# Patient Record
Sex: MALE | Race: WHITE | Employment: OTHER | URBAN - METROPOLITAN AREA
[De-identification: names, ages, dates, MRNs, and addresses within clinical notes are randomized per-mention and may not be internally consistent; named-entity substitution may affect disease eponyms.]

---

## 2018-03-13 PROBLEM — R73.02 GLUCOSE INTOLERANCE (IMPAIRED GLUCOSE TOLERANCE): Chronic | Status: ACTIVE | Noted: 2018-03-13

## 2018-03-13 PROBLEM — M10.9 GOUT: Chronic | Status: ACTIVE | Noted: 2018-03-13

## 2018-03-13 PROBLEM — E78.2 MIXED HYPERLIPIDEMIA: Chronic | Status: ACTIVE | Noted: 2018-03-13

## 2018-03-13 PROBLEM — Z87.442 HISTORY OF RENAL STONE: Chronic | Status: ACTIVE | Noted: 2018-03-13

## 2018-03-13 PROBLEM — Z87.442 HISTORY OF RENAL STONE: Status: ACTIVE | Noted: 2018-03-13

## 2018-03-13 PROBLEM — N18.30 CKD (CHRONIC KIDNEY DISEASE) STAGE 3, GFR 30-59 ML/MIN (HCC): Chronic | Status: ACTIVE | Noted: 2018-03-13

## 2018-03-13 PROBLEM — I12.9 BENIGN HYPERTENSIVE KIDNEY DISEASE WITH CHRONIC KIDNEY DISEASE: Chronic | Status: ACTIVE | Noted: 2018-03-13

## 2018-06-22 PROBLEM — E79.0 HYPERURICEMIA: Chronic | Status: ACTIVE | Noted: 2018-06-22

## 2018-07-21 ENCOUNTER — HOSPITAL ENCOUNTER (EMERGENCY)
Age: 78
Discharge: HOME | End: 2018-07-21
Attending: EMERGENCY MEDICINE
Payer: MEDICARE

## 2018-07-21 VITALS
HEART RATE: 75 BPM | DIASTOLIC BLOOD PRESSURE: 84 MMHG | HEIGHT: 66 IN | BODY MASS INDEX: 29.57 KG/M2 | WEIGHT: 184 LBS | TEMPERATURE: 97.6 F | RESPIRATION RATE: 20 BRPM | OXYGEN SATURATION: 97 % | SYSTOLIC BLOOD PRESSURE: 147 MMHG

## 2018-07-21 DIAGNOSIS — M54.41 CHRONIC RIGHT-SIDED LOW BACK PAIN WITH RIGHT-SIDED SCIATICA: Primary | ICD-10-CM

## 2018-07-21 DIAGNOSIS — G89.29 CHRONIC RIGHT-SIDED LOW BACK PAIN WITH RIGHT-SIDED SCIATICA: Primary | ICD-10-CM

## 2018-07-21 PROCEDURE — 99282 EMERGENCY DEPT VISIT SF MDM: CPT | Performed by: EMERGENCY MEDICINE

## 2018-07-21 PROCEDURE — 74011250636 HC RX REV CODE- 250/636: Performed by: NURSE PRACTITIONER

## 2018-07-21 PROCEDURE — 96372 THER/PROPH/DIAG INJ SC/IM: CPT | Performed by: EMERGENCY MEDICINE

## 2018-07-21 RX ORDER — KETOROLAC TROMETHAMINE 30 MG/ML
30 INJECTION, SOLUTION INTRAMUSCULAR; INTRAVENOUS
Status: COMPLETED | OUTPATIENT
Start: 2018-07-21 | End: 2018-07-21

## 2018-07-21 RX ORDER — MELOXICAM 15 MG/1
15 TABLET ORAL DAILY
Qty: 15 TAB | Refills: 0 | Status: SHIPPED | OUTPATIENT
Start: 2018-07-21 | End: 2018-08-29 | Stop reason: CLARIF

## 2018-07-21 RX ORDER — DEXAMETHASONE SODIUM PHOSPHATE 4 MG/ML
8 INJECTION, SOLUTION INTRA-ARTICULAR; INTRALESIONAL; INTRAMUSCULAR; INTRAVENOUS; SOFT TISSUE
Status: DISCONTINUED | OUTPATIENT
Start: 2018-07-21 | End: 2018-07-21

## 2018-07-21 RX ADMIN — METHYLPREDNISOLONE SODIUM SUCCINATE 125 MG: 125 INJECTION, POWDER, FOR SOLUTION INTRAMUSCULAR; INTRAVENOUS at 11:58

## 2018-07-21 RX ADMIN — KETOROLAC TROMETHAMINE 30 MG: 30 INJECTION, SOLUTION INTRAMUSCULAR at 11:55

## 2018-07-21 NOTE — ED TRIAGE NOTES
Pt states having right leg pain from the lower back all the way to the foot for the past 4 days. Denies any injury. Pt has hx of sciatica.

## 2018-07-21 NOTE — ED PROVIDER NOTES
Patient is a 65 y.o. male presenting with leg pain. The history is provided by the patient. No  was used. Leg Pain    This is a chronic problem. The current episode started more than 1 week ago. The problem occurs constantly. The problem has not changed since onset. Pain location: right buttock radiating down  his entire right thigh. Pertinent negatives include no numbness. This patient presents to the ED with a c/o an acute exacerbation of his chronic back pain, and RLE sciatica onset over the last couple weeks. He states that he sees [facility] for this pain, and states that he has in the past received several steroid injections, and states that this helped him significantly, but that he has not been able to receive a steroid injection in the last several months, and states that his pain has gotten significant worse. He states that he has had discussions regarding surgical intervention with the orthopedist, but states that he does not want to have surgery done. He presented to day to try to obtain  Pain control or relief from his chronic pain.     Past Medical History:   Diagnosis Date    Calculus of kidney 12/23/2013    Chronic kidney disease, stage III (moderate) 12/23/2013    Fracture of coccyx ([facility] [address] 1/14/2016    Gout, unspecified 12/23/2013    History of left shoulder fracture 1/14/2016    Hyperlipidemia 1/14/2016    Microscopic hematuria 12/23/2013    Sciatica of right side 1/14/2016    Unspecified essential hypertension 12/23/2013    Urinary tract infection, site not specified 12/23/2013       Past Surgical History:   Procedure Laterality Date    HX LITHOTRIPSY      HX SHOULDER REPLACEMENT  12/2014    left shoulder    TOTAL HIP ARTHROPLASTY           Family History:   Problem Relation Age of Onset    No Known Problems Mother     No Known Problems Father     No Known Problems Maternal Grandmother     No Known Problems Maternal Grandfather     No Known Problems Paternal Grandmother     No Known Problems Paternal Grandfather        Social History     Social History    Marital status:      Spouse name: N/A    Number of children: N/A    Years of education: N/A     Occupational History    Not on file. Social History Main Topics    Smoking status: Never Smoker    Smokeless tobacco: Never Used    Alcohol use No    Drug use: No    Sexual activity: Not Currently     Other Topics Concern    Not on file     Social History Narrative         ALLERGIES: Clonidine and Simvastatin    Review of Systems   Constitutional: Negative for chills and fever. Gastrointestinal: Negative for nausea and vomiting. Musculoskeletal: Negative for arthralgias and joint swelling. Skin: Negative for rash and wound. Neurological: Negative for dizziness and numbness. Vitals:    07/21/18 1123   BP: 147/84   Pulse: 75   Resp: 20   Temp: 97.6 °F (36.4 °C)   SpO2: 97%   Weight: 83.5 kg (184 lb)   Height: 5' 6\" (1.676 m)            Physical Exam   Constitutional: He is oriented to person, place, and time. He appears well-developed and well-nourished. No distress. HENT:   Head: Normocephalic and atraumatic. Cardiovascular: Intact distal pulses. Pulses:       Dorsalis pedis pulses are 2+ on the right side, and 2+ on the left side. Posterior tibial pulses are 2+ on the right side, and 2+ on the left side. Pulmonary/Chest: Effort normal. No respiratory distress. Musculoskeletal:   Mild, diffuse tenderness to palpation along the right lateral aspect of the back. No midline tenderness, bony crepitus, deformity, or bruising or swelling noted. No tenderness to palpation of the posterior thigh. Neurological: He is alert and oriented to person, place, and time. Skin: Skin is warm and dry. No erythema.         MDM  Number of Diagnoses or Management Options  Chronic right-sided low back pain with right-sided sciatica: established and worsening  Diagnosis management comments: Given the chronicity of the patient's symptoms, and the fact that he has not had steroid injection sometime, we will give him a systemic steroid injection and intramuscularly today, and will provide him some anti-inflammatory relief. I recommended that he try using exercises, which I provided in his paperwork. I do not believe that imaging would provide additional diagnostic benefit at this time. I also recommended that he follow up with [address] [place] [address] again, which his next appointment is scheduled for 731. I did tell him that he could try calling their office, and letting them know that he had to be seen in the ED for this. The patient and the family both expressed full understanding and compliance with this plan.     Risk of Complications, Morbidity, and/or Mortality  Presenting problems: minimal  Diagnostic procedures: minimal  Management options: minimal    Patient Progress  Patient progress: stable        ED Course       Procedures

## 2018-07-21 NOTE — DISCHARGE INSTRUCTIONS
Low Back Pain: Exercises  Your Care Instructions  Here are some examples of typical rehabilitation exercises for your condition. Start each exercise slowly. Ease off the exercise if you start to have pain. Your doctor or physical therapist will tell you when you can start these exercises and which ones will work best for you. How to do the exercises  Press-up    1. Lie on your stomach, supporting your body with your forearms. 2. Press your elbows down into the floor to raise your upper back. As you do this, relax your stomach muscles and allow your back to arch without using your back muscles. As your press up, do not let your hips or pelvis come off the floor. 3. Hold for 15 to 30 seconds, then relax. 4. Repeat 2 to 4 times. Alternate arm and leg (bird dog) exercise    Do this exercise slowly. Try to keep your body straight at all times, and do not let one hip drop lower than the other. 1. Start on the floor, on your hands and knees. 2. Tighten your belly muscles. 3. Raise one leg off the floor, and hold it straight out behind you. Be careful not to let your hip drop down, because that will twist your trunk. 4. Hold for about 6 seconds, then lower your leg and switch to the other leg. 5. Repeat 8 to 12 times on each leg. 6. Over time, work up to holding for 10 to 30 seconds each time. 7. If you feel stable and secure with your leg raised, try raising the opposite arm straight out in front of you at the same time. Knee-to-chest exercise    1. Lie on your back with your knees bent and your feet flat on the floor. 2. Bring one knee to your chest, keeping the other foot flat on the floor (or keeping the other leg straight, whichever feels better on your lower back). 3. Keep your lower back pressed to the floor. Hold for at least 15 to 30 seconds. 4. Relax, and lower the knee to the starting position. 5. Repeat with the other leg. Repeat 2 to 4 times with each leg.   6. To get more stretch, put your other leg flat on the floor while pulling your knee to your chest.  Curl-ups    1. Lie on the floor on your back with your knees bent at a 90-degree angle. Your feet should be flat on the floor, about 12 inches from your buttocks. 2. Cross your arms over your chest. If this bothers your neck, try putting your hands behind your neck (not your head), with your elbows spread apart. 3. Slowly tighten your belly muscles and raise your shoulder blades off the floor. 4. Keep your head in line with your body, and do not press your chin to your chest.  5. Hold this position for 1 or 2 seconds, then slowly lower yourself back down to the floor. 6. Repeat 8 to 12 times. Pelvic tilt exercise    1. Lie on your back with your knees bent. 2. \"Brace\" your stomach. This means to tighten your muscles by pulling in and imagining your belly button moving toward your spine. You should feel like your back is pressing to the floor and your hips and pelvis are rocking back. 3. Hold for about 6 seconds while you breathe smoothly. 4. Repeat 8 to 12 times. Heel dig bridging    1. Lie on your back with both knees bent and your ankles bent so that only your heels are digging into the floor. Your knees should be bent about 90 degrees. 2. Then push your heels into the floor, squeeze your buttocks, and lift your hips off the floor until your shoulders, hips, and knees are all in a straight line. 3. Hold for about 6 seconds as you continue to breathe normally, and then slowly lower your hips back down to the floor and rest for up to 10 seconds. 4. Do 8 to 12 repetitions. Hamstring stretch in doorway    1. Lie on your back in a doorway, with one leg through the open door. 2. Slide your leg up the wall to straighten your knee. You should feel a gentle stretch down the back of your leg. 3. Hold the stretch for at least 15 to 30 seconds. Do not arch your back, point your toes, or bend either knee.  Keep one heel touching the floor and the other heel touching the wall. 4. Repeat with your other leg. 5. Do 2 to 4 times for each leg. Hip flexor stretch    1. Kneel on the floor with one knee bent and one leg behind you. Place your forward knee over your foot. Keep your other knee touching the floor. 2. Slowly push your hips forward until you feel a stretch in the upper thigh of your rear leg. 3. Hold the stretch for at least 15 to 30 seconds. Repeat with your other leg. 4. Do 2 to 4 times on each side. Wall sit    1. Stand with your back 10 to 12 inches away from a wall. 2. Lean into the wall until your back is flat against it. 3. Slowly slide down until your knees are slightly bent, pressing your lower back into the wall. 4. Hold for about 6 seconds, then slide back up the wall. 5. Repeat 8 to 12 times. Follow-up care is a key part of your treatment and safety. Be sure to make and go to all appointments, and call your doctor if you are having problems. It's also a good idea to know your test results and keep a list of the medicines you take. Where can you learn more? Go to http://harlanExaGrid Systemslaure.info/. Enter L250 in the search box to learn more about \"Low Back Pain: Exercises. \"  Current as of: November 29, 2017  Content Version: 11.7  © 6593-1462 [employer]. Care instructions adapted under license by [employer] (which disclaims liability or warranty for this information). If you have questions about a medical condition or this instruction, always ask your healthcare professional. [place] [address] any warranty or liability for your use of this information. Follow-up with the [address] [place] [address] as discussed for further evaluation and treatment. I recommend calling them on Monday to discuss trying to get in sooner. The medicines provided to you in the ER should help alleviate your pain.   You may also take the Mobic as prescribed to help decrease in better control your pain over the next several days, until you are able to follow up. Return to the ED if you have any new or worsened symptoms, including difficulty walking, numbness or tingling, or difficulty urinating. Back Pain: Care Instructions  Your Care Instructions    Back pain has many possible causes. It is often related to problems with muscles and ligaments of the back. It may also be related to problems with the nerves, discs, or bones of the back. Moving, lifting, standing, sitting, or sleeping in an awkward way can strain the back. Sometimes you don't notice the injury until later. Arthritis is another common cause of back pain. Although it may hurt a lot, back pain usually improves on its own within several weeks. Most people recover in 12 weeks or less. Using good home treatment and being careful not to stress your back can help you feel better sooner. Follow-up care is a key part of your treatment and safety. Be sure to make and go to all appointments, and call your doctor if you are having problems. It's also a good idea to know your test results and keep a list of the medicines you take. How can you care for yourself at home? · Sit or lie in positions that are most comfortable and reduce your pain. Try one of these positions when you lie down:  ¨ Lie on your back with your knees bent and supported by large pillows. ¨ Lie on the floor with your legs on the seat of a sofa or chair. Dallie Mati on your side with your knees and hips bent and a pillow between your legs. ¨ Lie on your stomach if it does not make pain worse. · Do not sit up in bed, and avoid soft couches and twisted positions. Bed rest can help relieve pain at first, but it delays healing. Avoid bed rest after the first day of back pain. · Change positions every 30 minutes. If you must sit for long periods of time, take breaks from sitting. Get up and walk around, or lie in a comfortable position.   · Try using a heating pad on a low or medium setting for 15 to 20 minutes every 2 or 3 hours. Try a warm shower in place of one session with the heating pad. · You can also try an ice pack for 10 to 15 minutes every 2 to 3 hours. Put a thin cloth between the ice pack and your skin. · Take pain medicines exactly as directed. ¨ If the doctor gave you a prescription medicine for pain, take it as prescribed. ¨ If you are not taking a prescription pain medicine, ask your doctor if you can take an over-the-counter medicine. · Take short walks several times a day. You can start with 5 to 10 minutes, 3 or 4 times a day, and work up to longer walks. Walk on level surfaces and avoid hills and stairs until your back is better. · Return to work and other activities as soon as you can. Continued rest without activity is usually not good for your back. · To prevent future back pain, do exercises to stretch and strengthen your back and stomach. Learn how to use good posture, safe lifting techniques, and proper body mechanics. When should you call for help? Call your doctor now or seek immediate medical care if:    · You have new or worsening numbness in your legs.     · You have new or worsening weakness in your legs. (This could make it hard to stand up.)     · You lose control of your bladder or bowels.    Watch closely for changes in your health, and be sure to contact your doctor if:    · You have a fever, lose weight, or don't feel well.     · You do not get better as expected. Where can you learn more? Go to http://harlan-laure.info/. Enter G765 in the search box to learn more about \"Back Pain: Care Instructions. \"  Current as of: November 29, 2017  Content Version: 11.7  © 0140-0418 [employer]. Care instructions adapted under license by [employer] (which disclaims liability or warranty for this information).  If you have questions about a medical condition or this instruction, always ask your healthcare professional. [address] any warranty or liability for your use of this information.

## 2018-07-21 NOTE — ED NOTES
I have reviewed discharge instructions with the patient. The patient verbalized understanding. Patient left ED via Discharge Method: wheelchair to Home with wife. Opportunity for questions and clarification provided. Patient given 1 scripts. To continue your aftercare when you leave the hospital, you may receive an automated call from our care team to check in on how you are doing. This is a free service and part of our promise to provide the best care and service to meet your aftercare needs.  If you have questions, or wish to unsubscribe from this service please call [phone]. Thank you for Choosing our [facility] Emergency Department.

## 2018-08-29 ENCOUNTER — HOSPITAL ENCOUNTER (OUTPATIENT)
Dept: SURGERY | Age: 78
Discharge: HOME | End: 2018-08-29
Payer: MEDICARE

## 2018-08-29 VITALS
TEMPERATURE: 97.9 F | DIASTOLIC BLOOD PRESSURE: 78 MMHG | RESPIRATION RATE: 16 BRPM | SYSTOLIC BLOOD PRESSURE: 130 MMHG | BODY MASS INDEX: 28.16 KG/M2 | HEIGHT: 66 IN | WEIGHT: 175.25 LBS | HEART RATE: 70 BPM | OXYGEN SATURATION: 96 %

## 2018-08-29 LAB
ANION GAP SERPL CALC-SCNC: 10 MMOL/L (ref 7–16)
APPEARANCE UR: CLEAR
ATRIAL RATE: 65 BPM
BACTERIA SPEC CULT: NORMAL
BASOPHILS # BLD: 0.1 K/UL (ref 0–0.2)
BASOPHILS NFR BLD: 1 % (ref 0–2)
BILIRUB UR QL: NEGATIVE
BUN SERPL-MCNC: 27 MG/DL (ref 8–23)
CALCIUM SERPL-MCNC: 9.2 MG/DL (ref 8.3–10.4)
CALCULATED P AXIS, ECG09: -13 DEGREES
CALCULATED R AXIS, ECG10: -50 DEGREES
CALCULATED T AXIS, ECG11: 65 DEGREES
CHLORIDE SERPL-SCNC: 103 MMOL/L (ref 98–107)
CO2 SERPL-SCNC: 26 MMOL/L (ref 21–32)
COLOR UR: YELLOW
CREAT SERPL-MCNC: 1.55 MG/DL (ref 0.8–1.5)
DIAGNOSIS, 93000: NORMAL
DIFFERENTIAL METHOD BLD: ABNORMAL
EOSINOPHIL # BLD: 0.1 K/UL (ref 0–0.8)
EOSINOPHIL NFR BLD: 1 % (ref 0.5–7.8)
ERYTHROCYTE [DISTWIDTH] IN BLOOD BY AUTOMATED COUNT: 12.6 %
GLUCOSE SERPL-MCNC: 107 MG/DL (ref 65–100)
GLUCOSE UR STRIP.AUTO-MCNC: NEGATIVE MG/DL
HCT VFR BLD AUTO: 42 % (ref 41.1–50.3)
HGB BLD-MCNC: 14.5 G/DL (ref 13.6–17.2)
HGB UR QL STRIP: NEGATIVE
IMM GRANULOCYTES # BLD: 0 K/UL (ref 0–0.5)
IMM GRANULOCYTES NFR BLD AUTO: 0 % (ref 0–5)
KETONES UR QL STRIP.AUTO: NEGATIVE MG/DL
LEUKOCYTE ESTERASE UR QL STRIP.AUTO: NEGATIVE
LYMPHOCYTES # BLD: 2.2 K/UL (ref 0.5–4.6)
LYMPHOCYTES NFR BLD: 23 % (ref 13–44)
MCH RBC QN AUTO: 34.2 PG (ref 26.1–32.9)
MCHC RBC AUTO-ENTMCNC: 34.5 G/DL (ref 31.4–35)
MCV RBC AUTO: 99.1 FL (ref 79.6–97.8)
MONOCYTES # BLD: 0.7 K/UL (ref 0.1–1.3)
MONOCYTES NFR BLD: 7 % (ref 4–12)
NEUTS SEG # BLD: 6.5 K/UL (ref 1.7–8.2)
NEUTS SEG NFR BLD: 68 % (ref 43–78)
NITRITE UR QL STRIP.AUTO: NEGATIVE
NRBC # BLD: 0 K/UL (ref 0–0.2)
P-R INTERVAL, ECG05: 216 MS
PH UR STRIP: 6 [PH] (ref 5–9)
PLATELET # BLD AUTO: 237 K/UL (ref 150–450)
PMV BLD AUTO: 11.2 FL (ref 9.4–12.3)
POTASSIUM SERPL-SCNC: 4.5 MMOL/L (ref 3.5–5.1)
PROT UR STRIP-MCNC: NEGATIVE MG/DL
Q-T INTERVAL, ECG07: 426 MS
QRS DURATION, ECG06: 96 MS
QTC CALCULATION (BEZET), ECG08: 443 MS
RBC # BLD AUTO: 4.24 M/UL (ref 4.23–5.6)
SERVICE CMNT-IMP: NORMAL
SODIUM SERPL-SCNC: 139 MMOL/L (ref 136–145)
SP GR UR REFRACTOMETRY: 1.01 (ref 1–1.02)
UROBILINOGEN UR QL STRIP.AUTO: 0.2 EU/DL (ref 0.2–1)
VENTRICULAR RATE, ECG03: 65 BPM
WBC # BLD AUTO: 9.6 K/UL (ref 4.3–11.1)

## 2018-08-29 PROCEDURE — 81003 URINALYSIS AUTO W/O SCOPE: CPT

## 2018-08-29 PROCEDURE — 93005 ELECTROCARDIOGRAM TRACING: CPT | Performed by: ORTHOPAEDIC SURGERY

## 2018-08-29 PROCEDURE — 80048 BASIC METABOLIC PNL TOTAL CA: CPT

## 2018-08-29 PROCEDURE — 85025 COMPLETE CBC W/AUTO DIFF WBC: CPT

## 2018-08-29 PROCEDURE — 77030027138 HC INCENT SPIROMETER -A

## 2018-08-29 PROCEDURE — 87641 MR-STAPH DNA AMP PROBE: CPT

## 2018-08-29 NOTE — PERIOP NOTES
Patient verified name, , and surgery as listed in [facility]. TYPE  CASE:lll Orders:  received Labs per Spine protocol:  CBC,BMP,U/A, MRSA swab Labs per anesthesia protocol: EKG, T&S on DOS 
EKG/cardiac records  : Today NSR 1st degree AV Block, patient denies any cardiac hx Glucose:none Medication bottles were visualized today. Requested pt to validate each medication, dose and frequency while here today. Copy of medication reconciliation provided to the patient and instructed patient to compare to medication bottles. If any changes need to be made call this RN at [phone]. Instructed patient to continue previous medications as prescribed prior to surgery and  to take the following medications the day of surgery according to anesthesia guidelines with a small sip of water : Amlodipine Continue all previous medications unless otherwise directed. Instructed patient to hold all vitamins 7 days prior to surgery and the following medications prior to surgery: Vitamins Pt viewed Spine Pre-hab video. All further questions were addressed. Pt was provided with antibacterial soap, Hibiclens, long-handled sponge, Spine Recovery booklet and incentive spirometer. Pt correctly demonstrated use of incentive spirometer and instruction to bring it to the hospital on day of surgery. Handouts and all Surgery instructions provided to pt and pt verbalizes understanding. Patient Guide to Surgery Packet provided to patient. Packet includes Patient Guide to surgery handout, Facts about Pain Management handout, Facts about Urinary Catherization handout, Hand Hygiene handout, Patient Education and Teaching Sheet -Transfusion of Blood and Blood Products handout, and  [facility] frequent question and answer sheet. Guide reviewed with the patient and all questions answered to the satisfaction of the patient. Pt advised to visit [email or web address] for more educational information regarding anesthesia and to record any additional questions that arise so that it can be addressed by the anesthesiologist on the morning of surgery. Patient instructed on the following and verbalized understanding: 
Arrive at main entrance, time of arrival to be called the day before by 1700. Responsible adult must drive patient to and from hospital, stay during surgery and 24 hours postoperatively. Npo after midnight including gum, mints and ice chips. Shower using hibiclens the night before and the morning of surgery. Hibiclens provided to the patient with handout and verbal instructions for use. Leave all valuables at home. Instructed on no jewelry or body piercings on the dos. Bring insurance card and ID. No perfumes, oil, powder, colognes, makeup or  lotions on the skin. Patient verbalized understanding of all instructions and provided all medical/health information to the best of their ability.

## 2018-08-29 NOTE — PERIOP NOTES
Recent Results (from the past 12 hour(s)) CBC WITH AUTOMATED DIFF Collection Time: 08/29/18 10:41 AM  
Result Value Ref Range WBC 9.6 4.3 - 11.1 K/uL  
 RBC 4.24 4.23 - 5.6 M/uL  
 HGB 14.5 13.6 - 17.2 g/dL HCT 42.0 41.1 - 50.3 % MCV 99.1 (H) 79.6 - 97.8 FL  
 MCH 34.2 (H) 26.1 - 32.9 PG  
 MCHC 34.5 31.4 - 35.0 g/dL  
 RDW 12.6 % PLATELET 010 851 - 821 K/uL MPV 11.2 9.4 - 12.3 FL ABSOLUTE NRBC 0.00 0.0 - 0.2 K/uL  
 DF AUTOMATED NEUTROPHILS 68 43 - 78 % LYMPHOCYTES 23 13 - 44 % MONOCYTES 7 4.0 - 12.0 % EOSINOPHILS 1 0.5 - 7.8 % BASOPHILS 1 0.0 - 2.0 % IMMATURE GRANULOCYTES 0 0.0 - 5.0 %  
 ABS. NEUTROPHILS 6.5 1.7 - 8.2 K/UL  
 ABS. LYMPHOCYTES 2.2 0.5 - 4.6 K/UL  
 ABS. MONOCYTES 0.7 0.1 - 1.3 K/UL  
 ABS. EOSINOPHILS 0.1 0.0 - 0.8 K/UL  
 ABS. BASOPHILS 0.1 0.0 - 0.2 K/UL  
 ABS. IMM. GRANS. 0.0 0.0 - 0.5 K/UL METABOLIC PANEL, BASIC Collection Time: 08/29/18 10:41 AM  
Result Value Ref Range Sodium 139 136 - 145 mmol/L Potassium 4.5 3.5 - 5.1 mmol/L Chloride 103 98 - 107 mmol/L  
 CO2 26 21 - 32 mmol/L Anion gap 10 7 - 16 mmol/L Glucose 107 (H) 65 - 100 mg/dL BUN 27 (H) 8 - 23 MG/DL Creatinine 1.55 (H) 0.8 - 1.5 MG/DL  
 GFR est AA 56 (L) >60 ml/min/1.73m2 GFR est non-AA 46 (L) >60 ml/min/1.73m2 Calcium 9.2 8.3 - 10.4 MG/DL  
MSSA/MRSA SC BY PCR, NASAL SWAB Collection Time: 08/29/18 11:00 AM  
Result Value Ref Range Special Requests: NO SPECIAL REQUESTS Culture result:     
  SA target not detected. A MRSA NEGATIVE, SA NEGATIVE test result does not preclude MRSA or SA nasal colonization. URINALYSIS W/ RFLX MICROSCOPIC Collection Time: 08/29/18 11:00 AM  
Result Value Ref Range Color YELLOW Appearance CLEAR Specific gravity 1.008 1.001 - 1.023    
 pH (UA) 6.0 5.0 - 9.0 Protein NEGATIVE  NEG mg/dL Glucose NEGATIVE  mg/dL Ketone NEGATIVE  NEG mg/dL Bilirubin NEGATIVE  NEG Blood NEGATIVE  NEG Urobilinogen 0.2 0.2 - 1.0 EU/dL Nitrites NEGATIVE  NEG  Leukocyte Esterase NEGATIVE  NEG

## 2018-08-30 NOTE — H&P
Allergies:NKDA Medications: AmLODIPine Besylate (5 MG); Benazepril-Hydrochlorothiazide (20-12.5 MG); Norco (7.5-325 MG, Take 1-2 tablet(s) by mouth every 6 hours as needed [PRN]) CC: LOW BACK AND LEG PAIN 
 
HPI:  Recheck. He has been following with our PA. He is 65 and he has a grade 2 L5-S1 spondylolisthesis with severe foraminal stenosis bilaterally, but he really only has right leg pain, and it is a classic L5 distribution. Recent MRI scan of 08/03/2018 shows severe foraminal stenosis. No central stenosis. He still has a reasonable disc space, but it is a significant spondylolisthesis. He has been receiving L5 nerve blocks and they help temporarily. The last one was about 2 weeks ago and it is wearing off and he is having severe pain. Apparently, he was screaming initially at the visit today at the  until they could get his pain under better control. He says he always hurts at night when he tries to sleep. It hurts to stand and walk. He has improvement sitting and flexing. He also says if he is sitting and he elevates his right foot up on a step or something, his pain goes away. I printed out x-rays and the MRI scan and showed he and his wife the problem, and I told him with this degree of listhesis, I think the best way to treat him would actually be an anteroposterior fusion. I went through his medical history and he has some high blood pressure, but no other significant medical problems, although, he has been told he has some kidney disease, but they are not doing any treatment or anything special for it. SH:  He does not smoke. He is not a diabetic. He does not drink. PE:  He looks younger than his stated age. He looks healthy. Normal body habitus. He is not obese. He looks in pretty good shape. Alert and oriented x3. His pupils are equal, round and reactive to light. Oropharynx is clear. Neck is without adenopathy or bruit.   Sclerae are nonicteric. Mucous membranes are moist.  Lungs are clear to auscultation bilaterally. Heart has regular rate and rhythm without murmur. Abdomen is soft and nontender. No hepatosplenomegaly. He has not had any abdominal surgery. ASSESSMENT/PLAN:  The surgery I would suggest would be an ALIF on a Friday, have him stay in bed over the weekend, and then do a posterior fusion the following Monday. This way, he would not be asleep for too long and it would limit blood loss, and I think it would be better tolerated than a prolonged AP fusion in 1 day or a difficult posterior decompression and fusion with the large spondylolisthesis that he has. We talked about the risks of surgery and I answered his questions. He has decided that he does want to go through with it, but he would also like to go ahead and have another right L5 selective nerve root block in the interim, and it is probably going to be 3 weeks, so I think that is a reasonable idea. He takes Advil and it sounds like he only takes it once a day and it really helps. He had been given some Norco 7.5 mg, but he has not taken it because he thought it might hurt his kidneys, and I told him it would not. So, when he has pain during the day after he has already taken his Advil, I would recommend that he take the Norco.  We will schedule this and see him at surgery, an L5-S1 AP fusion.  
 
 
 
Electronically Signed By Carlie Rosado MD

## 2018-09-06 ENCOUNTER — ANESTHESIA EVENT (OUTPATIENT)
Dept: SURGERY | Age: 78
DRG: 455 | End: 2018-09-06
Payer: MEDICARE

## 2018-09-07 ENCOUNTER — HOSPITAL ENCOUNTER (INPATIENT)
Age: 78
LOS: 6 days | Discharge: HOME HEALTH | DRG: 455 | End: 2018-09-13
Attending: ORTHOPAEDIC SURGERY | Admitting: ORTHOPAEDIC SURGERY
Payer: MEDICARE

## 2018-09-07 ENCOUNTER — ANESTHESIA (OUTPATIENT)
Dept: SURGERY | Age: 78
DRG: 455 | End: 2018-09-07
Payer: MEDICARE

## 2018-09-07 ENCOUNTER — APPOINTMENT (OUTPATIENT)
Dept: GENERAL RADIOLOGY | Age: 78
DRG: 455 | End: 2018-09-07
Attending: ORTHOPAEDIC SURGERY
Payer: MEDICARE

## 2018-09-07 DIAGNOSIS — M48.062 SPINAL STENOSIS OF LUMBAR REGION WITH NEUROGENIC CLAUDICATION: Primary | ICD-10-CM

## 2018-09-07 PROBLEM — M43.17 SPONDYLOLISTHESIS AT L5-S1 LEVEL: Status: ACTIVE | Noted: 2018-09-07

## 2018-09-07 PROBLEM — M48.07 SPINAL STENOSIS OF LUMBOSACRAL REGION: Status: ACTIVE | Noted: 2018-09-07

## 2018-09-07 PROBLEM — M48.00 SPINAL STENOSIS: Status: ACTIVE | Noted: 2018-09-07

## 2018-09-07 LAB
ABO + RH BLD: NORMAL
BLOOD GROUP ANTIBODIES SERPL: NORMAL
SPECIMEN EXP DATE BLD: NORMAL

## 2018-09-07 PROCEDURE — 86901 BLOOD TYPING SEROLOGIC RH(D): CPT

## 2018-09-07 PROCEDURE — 77030003666 HC NDL SPINAL BD -A: Performed by: ORTHOPAEDIC SURGERY

## 2018-09-07 PROCEDURE — 77030039425 HC BLD LARYNG TRULITE DISP TELE -A: Performed by: ANESTHESIOLOGY

## 2018-09-07 PROCEDURE — 77030013292 HC BOWL MX PRSM J&J -A: Performed by: ANESTHESIOLOGY

## 2018-09-07 PROCEDURE — 72100 X-RAY EXAM L-S SPINE 2/3 VWS: CPT

## 2018-09-07 PROCEDURE — 74011250637 HC RX REV CODE- 250/637: Performed by: ORTHOPAEDIC SURGERY

## 2018-09-07 PROCEDURE — 76060000036 HC ANESTHESIA 2.5 TO 3 HR: Performed by: ORTHOPAEDIC SURGERY

## 2018-09-07 PROCEDURE — 77030008703 HC TU ET UNCUF COVD -A: Performed by: ANESTHESIOLOGY

## 2018-09-07 PROCEDURE — 77030032490 HC SLV COMPR SCD KNE COVD -B: Performed by: ORTHOPAEDIC SURGERY

## 2018-09-07 PROCEDURE — 77030008467 HC STPLR SKN COVD -B: Performed by: ORTHOPAEDIC SURGERY

## 2018-09-07 PROCEDURE — 74011250636 HC RX REV CODE- 250/636: Performed by: ORTHOPAEDIC SURGERY

## 2018-09-07 PROCEDURE — 74011250636 HC RX REV CODE- 250/636: Performed by: ANESTHESIOLOGY

## 2018-09-07 PROCEDURE — 77030030163 HC BN WAX J&J -A: Performed by: ORTHOPAEDIC SURGERY

## 2018-09-07 PROCEDURE — 65270000029 HC RM PRIVATE

## 2018-09-07 PROCEDURE — 77030025646 HC AUTOTRNSFUS KT TERU -C: Performed by: ORTHOPAEDIC SURGERY

## 2018-09-07 PROCEDURE — 77030011264 HC ELECTRD BLD EXT COVD -A: Performed by: ORTHOPAEDIC SURGERY

## 2018-09-07 PROCEDURE — 77030020407 HC IV BLD WRMR ST 3M -A: Performed by: ANESTHESIOLOGY

## 2018-09-07 PROCEDURE — 77030018836 HC SOL IRR NACL ICUM -A: Performed by: ORTHOPAEDIC SURGERY

## 2018-09-07 PROCEDURE — 77030019908 HC STETH ESOPH SIMS -A: Performed by: ANESTHESIOLOGY

## 2018-09-07 PROCEDURE — 74011000272 HC RX REV CODE- 272: Performed by: ORTHOPAEDIC SURGERY

## 2018-09-07 PROCEDURE — 77030002966 HC SUT PDS J&J -A: Performed by: ORTHOPAEDIC SURGERY

## 2018-09-07 PROCEDURE — 76210000016 HC OR PH I REC 1 TO 1.5 HR: Performed by: ORTHOPAEDIC SURGERY

## 2018-09-07 PROCEDURE — 77030034850: Performed by: ORTHOPAEDIC SURGERY

## 2018-09-07 PROCEDURE — 77030016570 HC BLNKT BAIR HGGR 3M -B: Performed by: ANESTHESIOLOGY

## 2018-09-07 PROCEDURE — 74011250636 HC RX REV CODE- 250/636

## 2018-09-07 PROCEDURE — 77030022198 HC TU ASPIR ASSMB TERU -A: Performed by: ORTHOPAEDIC SURGERY

## 2018-09-07 PROCEDURE — 77030013794 HC KT TRNSDUC BLD EDWD -B: Performed by: ANESTHESIOLOGY

## 2018-09-07 PROCEDURE — 77030008477 HC STYL SATN SLP COVD -A: Performed by: ANESTHESIOLOGY

## 2018-09-07 PROCEDURE — 74011000250 HC RX REV CODE- 250

## 2018-09-07 PROCEDURE — 77030022197 HC RESVR ATRNSFUS TERU -B: Performed by: ORTHOPAEDIC SURGERY

## 2018-09-07 PROCEDURE — 77030005401 HC CATH RAD ARRO -A: Performed by: ANESTHESIOLOGY

## 2018-09-07 PROCEDURE — 77030002996 HC SUT SLK J&J -A: Performed by: ORTHOPAEDIC SURGERY

## 2018-09-07 PROCEDURE — 76010000172 HC OR TIME 2.5 TO 3 HR INTENSV-TIER 1: Performed by: ORTHOPAEDIC SURGERY

## 2018-09-07 PROCEDURE — 77030014007 HC SPNG HEMSTAT J&J -B: Performed by: ORTHOPAEDIC SURGERY

## 2018-09-07 PROCEDURE — 74011250637 HC RX REV CODE- 250/637: Performed by: ANESTHESIOLOGY

## 2018-09-07 RX ORDER — HYDROMORPHONE HYDROCHLORIDE 2 MG/ML
1 INJECTION, SOLUTION INTRAMUSCULAR; INTRAVENOUS; SUBCUTANEOUS ONCE
Status: COMPLETED | OUTPATIENT
Start: 2018-09-07 | End: 2018-09-07

## 2018-09-07 RX ORDER — SODIUM CHLORIDE 9 MG/ML
25 INJECTION, SOLUTION INTRAVENOUS CONTINUOUS
Status: DISCONTINUED | OUTPATIENT
Start: 2018-09-07 | End: 2018-09-07 | Stop reason: HOSPADM

## 2018-09-07 RX ORDER — OXYCODONE HYDROCHLORIDE 5 MG/1
5 TABLET ORAL
Status: DISCONTINUED | OUTPATIENT
Start: 2018-09-07 | End: 2018-09-07 | Stop reason: HOSPADM

## 2018-09-07 RX ORDER — ROCURONIUM BROMIDE 10 MG/ML
INJECTION, SOLUTION INTRAVENOUS AS NEEDED
Status: DISCONTINUED | OUTPATIENT
Start: 2018-09-07 | End: 2018-09-07 | Stop reason: HOSPADM

## 2018-09-07 RX ORDER — TAMSULOSIN HYDROCHLORIDE 0.4 MG/1
0.4 CAPSULE ORAL DAILY
Status: DISCONTINUED | OUTPATIENT
Start: 2018-09-07 | End: 2018-09-13 | Stop reason: HOSPADM

## 2018-09-07 RX ORDER — HYDROMORPHONE HYDROCHLORIDE 2 MG/ML
0.5 INJECTION, SOLUTION INTRAMUSCULAR; INTRAVENOUS; SUBCUTANEOUS
Status: DISCONTINUED | OUTPATIENT
Start: 2018-09-07 | End: 2018-09-07 | Stop reason: HOSPADM

## 2018-09-07 RX ORDER — SODIUM CHLORIDE 0.9 % (FLUSH) 0.9 %
5-10 SYRINGE (ML) INJECTION EVERY 8 HOURS
Status: DISCONTINUED | OUTPATIENT
Start: 2018-09-07 | End: 2018-09-07 | Stop reason: HOSPADM

## 2018-09-07 RX ORDER — SODIUM CHLORIDE, SODIUM LACTATE, POTASSIUM CHLORIDE, CALCIUM CHLORIDE 600; 310; 30; 20 MG/100ML; MG/100ML; MG/100ML; MG/100ML
100 INJECTION, SOLUTION INTRAVENOUS CONTINUOUS
Status: DISCONTINUED | OUTPATIENT
Start: 2018-09-07 | End: 2018-09-07 | Stop reason: HOSPADM

## 2018-09-07 RX ORDER — SODIUM CHLORIDE 0.9 % (FLUSH) 0.9 %
5-10 SYRINGE (ML) INJECTION AS NEEDED
Status: DISCONTINUED | OUTPATIENT
Start: 2018-09-07 | End: 2018-09-07 | Stop reason: HOSPADM

## 2018-09-07 RX ORDER — PROPOFOL 10 MG/ML
INJECTION, EMULSION INTRAVENOUS AS NEEDED
Status: DISCONTINUED | OUTPATIENT
Start: 2018-09-07 | End: 2018-09-07 | Stop reason: HOSPADM

## 2018-09-07 RX ORDER — HYDROCODONE BITARTRATE AND ACETAMINOPHEN 7.5; 325 MG/1; MG/1
1 TABLET ORAL AS NEEDED
Status: DISCONTINUED | OUTPATIENT
Start: 2018-09-07 | End: 2018-09-07 | Stop reason: HOSPADM

## 2018-09-07 RX ORDER — FENTANYL CITRATE 50 UG/ML
100 INJECTION, SOLUTION INTRAMUSCULAR; INTRAVENOUS ONCE
Status: DISCONTINUED | OUTPATIENT
Start: 2018-09-07 | End: 2018-09-07 | Stop reason: HOSPADM

## 2018-09-07 RX ORDER — FENTANYL CITRATE 50 UG/ML
INJECTION, SOLUTION INTRAMUSCULAR; INTRAVENOUS AS NEEDED
Status: DISCONTINUED | OUTPATIENT
Start: 2018-09-07 | End: 2018-09-07 | Stop reason: HOSPADM

## 2018-09-07 RX ORDER — FAMOTIDINE 20 MG/1
20 TABLET, FILM COATED ORAL ONCE
Status: COMPLETED | OUTPATIENT
Start: 2018-09-07 | End: 2018-09-07

## 2018-09-07 RX ORDER — NALOXONE HYDROCHLORIDE 0.4 MG/ML
0.1 INJECTION, SOLUTION INTRAMUSCULAR; INTRAVENOUS; SUBCUTANEOUS AS NEEDED
Status: DISCONTINUED | OUTPATIENT
Start: 2018-09-07 | End: 2018-09-07 | Stop reason: HOSPADM

## 2018-09-07 RX ORDER — NEOSTIGMINE METHYLSULFATE 1 MG/ML
INJECTION INTRAVENOUS AS NEEDED
Status: DISCONTINUED | OUTPATIENT
Start: 2018-09-07 | End: 2018-09-07 | Stop reason: HOSPADM

## 2018-09-07 RX ORDER — MIDAZOLAM HYDROCHLORIDE 1 MG/ML
2 INJECTION, SOLUTION INTRAMUSCULAR; INTRAVENOUS
Status: DISCONTINUED | OUTPATIENT
Start: 2018-09-07 | End: 2018-09-07 | Stop reason: HOSPADM

## 2018-09-07 RX ORDER — SODIUM CHLORIDE, SODIUM LACTATE, POTASSIUM CHLORIDE, CALCIUM CHLORIDE 600; 310; 30; 20 MG/100ML; MG/100ML; MG/100ML; MG/100ML
INJECTION, SOLUTION INTRAVENOUS
Status: DISCONTINUED | OUTPATIENT
Start: 2018-09-07 | End: 2018-09-07 | Stop reason: HOSPADM

## 2018-09-07 RX ORDER — EPHEDRINE SULFATE 50 MG/ML
INJECTION, SOLUTION INTRAVENOUS AS NEEDED
Status: DISCONTINUED | OUTPATIENT
Start: 2018-09-07 | End: 2018-09-07 | Stop reason: HOSPADM

## 2018-09-07 RX ORDER — GLYCOPYRROLATE 0.2 MG/ML
INJECTION INTRAMUSCULAR; INTRAVENOUS AS NEEDED
Status: DISCONTINUED | OUTPATIENT
Start: 2018-09-07 | End: 2018-09-07 | Stop reason: HOSPADM

## 2018-09-07 RX ORDER — ONDANSETRON 2 MG/ML
INJECTION INTRAMUSCULAR; INTRAVENOUS AS NEEDED
Status: DISCONTINUED | OUTPATIENT
Start: 2018-09-07 | End: 2018-09-07 | Stop reason: HOSPADM

## 2018-09-07 RX ORDER — LIDOCAINE HYDROCHLORIDE 10 MG/ML
0.1 INJECTION INFILTRATION; PERINEURAL AS NEEDED
Status: DISCONTINUED | OUTPATIENT
Start: 2018-09-07 | End: 2018-09-07 | Stop reason: HOSPADM

## 2018-09-07 RX ORDER — LIDOCAINE HYDROCHLORIDE 20 MG/ML
INJECTION, SOLUTION EPIDURAL; INFILTRATION; INTRACAUDAL; PERINEURAL AS NEEDED
Status: DISCONTINUED | OUTPATIENT
Start: 2018-09-07 | End: 2018-09-07 | Stop reason: HOSPADM

## 2018-09-07 RX ORDER — CEFAZOLIN SODIUM/WATER 2 G/20 ML
2 SYRINGE (ML) INTRAVENOUS ONCE
Status: COMPLETED | OUTPATIENT
Start: 2018-09-07 | End: 2018-09-07

## 2018-09-07 RX ORDER — ESMOLOL HYDROCHLORIDE 10 MG/ML
INJECTION INTRAVENOUS AS NEEDED
Status: DISCONTINUED | OUTPATIENT
Start: 2018-09-07 | End: 2018-09-07 | Stop reason: HOSPADM

## 2018-09-07 RX ADMIN — SODIUM CHLORIDE, SODIUM LACTATE, POTASSIUM CHLORIDE, CALCIUM CHLORIDE: 600; 310; 30; 20 INJECTION, SOLUTION INTRAVENOUS at 16:44

## 2018-09-07 RX ADMIN — NEOSTIGMINE METHYLSULFATE 3 MG: 1 INJECTION INTRAVENOUS at 17:13

## 2018-09-07 RX ADMIN — HYDROMORPHONE HYDROCHLORIDE 0.5 MG: 2 INJECTION, SOLUTION INTRAMUSCULAR; INTRAVENOUS; SUBCUTANEOUS at 17:52

## 2018-09-07 RX ADMIN — ONDANSETRON 4 MG: 2 INJECTION INTRAMUSCULAR; INTRAVENOUS at 17:06

## 2018-09-07 RX ADMIN — FAMOTIDINE 20 MG: 20 TABLET ORAL at 12:16

## 2018-09-07 RX ADMIN — FENTANYL CITRATE 25 MCG: 50 INJECTION, SOLUTION INTRAMUSCULAR; INTRAVENOUS at 15:59

## 2018-09-07 RX ADMIN — EPHEDRINE SULFATE 5 MG: 50 INJECTION, SOLUTION INTRAVENOUS at 15:27

## 2018-09-07 RX ADMIN — PROPOFOL 160 MG: 10 INJECTION, EMULSION INTRAVENOUS at 15:03

## 2018-09-07 RX ADMIN — FENTANYL CITRATE 50 MCG: 50 INJECTION, SOLUTION INTRAMUSCULAR; INTRAVENOUS at 15:39

## 2018-09-07 RX ADMIN — EPHEDRINE SULFATE 10 MG: 50 INJECTION, SOLUTION INTRAVENOUS at 15:16

## 2018-09-07 RX ADMIN — LIDOCAINE HYDROCHLORIDE 60 MG: 20 INJECTION, SOLUTION EPIDURAL; INFILTRATION; INTRACAUDAL; PERINEURAL at 15:03

## 2018-09-07 RX ADMIN — ROCURONIUM BROMIDE 40 MG: 10 INJECTION, SOLUTION INTRAVENOUS at 15:03

## 2018-09-07 RX ADMIN — Medication 2 G: at 15:18

## 2018-09-07 RX ADMIN — EPHEDRINE SULFATE 5 MG: 50 INJECTION, SOLUTION INTRAVENOUS at 15:33

## 2018-09-07 RX ADMIN — SODIUM CHLORIDE, SODIUM LACTATE, POTASSIUM CHLORIDE, CALCIUM CHLORIDE: 600; 310; 30; 20 INJECTION, SOLUTION INTRAVENOUS at 15:00

## 2018-09-07 RX ADMIN — HYDROMORPHONE HYDROCHLORIDE 1 MG: 2 INJECTION, SOLUTION INTRAMUSCULAR; INTRAVENOUS; SUBCUTANEOUS at 13:39

## 2018-09-07 RX ADMIN — ROCURONIUM BROMIDE 10 MG: 10 INJECTION, SOLUTION INTRAVENOUS at 15:45

## 2018-09-07 RX ADMIN — FENTANYL CITRATE 25 MCG: 50 INJECTION, SOLUTION INTRAMUSCULAR; INTRAVENOUS at 16:05

## 2018-09-07 RX ADMIN — FENTANYL CITRATE 100 MCG: 50 INJECTION, SOLUTION INTRAMUSCULAR; INTRAVENOUS at 15:03

## 2018-09-07 RX ADMIN — FENTANYL CITRATE 50 MCG: 50 INJECTION, SOLUTION INTRAMUSCULAR; INTRAVENOUS at 16:28

## 2018-09-07 RX ADMIN — TAMSULOSIN HYDROCHLORIDE 0.4 MG: 0.4 CAPSULE ORAL at 20:47

## 2018-09-07 RX ADMIN — ESMOLOL HYDROCHLORIDE 20 MG: 10 INJECTION INTRAVENOUS at 17:14

## 2018-09-07 RX ADMIN — ROCURONIUM BROMIDE 10 MG: 10 INJECTION, SOLUTION INTRAVENOUS at 16:16

## 2018-09-07 RX ADMIN — HYDROMORPHONE HYDROCHLORIDE 0.5 MG: 2 INJECTION, SOLUTION INTRAMUSCULAR; INTRAVENOUS; SUBCUTANEOUS at 17:55

## 2018-09-07 RX ADMIN — SODIUM CHLORIDE, SODIUM LACTATE, POTASSIUM CHLORIDE, AND CALCIUM CHLORIDE 100 ML/HR: 600; 310; 30; 20 INJECTION, SOLUTION INTRAVENOUS at 12:16

## 2018-09-07 RX ADMIN — GLYCOPYRROLATE 0.4 MG: 0.2 INJECTION INTRAMUSCULAR; INTRAVENOUS at 17:13

## 2018-09-07 NOTE — BRIEF OP NOTE
BRIEF OPERATIVE NOTE Date of Procedure: 9/7/2018 Preoperative Diagnosis: Spondylolisthesis, unspecified spinal region [M43.10] Lumbar stenosis with neurogenic claudication [M48.062] Postoperative Diagnosis: same with enlarged, nonmobile scared down iliac veins Procedure(s): 
 attempted L5-S1 ALIF Surgeon(s) and Role: * Adis Burt MD - Primary Nader Arreola MD - Assisting Surgical Assistant: none Surgical Staff: 
Circ-1: Sunshine Burroughs RN 
Circ-Relief: Noemy Millan RN Perfusionist: Hollie Raymond Scrub Tech-1: Tiffany Toscano Scrub Tech-2: Marissa Banda Event Time In Incision Start [id number] Incision Close Anesthesia: General  
Estimated Blood Loss: minimal 
Specimens: * No specimens in log * [address] scared down nonmobile iliac veins Complications: none Implants: * No implants in log *

## 2018-09-07 NOTE — ANESTHESIA POSTPROCEDURE EVALUATION
Post-Anesthesia Evaluation and Assessment Patient: Cornelius Sanders MRN: [id number]  SSN: [id number] YOB: 1940  Age: 65 y.o. Sex: male Cardiovascular Function/Vital Signs Visit Vitals  /88  Pulse 80  Temp 36.1 °C (97 °F)  Resp 15  Ht 5' 6\" (1.676 m)  Wt 78.5 kg (173 lb 2 oz)  SpO2 99%  BMI 27.94 kg/m2 Patient is status post MAC anesthesia for Procedure(s): 
L5-S1 ALIF-ATTEMPTED. Nausea/Vomiting: None Postoperative hydration reviewed and adequate. Pain: 
Pain Scale 1: Numeric (0 - 10) (09/07/18 1754) Pain Intensity 1: 4 (09/07/18 1754) Managed Neurological Status:  
Neuro (WDL): Exceptions to WDL (09/07/18 1739) Neuro Neurologic State: Sleeping (09/07/18 1739) At baseline Mental Status and Level of Consciousness: Arousable Pulmonary Status:  
O2 Device: Nasal cannula (09/07/18 1739) Adequate oxygenation and airway patent Complications related to anesthesia: None Post-anesthesia assessment completed. No concerns Signed By: Bob Coon MD   
 September 7, 2018

## 2018-09-07 NOTE — PERIOP NOTES
Pt received from OR. Report received from [facility] and CRNA. Care of pt assumed. Pt placed on monitors and oxygen. See doc flowsheet for complete assessment. abd incision dry and intact.  Lt radial art line intact positional.

## 2018-09-07 NOTE — IP AVS SNAPSHOT
[address] 
[phone] Patient: Shanon Garcia MRN: [id number] :1940 A check rivera indicates which time of day the medication should be taken. My Medications START taking these medications Instructions Each Dose to Equal  
 Morning Noon Evening Bedtime HYDROcodone-acetaminophen 7.5-325 mg per tablet Commonly known as:  Marcell Callaway Your last dose was: This afternoon at 3 PM  
Your next dose is: Take today if needed after 9 PM  
   
 Take 1 Tab by mouth every six (6) hours as needed for Pain. Max Daily Amount: 4 Tabs. 1 Tab CONTINUE taking these medications Instructions Each Dose to Equal  
 Morning Noon Evening Bedtime  
 amLODIPine 5 mg tablet Commonly known as:  John Lux Your last dose was: This morning  Your next dose is:  Tomorrow morning  TAKE 1 TABLET EVERY DAY  
     
  
   
   
   
  
 benazepril-hydroCHLOROthiazide 20-12.5 mg per tablet Commonly known as:  LOTENSIN HCT Your last dose was: This morning  Your next dose is:  Tomorrow morning  TAKE 1 TABLET EVERY DAY  
     
  
   
   
   
  
 multivitamin tablet Commonly known as:  ONE A DAY Your next dose is:  Tomorrow morning  Take 1 Tab by mouth daily. 1 Tab OTHER Indications: many vitamins and herbal supplements Where to Get Your Medications Information on where to get these meds will be given to you by the nurse or doctor. ! Ask your nurse or doctor about these medications HYDROcodone-acetaminophen 7.5-325 mg per tablet

## 2018-09-07 NOTE — ANESTHESIA PREPROCEDURE EVALUATION
Anesthetic History Review of Systems / Medical History Patient summary reviewed Pulmonary Neuro/Psych Cardiovascular Hypertension Exercise tolerance: >4 METS 
  
GI/Hepatic/Renal 
  
 
 
Renal disease: CRI Endo/Other Cancer (Prostate) Other Findings Physical Exam 
 
Airway Mallampati: II 
TM Distance: > 6 cm Cardiovascular Regular rate and rhythm,  S1 and S2 normal,  no murmur, click, rub, or gallop Dental 
No notable dental hx Pulmonary Breath sounds clear to auscultation Abdominal 
 
 
 
 Other Findings Anesthetic Plan ASA: 3 Anesthesia type: general 
 
Monitoring Plan: Arterial line Anesthetic plan and risks discussed with: Patient

## 2018-09-07 NOTE — ANESTHESIA PROCEDURE NOTES
Arterial Line Placement Start time: 9/7/2018 3:11 PM 
End time: 9/7/2018 3:16 PM 
Performed by: Rose Mensah Authorized by: Rose Mensah Pre-Procedure Indications:  Arterial pressure monitoring and blood sampling Preanesthetic Checklist: patient identified, risks and benefits discussed, anesthesia consent, site marked, patient being monitored, timeout performed and patient being monitored Procedure:  
Prep:  Chlorhexidine Seldinger Technique?: No   
Orientation:  Left Location:  Radial artery Catheter size:  20 G Number of attempts:  1 Assessment:  
Post-procedure:  Sterile dressing applied Patient Tolerance:  Patient tolerated the procedure well with no immediate complications

## 2018-09-07 NOTE — PERIOP NOTES
TRANSFER - OUT REPORT: 
 
Verbal report given to Vivien Oscar RN on Natalia Martin  being transferred to 714(unit) for routine post - op Report consisted of patients Situation, Background, Assessment and  
Recommendations(SBAR). Information from the following report(s) SBAR, OR Summary, Intake/Output, MAR and Recent Results was reviewed with the receiving nurse. Lines:  
Peripheral IV 09/07/18 Right Hand (Active) Site Assessment Clean, dry, & intact 9/7/2018  6:20 PM  
Phlebitis Assessment 0 9/7/2018  6:20 PM  
Infiltration Assessment 0 9/7/2018  6:20 PM  
Dressing Status Clean, dry, & intact 9/7/2018  6:20 PM  
Dressing Type Transparent;Tape 9/7/2018  6:20 PM  
Hub Color/Line Status Green 9/7/2018  6:20 PM  
  
 
Opportunity for questions and clarification was provided. Patient transported with: 
 O2 @ 2 liters VTE prophylaxis orders have been written for Natalia Martin. Patient and family given floor number and nurses name. Family updated re: pt status after security code verified.

## 2018-09-07 NOTE — IP AVS SNAPSHOT
[address] [facility] 
[phone] Patient: Cornelius Sanders MRN: [id number] :1940 About your hospitalization You were admitted on:  2018 You last received care in the:  [facility] 7 MED SURG You were discharged on:  2018 Why you were hospitalized Your primary diagnosis was:  Spinal Stenosis Of Lumbosacral Region Your diagnoses also included:  Spondylolisthesis At L5-S1 Level, Spinal Stenosis Follow-up Information Follow up With Details Comments Contact Info Axel Giles MD Call As needed [address] [place] [address] [facility] 
[phone] Tr Melton MD On 2018 10:35 AM at [address] [address] 
[phone] [facility]   [address] [place] [address] [phone] Discharge Orders None A check rivera indicates which time of day the medication should be taken. My Medications START taking these medications Instructions Each Dose to Equal  
 Morning Noon Evening Bedtime HYDROcodone-acetaminophen 7.5-325 mg per tablet Commonly known as:  Lenon Gauze Your last dose was: This afternoon at 3 PM  
Your next dose is: Take today if needed after 9 PM  
   
 Take 1 Tab by mouth every six (6) hours as needed for Pain. Max Daily Amount: 4 Tabs. 1 Tab CONTINUE taking these medications Instructions Each Dose to Equal  
 Morning Noon Evening Bedtime  
 amLODIPine 5 mg tablet Commonly known as:  Sarah Adame Your last dose was: This morning  Your next dose is:  Tomorrow morning  TAKE 1 TABLET EVERY DAY  
     
  
   
   
   
  
 benazepril-hydroCHLOROthiazide 20-12.5 mg per tablet Commonly known as:  LOTENSIN HCT Your last dose was: This morning  Your next dose is:  Tomorrow morning 9/14 TAKE 1 TABLET EVERY DAY  
     
  
   
   
   
  
 multivitamin tablet Commonly known as:  ONE A DAY Your next dose is:  Tomorrow morning 9/14 Take 1 Tab by mouth daily. 1 Tab OTHER Indications: many vitamins and herbal supplements Where to Get Your Medications Information on where to get these meds will be given to you by the nurse or doctor. ! Ask your nurse or doctor about these medications HYDROcodone-acetaminophen 7.5-325 mg per tablet Opioid Education Prescription Opioids: What You Need to Know: 
 
 
NO Bending, Lifting or Twisting WEAR your brace if prescribed at all times EXCEPT when in bed, just going to the 
 bathroom, or showering Avoid sitting more than 20 - 30 minutes at at time NO driving until directed by your doctor DO NOT take any NSAIDS (either prescribed or over the counter until directed (Aleve, Ibuprofen, Mobic, etc) as this will interfere with bone healing CALL the doctor if:  Fever >100.5 
([phone])                 Incision becomes red, swollen or opens up Incision has yellow, thick drainage or an odor Pain is not managed with prescribed medications Excessive nausea and/or vomiting Avoid having pets sleep in bed with you until incision is completely healed DISCHARGE SUMMARY from Nurse PATIENT INSTRUCTIONS: 
 
 
F-face looks uneven A-arms unable to move or move unevenly S-speech slurred or non-existent T-time-call 911 as soon as signs and symptoms begin-DO NOT go Back to bed or wait to see if you get better-TIME IS BRAIN. Warning Signs of HEART ATTACK Call 911 if you have these symptoms: 
? Chest discomfort. Most heart attacks involve discomfort in the center of the chest that lasts more than a few minutes, or that goes away and comes back. It can feel like uncomfortable pressure, squeezing, fullness, or pain. ? Discomfort in other areas of the upper body. Symptoms can include pain or discomfort in one or both arms, the back, neck, jaw, or stomach. ? Shortness of breath with or without chest discomfort. ? Other signs may include breaking out in a cold sweat, nausea, or lightheadedness. Don't wait more than five minutes to call [address]! Fast action can save your life. Calling 911 is almost always the fastest way to get lifesaving treatment. Emergency Medical Services staff can begin treatment when they arrive  up to an hour sooner than if someone gets to the hospital by car. The discharge information has been reviewed with the patient. The patient verbalized understanding. Discharge medications reviewed with the patient and appropriate educational materials and side effects teaching were provided. ___________________________________________________________________________________________________________________________________ ACO Transitions of Care Introducing [employer] [address] offers a voluntary care coordination program to provide high quality service and care to [facility] fee-for-service beneficiaries. Ginny Michelle was designed to help you enhance your health and well-being through the following services: ? Transitions of Care  support for individuals who are transitioning from one care setting to another (example: Hospital to home). ? Chronic and Complex Care Coordination  support for individuals and caregivers of those with serious or chronic illnesses or with more than one chronic (ongoing) condition and those who take a number of different medications. If you meet specific medical criteria, a [address] [place] [address] Rd may call you directly to coordinate your care with your primary care physician and your other care providers. For questions about the [facility] programs, please, contact your physicians office. For general questions or additional information about Accountable Care Organizations: 
Please visit www.medicare.gov/acos. html or call 1-800-MEDICARE (6-548.417.4468) TTY users should call 8-511.279.3743. Introducing [facility]! Lalito Garrison introduces [employer] patient portal. Now you can access parts of your medical record, email your doctor's office, and request medication refills online. 1. In your internet browser, go to [email or web address] 2. Click on the First Time User? Click Here link in the Sign In box. You will see the New Member Sign Up page. 3. Enter your [employer] Access Code exactly as it appears below. You will not need to use this code after youve completed the sign-up process. If you do not sign up before the expiration date, you must request a new code. · [employer] Access Code: [email or web address] Expires: 9/20/2018  9:46 AM 
 
4. Enter the last four digits of your Social Security Number (xxxx) and Date of Birth (mm/dd/yyyy) as indicated and click Submit. You will be taken to the next sign-up page. 5. Create a [employer] ID.  This will be your [employer] login ID and cannot be changed, so think of one that is secure and easy to remember. 6. Create a [employer] password. You can change your password at any time. 7. Enter your Password Reset Question and Answer. This can be used at a later time if you forget your password. 8. Enter your e-mail address. You will receive e-mail notification when new information is available in [address] 9. Click Sign Up. You can now view and download portions of your medical record. 10. Click the Download Summary menu link to download a portable copy of your medical information. If you have questions, please visit the Frequently Asked Questions section of the [employer] website. Remember, [employer] is NOT to be used for urgent needs. For medical emergencies, dial 911. Now available from your iPhone and Android! Introducing Jovan Fonseca As a [employer] patient, I wanted to make you aware of our electronic visit tool called Jovan Fonseca. [employer] 24/7 allows you to connect within minutes with a medical provider 24 hours a day, seven days a week via a mobile device or tablet or logging into a secure website from your computer. You can access Jovan Fonseca from anywhere in the [place]. A virtual visit might be right for you when you have a simple condition and feel like you just dont want to get out of bed, or cant get away from work for an appointment, when your regular [employer] provider is not available (evenings, weekends or holidays), or when youre out of town and need minor care. Electronic visits cost only $49 and if the [employer] 24/7 provider determines a prescription is needed to treat your condition, one can be electronically transmitted to a nearby pharmacy*. Please take a moment to enroll today if you have not already done so. The enrollment process is free and takes just a few minutes.   To enroll, please download the [employer] 24/7 kimberly to your tablet or phone, or visit [email or web address] to enroll on your computer. And, as an [address] [place] [address] patient with a [employer] account, the results of your visits will be scanned into your electronic medical record and your primary care provider will be able to view the scanned results. We urge you to continue to see your regular [employer] provider for your ongoing medical care. And while your primary care provider may not be the one available when you seek a Jovan [employer] virtual visit, the peace of mind you get from getting a real diagnosis real time can be priceless. For more information on [email or web address], view our Frequently Asked Questions (FAQs) at [email or web address]. Sincerely, 
 
Yanelis Guerrero MD 
Chief Medical Officer [employer] *:  certain medications cannot be prescribed via [email or web address] Unresulted Labs-Please follow up with your PCP about these lab tests Order Current Status NC XR TECHNOLOGIST SERVICE In process NC XR TECHNOLOGIST SERVICE In process Providers Seen During Your Hospitalization Provider Specialty Primary office phone Crystal Chawla MD Orthopedic Surgery [phone] Immunizations Administered for This Admission Name Date Influenza Vaccine (Quad) PF  Deferred () Your Primary Care Physician (PCP) Primary Care Physician Office Phone Office Fax [address], [address] [place] [address] [phone] You are allergic to the following Allergen Reactions Clonidine Other (comments) Lethargy, Patient denies allergy Simvastatin Unknown (comments) Noted in office visit note, 5/26/2015,  Patient denies allergy Recent Documentation Height Weight BMI Smoking Status 1.676 m 78.5 kg 27.94 kg/m2 Never Smoker Emergency Contacts Name Discharge Info Relation Home Work Mobile Cheryl Escoto DISCHARGE CAREGIVER [3] Spouse [3] [phone] Patient Belongings The following personal items are in your possession at time of discharge: 
  Dental Appliances: Uppers, At bedside  Visual Aid: Glasses   Hearing Aids/Status: Does not own  Home Medications: None   Jewelry: None  Clothing: Socks, Undergarments, Footwear, Pants, Shirt    Other Valuables: None Please provide this summary of care documentation to your next provider. Signatures-by signing, you are acknowledging that this After Visit Summary has been reviewed with you and you have received a copy. Patient Signature:  ____________________________________________________________ Date:  ____________________________________________________________  
  
Yariel Police Provider Signature:  ____________________________________________________________ Date:  ____________________________________________________________

## 2018-09-08 PROCEDURE — 97530 THERAPEUTIC ACTIVITIES: CPT

## 2018-09-08 PROCEDURE — 74011250636 HC RX REV CODE- 250/636: Performed by: ORTHOPAEDIC SURGERY

## 2018-09-08 PROCEDURE — 74011250637 HC RX REV CODE- 250/637: Performed by: ORTHOPAEDIC SURGERY

## 2018-09-08 PROCEDURE — 00JU0ZZ INSPECTION OF SPINAL CANAL, OPEN APPROACH: ICD-10-PCS | Performed by: SURGERY

## 2018-09-08 PROCEDURE — 97161 PT EVAL LOW COMPLEX 20 MIN: CPT

## 2018-09-08 PROCEDURE — 65270000029 HC RM PRIVATE

## 2018-09-08 PROCEDURE — 77030020253 HC SOL INJ D545NS .05 DEX .45 SAL

## 2018-09-08 RX ORDER — SODIUM CHLORIDE 0.9 % (FLUSH) 0.9 %
5-10 SYRINGE (ML) INJECTION EVERY 8 HOURS
Status: DISCONTINUED | OUTPATIENT
Start: 2018-09-08 | End: 2018-09-13 | Stop reason: HOSPADM

## 2018-09-08 RX ORDER — DIPHENHYDRAMINE HCL 25 MG
25 CAPSULE ORAL
Status: DISCONTINUED | OUTPATIENT
Start: 2018-09-08 | End: 2018-09-13 | Stop reason: HOSPADM

## 2018-09-08 RX ORDER — FAMOTIDINE 20 MG/1
20 TABLET, FILM COATED ORAL EVERY 12 HOURS
Status: DISCONTINUED | OUTPATIENT
Start: 2018-09-08 | End: 2018-09-08

## 2018-09-08 RX ORDER — AMLODIPINE BESYLATE 5 MG/1
5 TABLET ORAL DAILY
Status: DISCONTINUED | OUTPATIENT
Start: 2018-09-08 | End: 2018-09-13 | Stop reason: HOSPADM

## 2018-09-08 RX ORDER — HYDROCODONE BITARTRATE AND ACETAMINOPHEN 7.5; 325 MG/1; MG/1
1 TABLET ORAL
Status: DISCONTINUED | OUTPATIENT
Start: 2018-09-08 | End: 2018-09-13 | Stop reason: HOSPADM

## 2018-09-08 RX ORDER — IBUPROFEN 400 MG/1
400 TABLET ORAL
Status: DISCONTINUED | OUTPATIENT
Start: 2018-09-08 | End: 2018-09-13 | Stop reason: HOSPADM

## 2018-09-08 RX ORDER — ONDANSETRON 2 MG/ML
4 INJECTION INTRAMUSCULAR; INTRAVENOUS
Status: DISCONTINUED | OUTPATIENT
Start: 2018-09-08 | End: 2018-09-13 | Stop reason: HOSPADM

## 2018-09-08 RX ORDER — FAMOTIDINE 20 MG/1
20 TABLET, FILM COATED ORAL DAILY
Status: DISCONTINUED | OUTPATIENT
Start: 2018-09-09 | End: 2018-09-13 | Stop reason: HOSPADM

## 2018-09-08 RX ORDER — HYDROMORPHONE HYDROCHLORIDE 1 MG/ML
1 INJECTION, SOLUTION INTRAMUSCULAR; INTRAVENOUS; SUBCUTANEOUS
Status: DISCONTINUED | OUTPATIENT
Start: 2018-09-08 | End: 2018-09-13 | Stop reason: HOSPADM

## 2018-09-08 RX ORDER — DEXTROSE, SODIUM CHLORIDE, AND POTASSIUM CHLORIDE 5; .45; .15 G/100ML; G/100ML; G/100ML
100 INJECTION INTRAVENOUS CONTINUOUS
Status: DISCONTINUED | OUTPATIENT
Start: 2018-09-08 | End: 2018-09-13 | Stop reason: HOSPADM

## 2018-09-08 RX ORDER — CYCLOBENZAPRINE HCL 10 MG
5 TABLET ORAL
Status: DISCONTINUED | OUTPATIENT
Start: 2018-09-08 | End: 2018-09-11

## 2018-09-08 RX ORDER — CEFAZOLIN SODIUM/WATER 2 G/20 ML
2 SYRINGE (ML) INTRAVENOUS EVERY 8 HOURS
Status: COMPLETED | OUTPATIENT
Start: 2018-09-08 | End: 2018-09-08

## 2018-09-08 RX ORDER — SODIUM CHLORIDE 0.9 % (FLUSH) 0.9 %
5-10 SYRINGE (ML) INJECTION AS NEEDED
Status: DISCONTINUED | OUTPATIENT
Start: 2018-09-08 | End: 2018-09-13 | Stop reason: HOSPADM

## 2018-09-08 RX ADMIN — TAMSULOSIN HYDROCHLORIDE 0.4 MG: 0.4 CAPSULE ORAL at 09:26

## 2018-09-08 RX ADMIN — Medication 1 AMPULE: at 20:35

## 2018-09-08 RX ADMIN — DEXTROSE MONOHYDRATE, SODIUM CHLORIDE, AND POTASSIUM CHLORIDE 100 ML/HR: 50; 4.5; 1.49 INJECTION, SOLUTION INTRAVENOUS at 01:34

## 2018-09-08 RX ADMIN — FAMOTIDINE 20 MG: 20 TABLET ORAL at 09:26

## 2018-09-08 RX ADMIN — Medication 2 G: at 18:00

## 2018-09-08 RX ADMIN — Medication 10 ML: at 14:00

## 2018-09-08 RX ADMIN — HYDROCHLOROTHIAZIDE: 12.5 CAPSULE ORAL at 09:26

## 2018-09-08 RX ADMIN — Medication 1 AMPULE: at 01:02

## 2018-09-08 RX ADMIN — AMLODIPINE BESYLATE 5 MG: 5 TABLET ORAL at 09:26

## 2018-09-08 RX ADMIN — FAMOTIDINE 20 MG: 20 TABLET ORAL at 01:02

## 2018-09-08 RX ADMIN — Medication 1 AMPULE: at 09:26

## 2018-09-08 RX ADMIN — Medication 5 ML: at 20:36

## 2018-09-08 RX ADMIN — HYDROCODONE BITARTRATE AND ACETAMINOPHEN 1 TABLET: 7.5; 325 TABLET ORAL at 13:51

## 2018-09-08 RX ADMIN — Medication 2 G: at 09:26

## 2018-09-08 RX ADMIN — Medication 2 G: at 01:34

## 2018-09-08 NOTE — OP NOTES
[facility] REPORT    Avel Alfonso  MR#: [id number]  : 1940  ACCOUNT #: [de-identified]   DATE OF SERVICE: 2018    SURGEON:  MD Ruben Vásquez MD, General Surgery    ASSISTANT:  none    PREOPERATIVE DIAGNOSIS:  L5-S1 grade II spondylolisthesis with spinal stenosis. POSTOPERATIVE DIAGNOSIS:  L5-S1 grade II spondylolisthesis with spinal stenosis with huge iliac veins that were scarred down and nonmobile. PROCEDURE PERFORMED:  Attempted L5-S1 anterior interbody fusion and placement of interbody device; however the preprocedure had to be aborted because we could not mobilize the iliac veins to gain access to the L5-S1 disk space. ANESTHESIA:  GETA. ESTIMATED BLOOD LOSS:  Minimal.    FLUIDS:  A liter of crystalloid. SPECIMENS REMOVED:  None. IMPLANTS:  None. COMPLICATIONS:  None. INDICATIONS:  The patient is a 74-year-old white male with L5-S1 spondylolisthesis grade II with spinal stenosis. He failed to get long-term improvement with pain medication, activity restrictions and epidural blocks,  he is very limited on his ability to stand and walk and he was brought for the first part of a 2-part procedure, which was going to be an anterior interbody fusion of L5-S1 followed 2 days with the posterior instrumented fusion. The patient was brought to the operating room and placed on the operating table. His abdominal area was prepped with Betadine and sterilely draped. Surgeon called a timeout and confirmed the procedure to be performed, his allergy history and the fact that he had received his preoperative IV antibiotics. An anterior retroperitoneal approach to the lumbosacral spine was carried out by Dr. Hieu Moya and once reached the disk space, placed a needle into the disk space, took C-arm pictures to determine that we were at L5-S1 and would determine the midline.   However, the patient had a huge iliac vein. He also had what appeared to be enlarged iliac arteries. The vein was scarred down to the top of the disk space and could not be mobilized and there was no way to successfully place an interbody device in the disk space and any further attempt was felt to be risky for injuring the iliac veins, so the surgery was aborted and the wound was closed by Dr. Reina Amanda and this will be dictated in a separate note. Dry sterile dressing applied and he was taken to the recovery room. Plan will be to do a posterior interbody fusion, posterolateral fusion and instrumentation in 3 days.       Fletcher Nixon III, MD SEL / LN  D: 09/07/2018 17:31     T: 09/08/2018 01:08  JOB #: [id number]  CC: Quan Valdes MD

## 2018-09-08 NOTE — PROGRESS NOTES
Problem: Mobility Impaired (Adult and Pediatric) Goal: *Acute Goals and Plan of Care (Insert Text) LTG: 
(1.)Mr. Flo Moreira will move from supine to sit and sit to supine , scoot up and down and roll side to side in bed with MODIFIED INDEPENDENCE within 7 treatment day(s). (2.)Mr. Flo Moreira will transfer from bed to chair and chair to bed with SUPERVISION using the least restrictive device within 7 treatment day(s). (3.)Mr. Flo Moreira will ambulate with STAND BY ASSIST for >pi=242 feet with the least restrictive device, appropriate gait pattern and good dynamic standing balance within 7 treatment day(s). (4.))Mr. Flo Moreira will be able to don / doff lumbar corset brace independently within 7 treatment days to prepare for upcoming surgery. ________________________________________________________________________________________________ PHYSICAL THERAPY: Initial Assessment 9/8/2018 INPATIENT: Hospital Day: 2 [place] / Product Type: Medicare /  
  
NAME/AGE/GENDER: Efren Pyle is a 65 y.o. 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 
L5-S1 ALIF-ATTEMPTED (N/A) 1 Day Post-Op ICD-10: Treatment Diagnosis:  
 · Generalized Muscle Weakness (M62.81) · Difficulty in walking, Not elsewhere classified (R26.2) · Low Back Pain (M54.5) Precaution/Allergies: 
Clonidine and Simvastatin ASSESSMENT:  
 
Mr. Flo Moreira presents supine in bed. Called JOHNSON Rutherford) to room to clarify MD orders. Per notes and per family, attempted fusion was not successful yesterday and surgery had to be aborted. Although pt has abdominal incision, no bony work was done secondary to iliac vain blocking passage.  RN clarified that pt was cleared to be up and lumbar brace was not needed until after surgery on Monday. Pt was motivated to get out of bed. Has been limited in mobility x 2 years. Was independent with all functional mobility, now requiring a straight cane for safety. Increased pain and weakness in R LE has been limiting pt and con't to do so. Pt required CGA for bed mobility with instruction in log roll technique. Min assist for transfers and gait 12' with HHA x 1. Pt noted to have increased hip/knee flexion on R LE during step phase to help compensate for what appeared to be weak DF on R. Pt heavily leans to L side which is the side he has been using a straight cane on. Pt positioned for comfort in chair and left with call bell in reach. Skilled PT to con't to follow once per day until next surgery which is planned for Monday 9/10. Will need new orders to resume following surgery and at that point, pt will be BID following the fusion. This section established at most recent assessment PROBLEM LIST (Impairments causing functional limitations): 1. Decreased Strength 2. Decreased ADL/Functional Activities 3. Decreased Transfer Abilities 4. Decreased Ambulation Ability/Technique 5. Decreased Balance 6. Increased Pain 7. Decreased Activity Tolerance 8. Decreased Flexibility/Joint Mobility 9. Decreased Knowledge of Precautions 10. Decreased [place] with Home Exercise Program 
 INTERVENTIONS PLANNED: (Benefits and precautions of physical therapy have been discussed with the patient.) 1. Balance Exercise 2. Bed Mobility 3. Family Education 4. Gait Training 5. Home Exercise Program (HEP) 6. Therapeutic Activites 7. Therapeutic Exercise/Strengthening 8. Transfer Training 9. Group Therapy TREATMENT PLAN: Frequency/Duration: daily for duration of hospital stay and then BID once pt has had fusion Rehabilitation Potential For Stated Goals: Good RECOMMENDED REHABILITATION/EQUIPMENT: (at time of discharge pending progress): Due to the probability of continued deficits (see above) this patient will likely need continued skilled physical therapy after discharge. Equipment:  
? None at this time HISTORY:  
History of Present Injury/Illness (Reason for Referral): 
Per chart: He has been following with our PA. He is 65 and he has a grade 2 L5-S1 spondylolisthesis with severe foraminal stenosis bilaterally, but he really only has right leg pain, and it is a classic L5 distribution. Recent MRI scan of 08/03/2018 shows severe foraminal stenosis. No central stenosis. He still has a reasonable disc space, but it is a significant spondylolisthesis. He has been receiving L5 nerve blocks and they help temporarily. The last one was about 2 weeks ago and it is wearing off and he is having severe pain. Apparently, he was screaming initially at the visit today at the  until they could get his pain under better control. He says he always hurts at night when he tries to sleep. It hurts to stand and walk. He has improvement sitting and flexing. He also says if he is sitting and he elevates his right foot up on a step or something, his pain goes away. I printed out x-rays and the MRI scan and showed he and his wife the problem, and I told him with this degree of listhesis, I think the best way to treat him would actually be an anteroposterior fusion. I went through his medical history and he has some high blood pressure, but no other significant medical problems, although, he has been told he has some kidney disease, but they are not doing any treatment or anything special for it. Past Medical History/Comorbidities:  
Mr. Ivone Bautista  has a past medical history of Arthritis; Calculus of kidney (12/23/2013); Chronic kidney disease; Chronic kidney disease, stage III (moderate) (12/23/2013); Fracture of coccyx ([address] (1/14/2016); Gout, unspecified (12/23/2013);  History of left shoulder fracture (1/14/2016); Hyperlipidemia (1/14/2016); Microscopic hematuria (12/23/2013); Sciatica of right side (1/14/2016); Unspecified essential hypertension (12/23/2013); and Urinary tract infection, site not specified (12/23/2013). Mr. Luis F Taylor  has a past surgical history that includes hx lithotripsy; pr total hip arthroplasty (Left); and hx shoulder replacement (Left, 12/2014). Social History/Living Environment:  
Home Environment: Private residence # Steps to Enter: 1 Rails to Enter: No 
Wheelchair Ramp: Yes One/Two Story Residence: One story Living Alone: No 
Support Systems: Spouse/Significant Other/Partner Patient Expects to be Discharged to[de-identified] Private residence Current DME Used/Available at Home: Raised toilet seat, Cane, straight Tub or Shower Type: Tub/Shower combination Prior Level of Function/Work/Activity: Pt was independent with all functional mobility and gait without assistive device. Has been limited in overall function x 2  Years secondary to back pain. Tries to work as an  some. Drives. Wife works 4 hours/day and therefore pt will be alone during that time. Personal Factors:   
      Sex:  male Age:  65 y.o. Number of Personal Factors/Comorbidities that affect the Plan of Care: 1-2: MODERATE COMPLEXITY EXAMINATION:  
Most Recent Physical Functioning:  
Gross Assessment: 
AROM: Generally decreased, functional (L shoulder flexion/abd to 90) Strength: Generally decreased, functional 
Coordination: Generally decreased, functional 
Sensation: Impaired (tingling noted by pt in R LE - chronic) Posture: 
Posture (WDL): Exceptions to [facility] Posture Assessment: Forward head, Rounded shoulders Balance: 
Sitting: Intact Standing: Impaired Standing - Static: Fair Standing - Dynamic : Fair Bed Mobility: 
Rolling: Contact guard assistance Supine to Sit: Contact guard assistance Interventions: Safety awareness training (educated in log roll technique) Wheelchair Mobility: Transfers: 
Sit to Stand: Minimum assistance Stand to Sit: Contact guard assistance Bed to Chair: Minimum assistance Interventions: Safety awareness training Gait: 
  
Base of Support: Widened Speed/Maria Victoria: Slow Step Length: Right shortened;Left shortened Swing Pattern: Right symmetrical 
Stance: Right decreased;Weight shift Gait Abnormalities: Antalgic;Decreased step clearance;Shuffling gait; Step to gait Distance (ft): 12 Feet (ft) Ambulation - Level of Assistance: Minimal assistance Body Structures Involved: 1. Joints 2. Muscles Body Functions Affected: 1. Neuromusculoskeletal 
2. Movement Related Activities and Participation Affected: 1. General Tasks and Demands 2. Mobility Number of elements that affect the Plan of Care: 4+: HIGH COMPLEXITY CLINICAL PRESENTATION:  
Presentation: Stable and uncomplicated: LOW COMPLEXITY CLINICAL DECISION MAKIN[address] [place] [id number] AM-PAC 6 Clicks Basic Mobility Inpatient Short Form How much difficulty does the patient currently have. .. Unable A Lot A Little None 1. Turning over in bed (including adjusting bedclothes, sheets and blankets)? [] 1   [] 2   [x] 3   [] 4  
2. Sitting down on and standing up from a chair with arms ( e.g., wheelchair, bedside commode, etc.)   [] 1   [] 2   [x] 3   [] 4  
3. Moving from lying on back to sitting on the side of the bed? [] 1   [] 2   [x] 3   [] 4 How much help from another person does the patient currently need. .. Total A Lot A Little None 4. Moving to and from a bed to a chair (including a wheelchair)? [] 1   [] 2   [x] 3   [] 4  
5. Need to walk in hospital room? [] 1   [] 2   [x] 3   [] 4  
6. Climbing 3-5 steps with a railing? [] 1   [] 2   [x] 3   [] 4  
© , Trustees of [address] [place] [id number], under license to [employer]. All rights reserved Score:  Initial: 18 Most Recent: X (Date: -- ) Interpretation of Tool:  Represents activities that are increasingly more difficult (i.e. Bed mobility, Transfers, Gait). Score 24 23 22-20 19-15 14-10 9-7 6 Modifier CH CI CJ CK CL CM CN   
 
? Mobility - Walking and Moving Around:  
  - CURRENT STATUS: CK - 40%-59% impaired, limited or restricted  - GOAL STATUS: CK - 40%-59% impaired, limited or restricted  - D/C STATUS:  ---------------To be determined--------------- [place] / [place] / Product Type: Medicare /   
 
Medical Necessity:    
· Patient demonstrates good rehab potential due to higher previous functional level. Reason for Services/Other Comments: 
· Patient continues to require present interventions due to patient's inability to function at baseline. Use of outcome tool(s) and clinical judgement create a POC that gives a: Questionable prediction of patient's progress: MODERATE COMPLEXITY  
  
 
 
 
TREATMENT:  
(In addition to Assessment/Re-Assessment sessions the following treatments were rendered) Pre-treatment Symptoms/Complaints: \"They have to fix my back before I can do any better. \" 
Pain: Initial:  
Pain Intensity 1: 5 Pain Intervention(s) 1: Ambulation/Increased Activity, Repositioned, Nurse notified  Post Session:  unchanged Therapeutic Activity: (    10 minutes): Therapeutic activities including Bed transfers, Chair transfers, Ambulation on level ground and safety eduction to improve mobility, strength and balance. Required minimal verbal and tactile cues   to safety and independence. Braces/Orthotics/Lines/Etc:  
· colby catheter · O2 Device: Nasal cannula Treatment/Session Assessment:   
· Response to Treatment:  Tolerated well. · Interdisciplinary Collaboration:  
o Physical Therapist 
o Registered Nurse · After treatment position/precautions:  
o Up in chair 
o Supine in bed 
o Bed alarm/tab alert on 
o Bed/Chair-wheels locked 
o Bed in low position 
o Call light within reach 
o RN notified · Compliance with Program/Exercises: Will assess as treatment progresses. · Recommendations/Intent for next treatment session: \"Next visit will focus on advancements to more challenging activities and reduction in assistance provided\". Total Treatment Duration: PT Patient Time In/Time Out Time In: 46 Time Out: 1112 Daniel Wagoner, PT

## 2018-09-08 NOTE — OP NOTES
[facility] REPORT    Genet Tee  MR#: [id number]  : 1940  ACCOUNT #: [de-identified]   DATE OF SERVICE: 2018    SURGEON:  Rinku Gracia MD    CO-SURGEON:  Shanon Carlson MD    PREOPERATIVE DIAGNOSIS:  L5-S1 spondylolisthesis. POSTOPERATIVE DIAGNOSIS:  L5-S1 spondylolisthesis. PROCEDURE PERFORMED:  Anterior retroperitoneal approach to expose L5-S1 disk space, attempted. ESTIMATED BLOOD LOSS:  100 mL. SPECIMENS REMOVED:  none    COMPLICATIONS: none    INDICATIONS:  This is a 67-year-old gentleman who presented with a spine surgery scheduled by Dr. Jeyson Blanchard and we were requested to expose the disk space anteriorly. Risks, benefits were discussed with Dr. Jeyson Blanchard and he understood and agreed to proceed. FINDINGS:  He has a very low bifurcation from the left and right iliac vein. The left iliac vein is large and densely stuck onto the anterior surface of L5-S1 disk space and mobilization was difficult and considered very dangerous,  and this is verified with intraoperative fluoroscopy and Dr. Jeyson Blanchard also verified the position and considered this was unsafe to proceed with further mobilization. PROCEDURE PERFORMED:  After informed consent was obtained, the patient brought into the operating room, laid in supine position. General anesthesia was administered. The patient's abdomen was prepped and draped in a routine fashion. The left lower abdominal paramedian incision was made. Dissection carried through the dermal layer. Anterior rectus sheath was exposed. Then, the rectus sheath was incised and the rectus muscle mobilized laterally and then the preperitoneal space was entered and then dissection along this space laterally and then this carried us into the retroperitoneal space. We were able to move the peritoneal content and retracted towards the right side and a retroperitoneal space was entered.  Benson retractor were placed to facilitate exposures and I was able to feel the promontory of the sacrum and then mobilization was performed in this area. We were able to see a very enlarged left iliac arteries and there is extensive fibrinous adhesions around this area and with careful dissection the left iliac vein was exposed. This is very enlarged and is almost completely covered the presacral space. With further effort, I was able to expose the presacral fascia and then a spinal needle was placed into the disk space and fluoroscopy were performed and the needle was identified right in the disk space but it is in the lower edge of the L5-S1 disk space and the left iliac vein was found to cover this disk space almost 50% and I spent an effort to mobilize the vein and it is densely stuck on the presacral fascia. The mobilization is difficult and is considered very dangerous and Dr. Chavez Poag examined the situation with me and he agreed and the further mobilization would probably harm the patient and the disk space will not be adequately exposed. At this point, we decided to abort the surgery and surgical field was inspected and no evidence of bleeding identified and then the retractor were released and the peritoneal content was resumed to the original placed. The anterior rectal sleeve was closed with #1 looped PDS in a running fashion. Skin closed with staples. Patient stable during the entire procedure and all the instrument count and lap counts were correct. Estimated blood loss was about 100 mL.       MD KEV Frazier / WILLIE  D: 09/07/2018 17:23     T: 09/08/2018 00:42  JOB #: [id number]

## 2018-09-08 NOTE — PROGRESS NOTES
2018 Post Op day: 1 Day Post-Op Procedure(s) (LRB): 
L5-S1 ALIF-ATTEMPTED (N/A) Admit Date: 2018 Admit Diagnosis: Spondylolisthesis, unspecified spinal region [M43.10] Lumbar stenosis with neurogenic claudication [M48.062] Principle Problem: Spinal stenosis of lumbosacral region. Subjective: Doing well, No complaints, No SOB, No Chest Pain, No Nausea or Vomiting Objective:  
Vital Signs are Stable, No Acute Distress, Alert and Oriented,   Neurovascular exam is intact in both legs. Assessment / Plan : 
Patient Active Problem List  
Diagnosis Code  Family history of malignant neoplasm of prostate Z80.42  
 History of left shoulder fracture Z87.81  
 History of renal stone H36.859  Gout M10.9  CKD (chronic kidney disease) stage 3, GFR 30-59 ml/min N18.3  Benign hypertensive kidney disease with chronic kidney disease I12.9  Glucose intolerance (impaired glucose tolerance) R73.02  
 Mixed hyperlipidemia E78.2  Hyperuricemia E79.0  Spinal stenosis of lumbosacral region M48.07  Spondylolisthesis at L5-S1 level M43.17  Spinal stenosis M48.00 Patient Vitals for the past 8 hrs: 
 BP Temp Pulse SpO2  
18 0512 115/66 97.8 °F (36.6 °C) 77 96 % Temp (24hrs), Av °F (36.7 °C), Min:97 °F (36.1 °C), Max:98.9 °F (37.2 °C) Body mass index is 27.94 kg/(m^2). Lab Results Component Value Date/Time HGB 14.5 2018 10:41 AM  
  
 
Continue current treatment Signed By: Genevieve Cogan, MD

## 2018-09-08 NOTE — PROGRESS NOTES
Problem: Falls - Risk of 
Goal: *Absence of Falls Document Joe Thomas Fall Risk and appropriate interventions in the flowsheet. Outcome: Progressing Towards Goal 
Fall Risk Interventions: 
Mobility Interventions: Bed/chair exit alarm, Communicate number of staff needed for ambulation/transfer, Patient to call before getting OOB Mentation Interventions: Door open when patient unattended, Bed/chair exit alarm Medication Interventions: Patient to call before getting OOB Elimination Interventions: Call light in reach, Bed/chair exit alarm, Patient to call for help with toileting needs History of Falls Interventions: Bed/chair exit alarm

## 2018-09-09 ENCOUNTER — ANESTHESIA EVENT (OUTPATIENT)
Dept: SURGERY | Age: 78
DRG: 455 | End: 2018-09-09
Payer: MEDICARE

## 2018-09-09 LAB
ANION GAP SERPL CALC-SCNC: 7 MMOL/L (ref 7–16)
BUN SERPL-MCNC: 15 MG/DL (ref 8–23)
CALCIUM SERPL-MCNC: 8.8 MG/DL (ref 8.3–10.4)
CHLORIDE SERPL-SCNC: 101 MMOL/L (ref 98–107)
CO2 SERPL-SCNC: 31 MMOL/L (ref 21–32)
CREAT SERPL-MCNC: 1.5 MG/DL (ref 0.8–1.5)
ERYTHROCYTE [DISTWIDTH] IN BLOOD BY AUTOMATED COUNT: 12.6 %
GLUCOSE SERPL-MCNC: 110 MG/DL (ref 65–100)
HCT VFR BLD AUTO: 38.5 % (ref 41.1–50.3)
HGB BLD-MCNC: 12.9 G/DL (ref 13.6–17.2)
INR PPP: 1.2
MCH RBC QN AUTO: 34.2 PG (ref 26.1–32.9)
MCHC RBC AUTO-ENTMCNC: 33.5 G/DL (ref 31.4–35)
MCV RBC AUTO: 102.1 FL (ref 79.6–97.8)
NRBC # BLD: 0 K/UL (ref 0–0.2)
PLATELET # BLD AUTO: 212 K/UL (ref 150–450)
PMV BLD AUTO: 10.4 FL (ref 9.4–12.3)
POTASSIUM SERPL-SCNC: 3.6 MMOL/L (ref 3.5–5.1)
PROTHROMBIN TIME: 14.6 SEC (ref 11.5–14.5)
RBC # BLD AUTO: 3.77 M/UL (ref 4.23–5.6)
SODIUM SERPL-SCNC: 139 MMOL/L (ref 136–145)
WBC # BLD AUTO: 11.3 K/UL (ref 4.3–11.1)

## 2018-09-09 PROCEDURE — 85610 PROTHROMBIN TIME: CPT

## 2018-09-09 PROCEDURE — 74011250637 HC RX REV CODE- 250/637: Performed by: ORTHOPAEDIC SURGERY

## 2018-09-09 PROCEDURE — 97530 THERAPEUTIC ACTIVITIES: CPT

## 2018-09-09 PROCEDURE — 80048 BASIC METABOLIC PNL TOTAL CA: CPT

## 2018-09-09 PROCEDURE — 85027 COMPLETE CBC AUTOMATED: CPT

## 2018-09-09 PROCEDURE — 36415 COLL VENOUS BLD VENIPUNCTURE: CPT

## 2018-09-09 PROCEDURE — 94760 N-INVAS EAR/PLS OXIMETRY 1: CPT

## 2018-09-09 PROCEDURE — 65270000029 HC RM PRIVATE

## 2018-09-09 RX ADMIN — AMLODIPINE BESYLATE 5 MG: 5 TABLET ORAL at 09:11

## 2018-09-09 RX ADMIN — FAMOTIDINE 20 MG: 20 TABLET ORAL at 09:11

## 2018-09-09 RX ADMIN — Medication 1 AMPULE: at 21:53

## 2018-09-09 RX ADMIN — HYDROCHLOROTHIAZIDE: 12.5 CAPSULE ORAL at 09:11

## 2018-09-09 RX ADMIN — Medication 5 ML: at 05:46

## 2018-09-09 RX ADMIN — Medication 10 ML: at 14:00

## 2018-09-09 RX ADMIN — Medication 1 AMPULE: at 09:11

## 2018-09-09 RX ADMIN — HYDROCODONE BITARTRATE AND ACETAMINOPHEN 1 TABLET: 7.5; 325 TABLET ORAL at 05:46

## 2018-09-09 RX ADMIN — Medication 5 ML: at 21:53

## 2018-09-09 RX ADMIN — HYDROCODONE BITARTRATE AND ACETAMINOPHEN 1 TABLET: 7.5; 325 TABLET ORAL at 21:52

## 2018-09-09 RX ADMIN — TAMSULOSIN HYDROCHLORIDE 0.4 MG: 0.4 CAPSULE ORAL at 09:11

## 2018-09-09 NOTE — PROGRESS NOTES
POD 2 
 
AF,VSS No complaints, discussed surgery tomorrow which is L5-S! Laminectomy, fusion,TLIF. Answered all questions Will get preop labs today

## 2018-09-09 NOTE — PROGRESS NOTES
Problem: Falls - Risk of 
Goal: *Absence of Falls Document Francisca Garber Fall Risk and appropriate interventions in the flowsheet. Outcome: Progressing Towards Goal 
Fall Risk Interventions: 
Mobility Interventions: Bed/chair exit alarm, Patient to call before getting OOB Mentation Interventions: Bed/chair exit alarm, Adequate sleep, hydration, pain control, More frequent rounding Medication Interventions: Bed/chair exit alarm, Patient to call before getting OOB Elimination Interventions: Call light in reach, Bed/chair exit alarm, Patient to call for help with toileting needs History of Falls Interventions: Bed/chair exit alarm

## 2018-09-09 NOTE — ANESTHESIA PREPROCEDURE EVALUATION
Anesthetic History Review of Systems / Medical History Patient summary reviewed Pulmonary Neuro/Psych Cardiovascular Hypertension Exercise tolerance: >4 METS 
  
GI/Hepatic/Renal 
  
 
 
Renal disease: CRI Endo/Other Cancer (Prostate) Other Findings Physical Exam 
 
Airway Mallampati: II 
TM Distance: > 6 cm Cardiovascular Regular rate and rhythm,  S1 and S2 normal,  no murmur, click, rub, or gallop Dental 
No notable dental hx Pulmonary Breath sounds clear to auscultation Abdominal 
 
 
 
 Other Findings Anesthetic Plan ASA: 3 Anesthesia type: general 
 
Monitoring Plan: Arterial line Anesthetic plan and risks discussed with: Patient ALIF 9/7 abandoned due to unfavorable anatomy

## 2018-09-10 ENCOUNTER — ANESTHESIA (OUTPATIENT)
Dept: SURGERY | Age: 78
DRG: 455 | End: 2018-09-10
Payer: MEDICARE

## 2018-09-10 ENCOUNTER — APPOINTMENT (OUTPATIENT)
Dept: GENERAL RADIOLOGY | Age: 78
DRG: 455 | End: 2018-09-10
Attending: ORTHOPAEDIC SURGERY
Payer: MEDICARE

## 2018-09-10 PROCEDURE — 77030013292 HC BOWL MX PRSM J&J -A: Performed by: ANESTHESIOLOGY

## 2018-09-10 PROCEDURE — C1713 ANCHOR/SCREW BN/BN,TIS/BN: HCPCS | Performed by: ORTHOPAEDIC SURGERY

## 2018-09-10 PROCEDURE — 76060000040 HC ANESTHESIA 4.5 TO 5 HR: Performed by: ORTHOPAEDIC SURGERY

## 2018-09-10 PROCEDURE — 77030008703 HC TU ET UNCUF COVD -A: Performed by: ANESTHESIOLOGY

## 2018-09-10 PROCEDURE — 77030008477 HC STYL SATN SLP COVD -A: Performed by: ANESTHESIOLOGY

## 2018-09-10 PROCEDURE — 74011250636 HC RX REV CODE- 250/636

## 2018-09-10 PROCEDURE — 77030014650 HC SEAL MTRX FLOSEL BAXT -C: Performed by: ORTHOPAEDIC SURGERY

## 2018-09-10 PROCEDURE — 74011250637 HC RX REV CODE- 250/637: Performed by: ORTHOPAEDIC SURGERY

## 2018-09-10 PROCEDURE — 0SG3071 FUSION OF LUMBOSACRAL JOINT WITH AUTOLOGOUS TISSUE SUBSTITUTE, POSTERIOR APPROACH, POSTERIOR COLUMN, OPEN APPROACH: ICD-10-PCS | Performed by: ORTHOPAEDIC SURGERY

## 2018-09-10 PROCEDURE — 77030037710: Performed by: ORTHOPAEDIC SURGERY

## 2018-09-10 PROCEDURE — 77030039425 HC BLD LARYNG TRULITE DISP TELE -A: Performed by: ANESTHESIOLOGY

## 2018-09-10 PROCEDURE — 77030002933 HC SUT MCRYL J&J -A: Performed by: ORTHOPAEDIC SURGERY

## 2018-09-10 PROCEDURE — 74011250636 HC RX REV CODE- 250/636: Performed by: ANESTHESIOLOGY

## 2018-09-10 PROCEDURE — 74011250636 HC RX REV CODE- 250/636: Performed by: ORTHOPAEDIC SURGERY

## 2018-09-10 PROCEDURE — 65270000029 HC RM PRIVATE

## 2018-09-10 PROCEDURE — 77030003666 HC NDL SPINAL BD -A: Performed by: ORTHOPAEDIC SURGERY

## 2018-09-10 PROCEDURE — 77030014368: Performed by: ORTHOPAEDIC SURGERY

## 2018-09-10 PROCEDURE — 77030005401 HC CATH RAD ARRO -A: Performed by: ANESTHESIOLOGY

## 2018-09-10 PROCEDURE — 94760 N-INVAS EAR/PLS OXIMETRY 1: CPT

## 2018-09-10 PROCEDURE — 0SG30AJ FUSION OF LUMBOSACRAL JOINT WITH INTERBODY FUSION DEVICE, POSTERIOR APPROACH, ANTERIOR COLUMN, OPEN APPROACH: ICD-10-PCS | Performed by: ORTHOPAEDIC SURGERY

## 2018-09-10 PROCEDURE — 07DR3ZZ EXTRACTION OF ILIAC BONE MARROW, PERCUTANEOUS APPROACH: ICD-10-PCS | Performed by: ORTHOPAEDIC SURGERY

## 2018-09-10 PROCEDURE — 77030032490 HC SLV COMPR SCD KNE COVD -B: Performed by: ORTHOPAEDIC SURGERY

## 2018-09-10 PROCEDURE — 77030019908 HC STETH ESOPH SIMS -A: Performed by: ANESTHESIOLOGY

## 2018-09-10 PROCEDURE — 77030038601 HC DEV SYS W/CANN LITE BIO STRY -F: Performed by: ORTHOPAEDIC SURGERY

## 2018-09-10 PROCEDURE — 0JB70ZZ EXCISION OF BACK SUBCUTANEOUS TISSUE AND FASCIA, OPEN APPROACH: ICD-10-PCS | Performed by: ORTHOPAEDIC SURGERY

## 2018-09-10 PROCEDURE — 74011000250 HC RX REV CODE- 250: Performed by: ORTHOPAEDIC SURGERY

## 2018-09-10 PROCEDURE — 0SB40ZZ EXCISION OF LUMBOSACRAL DISC, OPEN APPROACH: ICD-10-PCS | Performed by: ORTHOPAEDIC SURGERY

## 2018-09-10 PROCEDURE — 74011000250 HC RX REV CODE- 250

## 2018-09-10 PROCEDURE — 77030020407 HC IV BLD WRMR ST 3M -A: Performed by: ANESTHESIOLOGY

## 2018-09-10 PROCEDURE — 77030013794 HC KT TRNSDUC BLD EDWD -B: Performed by: ANESTHESIOLOGY

## 2018-09-10 PROCEDURE — 77030014007 HC SPNG HEMSTAT J&J -B: Performed by: ORTHOPAEDIC SURGERY

## 2018-09-10 PROCEDURE — 76210000006 HC OR PH I REC 0.5 TO 1 HR: Performed by: ORTHOPAEDIC SURGERY

## 2018-09-10 PROCEDURE — 77030031139 HC SUT VCRL2 J&J -A: Performed by: ORTHOPAEDIC SURGERY

## 2018-09-10 PROCEDURE — 77030018836 HC SOL IRR NACL ICUM -A: Performed by: ORTHOPAEDIC SURGERY

## 2018-09-10 PROCEDURE — 77030019940 HC BLNKT HYPOTHRM STRY -B: Performed by: ANESTHESIOLOGY

## 2018-09-10 PROCEDURE — 77030034850: Performed by: ORTHOPAEDIC SURGERY

## 2018-09-10 PROCEDURE — 76010000176 HC OR TIME 4.5 TO 5 HR INTENSV-TIER 1: Performed by: ORTHOPAEDIC SURGERY

## 2018-09-10 PROCEDURE — 72100 X-RAY EXAM L-S SPINE 2/3 VWS: CPT

## 2018-09-10 PROCEDURE — 77030034475 HC MISC IMPL SPN: Performed by: ORTHOPAEDIC SURGERY

## 2018-09-10 PROCEDURE — 88304 TISSUE EXAM BY PATHOLOGIST: CPT

## 2018-09-10 PROCEDURE — 74011000258 HC RX REV CODE- 258: Performed by: ORTHOPAEDIC SURGERY

## 2018-09-10 PROCEDURE — 77030006788 HC BLD SAW OSC STRY -B: Performed by: ORTHOPAEDIC SURGERY

## 2018-09-10 PROCEDURE — 77030030163 HC BN WAX J&J -A: Performed by: ORTHOPAEDIC SURGERY

## 2018-09-10 PROCEDURE — 77030039194 HC KT NEURO MONITR ASTU -G: Performed by: ORTHOPAEDIC SURGERY

## 2018-09-10 DEVICE — POLYAXIAL SCREW
Type: IMPLANTABLE DEVICE | Site: SPINE LUMBAR | Status: FUNCTIONAL
Brand: XIA 3

## 2018-09-10 DEVICE — BIO DBM PLUS PUTTY (WITH CANCELLOUS)
Type: IMPLANTABLE DEVICE | Site: SPINE LUMBAR | Status: FUNCTIONAL
Brand: BIO DBM

## 2018-09-10 DEVICE — TI ALLOY RAD ROD
Type: IMPLANTABLE DEVICE | Site: SPINE LUMBAR | Status: FUNCTIONAL
Brand: XIA 3

## 2018-09-10 DEVICE — BLOCKER
Type: IMPLANTABLE DEVICE | Site: SPINE LUMBAR | Status: FUNCTIONAL
Brand: XIA 3

## 2018-09-10 RX ORDER — BUPIVACAINE HYDROCHLORIDE AND EPINEPHRINE 5; 5 MG/ML; UG/ML
INJECTION, SOLUTION EPIDURAL; INTRACAUDAL; PERINEURAL AS NEEDED
Status: DISCONTINUED | OUTPATIENT
Start: 2018-09-10 | End: 2018-09-10 | Stop reason: HOSPADM

## 2018-09-10 RX ORDER — DEXAMETHASONE SODIUM PHOSPHATE 4 MG/ML
INJECTION, SOLUTION INTRA-ARTICULAR; INTRALESIONAL; INTRAMUSCULAR; INTRAVENOUS; SOFT TISSUE AS NEEDED
Status: DISCONTINUED | OUTPATIENT
Start: 2018-09-10 | End: 2018-09-10 | Stop reason: HOSPADM

## 2018-09-10 RX ORDER — ACETAMINOPHEN 10 MG/ML
INJECTION, SOLUTION INTRAVENOUS AS NEEDED
Status: DISCONTINUED | OUTPATIENT
Start: 2018-09-10 | End: 2018-09-10 | Stop reason: HOSPADM

## 2018-09-10 RX ORDER — PROPOFOL 10 MG/ML
INJECTION, EMULSION INTRAVENOUS AS NEEDED
Status: DISCONTINUED | OUTPATIENT
Start: 2018-09-10 | End: 2018-09-10 | Stop reason: HOSPADM

## 2018-09-10 RX ORDER — HYDROMORPHONE HYDROCHLORIDE 2 MG/ML
0.5 INJECTION, SOLUTION INTRAMUSCULAR; INTRAVENOUS; SUBCUTANEOUS
Status: DISCONTINUED | OUTPATIENT
Start: 2018-09-10 | End: 2018-09-10 | Stop reason: HOSPADM

## 2018-09-10 RX ORDER — OXYCODONE HYDROCHLORIDE 5 MG/1
5 TABLET ORAL
Status: DISCONTINUED | OUTPATIENT
Start: 2018-09-10 | End: 2018-09-10 | Stop reason: HOSPADM

## 2018-09-10 RX ORDER — KETAMINE HYDROCHLORIDE 100 MG/ML
INJECTION, SOLUTION INTRAMUSCULAR; INTRAVENOUS AS NEEDED
Status: DISCONTINUED | OUTPATIENT
Start: 2018-09-10 | End: 2018-09-10 | Stop reason: HOSPADM

## 2018-09-10 RX ORDER — SODIUM CHLORIDE 0.9 % (FLUSH) 0.9 %
5-10 SYRINGE (ML) INJECTION AS NEEDED
Status: DISCONTINUED | OUTPATIENT
Start: 2018-09-10 | End: 2018-09-10 | Stop reason: HOSPADM

## 2018-09-10 RX ORDER — FENTANYL CITRATE 50 UG/ML
INJECTION, SOLUTION INTRAMUSCULAR; INTRAVENOUS AS NEEDED
Status: DISCONTINUED | OUTPATIENT
Start: 2018-09-10 | End: 2018-09-10 | Stop reason: HOSPADM

## 2018-09-10 RX ORDER — BACITRACIN 50000 [IU]/1
INJECTION, POWDER, FOR SOLUTION INTRAMUSCULAR AS NEEDED
Status: DISCONTINUED | OUTPATIENT
Start: 2018-09-10 | End: 2018-09-10 | Stop reason: HOSPADM

## 2018-09-10 RX ORDER — LIDOCAINE HYDROCHLORIDE 20 MG/ML
INJECTION, SOLUTION EPIDURAL; INFILTRATION; INTRACAUDAL; PERINEURAL AS NEEDED
Status: DISCONTINUED | OUTPATIENT
Start: 2018-09-10 | End: 2018-09-10 | Stop reason: HOSPADM

## 2018-09-10 RX ORDER — ROCURONIUM BROMIDE 10 MG/ML
INJECTION, SOLUTION INTRAVENOUS AS NEEDED
Status: DISCONTINUED | OUTPATIENT
Start: 2018-09-10 | End: 2018-09-10 | Stop reason: HOSPADM

## 2018-09-10 RX ORDER — SODIUM CHLORIDE, SODIUM LACTATE, POTASSIUM CHLORIDE, CALCIUM CHLORIDE 600; 310; 30; 20 MG/100ML; MG/100ML; MG/100ML; MG/100ML
75 INJECTION, SOLUTION INTRAVENOUS CONTINUOUS
Status: DISCONTINUED | OUTPATIENT
Start: 2018-09-10 | End: 2018-09-10 | Stop reason: HOSPADM

## 2018-09-10 RX ORDER — VANCOMYCIN HYDROCHLORIDE 1 G/20ML
INJECTION, POWDER, LYOPHILIZED, FOR SOLUTION INTRAVENOUS AS NEEDED
Status: DISCONTINUED | OUTPATIENT
Start: 2018-09-10 | End: 2018-09-10 | Stop reason: HOSPADM

## 2018-09-10 RX ORDER — ONDANSETRON 2 MG/ML
INJECTION INTRAMUSCULAR; INTRAVENOUS AS NEEDED
Status: DISCONTINUED | OUTPATIENT
Start: 2018-09-10 | End: 2018-09-10 | Stop reason: HOSPADM

## 2018-09-10 RX ORDER — SUCCINYLCHOLINE CHLORIDE 20 MG/ML
INJECTION INTRAMUSCULAR; INTRAVENOUS AS NEEDED
Status: DISCONTINUED | OUTPATIENT
Start: 2018-09-10 | End: 2018-09-10 | Stop reason: HOSPADM

## 2018-09-10 RX ORDER — CEFAZOLIN SODIUM/WATER 2 G/20 ML
2 SYRINGE (ML) INTRAVENOUS
Status: COMPLETED | OUTPATIENT
Start: 2018-09-10 | End: 2018-09-10

## 2018-09-10 RX ORDER — OXYCODONE AND ACETAMINOPHEN 10; 325 MG/1; MG/1
1 TABLET ORAL AS NEEDED
Status: DISCONTINUED | OUTPATIENT
Start: 2018-09-10 | End: 2018-09-10 | Stop reason: HOSPADM

## 2018-09-10 RX ORDER — SODIUM CHLORIDE, SODIUM LACTATE, POTASSIUM CHLORIDE, CALCIUM CHLORIDE 600; 310; 30; 20 MG/100ML; MG/100ML; MG/100ML; MG/100ML
INJECTION, SOLUTION INTRAVENOUS
Status: DISCONTINUED | OUTPATIENT
Start: 2018-09-10 | End: 2018-09-10 | Stop reason: HOSPADM

## 2018-09-10 RX ADMIN — Medication 1 AMPULE: at 22:55

## 2018-09-10 RX ADMIN — FENTANYL CITRATE 25 MCG: 50 INJECTION, SOLUTION INTRAMUSCULAR; INTRAVENOUS at 11:40

## 2018-09-10 RX ADMIN — KETAMINE HYDROCHLORIDE 20 MG: 100 INJECTION, SOLUTION INTRAMUSCULAR; INTRAVENOUS at 09:30

## 2018-09-10 RX ADMIN — ONDANSETRON 4 MG: 2 INJECTION INTRAMUSCULAR; INTRAVENOUS at 11:45

## 2018-09-10 RX ADMIN — SODIUM CHLORIDE, SODIUM LACTATE, POTASSIUM CHLORIDE, AND CALCIUM CHLORIDE: 600; 310; 30; 20 INJECTION, SOLUTION INTRAVENOUS at 08:30

## 2018-09-10 RX ADMIN — ACETAMINOPHEN 1000 MG: 10 INJECTION, SOLUTION INTRAVENOUS at 11:42

## 2018-09-10 RX ADMIN — SUCCINYLCHOLINE CHLORIDE 140 MG: 20 INJECTION INTRAMUSCULAR; INTRAVENOUS at 07:52

## 2018-09-10 RX ADMIN — Medication 10 ML: at 22:55

## 2018-09-10 RX ADMIN — DEXAMETHASONE SODIUM PHOSPHATE 4 MG: 4 INJECTION, SOLUTION INTRA-ARTICULAR; INTRALESIONAL; INTRAMUSCULAR; INTRAVENOUS; SOFT TISSUE at 11:45

## 2018-09-10 RX ADMIN — FENTANYL CITRATE 100 MCG: 50 INJECTION, SOLUTION INTRAMUSCULAR; INTRAVENOUS at 07:52

## 2018-09-10 RX ADMIN — Medication 10 ML: at 05:44

## 2018-09-10 RX ADMIN — KETAMINE HYDROCHLORIDE 20 MG: 100 INJECTION, SOLUTION INTRAMUSCULAR; INTRAVENOUS at 10:28

## 2018-09-10 RX ADMIN — ROCURONIUM BROMIDE 10 MG: 10 INJECTION, SOLUTION INTRAVENOUS at 07:52

## 2018-09-10 RX ADMIN — Medication 2 G: at 08:31

## 2018-09-10 RX ADMIN — LIDOCAINE HYDROCHLORIDE 100 MG: 20 INJECTION, SOLUTION EPIDURAL; INFILTRATION; INTRACAUDAL; PERINEURAL at 07:52

## 2018-09-10 RX ADMIN — KETAMINE HYDROCHLORIDE 40 MG: 100 INJECTION, SOLUTION INTRAMUSCULAR; INTRAVENOUS at 08:35

## 2018-09-10 RX ADMIN — FENTANYL CITRATE 25 MCG: 50 INJECTION, SOLUTION INTRAMUSCULAR; INTRAVENOUS at 11:51

## 2018-09-10 RX ADMIN — SODIUM CHLORIDE, SODIUM LACTATE, POTASSIUM CHLORIDE, CALCIUM CHLORIDE: 600; 310; 30; 20 INJECTION, SOLUTION INTRAVENOUS at 08:01

## 2018-09-10 RX ADMIN — PROPOFOL 30 MG: 10 INJECTION, EMULSION INTRAVENOUS at 08:18

## 2018-09-10 RX ADMIN — FAMOTIDINE 20 MG: 20 TABLET ORAL at 05:42

## 2018-09-10 RX ADMIN — SODIUM CHLORIDE 1000 MG: 900 INJECTION, SOLUTION INTRAVENOUS at 16:12

## 2018-09-10 RX ADMIN — FENTANYL CITRATE 25 MCG: 50 INJECTION, SOLUTION INTRAMUSCULAR; INTRAVENOUS at 11:57

## 2018-09-10 RX ADMIN — FENTANYL CITRATE 50 MCG: 50 INJECTION, SOLUTION INTRAMUSCULAR; INTRAVENOUS at 08:37

## 2018-09-10 RX ADMIN — PROPOFOL 170 MG: 10 INJECTION, EMULSION INTRAVENOUS at 07:52

## 2018-09-10 RX ADMIN — KETAMINE HYDROCHLORIDE 20 MG: 100 INJECTION, SOLUTION INTRAMUSCULAR; INTRAVENOUS at 11:29

## 2018-09-10 RX ADMIN — AMLODIPINE BESYLATE 5 MG: 5 TABLET ORAL at 05:36

## 2018-09-10 RX ADMIN — SODIUM CHLORIDE, SODIUM LACTATE, POTASSIUM CHLORIDE, AND CALCIUM CHLORIDE 75 ML/HR: 600; 310; 30; 20 INJECTION, SOLUTION INTRAVENOUS at 06:02

## 2018-09-10 RX ADMIN — Medication 5 ML: at 14:00

## 2018-09-10 NOTE — ANESTHESIA POSTPROCEDURE EVALUATION
Post-Anesthesia Evaluation and Assessment Patient: Princella Schirmer MRN: [id number]  SSN: [id number] YOB: 1940  Age: 65 y.o. Sex: male Cardiovascular Function/Vital Signs Visit Vitals  /79  Pulse 94  Temp 36.9 °C (98.5 °F)  Resp 14  
 Ht 5' 6\" (1.676 m)  Wt 78.5 kg (173 lb 2 oz)  SpO2 100%  BMI 27.94 kg/m2 Patient is status post general anesthesia for Procedure(s): 
L5-S1 laminectomy for instrapinal extradural lesion/tlif/posterior instrumentation. Nausea/Vomiting: None Postoperative hydration reviewed and adequate. Pain: 
Pain Scale 1: Visual (09/10/18 1227) Pain Intensity 1: 0 (09/10/18 1227) Managed Neurological Status:  
Neuro (WDL): Exceptions to WDL (09/10/18 1227) Neuro Neurologic State: Sleeping (09/10/18 1227) Orientation Level: Oriented X4 (09/09/18 1924) Cognition: Follows commands;Decreased attention/concentration (09/08/18 1902) LUE Motor Response: Spontaneous  (09/08/18 1902) LLE Motor Response: Spontaneous ;Weak (09/08/18 1902) RUE Motor Response: Spontaneous  (09/08/18 1902) RLE Motor Response: Spontaneous ;Weak (09/08/18 1902) At baseline Mental Status and Level of Consciousness: Arousable Pulmonary Status:  
O2 Device: Nasal cannula (09/10/18 1227) Adequate oxygenation and airway patent Complications related to anesthesia: None Post-anesthesia assessment completed. No concerns Signed By: Jennifer Hickman MD   
 September 10, 2018

## 2018-09-10 NOTE — BRIEF OP NOTE
BRIEF OPERATIVE NOTE Date of Procedure: 9/10/2018 Preoperative Diagnosis: Spondylolisthesis, unspecified spinal region [M43.10] Lumbar stenosis with neurogenic claudication [M48.062] Postoperative Diagnosis: Spondylolisthesis, unspecified spinal region [M43.10] Procedure(s): 
L5-S1 laminectomy for instrapinal extradural lesion/tlif/posterior instrumentation Surgeon(s) and Role: * Tr Melton MD - Primary Surgical Assistant: none Surgical Staff: 
Circ-1: Millie Lim RN 
Circ-Relief: Lucho Caballero RN Scrub Tech-1: Timbo Swenson Scrub Tech-2: [facility] Scrub Tech-Relief: Nadine Snell; Michaela Wiggins; Kary Alfonso CNA Event Time In Incision Start 4222 Incision Close Anesthesia: General  
Estimated Blood Loss: 300cc Specimens:  
ID Type Source Tests Collected by Time Destination 1 : 1177 Sid Roldan III, MD 9/10/2018 1009 Pathology Findings: stenosis and right sided synovial cyst  
Complications: none Implants:  
Implant Name Type Inv. Item Serial No.  Lot No. LRB No. Used Action  
osteo amp 15cc   [id number]   N/A 1 Implanted GRAFT DBM PTTY+ W/CHIP 10ML -- BIO DBM - [id number]  GRAFT DBM PTTY+ W/CHIP 10ML -- BIO DBM  ERIKA [employer] HOWM [id number] N/A 1 Implanted SCR SPNE BRYCE 3 6.5X55MM TI --  - [id number]  SCR SPNE BRYCE 3 6.5X55MM TI --   Efren Miguel Angel WIAA [id number] N/A 1 Implanted SCR SPNE BRYCE 3 6.5X50MM TI --  - [id number]  SCR SPNE BRYCE 3 6.5X50MM TI --   [employer] SPINE HOWM [id number] N/A 2 Implanted BLAINE SPNE BRYCE 3 6.0X35MM TI --  - [id number]  BLAINE SPNE BRYCE 3 6.0X35MM TI --   ERIKA SPINE HOWM [id number] N/A 2 Implanted BLOCKER SPNE BRYCE 3 TI --  - [id number]  BLOCKER SPNE BRYCE 3 TI --   ERIKA SPINE HOWM [id number] N/A 4 Implanted SCR SPNE POLYAXL 6.5X60MM TI -- BRYCE  - [id number]   SCR SPNE POLYAXL 6.5X60MM TI -- BRYCE 3   ERIKA SPINE HOW [id number] N/A 2 Implanted

## 2018-09-10 NOTE — OP NOTES
[facility] REPORT    Gabino Rodríguez  MR#: [id number]  : 1940  ACCOUNT #: [de-identified]   DATE OF SERVICE: 09/10/2018    SURGEON:  Ivette Palencia MD    PREOPERATIVE DIAGNOSES:  L5-S1 spondylolisthesis with spinal stenosis and aborted prior L5-S1 anterior interbody fusion. POSTOPERATIVE DIAGNOSES:  L5-S1 spondylolisthesis with spinal stenosis and aborted prior L5-S1 anterior interbody fusion with right-sided synovial cyst.    PROCEDURES PERFORMED:  1. L5-S1 laminectomy with excision of intraspinal extradural lesion. 2. L5-S1 posterior lumbar interbody fusion. 3.  Placement of biomechanical interbody device at L5-S1 using the Orth[employer]ix titanium expandable interbody device  4. Placement of nonsegmental spinal instrumentation L5-S1 using the Phelps pedicle screw and ct system. 5. L5-S1 posterolateral fusion. 6.  Left iliac crest bone marrow aspiration. 7.  Use of local bone graft OsteoAMP and demineralized bone matrix putty for fusion. ANESTHESIA:  GETA. ESTIMATED BLOOD LOSS:  300 mL. FLUIDS:  1400 mL crystalloid. DRAINS:  One Hemovac. SPECIMENS REMOVED:  Intraspinal extradural lesion felt to be a synovial cyst sent to pathology. IMPLANTS:  Phelps    COMPLICATIONS:  None. ASSISTANTS:  None. INDICATIONS:  The patient is a 78-year-old gentleman with L5-S1 spondylolisthesis grade II and spinal stenosis. We had previously tried to do an anterior interbody fusion but were unable to because of where his iliac veins were located, so the plan today was to not only do a laminectomy but also an interbody fusion and a posterior fusion with posterior instrumentation. DESCRIPTION OF PROCEDURE:  The patient was brought to the operating room. After administration of anesthesia, IV antibiotic and placement of monitoring lines, he was positioned prone on the [facility] frame. His back was prepped with Betadine and sterilely draped.   At this point, surgeon called a timeout and confirmed the procedure to be performed, his allergy history and the fact that he had received his preoperative IV antibiotics. C-arm was brought in. We identified the L5-S1 level, marked it on the skin and we then made a midline incision over this with a scalpel, dissected down to the subcutaneous tissue with electrocautery to the deep fascia. Deep fascia was incised on either side of the spinous processes and we dissected down along the lamina out to the facet joint bilaterally. We ended up taking a lateral C-arm x-ray with an angled curet and interlaminar space to determine our location. Once we had done that, we stripped out the lateral gutter exposing the L5-S1 facet and the L4-5 facet. We identified the sacral ala and the L5 transverse process bilaterally. We cleaned all the soft tissue off the spine and between the spinous transverse process and the sacral ala down to the membrane. All bleeders were cauterized as they were encountered. Once we had done this, we were ready for decompression. We removed the spinous process of L5 and S1 with a rongeur and we removed most of the L5 lamina with a rongeur. We then used a Kerrison punch to complete the laminectomy bilaterally also do extensive facetectomy. We did extensive foraminotomy, identified the L4 and L5 nerve roots and made sure those were free. We noted excessive tightness and scarring on the right side, approximately where the L4 nerve was exiting and this was densely adherent to the dura. Eventually, we were able to excise this in pieces, appeared to be a synovial cyst.  The tissue was sent to Pathology for permanent sectioning, but after removing this, the nerve roots and thecal sac were free. We checked cephalad and caudad. No further problem was seen. We also undercut the facets a little bit at L4-5 and removed the ligamentum which helped with the decompression.   We then went in and placed an interbody device and we elected to place it from the left side. We retracted neural structures toward the midline, cauterized the epidural veins. We identified the L4 and L5 nerve root and made sure they were free. We burred out the medial aspect of the facet and then we incised the disk annulus with a scalpel and removed this with pituitaries, used the distractors and marie to remove disk and then more pituitaries. Because of spondylolisthesis, we used a Kerrison punch to bite off the back of the S1 vertebral body so that we would have better access and we eventually decided that a 10.5 trial, because we had used the trials after the distractors and marie, had a nice tight fit. We were going to use the lordotic expandable titanium cage from [employer]. So at this point, we harvested bone marrow aspirate by inserting the needle into the iliac crest on the left side, removing the stylet and suctioning off 10 mL of bone marrow aspirate, which was mixed with OsteoAMP. We then put 2.5 mL of this into the bone graft gun and injected it into the disk space, tamped it anteriorly. Our 10.5 interbody device was selected and attached to the  and we inserted it from the left side, retracting the neural structures all the way to the anterior aspect of the disk. Then, we used the crank obando to expand the cage until maximum distraction was obtained and the pressure lock popped. We removed the expander from the attachment device, inserted the funnel. We funneled bone graft down through this and tamped it into the cage. Then, we removed the funnel and  device as one unit and the interbody device appeared to be well settled anteriorly. We took a lateral C-arm x-ray that showed it having good contact, good distraction, good lordosis. Then,   at this point, we thoroughly irrigated the wound and suctioned dry. We were now ready to do our posterolateral instrumentation with pedicle screws and rods.   We identified the appropriate starting point for the L5 and S1 screws. We burred a starting hole and inserted a Steffee probe down to this probe and no breaches were found, tapped it with 5.5 mm tap. Reprobed it and no breaches were found. We used the nerve stimulation and there was no stimulation below the 10 milliamp threshold. We then inserted 6.5 50 mm screws at S1 and a 6.5 60 mm screws at L5. We had already decorticated the transverse process and sacral ala with a high speed bur and the OsteoAMP bone graft/local bone graft mixture was divided in half was placed in each lateral gutter and then we placed demineralized bone matrix putty over the top of this to keep it in place. Rods were placed into the screws bilaterally. The locking nuts were placed. They were torqued tight. It should be noted that we did save all the bone from the decompression and run it through a bone mill for local bone graft. So final AP, lateral C-arm x-ray were obtained which showed good alignment of the instrumentation and spine. Everything appeared to be stable, so we were ready to close the wound, so a large Hemovac drain was placed deep in the wound, brought out through a separate proximal stab incision. A gram of vancomycin powder was placed in the wound and we placed FloSeal over the decompression site for hemostasis and we closed the wound in layers. Deep fascia closed with interrupted figure-of-eight stitches of #1 Vicryl, subcutaneous tissue closed with interrupted stitches of 2-0 Vicryl and skin was closed with running subcuticular stitch of 3-0 Vicryl. Steri-Strips and dry sterile dressings were applied. After we applied the dressing, the patient was rolled supine on to the recovery room bed, having tolerated the procedure well.       Ysabel Bunn III, MD SEL / MN  D: 09/10/2018 12:15     T: 09/10/2018 13:57  JOB #: [id number]  CC: eJff Jones MD

## 2018-09-10 NOTE — PROGRESS NOTES
TRANSFER - OUT REPORT: 
 
Verbal report given to Jermain Ivan RN (name) on Spring Odonnell  being transferred to Preop (unit) for ordered procedure Report consisted of patients Situation, Background, Assessment and  
Recommendations(SBAR). Information from the following report(s) SBAR, Kardex, Intake/Output, MAR and Recent Results was reviewed with the receiving nurse. Lines:  
Peripheral IV 09/07/18 Right Hand (Active) Site Assessment Clean, dry, & intact 9/9/2018 10:38 AM  
Phlebitis Assessment 0 9/9/2018 10:38 AM  
Infiltration Assessment 0 9/9/2018 10:38 AM  
Dressing Status Clean, dry, & intact 9/9/2018 10:38 AM  
Dressing Type Transparent 9/9/2018 10:38 AM  
Hub Color/Line Status Capped 9/9/2018 10:38 AM  
  
 
Opportunity for questions and clarification was provided. Patient transported with: 
 Registered Nurse

## 2018-09-10 NOTE — PERIOP NOTES
Attempted to contact wife. She is not in the waiting room. Aldo states she left for lunch. Called wife's cell phone, it went to voice mail. Per Aldo family is aware pt going to [phone].

## 2018-09-10 NOTE — PROGRESS NOTES
Initial visit to assess pt's spiritual needs. Ministry of presence & prayer to demonstrate caring & concern, convey emotional & spiritual support.  
 
Chaplain Genevieve Haque MDiv,ThM,PhD

## 2018-09-10 NOTE — PROGRESS NOTES
Primary Nurse Sneha Tian RN and Al, RN performed a dual skin assessment on this patient incision to anterior abdomen CDI, and posterior mid back CDI.

## 2018-09-10 NOTE — PROGRESS NOTES
TRANSFER - IN REPORT: 
 
Verbal report received from JOHNSON Huerta on [facility]  being received from PACU for routine post - op Report consisted of patients Situation, Background, Assessment and  
Recommendations(SBAR). Information from the following report(s) SBAR was reviewed with the receiving nurse. Opportunity for questions and clarification was provided. Assessment completed upon patients arrival to unit and care assumed.

## 2018-09-10 NOTE — PERIOP NOTES
TRANSFER - OUT REPORT: 
 
Verbal report given to Sammie ORNELAS(name) on Princella Schirmer  being transferred to [facility](unit) for routine post - op Report consisted of patients Situation, Background, Assessment and  
Recommendations(SBAR). Information from the following report(s) SBAR, OR Summary and Procedure Summary was reviewed with the receiving nurse. Lines:  
Peripheral IV 09/07/18 Right Hand (Active) Site Assessment Clean, dry, & intact 9/10/2018 12:27 PM  
Phlebitis Assessment 0 9/10/2018 12:27 PM  
Infiltration Assessment 0 9/10/2018 12:27 PM  
Dressing Status Clean, dry, & intact 9/10/2018 12:27 PM  
Dressing Type Tape;Transparent 9/10/2018 12:27 PM  
Hub Color/Line Status Green; Infusing 9/10/2018 12:27 PM  
  
 
Opportunity for questions and clarification was provided. Patient transported with: 
 O2 @ 2 liters VTE prophylaxis orders have been written for Princella Schirmer. Patient and family given floor number and nurses name. Family updated re: pt status after security code verified.

## 2018-09-11 LAB
ANION GAP SERPL CALC-SCNC: 9 MMOL/L (ref 7–16)
BUN SERPL-MCNC: 23 MG/DL (ref 8–23)
CALCIUM SERPL-MCNC: 8.4 MG/DL (ref 8.3–10.4)
CHLORIDE SERPL-SCNC: 104 MMOL/L (ref 98–107)
CO2 SERPL-SCNC: 26 MMOL/L (ref 21–32)
CREAT SERPL-MCNC: 1.33 MG/DL (ref 0.8–1.5)
GLUCOSE SERPL-MCNC: 161 MG/DL (ref 65–100)
HCT VFR BLD AUTO: 35.5 % (ref 41.1–50.3)
HGB BLD-MCNC: 12 G/DL (ref 13.6–17.2)
POTASSIUM SERPL-SCNC: 4 MMOL/L (ref 3.5–5.1)
SODIUM SERPL-SCNC: 139 MMOL/L (ref 136–145)

## 2018-09-11 PROCEDURE — 36415 COLL VENOUS BLD VENIPUNCTURE: CPT

## 2018-09-11 PROCEDURE — 74011250637 HC RX REV CODE- 250/637: Performed by: ORTHOPAEDIC SURGERY

## 2018-09-11 PROCEDURE — 80048 BASIC METABOLIC PNL TOTAL CA: CPT

## 2018-09-11 PROCEDURE — 74011000258 HC RX REV CODE- 258: Performed by: ORTHOPAEDIC SURGERY

## 2018-09-11 PROCEDURE — 74011250637 HC RX REV CODE- 250/637: Performed by: PHYSICIAN ASSISTANT

## 2018-09-11 PROCEDURE — 97116 GAIT TRAINING THERAPY: CPT

## 2018-09-11 PROCEDURE — 97164 PT RE-EVAL EST PLAN CARE: CPT

## 2018-09-11 PROCEDURE — 74011250636 HC RX REV CODE- 250/636: Performed by: ORTHOPAEDIC SURGERY

## 2018-09-11 PROCEDURE — 85018 HEMOGLOBIN: CPT

## 2018-09-11 PROCEDURE — 97530 THERAPEUTIC ACTIVITIES: CPT

## 2018-09-11 PROCEDURE — 65270000029 HC RM PRIVATE

## 2018-09-11 RX ORDER — ACETAMINOPHEN 500 MG
500 TABLET ORAL
Status: DISCONTINUED | OUTPATIENT
Start: 2018-09-11 | End: 2018-09-13 | Stop reason: HOSPADM

## 2018-09-11 RX ORDER — TRAMADOL HYDROCHLORIDE 50 MG/1
50 TABLET ORAL
Status: DISCONTINUED | OUTPATIENT
Start: 2018-09-11 | End: 2018-09-13 | Stop reason: HOSPADM

## 2018-09-11 RX ADMIN — HYDROMORPHONE HYDROCHLORIDE 1 MG: 1 INJECTION, SOLUTION INTRAMUSCULAR; INTRAVENOUS; SUBCUTANEOUS at 03:06

## 2018-09-11 RX ADMIN — Medication 5 ML: at 21:17

## 2018-09-11 RX ADMIN — HYDROCODONE BITARTRATE AND ACETAMINOPHEN 1 TABLET: 7.5; 325 TABLET ORAL at 10:14

## 2018-09-11 RX ADMIN — Medication 1 AMPULE: at 08:31

## 2018-09-11 RX ADMIN — SODIUM CHLORIDE 1000 MG: 900 INJECTION, SOLUTION INTRAVENOUS at 10:14

## 2018-09-11 RX ADMIN — AMLODIPINE BESYLATE 5 MG: 5 TABLET ORAL at 08:34

## 2018-09-11 RX ADMIN — DEXTROSE MONOHYDRATE, SODIUM CHLORIDE, AND POTASSIUM CHLORIDE 100 ML/HR: 50; 4.5; 1.49 INJECTION, SOLUTION INTRAVENOUS at 05:25

## 2018-09-11 RX ADMIN — FAMOTIDINE 20 MG: 20 TABLET ORAL at 08:34

## 2018-09-11 RX ADMIN — HYDROCHLOROTHIAZIDE: 12.5 CAPSULE ORAL at 08:32

## 2018-09-11 RX ADMIN — Medication 1 AMPULE: at 21:15

## 2018-09-11 RX ADMIN — TRAMADOL HYDROCHLORIDE 50 MG: 50 TABLET, FILM COATED ORAL at 21:16

## 2018-09-11 RX ADMIN — SODIUM CHLORIDE 1000 MG: 900 INJECTION, SOLUTION INTRAVENOUS at 00:43

## 2018-09-11 RX ADMIN — TAMSULOSIN HYDROCHLORIDE 0.4 MG: 0.4 CAPSULE ORAL at 08:33

## 2018-09-11 RX ADMIN — HYDROCODONE BITARTRATE AND ACETAMINOPHEN 1 TABLET: 7.5; 325 TABLET ORAL at 22:31

## 2018-09-11 NOTE — PROGRESS NOTES
CM attempted to meet with patient in room to introduce role of CM and assess for possible DC needs. Patient was sleeping, and CM did not disturb him. CM contacted patient's wife via telephone to complete assessment. SOCIAL: 
Patient and spouse live in a one level home with ramped entrance and no stairs inside the home. Wife is reportedly in good health, and physically able to assist in patient's care. Patient's sister lives next door. She is a retired RN, available to assist in his care. Patient also has a niece who lives nearby. Additional social support available through friends. Patient is a . He does not have any  benefits. RX are covered by drug plan affordably. No HCPOA. No living will. PCP is Dr. Marissa Kign at [facility]. Last visit approximately 3 months ago. MOBILITY / HISTORY: 
Prior to admission, patient was fully independent with ambulation. He was able to manage ADLs with independence. He drives himself normally. DME - rolling walker, shower chair, raised toilet seat, cane. No home oxygen. No dialysis history. No CLTC hours, aids, sitters, or caregivers. No history of STR. HH history 10 years ago after hip surgery with [facility]. PLAN FOR DISCHARGE: 
Patient has all necessary DME at home at this time. Anticipate DC to home with [facility] for PT. CM will send referral via fax today. Care Management Interventions PCP Verified by CM: Yes Mode of Transport at Discharge: Other (see comment) Transition of Care Consult (CM Consult): Discharge Planning, Home Health [address] No 
Reason Outside [place]: Patient already serviced by other home care/hospice agency Discharge Durable Medical Equipment: No 
Physical Therapy Consult: Yes Current Support Network: Own Home, Lives with Spouse, Family Lives [place] Confirm Follow Up Transport: Family Plan discussed with Pt/Family/Caregiver:  Yes 
 Freedom of Choice Offered: Yes Discharge Location Discharge Placement: Home with home health

## 2018-09-11 NOTE — PROGRESS NOTES
ORTH POST OP PROGRESS NOTE 2018 Admit Date: 2018 Admit Diagnosis: Spondylolisthesis, unspecified spinal region [M43.10] Lumbar stenosis with neurogenic claudication [M48.062] Spondylolisthesis, unspecified spinal region [M43.10] Lumbar stenosis with neurogenic claudication [M48.062] Procedure: Procedure(s): 
L5-S1 laminectomy for instrapinal extradural lesion/tlif/posterior instrumentation Post Op day: 1 Day Post-Op Subjective:  
 
Nicolasa Potter is a patient who has no complaints. POD #1 TLIF, POD#4 ALIF Objective:  
 
Vital Signs:   
Blood pressure 118/74, pulse 72, temperature 98 °F (36.7 °C), resp. rate 18, height 5' 6\" (1.676 m), weight 78.5 kg (173 lb 2 oz), SpO2 94 %. Temp (24hrs), Av.5 °F (36.9 °C), Min:98 °F (36.7 °C), Max:99.1 °F (37.3 °C) 
 
 
  
 190 -  0700 In: 1800 [I.V.:1800] Out: 9302 [Urine:1100; KNNFCS:071] LAB:   
Recent Labs  
   18 
 1036 HGB  12.9* WBC  11.3*  
PLT  212 Physical Exam 
 
General:   Alert and oriented. No acute distress Lungs:  Respirations unlabored. Extremities: No evidence of cyanosis. Calves soft, nontender. Moves both upper and lower extremities. Dressing:  clean, dry, and intact Neuro:  no deficit Assessment:  
  
Patient Active Problem List  
Diagnosis Code  Family history of malignant neoplasm of prostate Z80.42  
 History of left shoulder fracture Z87.81  
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  
 Mixed hyperlipidemia E78.2  Hyperuricemia E79.0  Spinal stenosis of lumbosacral region M48.07  Spondylolisthesis at L5-S1 level M43.17  Spinal stenosis M48.00 Plan:  
 
Continue PT Discontinue: colby, leave drain until tomorrow Consult: none Check labs, hx CKD Anticipate Discharge To: HOME, if slow progress with PT, consider PPD and SNF Signed By: Steffen Manley PA-C

## 2018-09-11 NOTE — PROGRESS NOTES
Problem: Mobility Impaired (Adult and Pediatric) Goal: *Acute Goals and Plan of Care (Insert Text) STG: 
(1.)Mr. Manju Jones will move from supine to sit and sit to supine , scoot up and down and roll side to side in bed with STAND BY ASSIST within 3 treatment day(s). (2.)Mr. Manju Jones will transfer from bed to chair and chair to bed with CONTACT GUARD ASSIST using the least restrictive device within 3 treatment day(s). (3.)Mr. Manju Jones will ambulate with CONTACT GUARD ASSIST for 100 feet with the least restrictive device and minimal cueing within 3 treatment day(s). (4.)Mr. Manju Jones will be able to don / doff lumbar corset brace independently with minimal cueing within 3 treatment days (5.)Mr. Manju Jones will maintain spinal precautions throughout all functional mobility within 3 days with 0 verbal cues. LTG: 
(1.)Mr. Manju Jones will move from supine to sit and sit to supine , scoot up and down and roll side to side in bed with MODIFIED INDEPENDENCE within 7 treatment day(s). (2.)Mr. Manju Jones will transfer from bed to chair and chair to bed with SUPERVISION using the least restrictive device within 7 treatment day(s). (3.)Mr. Manju Jones will ambulate with STAND BY ASSIST for 150 feet with the least restrictive device, appropriate gait pattern and good dynamic standing balance within 7 treatment day(s). (4.)Mr. Manju Jones will be able to don / doff lumbar corset brace independently and 0 cues within 7 treatment days ________________________________________________________________________________________________ PHYSICAL THERAPY: Daily Note, Treatment Day: Day of Assessment, PM 9/11/2018 INPATIENT: Hospital Day: 5 [place] / Product Type: Medicare /  
  
NAME/AGE/GENDER: Shanelle Olivas is a 65 y.o. male PRIMARY DIAGNOSIS: Spondylolisthesis, unspecified spinal region [M43.10] Lumbar stenosis with neurogenic claudication [M48.062] Spondylolisthesis, unspecified spinal region [M43.10] Lumbar stenosis with neurogenic claudication [M48.062] Spinal stenosis of lumbosacral region Spinal stenosis of lumbosacral region Procedure(s) (LRB): 
L5-S1 laminectomy for instrapinal extradural lesion/tlif/posterior instrumentation (N/A) 1 Day Post-Op ICD-10: Treatment Diagnosis:  
 · Generalized Muscle Weakness (M62.81) · Difficulty in walking, Not elsewhere classified (R26.2) · Other abnormalities of gait and mobility (R26.89) · Low Back Pain (M54.5) Precaution/Allergies: 
Clonidine and Simvastatin ASSESSMENT:  
 
Mr. Margaret Fernandez is a 65 y.o. male that presents to physical therapy supine in bed with wife present. Pt has shown increase in activity tolerance from AM session by ambulating 500' with rolling walker. Pt ambulated ~25' with cane and expressed understanding of need for rolling walker instead of cane. Min assist needed to ambulate with cane, Contact Guard assist with walker. Pt still struggle the most with transfer during initial activation of sit to stand and eccentric control in stand to sit. This is probably due to pt's overall BLE weakness that is functional, but requires strengthening to initiate movements safely and properly. Pt still has trouble with gait deviations that are correctable with verbal cues, but he will demonstrate deviations more frequently with increased fatigue and pain. Pt will benefit from skilled therapy to increase strength and normalize gait in order to return to prior level of function with least restrictive device. Recommend pt to use rolling walker upon d/c from hospital due to increased balance deficits from prior level of function. Pt was left supine in bed, call button in reach and wife at bedside. This section established at most recent assessment PROBLEM LIST (Impairments causing functional limitations): 1. Decreased Strength 2. Decreased ADL/Functional Activities 3. Decreased Transfer Abilities 4. Decreased Ambulation Ability/Technique 5. Decreased Balance 6. Increased Pain 7. Decreased Activity Tolerance 8. Decreased Flexibility/Joint Mobility 9. Decreased Knowledge of Precautions 10. Decreased [place] with Home Exercise Program 
 INTERVENTIONS PLANNED: (Benefits and precautions of physical therapy have been discussed with the patient.) 1. Balance Exercise 2. Bed Mobility 3. Family Education 4. Gait Training 5. Home Exercise Program (HEP) 6. Manual Therapy 7. Neuromuscular Re-education/Strengthening 8. Range of Motion (ROM) 9. Therapeutic Activites 10. Therapeutic Exercise/Strengthening 11. Transfer Training TREATMENT PLAN: Frequency/Duration: BID for remainder of hospital stay Rehabilitation Potential For Stated Goals: Good RECOMMENDED REHABILITATION/EQUIPMENT: (at time of discharge pending progress): Due to the probability of continued deficits (see above) this patient will likely need continued skilled physical therapy after discharge. Equipment:  
? None at this time HISTORY:  
History of Present Injury/Illness (Reason for Referral): 
Per chart: He has been following with our PA. He is 65 and he has a grade 2 L5-S1 spondylolisthesis with severe foraminal stenosis bilaterally, but he really only has right leg pain, and it is a classic L5 distribution. Recent MRI scan of 08/03/2018 shows severe foraminal stenosis. No central stenosis. He still has a reasonable disc space, but it is a significant spondylolisthesis. He has been receiving L5 nerve blocks and they help temporarily. The last one was about 2 weeks ago and it is wearing off and he is having severe pain. Apparently, he was screaming initially at the visit today at the  until they could get his pain under better control. He says he always hurts at night when he tries to sleep. It hurts to stand and walk. He has improvement sitting and flexing.   He also says if he is sitting and he elevates his right foot up on a step or something, his pain goes away. I printed out x-rays and the MRI scan and showed he and his wife the problem, and I told him with this degree of listhesis, I think the best way to treat him would actually be an anteroposterior fusion. I went through his medical history and he has some high blood pressure, but no other significant medical problems, although, he has been told he has some kidney disease, but they are not doing any treatment or anything special for it. Past Medical History/Comorbidities:  
Mr. Noemi Sumner  has a past medical history of Arthritis; Calculus of kidney (12/23/2013); Chronic kidney disease; Chronic kidney disease, stage III (moderate) (12/23/2013); Fracture of coccyx ([address] (1/14/2016); Gout, unspecified (12/23/2013); History of left shoulder fracture (1/14/2016); Hyperlipidemia (1/14/2016); Microscopic hematuria (12/23/2013); Sciatica of right side (1/14/2016); Unspecified essential hypertension (12/23/2013); and Urinary tract infection, site not specified (12/23/2013). Mr. Noemi Sumner  has a past surgical history that includes hx lithotripsy; pr total hip arthroplasty (Left); and hx shoulder replacement (Left, 12/2014). Social History/Living Environment:  
Home Environment: Private residence # Steps to Enter: 0 Rails to Enter: No 
Wheelchair Ramp: Yes One/Two Story Residence: One story Living Alone: No 
Support Systems: Spouse/Significant Other/Partner, Family member(s) Patient Expects to be Discharged to[de-identified]  (Will discuss in PM Session) Current DME Used/Available at Home: Aliy[employer] Hillman, [employer] Tub or Shower Type: Tub/Shower combination Prior Level of Function/Work/Activity: Pt was independent with all functional mobility and gait without assistive device. Has been limited in overall function x 2  Years secondary to back pain. Tries to work as an  some. Drives.  Wife works 4 hours/day and therefore pt will be alone during that time. Personal Factors:   
      Sex:  male Age:  65 y.o. Number of Personal Factors/Comorbidities that affect the Plan of Care: 1-2: MODERATE COMPLEXITY EXAMINATION:  
Most Recent Physical Functioning:  
Gross Assessment: 
AROM: Generally decreased, functional 
PROM: Generally decreased, functional 
Strength: Generally decreased, functional 
Tone: Normal 
Sensation: Intact Posture: 
Posture (WDL): Exceptions to [facility] Posture Assessment: Forward head, Rounded shoulders, Trunk flexion Balance: 
Sitting: Impaired Sitting - Static: Good (unsupported) Sitting - Dynamic: Fair (occasional) Standing: Impaired Standing - Static: Good Standing - Dynamic : Fair Bed Mobility: 
Rolling: Stand-by assistance (Log Roll) Supine to Sit: Stand-by assistance Sit to Supine: Stand-by assistance Scooting: Independent Interventions: Safety awareness training;Manual cues; Verbal cues; Visual cues Wheelchair Mobility: 
  
Transfers: 
Sit to Stand: Minimum assistance Stand to Sit: Minimum assistance Interventions: Safety awareness training;Manual cues; Verbal cues; Visual cues Gait: 
Right Side Weight Bearing: Full Left Side Weight Bearing: Full Base of Support: Center of gravity altered Speed/[place]: Pace decreased (<100 feet/min) Step Length: Left shortened;Right shortened Gait Abnormalities: Antalgic; Altered arm swing;Decreased step clearance;Trunk sway increased; Step to gait Distance (ft): 500 Feet (ft) Assistive Device: Gait belt;Walker, rolling Ambulation - Level of Assistance: Contact guard assistance Body Structures Involved: 1. Bones 2. Joints 3. Muscles 4. Ligaments Body Functions Affected: 1. Sensory/Pain 2. Neuromusculoskeletal 
3. Movement Related Activities and Participation Affected: 1. General Tasks and Demands 2. Mobility 3. Self Care 4. Community, Social and [place] Number of elements that affect the Plan of Care: 4+: HIGH COMPLEXITY CLINICAL PRESENTATION:  
Presentation: Stable and uncomplicated: LOW COMPLEXITY CLINICAL DECISION MAKIN [facility] [address] [id number] AM-PAC 6 Clicks Basic Mobility Inpatient Short Form How much difficulty does the patient currently have. .. Unable A Lot A Little None 1. Turning over in bed (including adjusting bedclothes, sheets and blankets)? [] 1   [] 2   [x] 3   [] 4  
2. Sitting down on and standing up from a chair with arms ( e.g., wheelchair, bedside commode, etc.)   [] 1   [] 2   [x] 3   [] 4  
3. Moving from lying on back to sitting on the side of the bed? [] 1   [] 2   [x] 3   [] 4 How much help from another person does the patient currently need. .. Total A Lot A Little None 4. Moving to and from a bed to a chair (including a wheelchair)? [] 1   [] 2   [x] 3   [] 4  
5. Need to walk in hospital room? [] 1   [] 2   [x] 3   [] 4  
6. Climbing 3-5 steps with a railing? [] 1   [x] 2   [] 3   [] 4  
© , Trustees of [facility] [address] [address], under license to [employer]. All rights reserved Score:  Initial: 18 Most Recent: 17 (Date: 18 ) Interpretation of Tool:  Represents activities that are increasingly more difficult (i.e. Bed mobility, Transfers, Gait). Score 24 23 22-20 19-15 14-10 9-7 6 Modifier CH CI CJ CK CL CM CN   
 
? Mobility - Walking and Moving Around:  
  - CURRENT STATUS: CK - 40%-59% impaired, limited or restricted  - GOAL STATUS: CJ - 20%-39% impaired, limited or restricted  - D/C STATUS:  ---------------To be determined--------------- [place] / [place] / Product Type: Medicare /   
 
Medical Necessity:    
· Patient demonstrates good rehab potential due to higher previous functional level. Reason for Services/Other Comments: 
· Patient continues to require present interventions due to patient's inability to function at baseline. Use of outcome tool(s) and clinical judgement create a POC that gives a: Questionable prediction of patient's progress: MODERATE COMPLEXITY  
  
 
 
 
TREATMENT:  
(In addition to Assessment/Re-Assessment sessions the following treatments were rendered) Pre-treatment Symptoms/Complaints:  No pain when not moving Pain: Initial:  
Pain Intensity 1: 0 Pain Location 1: Back Pain Orientation 1: Lower  Post Session:  4/10 Back and RT leg Therapeutic Activity: (    10 minutes): Therapeutic activities including Bed transfers, Chair transfers, Managing assistive device, and Operating brace to improve mobility, strength and balance. Needed moderate cueing when performing all activities to progress safely. Needed cueing to initiated bed mobility and transfers. Needed manual cues to lower body slowly during stand to sit transfer. Gait Training (  15 minutes):  Gait training to improve and/or restore physical functioning as related to mobility, strength, balance and coordination. Ambulated 500 Feet (ft) with minimal assistance/contact guard assist on level ground using rolling walker and moderate visual, verbal and manual cues related to their stride length, push off, heel strike, ankle position and motion and hip position and motionto promote proper body alignment, promote proper body posture and promote proper body mechanics. Pt needed cueing to improve attention to environment and not constantly looking at feet. Continued cueing for foot clearance and widening base of support. Assistive Device: Gait belt, Walker, rolling Ambulation - Level of Assistance: Contact guard assistance Distance (ft): 500 Feet (ft) Braces/Orthotics/Lines/Etc:  
· IV 
· Hemovac Treatment/Session Assessment:   
· Response to Treatment:  Increased pain and fatigue, but increased awareness of deficits · Interdisciplinary Collaboration:  
o Physical Therapist 
o Registered Nurse 
o SPT · After treatment position/precautions:  
o Supine in bed 
o Bed/Chair-wheels locked 
o Bed in low position 
o Call light within reach 
o RN notified 
o Visitors at bedside · Compliance with Program/Exercises: good. · Recommendations/Intent for next treatment session:  Advance treatment to more challenging activities. Total Treatment Duration: PT Patient Time In/Time Out Time In: 1759 Time Out: 1348 [address] Bautista  
SPT, CSCS

## 2018-09-11 NOTE — PROGRESS NOTES
Problem: Mobility Impaired (Adult and Pediatric) Goal: *Acute Goals and Plan of Care (Insert Text) STG: 
(1.)Mr. Doreen Nathan will move from supine to sit and sit to supine , scoot up and down and roll side to side in bed with STAND BY ASSIST within 3 treatment day(s). (2.)Mr. Doreen Nathan will transfer from bed to chair and chair to bed with CONTACT GUARD ASSIST using the least restrictive device within 3 treatment day(s). (3.)Mr. Doreen Nathan will ambulate with CONTACT GUARD ASSIST for 100 feet with the least restrictive device and minimal cueing within 3 treatment day(s). (4.)Mr. Doreen Nathan will be able to don / doff lumbar corset brace independently with minimal cueing within 3 treatment days (5.)Mr. Doreen Nathan will maintain spinal precautions throughout all functional mobility within 3 days with 0 verbal cues. LTG: 
(1.)Mr. Doreen Nathan will move from supine to sit and sit to supine , scoot up and down and roll side to side in bed with MODIFIED INDEPENDENCE within 7 treatment day(s). (2.)Mr. Doreen Nathan will transfer from bed to chair and chair to bed with SUPERVISION using the least restrictive device within 7 treatment day(s). (3.)Mr. Doreen Nathan will ambulate with STAND BY ASSIST for 150 feet with the least restrictive device, appropriate gait pattern and good dynamic standing balance within 7 treatment day(s). (4.)Mr. Doreen Nathan will be able to don / doff lumbar corset brace independently and 0 cues within 7 treatment days ________________________________________________________________________________________________ PHYSICAL THERAPY: Daily Note, Re-evaluation, Treatment Day: Day of Assessment, AM 9/11/2018 INPATIENT: Hospital Day: 5 [place] / Product Type: Medicare /  
  
NAME/AGE/GENDER: Mary Jane Romero is a 65 y.o. male PRIMARY DIAGNOSIS: Spondylolisthesis, unspecified spinal region [M43.10] Lumbar stenosis with neurogenic claudication [M48.062] Spondylolisthesis, unspecified spinal region [M43.10] Lumbar stenosis with neurogenic claudication [M48.062] Spinal stenosis of lumbosacral region Spinal stenosis of lumbosacral region Procedure(s) (LRB): 
L5-S1 laminectomy for instrapinal extradural lesion/tlif/posterior instrumentation (N/A) 1 Day Post-Op ICD-10: Treatment Diagnosis:  
 · Generalized Muscle Weakness (M62.81) · Difficulty in walking, Not elsewhere classified (R26.2) · Other abnormalities of gait and mobility (R26.89) · Low Back Pain (M54.5) Precaution/Allergies: 
Clonidine and Simvastatin ASSESSMENT:  
 
Mr. Obi Lafleur is a 65 y.o. male that presents to physical therapy supine in bed with wife present. He is A&O X 4 and agreeable to therapy. Pt expresses desire to move after his operation. He is weak and movement causes pain s/p L5-S1 laminectomy. Distal Hip flexors were 4/5 Bilat and Distal Musculature was 4-/5 in BLE. No sensation deficites noted. His SpO2 was 96% on room air. When asked about spinal precautions he was unaware. Pt was taught precautions, but could not repeat them at the end of session. He demonstrates proper use of log roll during bed mobility and only needs contact guard assist. Pt was experiencing dizziness in sitting EOB and standing initially, but dizziness resolved in less than 2 minutes after maintaining each position. He needed Min assist for transfers and mobility. Treatment initiated during transfers for manual and verbal cues to activate glute on extending hips during sit to stand. He needed mod amount of cueing to push through bed with arms and feet with floor in order to safely use walker for ambulation. During ambulation, pt c/o increased pain in back (4/10) that was constant and did not worsen as gait training progressed. The pain occurred at ~30 feet of walking and after a short break, he walked 45 feet more.  Noted increase in gait deviations when he fatigued. Pt needed verbal and visual cues to promote step through gait pattern and to increase toe clearance bilat. Pt would benefit from skilled physical therapy to increase strength, normalize gait pattern, and decrease pain in order to return to home safely with least restrictive device. Pt was left seated in chair and educated to perform pressure relief if sitting greater than 30 min. Wife was at bed, call button in reach, Nursing student in room. Goals adjusted during re-eval to suit pt's new level of function following above surgery. This section established at most recent assessment PROBLEM LIST (Impairments causing functional limitations): 1. Decreased Strength 2. Decreased ADL/Functional Activities 3. Decreased Transfer Abilities 4. Decreased Ambulation Ability/Technique 5. Decreased Balance 6. Increased Pain 7. Decreased Activity Tolerance 8. Decreased Flexibility/Joint Mobility 9. Decreased Knowledge of Precautions 10. Decreased Willacy with Home Exercise Program 
 INTERVENTIONS PLANNED: (Benefits and precautions of physical therapy have been discussed with the patient.) 1. Balance Exercise 2. Bed Mobility 3. Family Education 4. Gait Training 5. Home Exercise Program (HEP) 6. Manual Therapy 7. Neuromuscular Re-education/Strengthening 8. Range of Motion (ROM) 9. Therapeutic Activites 10. Therapeutic Exercise/Strengthening 11. Transfer Training TREATMENT PLAN: Frequency/Duration: BID for remainder of hospital stay Rehabilitation Potential For Stated Goals: Good RECOMMENDED REHABILITATION/EQUIPMENT: (at time of discharge pending progress): Due to the probability of continued deficits (see above) this patient will likely need continued skilled physical therapy after discharge. Equipment:  
? None at this time HISTORY:  
History of Present Injury/Illness (Reason for Referral): 
 Per chart: He has been following with our PA. He is 65 and he has a grade 2 L5-S1 spondylolisthesis with severe foraminal stenosis bilaterally, but he really only has right leg pain, and it is a classic L5 distribution. Recent MRI scan of 08/03/2018 shows severe foraminal stenosis. No central stenosis. He still has a reasonable disc space, but it is a significant spondylolisthesis. He has been receiving L5 nerve blocks and they help temporarily. The last one was about 2 weeks ago and it is wearing off and he is having severe pain. Apparently, he was screaming initially at the visit today at the  until they could get his pain under better control. He says he always hurts at night when he tries to sleep. It hurts to stand and walk. He has improvement sitting and flexing. He also says if he is sitting and he elevates his right foot up on a step or something, his pain goes away. I printed out x-rays and the MRI scan and showed he and his wife the problem, and I told him with this degree of listhesis, I think the best way to treat him would actually be an anteroposterior fusion. I went through his medical history and he has some high blood pressure, but no other significant medical problems, although, he has been told he has some kidney disease, but they are not doing any treatment or anything special for it. Past Medical History/Comorbidities:  
Mr. Doreen Nathan  has a past medical history of Arthritis; Calculus of kidney (12/23/2013); Chronic kidney disease; Chronic kidney disease, stage III (moderate) (12/23/2013); Fracture of coccyx ([address] (1/14/2016); Gout, unspecified (12/23/2013); History of left shoulder fracture (1/14/2016); Hyperlipidemia (1/14/2016); Microscopic hematuria (12/23/2013); Sciatica of right side (1/14/2016); Unspecified essential hypertension (12/23/2013); and Urinary tract infection, site not specified (12/23/2013).   Mr. Doreen Nathan  has a past surgical history that includes hx lithotripsy; pr total hip arthroplasty (Left); and hx shoulder replacement (Left, 12/2014). Social History/Living Environment:  
Home Environment: Private residence # Steps to Enter: 0 Rails to Enter: No 
Wheelchair Ramp: Yes One/Two Story Residence: One story Living Alone: No 
Support Systems: Spouse/Significant Other/Partner, Family member(s) Patient Expects to be Discharged to[de-identified]  (Will discuss in PM Session) Current DME Used/Available at Home: amandeep Molina, [address] Tub or Shower Type: Tub/Shower combination Prior Level of Function/Work/Activity: Pt was independent with all functional mobility and gait without assistive device. Has been limited in overall function x 2  Years secondary to back pain. Tries to work as an  some. Drives. Wife works 4 hours/day and therefore pt will be alone during that time. Personal Factors:   
      Sex:  male Age:  65 y.o. Number of Personal Factors/Comorbidities that affect the Plan of Care: 1-2: MODERATE COMPLEXITY EXAMINATION:  
Most Recent Physical Functioning:  
Gross Assessment: 
AROM: Generally decreased, functional 
PROM: Generally decreased, functional 
Strength: Generally decreased, functional 
Tone: Normal 
Sensation: Intact Posture: 
Posture (WDL): Exceptions to [facility] Posture Assessment: Forward head, Rounded shoulders, Trunk flexion Balance: 
Sitting: Impaired Sitting - Static: Good (unsupported); Prop sitting Sitting - Dynamic: Fair (occasional) Standing: Impaired Standing - Static: Good Standing - Dynamic : Fair Bed Mobility: 
Rolling: Contact guard assistance Supine to Sit: Contact guard assistance Scooting: Independent Wheelchair Mobility: 
  
Transfers: 
Sit to Stand: Minimum assistance Stand to Sit: Minimum assistance Gait: 
Right Side Weight Bearing: Full Left Side Weight Bearing: Full Base of Support: Center of gravity altered Speed/Maria Victoria: Pace decreased (<100 feet/min); Shuffled; Slow Step Length: Left shortened;Right shortened Gait Abnormalities: Antalgic; Altered arm swing;Decreased step clearance; Path deviations; Shuffling gait; Step to gait;Trunk sway increased Distance (ft): 75 Feet (ft) Assistive Device: Walker, rolling;Gait belt Ambulation - Level of Assistance: Minimal assistance Body Structures Involved: 1. Bones 2. Joints 3. Muscles 4. Ligaments Body Functions Affected: 1. Sensory/Pain 2. Neuromusculoskeletal 
3. Movement Related Activities and Participation Affected: 1. General Tasks and Demands 2. Mobility 3. Self Care 4. Community, Social and [place] Number of elements that affect the Plan of Care: 4+: HIGH COMPLEXITY CLINICAL PRESENTATION:  
Presentation: Stable and uncomplicated: LOW COMPLEXITY CLINICAL DECISION MAKIN[address] [place] [id number] AM-PAC 6 Clicks Basic Mobility Inpatient Short Form How much difficulty does the patient currently have. .. Unable A Lot A Little None 1. Turning over in bed (including adjusting bedclothes, sheets and blankets)? [] 1   [] 2   [x] 3   [] 4  
2. Sitting down on and standing up from a chair with arms ( e.g., wheelchair, bedside commode, etc.)   [] 1   [] 2   [x] 3   [] 4  
3. Moving from lying on back to sitting on the side of the bed? [] 1   [] 2   [x] 3   [] 4 How much help from another person does the patient currently need. .. Total A Lot A Little None 4. Moving to and from a bed to a chair (including a wheelchair)? [] 1   [] 2   [x] 3   [] 4  
5. Need to walk in hospital room? [] 1   [] 2   [x] 3   [] 4  
6. Climbing 3-5 steps with a railing? [] 1   [x] 2   [] 3   [] 4  
© , Trustees of [address] [place] [address], under license to [employer]. All rights reserved Score:  Initial: 18 Most Recent: 17 (Date: 18 ) Interpretation of Tool:  Represents activities that are increasingly more difficult (i.e. Bed mobility, Transfers, Gait).  
 Score 24 23 22-20 19-15 14-10 9-7 6   
 Modifier CH CI CJ CK CL CM CN   
 
? Mobility - Walking and Moving Around:  
  - CURRENT STATUS: CK - 40%-59% impaired, limited or restricted  - GOAL STATUS: CJ - 20%-39% impaired, limited or restricted  - D/C STATUS:  ---------------To be determined--------------- [place] / [place] / Product Type: Medicare /   
 
Medical Necessity:    
· Patient demonstrates good rehab potential due to higher previous functional level. Reason for Services/Other Comments: 
· Patient continues to require present interventions due to patient's inability to function at baseline. Use of outcome tool(s) and clinical judgement create a POC that gives a: Questionable prediction of patient's progress: MODERATE COMPLEXITY  
  
 
 
 
TREATMENT:  
(In addition to Assessment/Re-Assessment sessions the following treatments were rendered) Pre-treatment Symptoms/Complaints:  No pain when not moving Pain: Initial:  
Pain Intensity 1: 0 Pain Location 1: Back Pain Orientation 1: Lower  Post Session:  4/10 Therapeutic Activity: (    15 minutes): Therapeutic activities including Bed transfers, Chair transfers, Managing assistive device and operating brace to improve mobility, strength and balance. Needed moderate cueing when performing all activities to progress safely. Noted improvement in transfer techniques from initial sit to stand and final stand to sit. Gait Training (  10 minutes):  Gait training to improve and/or restore physical functioning as related to mobility, strength, balance and coordination.   Ambulated 75 Feet (ft) with minimal assistance/contact guard assist on level ground using rolling walker and moderate visual, verbal and manual cues related to their stride length, push off, heel strike, ankle position and motion and hip position and motionto promote proper body alignment, promote proper body posture and promote proper body mechanics. Pt needed cueing to improve attention to environment and not constantly looking at feet. Also needed instruction to prevent shuffling gait and step through reciprocally. Assistive Device: Walker, rolling, Gait belt Ambulation - Level of Assistance: Minimal assistance Distance (ft): 75 Feet (ft) Braces/Orthotics/Lines/Etc:  
· IV 
· Hemovac Treatment/Session Assessment:   
· Response to Treatment:  Tolerated well. · Interdisciplinary Collaboration:  
o Physical Therapist 
o Registered Nurse 
o SPT  
o Nursing student · After treatment position/precautions:  
o Up in chair 
o Bed/Chair-wheels locked 
o Bed in low position 
o Call light within reach 
o RN notified 
o Family at bedside 
o Nursing Student At Side · Compliance with Program/Exercises: good. · Recommendations/Intent for next treatment session:  Advance treatment to more challenging activities. Total Treatment Duration: PT Patient Time In/Time Out Time In: 3014 Time Out: 9394 Lamin RAGSDALE, CSCS

## 2018-09-12 PROCEDURE — 74011250637 HC RX REV CODE- 250/637: Performed by: ORTHOPAEDIC SURGERY

## 2018-09-12 PROCEDURE — 97530 THERAPEUTIC ACTIVITIES: CPT

## 2018-09-12 PROCEDURE — 65270000029 HC RM PRIVATE

## 2018-09-12 PROCEDURE — 74011250637 HC RX REV CODE- 250/637: Performed by: NURSE PRACTITIONER

## 2018-09-12 PROCEDURE — 74011250637 HC RX REV CODE- 250/637: Performed by: PHYSICIAN ASSISTANT

## 2018-09-12 PROCEDURE — 74011000250 HC RX REV CODE- 250: Performed by: ORTHOPAEDIC SURGERY

## 2018-09-12 RX ORDER — POLYETHYLENE GLYCOL 3350 17 G/17G
17 POWDER, FOR SOLUTION ORAL DAILY PRN
Status: DISCONTINUED | OUTPATIENT
Start: 2018-09-12 | End: 2018-09-13 | Stop reason: HOSPADM

## 2018-09-12 RX ORDER — GABAPENTIN 100 MG/1
200 CAPSULE ORAL 2 TIMES DAILY
Status: DISCONTINUED | OUTPATIENT
Start: 2018-09-12 | End: 2018-09-13 | Stop reason: HOSPADM

## 2018-09-12 RX ORDER — ADHESIVE BANDAGE
30 BANDAGE TOPICAL DAILY PRN
Status: DISCONTINUED | OUTPATIENT
Start: 2018-09-12 | End: 2018-09-13 | Stop reason: HOSPADM

## 2018-09-12 RX ORDER — FACIAL-BODY WIPES
10 EACH TOPICAL DAILY PRN
Status: DISCONTINUED | OUTPATIENT
Start: 2018-09-12 | End: 2018-09-13 | Stop reason: HOSPADM

## 2018-09-12 RX ADMIN — POLYETHYLENE GLYCOL 3350 17 G: 17 POWDER, FOR SOLUTION ORAL at 17:29

## 2018-09-12 RX ADMIN — TAMSULOSIN HYDROCHLORIDE 0.4 MG: 0.4 CAPSULE ORAL at 09:32

## 2018-09-12 RX ADMIN — HYDROCHLOROTHIAZIDE: 12.5 CAPSULE ORAL at 09:32

## 2018-09-12 RX ADMIN — GABAPENTIN 200 MG: 100 CAPSULE ORAL at 14:48

## 2018-09-12 RX ADMIN — MAGNESIUM HYDROXIDE 30 ML: 400 SUSPENSION ORAL at 12:06

## 2018-09-12 RX ADMIN — Medication 10 ML: at 21:55

## 2018-09-12 RX ADMIN — Medication 5 ML: at 14:50

## 2018-09-12 RX ADMIN — HYDROCODONE BITARTRATE AND ACETAMINOPHEN 1 TABLET: 7.5; 325 TABLET ORAL at 09:36

## 2018-09-12 RX ADMIN — GABAPENTIN 200 MG: 100 CAPSULE ORAL at 17:31

## 2018-09-12 RX ADMIN — AMLODIPINE BESYLATE 5 MG: 5 TABLET ORAL at 09:32

## 2018-09-12 RX ADMIN — FAMOTIDINE 20 MG: 20 TABLET ORAL at 09:32

## 2018-09-12 RX ADMIN — Medication 1 AMPULE: at 09:38

## 2018-09-12 RX ADMIN — Medication 5 ML: at 05:20

## 2018-09-12 RX ADMIN — HYDROCODONE BITARTRATE AND ACETAMINOPHEN 1 TABLET: 7.5; 325 TABLET ORAL at 14:48

## 2018-09-12 RX ADMIN — TRAMADOL HYDROCHLORIDE 50 MG: 50 TABLET, FILM COATED ORAL at 05:20

## 2018-09-12 RX ADMIN — Medication 1 AMPULE: at 21:55

## 2018-09-12 NOTE — PROGRESS NOTES
Problem: Falls - Risk of 
Goal: *Absence of Falls Document Karen Robin Fall Risk and appropriate interventions in the flowsheet. Outcome: Progressing Towards Goal 
Fall Risk Interventions: 
Mobility Interventions: Bed/chair exit alarm, Communicate number of staff needed for ambulation/transfer, Patient to call before getting OOB Mentation Interventions: Increase mobility Medication Interventions: Assess postural VS orthostatic hypotension, Bed/chair exit alarm, Patient to call before getting OOB Elimination Interventions: Bed/chair exit alarm, Call light in reach, Patient to call for help with toileting needs History of Falls Interventions: Bed/chair exit alarm

## 2018-09-12 NOTE — PROGRESS NOTES
ORTHO PROGRESS NOTE 2018 Admit Date: 2018 Admit Diagnosis: Spondylolisthesis, unspecified spinal region [M43.10] Lumbar stenosis with neurogenic claudication [M48.062] Spondylolisthesis, unspecified spinal region [M43.10] Lumbar stenosis with neurogenic claudication [M48.062] Post Op day: 2 Days Post-Op Subjective:  
 
Kanu King is a patient who is now 2 Days Post-Op  and has complaints  of constipation and increased back and right leg pain. .  
 
 
Objective:  
 
PT/OT:  
  
 
Vital Signs:   
Patient Vitals for the past 8 hrs: 
 BP Temp Pulse Resp SpO2  
18 0841 131/81 99.1 °F (37.3 °C) 73 18 93 % 18 0345 126/77 98.1 °F (36.7 °C) 70 18 96 % Temp (24hrs), Av.9 °F (37.2 °C), Min:98.1 °F (36.7 °C), Max:99.8 °F (37.7 °C) LAB:   
Recent Labs  
   18 
 2115 HGB  12.0* I/O: 
 2390 -  1900 In: -  
Out: 100 [Drains:100] 09/10 1901 -  0700 In: -  
Out: 4883 [[id number]; [id number]:983] Physical Exam: 
 
Awake and in no acute distress. Mood and affect appropriate. Respirations unlabored and no evidence cyanosis. Calves nontender. Abdomen soft and nontender. Dressing clean/dry No new neurologic deficit. Assessment:  
  
Patient Active Problem List  
Diagnosis Code  Family history of malignant neoplasm of prostate Z80.42  
 History of left shoulder fracture Z87.81  
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  
 Mixed hyperlipidemia E78.2  Hyperuricemia E79.0  Spinal stenosis of lumbosacral region M48.07  Spondylolisthesis at L5-S1 level M43.17  Spinal stenosis M48.00  
 
 
2 Days Post-Op STATUS POST Procedure(s): 
L5-S1 laminectomy for instrapinal extradural lesion/tlif/posterior instrumentation Plan:  
 
Continue PT/OT/Rehab Discontinue:   
Consult: none Anticipate discharge to: HOME with [facility] Will add gabapentin for neuropathic pain. Laxative of choice for constipation Signed By: Sara Cavazos NP

## 2018-09-12 NOTE — PROGRESS NOTES
Problem: Mobility Impaired (Adult and Pediatric) Goal: *Acute Goals and Plan of Care (Insert Text) STG: 
(1.)Mr. Farrah Holley will move from supine to sit and sit to supine , scoot up and down and roll side to side in bed with STAND BY ASSIST within 3 treatment day(s). (2.)Mr. Farrah Holley will transfer from bed to chair and chair to bed with CONTACT GUARD ASSIST using the least restrictive device within 3 treatment day(s). (3.)Mr. Farrah Holley will ambulate with CONTACT GUARD ASSIST for 100 feet with the least restrictive device and minimal cueing within 3 treatment day(s). (4.)Mr. Farrah Holley will be able to don / doff lumbar corset brace independently with minimal cueing within 3 treatment days (5.)Mr. Farrah Holley will maintain spinal precautions throughout all functional mobility within 3 days with 0 verbal cues. LTG: 
(1.)Mr. Farrah Holley will move from supine to sit and sit to supine , scoot up and down and roll side to side in bed with MODIFIED INDEPENDENCE within 7 treatment day(s). (2.)Mr. Farrah Holley will transfer from bed to chair and chair to bed with SUPERVISION using the least restrictive device within 7 treatment day(s). (3.)Mr. Farrah Holley will ambulate with STAND BY ASSIST for 150 feet with the least restrictive device, appropriate gait pattern and good dynamic standing balance within 7 treatment day(s). (4.)Mr. Farrah Holley will be able to don / doff lumbar corset brace independently and 0 cues within 7 treatment days ________________________________________________________________________________________________ PHYSICAL THERAPY: Daily Note, Treatment Day: 1st, AM 9/12/2018 INPATIENT: Hospital Day: 6 [place] / Product Type: Medicare /  
  
NAME/AGE/GENDER: Esther Venegas is a 65 y.o. male PRIMARY DIAGNOSIS: Spondylolisthesis, unspecified spinal region [M43.10] Lumbar stenosis with neurogenic claudication [M48.062] Spondylolisthesis, unspecified spinal region [M43.10] Lumbar stenosis with neurogenic claudication [M48.062] Spinal stenosis of lumbosacral region Spinal stenosis of lumbosacral region Procedure(s) (LRB): 
L5-S1 laminectomy for instrapinal extradural lesion/tlif/posterior instrumentation (N/A) 2 Days Post-Op ICD-10: Treatment Diagnosis:  
 · Generalized Muscle Weakness (M62.81) · Difficulty in walking, Not elsewhere classified (R26.2) · Other abnormalities of gait and mobility (R26.89) · Low Back Pain (M54.5) Precaution/Allergies: 
Clonidine and Simvastatin ASSESSMENT:  
 
Mr. Doreen Nathan is a 65 y.o. male that presents supine in bed with wife present. Agreeable to therapy. RN present medicating patient. Bed mobility requires min assist as patient requires verbal and tactile cues for logrolling. Sitting balance is good. Patient desires to don sneakers for gait training. Total assist needed. Sit to stand and donning the brace with assistance. Wife is present and is instructed in donning the brace. Gait training with rolling walker x 500 feet with slow but steady dori. Patient is returned to the room and placed in the bedside chair. Good session. Patient is very motivated and cooperative. Making progress towards goals. Pt will benefit from skilled therapy to increase strength and normalize gait in order to return to prior level of function with least restrictive device. Recommend pt to use rolling walker upon d/c from hospital due to increased balance deficits from prior level of function. Pt was left supine in bed, call button in reach and wife at bedside. Will continue PT efforts. This section established at most recent assessment PROBLEM LIST (Impairments causing functional limitations): 1. Decreased Strength 2. Decreased ADL/Functional Activities 3. Decreased Transfer Abilities 4. Decreased Ambulation Ability/Technique 5. Decreased Balance 6. Increased Pain 7. Decreased Activity Tolerance 8. Decreased Flexibility/Joint Mobility 9. Decreased Knowledge of Precautions 10. Decreased [place] with Home Exercise Program 
 INTERVENTIONS PLANNED: (Benefits and precautions of physical therapy have been discussed with the patient.) 1. Balance Exercise 2. Bed Mobility 3. Family Education 4. Gait Training 5. Home Exercise Program (HEP) 6. Manual Therapy 7. Neuromuscular Re-education/Strengthening 8. Range of Motion (ROM) 9. Therapeutic Activites 10. Therapeutic Exercise/Strengthening 11. Transfer Training TREATMENT PLAN: Frequency/Duration: BID for remainder of hospital stay Rehabilitation Potential For Stated Goals: Good RECOMMENDED REHABILITATION/EQUIPMENT: (at time of discharge pending progress): Due to the probability of continued deficits (see above) this patient will likely need continued skilled physical therapy after discharge. Equipment:  
? None at this time HISTORY:  
History of Present Injury/Illness (Reason for Referral): 
Per chart: He has been following with our PA. He is 65 and he has a grade 2 L5-S1 spondylolisthesis with severe foraminal stenosis bilaterally, but he really only has right leg pain, and it is a classic L5 distribution. Recent MRI scan of 08/03/2018 shows severe foraminal stenosis. No central stenosis. He still has a reasonable disc space, but it is a significant spondylolisthesis. He has been receiving L5 nerve blocks and they help temporarily. The last one was about 2 weeks ago and it is wearing off and he is having severe pain. Apparently, he was screaming initially at the visit today at the  until they could get his pain under better control. He says he always hurts at night when he tries to sleep. It hurts to stand and walk. He has improvement sitting and flexing.   He also says if he is sitting and he elevates his right foot up on a step or something, his pain goes away. I printed out x-rays and the MRI scan and showed he and his wife the problem, and I told him with this degree of listhesis, I think the best way to treat him would actually be an anteroposterior fusion. I went through his medical history and he has some high blood pressure, but no other significant medical problems, although, he has been told he has some kidney disease, but they are not doing any treatment or anything special for it. Past Medical History/Comorbidities:  
Mr. Pb Henderson  has a past medical history of Arthritis; Calculus of kidney (12/23/2013); Chronic kidney disease; Chronic kidney disease, stage III (moderate) (12/23/2013); Fracture of coccyx ([address] (1/14/2016); Gout, unspecified (12/23/2013); History of left shoulder fracture (1/14/2016); Hyperlipidemia (1/14/2016); Microscopic hematuria (12/23/2013); Sciatica of right side (1/14/2016); Unspecified essential hypertension (12/23/2013); and Urinary tract infection, site not specified (12/23/2013). Mr. Pb Henderson  has a past surgical history that includes hx lithotripsy; pr total hip arthroplasty (Left); and hx shoulder replacement (Left, 12/2014). Social History/Living Environment:  
Home Environment: Private residence # Steps to Enter: 0 Rails to Enter: No 
Wheelchair Ramp: Yes One/Two Story Residence: One story Living Alone: No 
Support Systems: Spouse/Significant Other/Partner, Family member(s) Patient Expects to be Discharged to[de-identified]  (Will discuss in PM Session) Current DME Used/Available at Home: [employer] Spray, straight, [place] [employer] Tub or Shower Type: Tub/Shower combination Prior Level of Function/Work/Activity: Pt was independent with all functional mobility and gait without assistive device. Has been limited in overall function x 2  Years secondary to back pain. Tries to work as an  some. Drives. Wife works 4 hours/day and therefore pt will be alone during that time. Personal Factors:   
      Sex:  male Age:  65 y.o. Number of Personal Factors/Comorbidities that affect the Plan of Care: 1-2: MODERATE COMPLEXITY EXAMINATION:  
Most Recent Physical Functioning:  
Gross Assessment: 
  
         
  
Posture: 
  
Balance: 
Sitting: Intact Sitting - Static: Good (unsupported) Sitting - Dynamic: Good (unsupported) Standing: Impaired Standing - Static: Good Standing - Dynamic : Fair Bed Mobility: 
Rolling: Modified independent Supine to Sit: Minimum assistance Scooting: Minimum assistance Wheelchair Mobility: 
  
Transfers: 
Sit to Stand: Contact guard assistance Stand to Sit: Contact guard assistance Gait: 
Right Side Weight Bearing: Full Left Side Weight Bearing: Full Speed/[place]: Slow Distance (ft): 500 Feet (ft) Assistive Device: Brace/Splint; Walker, rolling Ambulation - Level of Assistance: Contact guard assistance Body Structures Involved: 1. Bones 2. Joints 3. Muscles 4. Ligaments Body Functions Affected: 1. Sensory/Pain 2. Neuromusculoskeletal 
3. Movement Related Activities and Participation Affected: 1. General Tasks and Demands 2. Mobility 3. Self Care 4. Community, Social and [place] Number of elements that affect the Plan of Care: 4+: HIGH COMPLEXITY CLINICAL PRESENTATION:  
Presentation: Stable and uncomplicated: LOW COMPLEXITY CLINICAL DECISION MAKIN [place] [id number] AM-PAC 6 Clicks Basic Mobility Inpatient Short Form How much difficulty does the patient currently have. .. Unable A Lot A Little None 1. Turning over in bed (including adjusting bedclothes, sheets and blankets)? [] 1   [] 2   [x] 3   [] 4  
2. Sitting down on and standing up from a chair with arms ( e.g., wheelchair, bedside commode, etc.)   [] 1   [] 2   [x] 3   [] 4  
3. Moving from lying on back to sitting on the side of the bed? [] 1   [] 2   [x] 3   [] 4 How much help from another person does the patient currently need. .. Total A Lot A Little None 4. Moving to and from a bed to a chair (including a wheelchair)? [] 1   [] 2   [x] 3   [] 4  
5. Need to walk in hospital room? [] 1   [] 2   [x] 3   [] 4  
6. Climbing 3-5 steps with a railing? [] 1   [x] 2   [] 3   [] 4  
© 2007, Trustees of [address] [place] [address] [facility], under license to [employer]. All rights reserved Score:  Initial: 18 Most Recent: 17 (Date: 09/11/18 ) Interpretation of Tool:  Represents activities that are increasingly more difficult (i.e. Bed mobility, Transfers, Gait). Score 24 23 22-20 19-15 14-10 9-7 6 Modifier CH CI CJ CK CL CM CN   
 
? Mobility - Walking and Moving Around:  
  - CURRENT STATUS: CK - 40%-59% impaired, limited or restricted  - GOAL STATUS: CJ - 20%-39% impaired, limited or restricted  - D/C STATUS:  ---------------To be determined--------------- [place] / [place] B / Product Type: Medicare /   
 
Medical Necessity:    
· Patient demonstrates good rehab potential due to higher previous functional level. Reason for Services/Other Comments: 
· Patient continues to require present interventions due to patient's inability to function at baseline. Use of outcome tool(s) and clinical judgement create a POC that gives a: Questionable prediction of patient's progress: MODERATE COMPLEXITY  
  
 
 
 
TREATMENT:  
(In addition to Assessment/Re-Assessment sessions the following treatments were rendered) Pre-treatment Symptoms/Complaints:  I am ready to try\" Pain: Initial:  
Pain Intensity 1:  (no number given) Pain Location 1: Back  Post Session:  No number given  Patient has been medicated by the RN Therapeutic Activity: (    26 minutes): Therapeutic activities including Bed transfers, Chair transfers, Managing assistive device, and Operating brace to improve mobility, strength and balance. Needed minimum verbal  cueing when performing all activities to progress safely.  Needed cueing to initiated bed mobility and transfers. Needed manual cues to lower body slowly during stand to sit transfer. Gait Training (   minutes):  Gait training to improve and/or restore physical functioning as related to mobility, strength, balance and coordination. Ambulated 500 Feet (ft) with minimal assistance/contact guard assist on level ground using rolling walker and moderate visual, verbal and manual cues related to their stride length, push off, heel strike, ankle position and motion and hip position and motionto promote proper body alignment, promote proper body posture and promote proper body mechanics. Pt needed cueing to improve attention to environment and not constantly looking at feet. Continued cueing for foot clearance and widening base of support. Assistive Device: Brace/Splint, Walker, rolling Ambulation - Level of Assistance: Contact guard assistance Distance (ft): 500 Feet (ft) Braces/Orthotics/Lines/Etc:  
· Hemovac back brace Treatment/Session Assessment:   
· Response to Treatment:  Tolerated well · Interdisciplinary Collaboration:  
o Physical Therapy Assistant 
o Registered Nurse · After treatment position/precautions:  
o Up in chair 
o Bed/Chair-wheels locked 
o Call light within reach 
o RN notified 
o Family at bedside · Compliance with Program/Exercises: compliant · Recommendations/Intent for next treatment session:  Advance treatment to more challenging activities. Total Treatment Duration: PT Patient Time In/Time Out Time In: 1539 Time Out: 1259 Nena Jeffries, PTA  
SPT, CSCS

## 2018-09-12 NOTE — PROGRESS NOTES
PT note: Attempted the patient x 2 however the patient request treatment deferred as patient states that he is having pain and really need to have a bowel movement. Benefits of mobility for overall wellness reviewed with the patient. Wife is very encouraging to the efforts. RN made aware.   Carmen Fleming, PTA

## 2018-09-12 NOTE — PROGRESS NOTES
Problem: Falls - Risk of 
Goal: *Absence of Falls Document Kathykimberly William Fall Risk and appropriate interventions in the flowsheet. Outcome: Progressing Towards Goal 
Fall Risk Interventions: 
Mobility Interventions: Bed/chair exit alarm, Communicate number of staff needed for ambulation/transfer, OT consult for ADLs, Patient to call before getting OOB, PT Consult for mobility concerns, PT Consult for assist device competence, Utilize walker, cane, or other assistive device Mentation Interventions: Adequate sleep, hydration, pain control, Bed/chair exit alarm, Familiar objects from home, Family/sitter at bedside, Toileting rounds, Update white board Medication Interventions: Assess postural VS orthostatic hypotension, Bed/chair exit alarm, Evaluate medications/consider consulting pharmacy, Patient to call before getting OOB, Teach patient to arise slowly Elimination Interventions: Bed/chair exit alarm, Call light in reach, Elevated toilet seat, Patient to call for help with toileting needs, Toilet paper/wipes in reach, Toileting schedule/hourly rounds, Urinal in reach History of Falls Interventions: Bed/chair exit alarm, Consult care management for discharge planning, Door open when patient unattended, Evaluate medications/consider consulting pharmacy

## 2018-09-12 NOTE — PROGRESS NOTES
Problem: Mobility Impaired (Adult and Pediatric) Goal: *Acute Goals and Plan of Care (Insert Text) STG: 
(1.)Mr. Enriqueta Knight will move from supine to sit and sit to supine , scoot up and down and roll side to side in bed with STAND BY ASSIST within 3 treatment day(s). (2.)Mr. Enriqueta Knight will transfer from bed to chair and chair to bed with CONTACT GUARD ASSIST using the least restrictive device within 3 treatment day(s). (3.)Mr. Enriqueta Knight will ambulate with CONTACT GUARD ASSIST for 100 feet with the least restrictive device and minimal cueing within 3 treatment day(s). (4.)Mr. Enriqueta Knight will be able to don / doff lumbar corset brace independently with minimal cueing within 3 treatment days (5.)Mr. Enriqueta Knight will maintain spinal precautions throughout all functional mobility within 3 days with 0 verbal cues. LTG: 
(1.)Mr. Enriqueta Knight will move from supine to sit and sit to supine , scoot up and down and roll side to side in bed with MODIFIED INDEPENDENCE within 7 treatment day(s). (2.)Mr. Enriqueta Knight will transfer from bed to chair and chair to bed with SUPERVISION using the least restrictive device within 7 treatment day(s). (3.)Mr. Enriqueta Knight will ambulate with STAND BY ASSIST for 150 feet with the least restrictive device, appropriate gait pattern and good dynamic standing balance within 7 treatment day(s). (4.)Mr. Enriqueta Knight will be able to don / doff lumbar corset brace independently and 0 cues within 7 treatment days ________________________________________________________________________________________________ PHYSICAL THERAPY: Daily Note, Treatment Day: 1st, PM 9/12/2018 INPATIENT: Hospital Day: 6 [place] / Product Type: Medicare /  
  
NAME/AGE/GENDER: Christiano Villeda is a 65 y.o. male PRIMARY DIAGNOSIS: Spondylolisthesis, unspecified spinal region [M43.10] Lumbar stenosis with neurogenic claudication [M48.062] Spondylolisthesis, unspecified spinal region [M43.10] Lumbar stenosis with neurogenic claudication [M48.062] Spinal stenosis of lumbosacral region Spinal stenosis of lumbosacral region Procedure(s) (LRB): 
L5-S1 laminectomy for instrapinal extradural lesion/tlif/posterior instrumentation (N/A) 2 Days Post-Op ICD-10: Treatment Diagnosis:  
 · Generalized Muscle Weakness (M62.81) · Difficulty in walking, Not elsewhere classified (R26.2) · Other abnormalities of gait and mobility (R26.89) · Low Back Pain (M54.5) Precaution/Allergies: 
Clonidine and Simvastatin ASSESSMENT:  
 
Mr. Farrah Holley is a 65 y.o. male that presents supine in bed with wife present. Agreeable to therapy. RN present medicating patient. Bed mobility requires min assist as patient requires verbal and tactile cues for logrolling. Sitting balance is good. Patient desires to don sneakers for gait training. Total assist needed. Sit to stand and donning the brace with assistance. Wife is present and is instructed in donning the brace. Gait training with rolling walker x 500 feet with slow but steady dori. Patient is returned to the room and placed in the bedside chair. Good session. Patient is very motivated and cooperative. Making progress towards goals. Pt will benefit from skilled therapy to increase strength and normalize gait in order to return to prior level of function with least restrictive device. Recommend pt to use rolling walker upon d/c from hospital due to increased balance deficits from prior level of function. Pt was left supine in bed, call button in reach and wife at bedside. Will continue PT efforts. PM treatment:  Patient is sitting on the [facility] but is agreeable to getting up and taking a walk. Brace is intact. Sit to stand @ walker with just a little assistance.   Gait training with rolling walker x 500 feet with slow but steady dori. Patient required some cues for safe walker distance and the amount of pressure the patient is placing on the walker. Patient is returned to the room and requested to sit on the bedside chair. Wife is present and is very attentive to the patient. Both are asked to call staff for any needs. Patient states that he does have pain but no number given. Will continue PT efforts. This section established at most recent assessment PROBLEM LIST (Impairments causing functional limitations): 1. Decreased Strength 2. Decreased ADL/Functional Activities 3. Decreased Transfer Abilities 4. Decreased Ambulation Ability/Technique 5. Decreased Balance 6. Increased Pain 7. Decreased Activity Tolerance 8. Decreased Flexibility/Joint Mobility 9. Decreased Knowledge of Precautions 10. Decreased Delta with Home Exercise Program 
 INTERVENTIONS PLANNED: (Benefits and precautions of physical therapy have been discussed with the patient.) 1. Balance Exercise 2. Bed Mobility 3. Family Education 4. Gait Training 5. Home Exercise Program (HEP) 6. Manual Therapy 7. Neuromuscular Re-education/Strengthening 8. Range of Motion (ROM) 9. Therapeutic Activites 10. Therapeutic Exercise/Strengthening 11. Transfer Training TREATMENT PLAN: Frequency/Duration: BID for remainder of hospital stay Rehabilitation Potential For Stated Goals: Good RECOMMENDED REHABILITATION/EQUIPMENT: (at time of discharge pending progress): Due to the probability of continued deficits (see above) this patient will likely need continued skilled physical therapy after discharge. Equipment:  
? None at this time HISTORY:  
History of Present Injury/Illness (Reason for Referral): 
Per chart: He has been following with our PA.   He is 65 and he has a grade 2 L5-S1 spondylolisthesis with severe foraminal stenosis bilaterally, but he really only has right leg pain, and it is a classic L5 distribution. Recent MRI scan of 08/03/2018 shows severe foraminal stenosis. No central stenosis. He still has a reasonable disc space, but it is a significant spondylolisthesis. He has been receiving L5 nerve blocks and they help temporarily. The last one was about 2 weeks ago and it is wearing off and he is having severe pain. Apparently, he was screaming initially at the visit today at the  until they could get his pain under better control. He says he always hurts at night when he tries to sleep. It hurts to stand and walk. He has improvement sitting and flexing. He also says if he is sitting and he elevates his right foot up on a step or something, his pain goes away. I printed out x-rays and the MRI scan and showed he and his wife the problem, and I told him with this degree of listhesis, I think the best way to treat him would actually be an anteroposterior fusion. I went through his medical history and he has some high blood pressure, but no other significant medical problems, although, he has been told he has some kidney disease, but they are not doing any treatment or anything special for it. Past Medical History/Comorbidities:  
Mr. Noemi Sumner  has a past medical history of Arthritis; Calculus of kidney (12/23/2013); Chronic kidney disease; Chronic kidney disease, stage III (moderate) (12/23/2013); Fracture of coccyx ([address] (1/14/2016); Gout, unspecified (12/23/2013); History of left shoulder fracture (1/14/2016); Hyperlipidemia (1/14/2016); Microscopic hematuria (12/23/2013); Sciatica of right side (1/14/2016); Unspecified essential hypertension (12/23/2013); and Urinary tract infection, site not specified (12/23/2013). Mr. Noemi Sumner  has a past surgical history that includes hx lithotripsy; pr total hip arthroplasty (Left); and hx shoulder replacement (Left, 12/2014). Social History/Living Environment: Home Environment: Private residence # Steps to Enter: 0 Rails to Enter: No 
Wheelchair Ramp: Yes One/Two Story Residence: One story Living Alone: No 
Support Systems: Spouse/Significant Other/Partner, Family member(s) Patient Expects to be Discharged to[de-identified]  (Will discuss in PM Session) Current DME Used/Available at Home: [employer], straight, Jean Purvi Tub or Shower Type: Tub/Shower combination Prior Level of Function/Work/Activity: Pt was independent with all functional mobility and gait without assistive device. Has been limited in overall function x 2  Years secondary to back pain. Tries to work as an  some. Drives. Wife works 4 hours/day and therefore pt will be alone during that time. Personal Factors:   
      Sex:  male Age:  65 y.o. Number of Personal Factors/Comorbidities that affect the Plan of Care: 1-2: MODERATE COMPLEXITY EXAMINATION:  
Most Recent Physical Functioning:  
Gross Assessment: 
  
         
  
Posture: 
  
Balance: 
Sitting: Intact Sitting - Static: Good (unsupported) Sitting - Dynamic: Good (unsupported) Standing: Impaired Standing - Static: Good Standing - Dynamic : Fair Bed Mobility: 
Rolling: Modified independent Supine to Sit: Minimum assistance Scooting: Minimum assistance Wheelchair Mobility: 
  
Transfers: 
Sit to Stand: Contact guard assistance Stand to Sit: Contact guard assistance Gait: 
Right Side Weight Bearing: Full Left Side Weight Bearing: Full Speed/Maria Victoria: Slow Distance (ft): 500 Feet (ft) Assistive Device: Brace/Splint; Walker, rolling Ambulation - Level of Assistance: Contact guard assistance Body Structures Involved: 1. Bones 2. Joints 3. Muscles 4. Ligaments Body Functions Affected: 1. Sensory/Pain 2. Neuromusculoskeletal 
3. Movement Related Activities and Participation Affected: 1. General Tasks and Demands 2. Mobility 3. Self Care 4. Community, Social and [place] Number of elements that affect the Plan of Care: 4+: HIGH COMPLEXITY CLINICAL PRESENTATION:  
Presentation: Stable and uncomplicated: LOW COMPLEXITY CLINICAL DECISION MAKIN [facility] [address] [id number] AM-PAC 6 Clicks Basic Mobility Inpatient Short Form How much difficulty does the patient currently have. .. Unable A Lot A Little None 1. Turning over in bed (including adjusting bedclothes, sheets and blankets)? [] 1   [] 2   [x] 3   [] 4  
2. Sitting down on and standing up from a chair with arms ( e.g., wheelchair, bedside commode, etc.)   [] 1   [] 2   [x] 3   [] 4  
3. Moving from lying on back to sitting on the side of the bed? [] 1   [] 2   [x] 3   [] 4 How much help from another person does the patient currently need. .. Total A Lot A Little None 4. Moving to and from a bed to a chair (including a wheelchair)? [] 1   [] 2   [x] 3   [] 4  
5. Need to walk in hospital room? [] 1   [] 2   [x] 3   [] 4  
6. Climbing 3-5 steps with a railing? [] 1   [x] 2   [] 3   [] 4  
© , Trustees of [address] [facility] [address] [address], under license to [employer]. All rights reserved Score:  Initial: 18 Most Recent: 17 (Date: 18 ) Interpretation of Tool:  Represents activities that are increasingly more difficult (i.e. Bed mobility, Transfers, Gait). Score 24 23 22-20 19-15 14-10 9-7 6 Modifier CH CI CJ CK CL CM CN   
 
? Mobility - Walking and Moving Around:  
  - CURRENT STATUS: CK - 40%-59% impaired, limited or restricted  - GOAL STATUS: CJ - 20%-39% impaired, limited or restricted  - D/C STATUS:  ---------------To be determined--------------- [place] / [place] / Product Type: Medicare /   
 
Medical Necessity:    
· Patient demonstrates good rehab potential due to higher previous functional level. Reason for Services/Other Comments: 
· Patient continues to require present interventions due to patient's inability to function at baseline. Use of outcome tool(s) and clinical judgement create a POC that gives a: Questionable prediction of patient's progress: MODERATE COMPLEXITY  
  
 
 
 
TREATMENT:  
(In addition to Assessment/Re-Assessment sessions the following treatments were rendered) Pre-treatment Symptoms/Complaints:  I am ready to walk\" Pain: Initial:  
Pain Intensity 1: 0 Pain Location 1: Back  Post Session:  No number given Therapeutic Activity: (    24 minutes): Therapeutic activities including Bed transfers, Chair transfers, Managing assistive device, and Operating brace to improve mobility, strength and balance. Needed  Contact guard assist with minimum verbal  cueing when performing all activities to progress safely. Needed cueing to initiated bed mobility and transfers. Needed manual cues to lower body slowly during stand to sit transfer. Gait Training (   minutes):  Gait training to improve and/or restore physical functioning as related to mobility, strength, balance and coordination. Ambulated 500 Feet (ft) with minimal assistance/contact guard assist on level ground using rolling walker and moderate visual, verbal and manual cues related to their stride length, push off, heel strike, ankle position and motion and hip position and motionto promote proper body alignment, promote proper body posture and promote proper body mechanics. Pt needed cueing to improve attention to environment and not constantly looking at feet. Continued cueing for foot clearance and widening base of support. Assistive Device: Brace/Splint, Walker, rolling Ambulation - Level of Assistance: Contact guard assistance Distance (ft): 500 Feet (ft) Braces/Orthotics/Lines/Etc:  
· Hemovac back brace Treatment/Session Assessment:   
· Response to Treatment:  Tolerated well · Interdisciplinary Collaboration:  
o Physical Therapy Assistant 
o Registered Nurse · After treatment position/precautions:  
o Up in chair 
o Bed/Chair-wheels locked o Call light within reach 
o RN notified 
o Family at bedside · Compliance with Program/Exercises: compliant · Recommendations/Intent for next treatment session:  Advance treatment to more challenging activities. Total Treatment Duration: PT Patient Time In/Time Out Time In: 4880 Time Out: 4920 Nena Jeffries, PTA  
SPT, CSCS

## 2018-09-13 VITALS
OXYGEN SATURATION: 94 % | HEIGHT: 66 IN | TEMPERATURE: 98.3 F | DIASTOLIC BLOOD PRESSURE: 67 MMHG | RESPIRATION RATE: 16 BRPM | BODY MASS INDEX: 27.82 KG/M2 | WEIGHT: 173.13 LBS | HEART RATE: 87 BPM | SYSTOLIC BLOOD PRESSURE: 102 MMHG

## 2018-09-13 PROCEDURE — 97530 THERAPEUTIC ACTIVITIES: CPT

## 2018-09-13 PROCEDURE — 74011250637 HC RX REV CODE- 250/637: Performed by: NURSE PRACTITIONER

## 2018-09-13 PROCEDURE — 74011250637 HC RX REV CODE- 250/637: Performed by: ORTHOPAEDIC SURGERY

## 2018-09-13 PROCEDURE — 74011000250 HC RX REV CODE- 250: Performed by: ORTHOPAEDIC SURGERY

## 2018-09-13 RX ORDER — HYDROCODONE BITARTRATE AND ACETAMINOPHEN 7.5; 325 MG/1; MG/1
1 TABLET ORAL
Qty: 28 TAB | Refills: 0 | Status: SHIPPED | OUTPATIENT
Start: 2018-09-13 | End: 2018-12-20 | Stop reason: ALTCHOICE

## 2018-09-13 RX ADMIN — HYDROCHLOROTHIAZIDE: 12.5 CAPSULE ORAL at 08:45

## 2018-09-13 RX ADMIN — Medication 1 AMPULE: at 08:47

## 2018-09-13 RX ADMIN — MAGNESIUM HYDROXIDE 30 ML: 400 SUSPENSION ORAL at 12:07

## 2018-09-13 RX ADMIN — GABAPENTIN 200 MG: 100 CAPSULE ORAL at 17:18

## 2018-09-13 RX ADMIN — Medication 10 ML: at 13:58

## 2018-09-13 RX ADMIN — FAMOTIDINE 20 MG: 20 TABLET ORAL at 08:43

## 2018-09-13 RX ADMIN — Medication 10 ML: at 06:11

## 2018-09-13 RX ADMIN — POLYETHYLENE GLYCOL 3350 17 G: 17 POWDER, FOR SOLUTION ORAL at 14:04

## 2018-09-13 RX ADMIN — BISACODYL 10 MG: 10 SUPPOSITORY RECTAL at 06:16

## 2018-09-13 RX ADMIN — GABAPENTIN 200 MG: 100 CAPSULE ORAL at 08:44

## 2018-09-13 RX ADMIN — HYDROCHLOROTHIAZIDE: 12.5 CAPSULE ORAL at 08:44

## 2018-09-13 RX ADMIN — AMLODIPINE BESYLATE 5 MG: 5 TABLET ORAL at 08:46

## 2018-09-13 RX ADMIN — TAMSULOSIN HYDROCHLORIDE 0.4 MG: 0.4 CAPSULE ORAL at 08:47

## 2018-09-13 NOTE — PROGRESS NOTES
Problem: Falls - Risk of 
Goal: *Absence of Falls Document Norman Baird Fall Risk and appropriate interventions in the flowsheet. Outcome: Progressing Towards Goal 
Fall Risk Interventions: 
Mobility Interventions: Bed/chair exit alarm, Communicate number of staff needed for ambulation/transfer, OT consult for ADLs, Patient to call before getting OOB, PT Consult for mobility concerns, PT Consult for assist device competence, Utilize walker, cane, or other assistive device Mentation Interventions: Adequate sleep, hydration, pain control, Bed/chair exit alarm, Evaluate medications/consider consulting pharmacy, Eyeglasses and hearing aids, Familiar objects from home, Family/sitter at bedside, Toileting rounds, Update white board Medication Interventions: Bed/chair exit alarm, Evaluate medications/consider consulting pharmacy, Patient to call before getting OOB, Teach patient to arise slowly Elimination Interventions: Bed/chair exit alarm, Call light in reach, Elevated toilet seat, Patient to call for help with toileting needs, Toilet paper/wipes in reach, Toileting schedule/hourly rounds History of Falls Interventions: Bed/chair exit alarm, Consult care management for discharge planning, Door open when patient unattended, Evaluate medications/consider consulting pharmacy, Investigate reason for fall, Room close to nurse's station

## 2018-09-13 NOTE — DISCHARGE INSTRUCTIONS
MAY shower (may shower with dressing on)-->NO tub baths    LEAVE dressing on incision for 3 DAYS-->then may remove   (If dressing starts to fall off may re-secure with tape)    NO lifting anything heavier than 5LBS     NO Bending, Lifting or Twisting    WEAR your brace if prescribed at all times EXCEPT when in bed, just going to the   bathroom, or showering    Avoid sitting more than 20 - 30 minutes at at time    NO driving until directed by your doctor    DO NOT take any NSAIDS (either prescribed or over the counter until directed    (Aleve, Ibuprofen, Mobic, etc) as this will interfere with bone healing    CALL the doctor if:  Fever >100.5  ([phone])                 Incision becomes red, swollen or opens up               Incision has yellow, thick drainage or an odor               Pain is not managed with prescribed medications               Excessive nausea and/or vomiting    Avoid having pets sleep in bed with you until incision is completely healed    DISCHARGE SUMMARY from Nurse    PATIENT INSTRUCTIONS:    After general anesthesia or intravenous sedation, for 24 hours or while taking prescription Narcotics:  · Limit your activities  · Do not drive and operate hazardous machinery  · Do not make important personal or business decisions  · Do  not drink alcoholic beverages  · If you have not urinated within 8 hours after discharge, please contact your surgeon on call.     Report the following to your surgeon:  · Excessive pain, swelling, redness or odor of or around the surgical area  · Temperature over 100.5  · Nausea and vomiting lasting longer than 4 hours or if unable to take medications  · Any signs of decreased circulation or nerve impairment to extremity: change in color, persistent  numbness, tingling, coldness or increase pain  · Any questions    What to do at Home:  Recommended activity: see discharge instructions    If you experience any of the following symptoms see discharge instructions, please follow up with surgeon. *  Please give a list of your current medications to your Primary Care Provider. *  Please update this list whenever your medications are discontinued, doses are      changed, or new medications (including over-the-counter products) are added. *  Please carry medication information at all times in case of emergency situations. These are general instructions for a healthy lifestyle:    No smoking/ No tobacco products/ Avoid exposure to second hand smoke  Surgeon General's Warning:  Quitting smoking now greatly reduces serious risk to your health. Obesity, smoking, and sedentary lifestyle greatly increases your risk for illness    A healthy diet, regular physical exercise & weight monitoring are important for maintaining a healthy lifestyle    You may be retaining fluid if you have a history of heart failure or if you experience any of the following symptoms:  Weight gain of 3 pounds or more overnight or 5 pounds in a week, increased swelling in our hands or feet or shortness of breath while lying flat in bed. Please call your doctor as soon as you notice any of these symptoms; do not wait until your next office visit. Recognize signs and symptoms of STROKE:    F-face looks uneven    A-arms unable to move or move unevenly    S-speech slurred or non-existent    T-time-call 911 as soon as signs and symptoms begin-DO NOT go       Back to bed or wait to see if you get better-TIME IS BRAIN. Warning Signs of HEART ATTACK     Call 911 if you have these symptoms:   Chest discomfort. Most heart attacks involve discomfort in the center of the chest that lasts more than a few minutes, or that goes away and comes back. It can feel like uncomfortable pressure, squeezing, fullness, or pain.  Discomfort in other areas of the upper body. Symptoms can include pain or discomfort in one or both arms, the back, neck, jaw, or stomach.    Shortness of breath with or without chest discomfort.  Other signs may include breaking out in a cold sweat, nausea, or lightheadedness. Don't wait more than five minutes to call 911 - MINUTES MATTER! Fast action can save your life. Calling 911 is almost always the fastest way to get lifesaving treatment. Emergency Medical Services staff can begin treatment when they arrive -- up to an hour sooner than if someone gets to the hospital by car. The discharge information has been reviewed with the patient. The patient verbalized understanding. Discharge medications reviewed with the patient and appropriate educational materials and side effects teaching were provided.   ___________________________________________________________________________________________________________________________________

## 2018-09-13 NOTE — PROGRESS NOTES
In accordance with Medicare Guidelines, a copy of the Important Letter from Medicare was presented to the patient/patients spouse in anticipation of expected discharge within 48 hours. An oral explanation was provided and all questions were answered. A copy of the Important Letter from Medicare was signed by patients spouse and placed in the patient's medical chart, and a copy of the Important Letter from Medicare was provided to the patients spouse. Care Managers were notified.

## 2018-09-13 NOTE — PROGRESS NOTES
Patient transferred from chair to bed; one assist. and walker used. Expressed slight pain in lower back when lying down. Pain subsided before I left room. Bed low and locked; semi-fowlers position. Gerardo Coma GVT Nursing Student

## 2018-09-13 NOTE — PROGRESS NOTES
Problem: Mobility Impaired (Adult and Pediatric) Goal: *Acute Goals and Plan of Care (Insert Text) STG: 
(1.)Mr. Farrah Holley will move from supine to sit and sit to supine , scoot up and down and roll side to side in bed with STAND BY ASSIST within 3 treatment day(s). (2.)Mr. Farrah Holley will transfer from bed to chair and chair to bed with CONTACT GUARD ASSIST using the least restrictive device within 3 treatment day(s). Goal met 9/13/18 
(3.)Mr. Farrah Holley will ambulate with CONTACT GUARD ASSIST for 100 feet with the least restrictive device and minimal cueing within 3 treatment day(s). Goal met 9/13/18 
(4.)Mr. Fararh Holley will be able to don / doff lumbar corset brace independently with minimal cueing within 3 treatment days (5.)Mr. Farrah Holley will maintain spinal precautions throughout all functional mobility within 3 days with 0 verbal cues. Goal met 9/13/18 LTG: 
(1.)Mr. Farrah Holley will move from supine to sit and sit to supine , scoot up and down and roll side to side in bed with MODIFIED INDEPENDENCE within 7 treatment day(s). (2.)Mr. Farrah Holley will transfer from bed to chair and chair to bed with SUPERVISION using the least restrictive device within 7 treatment day(s). (3.)Mr. Farrah Holley will ambulate with STAND BY ASSIST for 150 feet with the least restrictive device, appropriate gait pattern and good dynamic standing balance within 7 treatment day(s). (4.)Mr. Farrah Holley will be able to don / doff lumbar corset brace independently and 0 cues within 7 treatment days ________________________________________________________________________________________________ PHYSICAL THERAPY: Daily Note, Treatment Day: 2nd, AM 9/13/2018 INPATIENT: Hospital Day: 7 [place] / Product Type: Medicare /  
  
NAME/AGE/GENDER: Esther Venegas is a 65 y.o. male PRIMARY DIAGNOSIS: Spondylolisthesis, unspecified spinal region [M43.10] Lumbar stenosis with neurogenic claudication [M48.062] Spondylolisthesis, unspecified spinal region [M43.10] Lumbar stenosis with neurogenic claudication [M48.062] Spinal stenosis of lumbosacral region Spinal stenosis of lumbosacral region Procedure(s) (LRB): 
L5-S1 laminectomy for instrapinal extradural lesion/tlif/posterior instrumentation (N/A) 3 Days Post-Op ICD-10: Treatment Diagnosis:  
 · Generalized Muscle Weakness (M62.81) · Difficulty in walking, Not elsewhere classified (R26.2) · Other abnormalities of gait and mobility (R26.89) · Low Back Pain (M54.5) Precaution/Allergies: 
Clonidine and Simvastatin ASSESSMENT:  
 
Mr. Noemi Sumner is a 65 y.o. male that presents supine in bed with wife present. Agreeable to therapy. Bed mobility requires min assist as patient requires verbal and tactile cues for logrolling. Sitting balance is good. Patient desires to don sneakers for gait training. Total assist needed. Sit to stand and donning the brace with assistance. Gait training with rolling walker x 500 feet with slow but steady dori. Patient is returned to the room and placed in the bedside chair. Good session. Patient is very motivated and cooperative. Making progress towards goals. Pt will benefit from skilled therapy to increase strength and normalize gait in order to return to prior level of function with least restrictive device. Recommend pt to use rolling walker upon d/c from hospital due to increased balance deficits from prior level of function. Pt was left supine in bed, call button in reach and wife at bedside. Will continue PT efforts. PM treatment:  Patient is supine in bed and agreeable to therapy. Patient had just returned to supine from the [facility]. With the patient using the logrolling techniques the patient was able to move to the edge of the bed with min assist.  Static sitting good.   RN pulls drain and patient is ready ambulate. Gait training with rolling walker continued x 500 feet with slow but steady dori. Patient is returned to supine in bed. Brace removed. Wife has returned. Possible discharge today. This section established at most recent assessment PROBLEM LIST (Impairments causing functional limitations): 1. Decreased Strength 2. Decreased ADL/Functional Activities 3. Decreased Transfer Abilities 4. Decreased Ambulation Ability/Technique 5. Decreased Balance 6. Increased Pain 7. Decreased Activity Tolerance 8. Decreased Flexibility/Joint Mobility 9. Decreased Knowledge of Precautions 10. Decreased [place] with Home Exercise Program 
 INTERVENTIONS PLANNED: (Benefits and precautions of physical therapy have been discussed with the patient.) 1. Balance Exercise 2. Bed Mobility 3. Family Education 4. Gait Training 5. Home Exercise Program (HEP) 6. Manual Therapy 7. Neuromuscular Re-education/Strengthening 8. Range of Motion (ROM) 9. Therapeutic Activites 10. Therapeutic Exercise/Strengthening 11. Transfer Training TREATMENT PLAN: Frequency/Duration: BID for remainder of hospital stay Rehabilitation Potential For Stated Goals: Good RECOMMENDED REHABILITATION/EQUIPMENT: (at time of discharge pending progress): Due to the probability of continued deficits (see above) this patient will likely need continued skilled physical therapy after discharge. Equipment:  
? None at this time HISTORY:  
History of Present Injury/Illness (Reason for Referral): 
Per chart: He has been following with our PA. He is 65 and he has a grade 2 L5-S1 spondylolisthesis with severe foraminal stenosis bilaterally, but he really only has right leg pain, and it is a classic L5 distribution. Recent MRI scan of 08/03/2018 shows severe foraminal stenosis. No central stenosis.   He still has a reasonable disc space, but it is a significant spondylolisthesis. He has been receiving L5 nerve blocks and they help temporarily. The last one was about 2 weeks ago and it is wearing off and he is having severe pain. Apparently, he was screaming initially at the visit today at the  until they could get his pain under better control. He says he always hurts at night when he tries to sleep. It hurts to stand and walk. He has improvement sitting and flexing. He also says if he is sitting and he elevates his right foot up on a step or something, his pain goes away. I printed out x-rays and the MRI scan and showed he and his wife the problem, and I told him with this degree of listhesis, I think the best way to treat him would actually be an anteroposterior fusion. I went through his medical history and he has some high blood pressure, but no other significant medical problems, although, he has been told he has some kidney disease, but they are not doing any treatment or anything special for it. Past Medical History/Comorbidities:  
Mr. Ivone Bautista  has a past medical history of Arthritis; Calculus of kidney (12/23/2013); Chronic kidney disease; Chronic kidney disease, stage III (moderate) (12/23/2013); Fracture of coccyx ([facility] [address] (1/14/2016); Gout, unspecified (12/23/2013); History of left shoulder fracture (1/14/2016); Hyperlipidemia (1/14/2016); Microscopic hematuria (12/23/2013); Sciatica of right side (1/14/2016); Unspecified essential hypertension (12/23/2013); and Urinary tract infection, site not specified (12/23/2013). Mr. Ivone Bautista  has a past surgical history that includes hx lithotripsy; pr total hip arthroplasty (Left); and hx shoulder replacement (Left, 12/2014). Social History/Living Environment:  
Home Environment: Private residence # Steps to Enter: 0 Rails to Enter: No 
Wheelchair Ramp: Yes One/Two Story Residence: One story Living Alone: No 
Support Systems: Spouse/Significant Other/Partner, Family member(s) Patient Expects to be Discharged to[de-identified]  (Will discuss in PM Session) Current DME Used/Available at Home: Macky Kussmaul, straight, [employer] Olsz[employer] Tub or Shower Type: Tub/Shower combination Prior Level of Function/Work/Activity: Pt was independent with all functional mobility and gait without assistive device. Has been limited in overall function x 2  Years secondary to back pain. Tries to work as an  some. Drives. Wife works 4 hours/day and therefore pt will be alone during that time. Personal Factors:   
      Sex:  male Age:  65 y.o. Number of Personal Factors/Comorbidities that affect the Plan of Care: 1-2: MODERATE COMPLEXITY EXAMINATION:  
Most Recent Physical Functioning:  
Gross Assessment: 
  
         
  
Posture: 
  
Balance: 
Sitting: Intact Sitting - Static: Good (unsupported) Sitting - Dynamic: Good (unsupported) Standing: Intact Standing - Static: Good Standing - Dynamic : Fair Bed Mobility: 
Rolling: Stand-by assistance Supine to Sit: Minimum assistance Sit to Supine: Minimum assistance Scooting: Minimum assistance Wheelchair Mobility: 
  
Transfers: 
Sit to Stand: Stand-by assistance Stand to Sit: Stand-by assistance Gait: 
Right Side Weight Bearing: Full Left Side Weight Bearing: Full Speed/[place]: Slow Distance (ft): 500 Feet (ft) Assistive Device: Brace/Splint; Walker, rolling Ambulation - Level of Assistance: Contact guard assistance Body Structures Involved: 1. Bones 2. Joints 3. Muscles 4. Ligaments Body Functions Affected: 1. Sensory/Pain 2. Neuromusculoskeletal 
3. Movement Related Activities and Participation Affected: 1. General Tasks and Demands 2. Mobility 3. Self Care 4. Community, Social and [place] Number of elements that affect the Plan of Care: 4+: HIGH COMPLEXITY CLINICAL PRESENTATION:  
Presentation: Stable and uncomplicated: LOW COMPLEXITY CLINICAL DECISION MAKING:  
[employer] 6 Clicks Basic Mobility Inpatient Short Form How much difficulty does the patient currently have. .. Unable A Lot A Little None 1. Turning over in bed (including adjusting bedclothes, sheets and blankets)? [] 1   [] 2   [x] 3   [] 4  
2. Sitting down on and standing up from a chair with arms ( e.g., wheelchair, bedside commode, etc.)   [] 1   [] 2   [x] 3   [] 4  
3. Moving from lying on back to sitting on the side of the bed? [] 1   [] 2   [x] 3   [] 4 How much help from another person does the patient currently need. .. Total A Lot A Little None 4. Moving to and from a bed to a chair (including a wheelchair)? [] 1   [] 2   [x] 3   [] 4  
5. Need to walk in hospital room? [] 1   [] 2   [x] 3   [] 4  
6. Climbing 3-5 steps with a railing? [] 1   [x] 2   [] 3   [] 4  
© 2007, Trustees of [facility] [employer], under license to [employer]. All rights reserved Score:  Initial: 18 Most Recent: 17 (Date: 09/11/18 ) Interpretation of Tool:  Represents activities that are increasingly more difficult (i.e. Bed mobility, Transfers, Gait). Score 24 23 22-20 19-15 14-10 9-7 6 Modifier CH CI CJ CK CL CM CN   
 
? Mobility - Walking and Moving Around:  
  - CURRENT STATUS: CK - 40%-59% impaired, limited or restricted  - GOAL STATUS: CJ - 20%-39% impaired, limited or restricted  - D/C STATUS:  ---------------To be determined--------------- [place] / [place] B / Product Type: Medicare /   
 
Medical Necessity:    
· Patient demonstrates good rehab potential due to higher previous functional level. Reason for Services/Other Comments: 
· Patient continues to require present interventions due to patient's inability to function at baseline.   
Use of outcome tool(s) and clinical judgement create a POC that gives a: Questionable prediction of patient's progress: MODERATE COMPLEXITY  
  
 
 
 
TREATMENT:  
 (In addition to Assessment/Re-Assessment sessions the following treatments were rendered) Pre-treatment Symptoms/Complaints: \"I am ready\" Pain: Initial:  
Pain Intensity 1: 0 Pain Location 1: Back  Post Session:  0/10 Therapeutic Activity: (    26 minutes): Therapeutic activities including Bed transfers, Chair transfers, Managing assistive device, and Operating brace to improve mobility, strength and balance. Needed minimum verbal  cueing when performing all activities to progress safely. Needed cueing to initiated bed mobility and transfers. Gait Training (   minutes):  Gait training to improve and/or restore physical functioning as related to mobility, strength, balance and coordination. Ambulated 500 Feet (ft) with minimal assistance/contact guard assist on level ground using rolling walker and moderate visual, verbal and manual cues related to their stride length, push off, heel strike, ankle position and motion and hip position and motionto promote proper body alignment, promote proper body posture and promote proper body mechanics. Pt needed cueing to improve attention to environment and not constantly looking at feet. Continued cueing for foot clearance and widening base of support. Assistive Device: Brace/Splint, Walker, rolling Ambulation - Level of Assistance: Contact guard assistance Distance (ft): 500 Feet (ft) Braces/Orthotics/Lines/Etc:  
· Hemovac back brace Treatment/Session Assessment:   
· Response to Treatment:  Tolerated well · Interdisciplinary Collaboration:  
o Physical Therapy Assistant 
o Registered Nurse · After treatment position/precautions:  
o Supine in bed 
o Bed alarm/tab alert on 
o Bed/Chair-wheels locked 
o Bed in low position 
o Call light within reach 
o RN notified 
o Family at bedside · Compliance with Program/Exercises: compliant · Recommendations/Intent for next treatment session:  Advance treatment to more challenging activities Total Treatment Duration: PT Patient Time In/Time Out Time In: 3989 Time Out: 1501 Nena Mcmahon-Wendy, PTA  
SPT, CSCS

## 2018-09-13 NOTE — PROGRESS NOTES
Problem: Mobility Impaired (Adult and Pediatric) Goal: *Acute Goals and Plan of Care (Insert Text) STG: 
(1.)Mr. Enriqueta Knight will move from supine to sit and sit to supine , scoot up and down and roll side to side in bed with STAND BY ASSIST within 3 treatment day(s). (2.)Mr. Enriqueta Knight will transfer from bed to chair and chair to bed with CONTACT GUARD ASSIST using the least restrictive device within 3 treatment day(s). (3.)Mr. Enriqueta Knight will ambulate with CONTACT GUARD ASSIST for 100 feet with the least restrictive device and minimal cueing within 3 treatment day(s). (4.)Mr. Enriqueta Knight will be able to don / doff lumbar corset brace independently with minimal cueing within 3 treatment days (5.)Mr. Enriqueta Knight will maintain spinal precautions throughout all functional mobility within 3 days with 0 verbal cues. LTG: 
(1.)Mr. Enriqueta Knight will move from supine to sit and sit to supine , scoot up and down and roll side to side in bed with MODIFIED INDEPENDENCE within 7 treatment day(s). (2.)Mr. Enriqueta Knight will transfer from bed to chair and chair to bed with SUPERVISION using the least restrictive device within 7 treatment day(s). (3.)Mr. Enriqueta Knight will ambulate with STAND BY ASSIST for 150 feet with the least restrictive device, appropriate gait pattern and good dynamic standing balance within 7 treatment day(s). (4.)Mr. Enriqueta Knight will be able to don / doff lumbar corset brace independently and 0 cues within 7 treatment days ________________________________________________________________________________________________ PHYSICAL THERAPY: Daily Note, Treatment Day: 2nd, AM 9/13/2018 INPATIENT: Hospital Day: 7 [place] / Product Type: Medicare /  
  
NAME/AGE/GENDER: Christiano Villeda is a 65 y.o. male PRIMARY DIAGNOSIS: Spondylolisthesis, unspecified spinal region [M43.10] Lumbar stenosis with neurogenic claudication [M48.062] Spondylolisthesis, unspecified spinal region [M43.10] Lumbar stenosis with neurogenic claudication [M48.062] Spinal stenosis of lumbosacral region Spinal stenosis of lumbosacral region Procedure(s) (LRB): 
L5-S1 laminectomy for instrapinal extradural lesion/tlif/posterior instrumentation (N/A) 3 Days Post-Op ICD-10: Treatment Diagnosis:  
 · Generalized Muscle Weakness (M62.81) · Difficulty in walking, Not elsewhere classified (R26.2) · Other abnormalities of gait and mobility (R26.89) · Low Back Pain (M54.5) Precaution/Allergies: 
Clonidine and Simvastatin ASSESSMENT:  
 
Mr. Chet Jovel is a 65 y.o. male that presents supine in bed with wife present. Agreeable to therapy. Bed mobility requires min assist as patient requires verbal and tactile cues for logrolling. Sitting balance is good. Patient desires to don sneakers for gait training. Total assist needed. Sit to stand and donning the brace with assistance. Gait training with rolling walker x 500 feet with slow but steady dori. Patient is returned to the room and placed in the bedside chair. Good session. Patient is very motivated and cooperative. Making progress towards goals. Pt will benefit from skilled therapy to increase strength and normalize gait in order to return to prior level of function with least restrictive device. Recommend pt to use rolling walker upon d/c from hospital due to increased balance deficits from prior level of function. Pt was left supine in bed, call button in reach and wife at bedside. Will continue PT efforts. This section established at most recent assessment PROBLEM LIST (Impairments causing functional limitations): 1. Decreased Strength 2. Decreased ADL/Functional Activities 3. Decreased Transfer Abilities 4. Decreased Ambulation Ability/Technique 5. Decreased Balance 6. Increased Pain 7. Decreased Activity Tolerance 8. Decreased Flexibility/Joint Mobility 9. Decreased Knowledge of Precautions 10. Decreased [place] with Home Exercise Program 
 INTERVENTIONS PLANNED: (Benefits and precautions of physical therapy have been discussed with the patient.) 1. Balance Exercise 2. Bed Mobility 3. Family Education 4. Gait Training 5. Home Exercise Program (HEP) 6. Manual Therapy 7. Neuromuscular Re-education/Strengthening 8. Range of Motion (ROM) 9. Therapeutic Activites 10. Therapeutic Exercise/Strengthening 11. Transfer Training TREATMENT PLAN: Frequency/Duration: BID for remainder of hospital stay Rehabilitation Potential For Stated Goals: Good RECOMMENDED REHABILITATION/EQUIPMENT: (at time of discharge pending progress): Due to the probability of continued deficits (see above) this patient will likely need continued skilled physical therapy after discharge. Equipment:  
? None at this time HISTORY:  
History of Present Injury/Illness (Reason for Referral): 
Per chart: He has been following with our PA. He is 65 and he has a grade 2 L5-S1 spondylolisthesis with severe foraminal stenosis bilaterally, but he really only has right leg pain, and it is a classic L5 distribution. Recent MRI scan of 08/03/2018 shows severe foraminal stenosis. No central stenosis. He still has a reasonable disc space, but it is a significant spondylolisthesis. He has been receiving L5 nerve blocks and they help temporarily. The last one was about 2 weeks ago and it is wearing off and he is having severe pain. Apparently, he was screaming initially at the visit today at the  until they could get his pain under better control. He says he always hurts at night when he tries to sleep. It hurts to stand and walk. He has improvement sitting and flexing. He also says if he is sitting and he elevates his right foot up on a step or something, his pain goes away.   I printed out x-rays and the MRI scan and showed he and his wife the problem, and I told him with this degree of listhesis, I think the best way to treat him would actually be an anteroposterior fusion. I went through his medical history and he has some high blood pressure, but no other significant medical problems, although, he has been told he has some kidney disease, but they are not doing any treatment or anything special for it. Past Medical History/Comorbidities:  
Mr. Serene Sales  has a past medical history of Arthritis; Calculus of kidney (12/23/2013); Chronic kidney disease; Chronic kidney disease, stage III (moderate) (12/23/2013); Fracture of coccyx ([address] (1/14/2016); Gout, unspecified (12/23/2013); History of left shoulder fracture (1/14/2016); Hyperlipidemia (1/14/2016); Microscopic hematuria (12/23/2013); Sciatica of right side (1/14/2016); Unspecified essential hypertension (12/23/2013); and Urinary tract infection, site not specified (12/23/2013). Mr. Serene Sales  has a past surgical history that includes hx lithotripsy; pr total hip arthroplasty (Left); and hx shoulder replacement (Left, 12/2014). Social History/Living Environment:  
Home Environment: Private residence # Steps to Enter: 0 Rails to Enter: No 
Wheelchair Ramp: Yes One/Two Story Residence: One story Living Alone: No 
Support Systems: Spouse/Significant Other/Partner, Family member(s) Patient Expects to be Discharged to[de-identified]  (Will discuss in PM Session) Current DME Used/Available at Home: [address] [facility], [place], straight, [place] Tub or Shower Type: Tub/Shower combination Prior Level of Function/Work/Activity: Pt was independent with all functional mobility and gait without assistive device. Has been limited in overall function x 2  Years secondary to back pain. Tries to work as an  some. Drives. Wife works 4 hours/day and therefore pt will be alone during that time. Personal Factors:   
      Sex:  male Age:  65 y.o. Number of Personal Factors/Comorbidities that affect the Plan of Care: 1-2: MODERATE COMPLEXITY EXAMINATION:  
Most Recent Physical Functioning:  
Gross Assessment: 
  
         
  
Posture: 
  
Balance: 
Sitting: Intact Sitting - Static: Good (unsupported) Sitting - Dynamic: Good (unsupported) Standing: Intact Standing - Static: Good Standing - Dynamic : Fair Bed Mobility: 
Rolling: Stand-by assistance Supine to Sit: Minimum assistance Scooting: Minimum assistance Wheelchair Mobility: 
  
Transfers: 
Sit to Stand: Stand-by assistance Stand to Sit: Stand-by assistance Gait: 
Right Side Weight Bearing: Full Left Side Weight Bearing: Full Speed/[place]: Slow Distance (ft): 500 Feet (ft) Assistive Device: Brace/Splint; Walker, rolling Ambulation - Level of Assistance: Contact guard assistance Body Structures Involved: 1. Bones 2. Joints 3. Muscles 4. Ligaments Body Functions Affected: 1. Sensory/Pain 2. Neuromusculoskeletal 
3. Movement Related Activities and Participation Affected: 1. General Tasks and Demands 2. Mobility 3. Self Care 4. Community, Social and [place] Number of elements that affect the Plan of Care: 4+: HIGH COMPLEXITY CLINICAL PRESENTATION:  
Presentation: Stable and uncomplicated: LOW COMPLEXITY CLINICAL DECISION MAKIN [place] [address] AM-PAC 6 Clicks Basic Mobility Inpatient Short Form How much difficulty does the patient currently have. .. Unable A Lot A Little None 1. Turning over in bed (including adjusting bedclothes, sheets and blankets)? [] 1   [] 2   [x] 3   [] 4  
2. Sitting down on and standing up from a chair with arms ( e.g., wheelchair, bedside commode, etc.)   [] 1   [] 2   [x] 3   [] 4  
3. Moving from lying on back to sitting on the side of the bed? [] 1   [] 2   [x] 3   [] 4 How much help from another person does the patient currently need. .. Total A Lot A Little None 4. Moving to and from a bed to a chair (including a wheelchair)? [] 1   [] 2   [x] 3   [] 4  
5. Need to walk in hospital room? [] 1   [] 2   [x] 3   [] 4  
6. Climbing 3-5 steps with a railing? [] 1   [x] 2   [] 3   [] 4  
© 2007, Trustees of [address] [place] [address] [address] [id number], under license to [employer]. All rights reserved Score:  Initial: 18 Most Recent: 17 (Date: 09/11/18 ) Interpretation of Tool:  Represents activities that are increasingly more difficult (i.e. Bed mobility, Transfers, Gait). Score 24 23 22-20 19-15 14-10 9-7 6 Modifier CH CI CJ CK CL CM CN   
 
? Mobility - Walking and Moving Around:  
  - CURRENT STATUS: CK - 40%-59% impaired, limited or restricted  - GOAL STATUS: CJ - 20%-39% impaired, limited or restricted  - D/C STATUS:  ---------------To be determined--------------- [place] / [place] / Product Type: Medicare /   
 
Medical Necessity:    
· Patient demonstrates good rehab potential due to higher previous functional level. Reason for Services/Other Comments: 
· Patient continues to require present interventions due to patient's inability to function at baseline. Use of outcome tool(s) and clinical judgement create a POC that gives a: Questionable prediction of patient's progress: MODERATE COMPLEXITY  
  
 
 
 
TREATMENT:  
(In addition to Assessment/Re-Assessment sessions the following treatments were rendered) Pre-treatment Symptoms/Complaints: \"My back hurts more today than yesterday\" Pain: Initial:  
Pain Intensity 1: 5 Pain Location 1: Back  Post Session:  5/10 Therapeutic Activity: (    26 minutes): Therapeutic activities including Bed transfers, Chair transfers, Managing assistive device, and Operating brace to improve mobility, strength and balance. Needed minimum verbal  cueing when performing all activities to progress safely.  Needed cueing to initiated bed mobility and transfers. Needed manual cues to lower body slowly during stand to sit transfer. Gait Training (   minutes):  Gait training to improve and/or restore physical functioning as related to mobility, strength, balance and coordination. Ambulated 500 Feet (ft) with minimal assistance/contact guard assist on level ground using rolling walker and moderate visual, verbal and manual cues related to their stride length, push off, heel strike, ankle position and motion and hip position and motionto promote proper body alignment, promote proper body posture and promote proper body mechanics. Pt needed cueing to improve attention to environment and not constantly looking at feet. Continued cueing for foot clearance and widening base of support. Assistive Device: Brace/Splint, Walker, rolling Ambulation - Level of Assistance: Contact guard assistance Distance (ft): 500 Feet (ft) Braces/Orthotics/Lines/Etc:  
· Hemovac back brace Treatment/Session Assessment:   
· Response to Treatment:  Tolerated well · Interdisciplinary Collaboration:  
o Physical Therapy Assistant 
o Registered Nurse · After treatment position/precautions:  
o Up in chair 
o Bed/Chair-wheels locked 
o Call light within reach 
o RN notified 
o Family at bedside · Compliance with Program/Exercises: compliant · Recommendations/Intent for next treatment session:  Advance treatment to more challenging activities Total Treatment Duration: PT Patient Time In/Time Out Time In: 0901 Time Out: 8926 Nena Jeffries, PTA  
SPT, CSCS

## 2018-09-13 NOTE — PROGRESS NOTES
ORTHO PROGRESS NOTE 2018 Admit Date: 2018 Admit Diagnosis: Spondylolisthesis, unspecified spinal region [M43.10] Lumbar stenosis with neurogenic claudication [M48.062] Spondylolisthesis, unspecified spinal region [M43.10] Lumbar stenosis with neurogenic claudication [M48.062] Post Op day: 3 Days Post-Op Subjective:  
 
Ector Greer is a patient who is now 3 Days Post-Op  and has complaints  of constipation. Still no BM aftere MOM and suppository. +BS. leg pain is better . Objective:  
 
PT/OT: satisfactory Vital Signs:   
Patient Vitals for the past 8 hrs: 
 BP Temp Pulse Resp SpO2  
18 0725 119/79 97.4 °F (36.3 °C) 76 14 95 % 18 0543 116/75 98.6 °F (37 °C) 75 18 93 % Temp (24hrs), Av.3 °F (36.8 °C), Min:97.4 °F (36.3 °C), Max:98.7 °F (37.1 °C) LAB:   
Recent Labs  
   18 
 0346 HGB  12.0* I/O: 
 0743 -  1900 In: 120 [P.O.:120] Out: 450 [Urine:450]  1901 -  0700 In: 240 [P.O.:240] Out: 2397 [Urine:800; Drains:338] Physical Exam: 
 
Awake and in no acute distress. Mood and affect appropriate. Respirations unlabored and no evidence cyanosis. Calves nontender. Abdomen soft and nontender. Dressing clean/dry No new neurologic deficit. Assessment:  
  
Patient Active Problem List  
Diagnosis Code  Family history of malignant neoplasm of prostate Z80.42  
 History of left shoulder fracture Z87.81  
 History of renal stone F71.950  Gout M10.9  CKD (chronic kidney disease) stage 3, GFR 30-59 ml/min N18.3  Benign hypertensive kidney disease with chronic kidney disease I12.9  Glucose intolerance (impaired glucose tolerance) R73.02  
 Mixed hyperlipidemia E78.2  Hyperuricemia E79.0  Spinal stenosis of lumbosacral region M48.07  Spondylolisthesis at L5-S1 level M43.17  Spinal stenosis M48.00  
 
 
3 Days Post-Op STATUS POST Procedure(s): 
 L5-S1 laminectomy for instrapinal extradural lesion/tlif/posterior instrumentation Plan:  
 
Continue PT/OT/Rehab Discontinue: daily dressing change and drain Consult: none Anticipate discharge to: HOME later today if BM resulted. Can try fleets enema later.   
   
Signed By: Romana Party, NP

## 2018-09-13 NOTE — PROGRESS NOTES
Discharge instructions  all new medications,medication side effects sheet, follow up appointment and  prescriptions reviewed and explained to the patient. Patient verbalizes understanding of instructions. A copy of discharge instructions and prescriptions  have been given to patient. Opportunity for questions provided.

## 2018-09-13 NOTE — PROGRESS NOTES
1725 Patient complained of mild pain on left leg, rating pain 3/10. I raised the bottom of the bed slightly to relieve pressure. 0830 patient stated pain was gone and rated pain 0/10.

## 2018-09-14 ENCOUNTER — PATIENT OUTREACH (OUTPATIENT)
Dept: CASE MANAGEMENT | Age: 78
End: 2018-09-14

## 2018-09-14 NOTE — PROGRESS NOTES
This note will not be viewable in [address] Date/Time of Call: 
 09/14/18 142pm  
What was the patient hospitalized for? Spinal Stenosis of Lumbosacral Region Consent for [place] Call Does the patient understand his/her diagnosis and/or treatment and what happened during the hospitalization? Patients spouse agrees to [place] Call Yes patients spouse states that she understands the diagnosis and treatment from the hospitalization Did the patient receive discharge instructions? Yes  
CM Assessed Risk for Readmission:  
 
 
Patient stated Risk for Readmission:  
 
 Patient is a low risk for readmission per Care Coordinator assessment Patients spouse states that she does not have any concerns for readmission at this time. Review any discharge instructions (see discharge instructions/AVS in [facility]). Ask patient if they understand these. Do they have any questions? DC instructions reviewed. Patients spouse denies any questions or concerns at this time Were home services ordered (nursing, PT, OT, ST, etc.)? Yes[facility] If so, has the first visit occurred? If not, why? (Assist with coordination of services if necessary.) 
 09/14/18 Was any DME ordered? No   
If so, has it been received? If not, why?  (Assist patient in obtaining DME orders &/or equipment if necessary. ) NA Complete a review of all medications (new, continued and discontinued meds per the D/C instructions and medication tab in [facility]). Medications reviewed with patients spouse Were all new prescriptions filled? If not, why?  (Assist patient in obtaining medications if necessary  escalate for CCM &/or SW if ongoing issues are verbalized by pt or anticipated) Yes Does the patient understand the purpose and dosing instructions for all medications? (If patient has questions, provide explanation and education.) Patients spouse states that she understands dosages, and purposes of medications Does the patient have any problems in performing ADLs? (If patient is unable to perform ADLs  what is the limiting factor(s)? Do they have a support system that can assist? If no support system is present, discuss possible assistance that they may be able to obtain. Escalate for CCM/SW if ongoing issues are verbalized by pt or anticipated) Patients spouse states that the patient is primarily independent with his ADLs, and that she assists when needed Does the patient have all follow-up appointments scheduled? 7 day f/up with PCP?  
(f/up with PCP may be w/in 14 days if patient has a f/up with their specialist w/in 7 days) 7-14 day f/up with specialist?  
(or per discharge instructions) If f/up has not been made  what actions has the care coordinator made to accomplish this? Has transportation been arranged? Yes  
 
 
Scheduled with PCP on 12/02/18. Advised patients spouse that we recommend a 7 day FU with PCP after DC from hospital stay. She declines stating that they want to leave the appointment as is since the patient only sees his PCP every 6 months and there is no need. Dr. Jeyson Blanchard 09/25/18 Offered to assist in scheduling a PCP appointment within 7 days. Patients spouse declines stating that they want to leave the patients PCP appointment scheduled as it is since he only sees his PCP every 6 months and there is no need. Patients spouse will transport the patient to appointments Any other questions or concerns expressed by the patient? Patients spouse states that she does not have any further questions or concerns at this time. [facility] Coordinator contact information provided should anything arise that they need assistance with. Schedule next appointment with GERONIMO NICKERSON Coordinator or refer to RN Case Manager/ per the workflow guidelines. When is care coordinators next follow-up call scheduled? If referred for CCM  what RN care manager was the referral assigned? [facility] Coordinator will FU with patient within the next 30 days Within 30 days NA  
[facility] Call Completed By: Raffy Mosley CMA Care Coordinator

## 2018-10-29 ENCOUNTER — HOSPITAL ENCOUNTER (OUTPATIENT)
Dept: PHYSICAL THERAPY | Age: 78
Discharge: HOME | End: 2018-10-29
Payer: MEDICARE

## 2018-10-29 PROCEDURE — 97110 THERAPEUTIC EXERCISES: CPT

## 2018-10-29 PROCEDURE — G8982 BODY POS GOAL STATUS: HCPCS

## 2018-10-29 PROCEDURE — 97162 PT EVAL MOD COMPLEX 30 MIN: CPT

## 2018-10-29 PROCEDURE — 97140 MANUAL THERAPY 1/> REGIONS: CPT

## 2018-10-29 PROCEDURE — G8981 BODY POS CURRENT STATUS: HCPCS

## 2018-10-29 NOTE — PROGRESS NOTES
Purcell Babinski  : 1940  [place]  Secondary: [address] at [facility]  [address] [facility], [place], [address] [place] [address]  [phone]   [phone]       OUTPATIENT PHYSICAL THERAPY:Initial Assessment 10/29/2018    ICD-10: Treatment Diagnosis: M54.5 low back pain and Z98.1 arthrodesis status and R26.89 other  Abnormalities of gait and mobility   Precautions: bending and lifting at the waist   Allergies: Clonidine and Simvastatin   Fall Risk Score: 2 (? 5 = High Risk)  MD Orders: evaluate and treat  MEDICAL/REFERRING DIAGNOSIS:  Arthrodesis status [Z98.1]   DATE OF ONSET: 9-10-18-laminectomy at L5-S1  REFERRING PHYSICIAN: Geni Davila MD  RETURN PHYSICIAN APPOINTMENT:unsure     INITIAL ASSESSMENT:  Mr. Alexandra Nevarez is a 77 y.o. male presenting to physical therapy with complaints of B low back pain/soreness but with only mild radicular symptoms/tingling into his R big toe since receiving a L5-S1 laminectomy on 9-10-18-patient has long history of R side sciatica --patient ambulates independently into dept with moderate antalgic gait complaining of mainly R low back and R hip pain -pain level is 4/10 -mild tingling into R big toe -trunk range of motion is limited in flexion and extension-flattened lumbar curve with rounded shoulders and forward head on neck posture -incision looks good at low back -mild incision noted just below umbilicus where the surgery was attempted to be performed with an anterior approach on 18  but was not successful -weak B quads and hamstrings are noted -very good candidate for skilled physical therapy to include manual therapy , pain modalities as needed, and therapeutic exercises followed with cold pack -motivated patient     PROBLEM LIST (Impacting functional limitations):  1. Decreased Strength  2. Decreased ADL/Functional Activities  3. Decreased Transfer Abilities  4.  Decreased Ambulation Ability/Technique  5. Decreased Balance  6. Increased Pain  7. Decreased Activity Tolerance  8. Decreased Pacing Skills  9. Decreased Flexibility/Joint Mobility INTERVENTIONS PLANNED:  1. Cold  2. Electrical Stimulation  3. Gait Training  4. Heat  5. Home Exercise Program (HEP)  6. Manual Therapy  7. Range of Motion (ROM)  8. Therapeutic Exercise/Strengthening   TREATMENT PLAN:  Effective Dates: 10/29/2018 TO 12/28/2018 (60 days). Frequency/Duration: 2 times a week for 60 Days  GOALS: (Goals have been discussed and agreed upon with patient.)  Short-Term Functional Goals: Time Frame: 11-29-18  1. Low back pain is less than 4/10 to progress to DC goal   2. Trunk mobility is increased by 10%   3. Less rounded posture   4. Instruction in exercise program to allow increased ADLs. 5.ambulate independently with minimal + antalgic gait  Discharge Goals: Time Frame: 12-28-18  1. 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 Trunk mobility is 75% in all ranges to allow full personal care including washing and dressing and putting on shoes and socks   3. Independent ambulation with minimal antalgic gait to allow walking community distances   4. Able to walk/stand for at least 30-45 minutes   5. Sleep at least 6 hours without wakened by low back pain   6. low back pain outcome measure score is improved from 9/50 to 5/50   7. Rehabilitation Potential For Stated Goals: Good  Regarding Maria A Patriciakimberly Escoto's therapy, I certify that the treatment plan above will be carried out by a therapist or under their direction. Thank you for this referral,  Nikky Lu PT     Referring Physician Signature: Avinash Galvan MD              Date                    The information in this section was collected on 10-29-18 (except where otherwise noted).   HISTORY:   History of Present Injury/Illness (Reason for Referral):  L5-S1 laminectomy on 9-10-18 (attempted surgery on 9-7-18 with anterior approach was not successful )-B low back soreness with mild radiculopathy and antalgic gait   Past Medical History/Comorbidities:   Mr. Casie Rob  has a past medical history of Arthritis, Calculus of kidney, Chronic kidney disease, Chronic kidney disease, stage III (moderate), Fracture of coccyx ([address] Gout, unspecified, History of left shoulder fracture, Hyperlipidemia, Microscopic hematuria, Sciatica of right side, Unspecified essential hypertension, and Urinary tract infection, site not specified. Mr. Casie Rob  has a past surgical history that includes hx lithotripsy; pr total hip arthroplasty (Left); hx shoulder replacement (Left, 12/2014); L5-S1 laminectomy for instrapinal extradural lesion/tlif/posterior instrumentation (N/A, 9/10/2018); L5-S1 ALIF-ATTEMPTED (N/A, 9/7/2018); and LEFT PROXIMAL HUMERUS OPEN REDUCTION INTERNAL FIXATION  CHOICE ANES (Left, 12/3/2014). Social History/Living Environment:   Home Environment: Private residence  # Steps to Enter: 0  Rails to Enter: Yes  Hand Rails : Bilateral  Wheelchair Ramp: Yes  One/Two Story Residence: One story  Living Alone: No  Support Systems: Spouse/Significant Other/Partner  Patient Expects to be Discharged to[de-identified] Private residence  Current DME Used/Available at Home: Brace/Splint(back brace-patient not wearing today)  Tub or Shower Type: Tub/Shower combination  Prior Level of Function/Work/Activity:  Independent with home ADLs but with increased low back pain with walking and standing   Current Medications:       Current Outpatient Medications:     HYDROcodone-acetaminophen (NORCO) 7.5-325 mg per tablet, Take 1 Tab by mouth every six (6) hours as needed for Pain.  Max Daily Amount: 4 Tabs., Disp: 28 Tab, Rfl: 0    amLODIPine (NORVASC) 5 mg tablet, TAKE 1 TABLET EVERY DAY, Disp: 90 Tab, Rfl: 1    benazepril-hydroCHLOROthiazide (LOTENSIN HCT) 20-12.5 mg per tablet, TAKE 1 TABLET EVERY DAY, Disp: 90 Tab, Rfl: 1    multivitamin (ONE A DAY) tablet, Take 1 Tab by mouth daily. , Disp: , Rfl:     OTHER, Indications: many vitamins and herbal supplements, Disp: , Rfl:    Date Last Reviewed:  10/29/2018   Number of Personal Factors/Comorbidities that affect the Plan of Care:  (patient has a history of chronic kidney disease, gout,sciatica, and HTN) 1-2: MODERATE COMPLEXITY   EXAMINATION:   Observation/Orthostatic Postural Assessment:          Patient ambulates independently into dept with moderate antalgic gait due to R low back and R hip pain -flattened lumbar curve with rounded shoulders   Palpation:         Tight R lumbar paraspinals and B gluteus medius -decreased muscle tone in R gastrocsoleus   ROM:         LE range is wfl     Trunk range of motion -    Flexion-66%  Extension-50%-rounded trunk posture  B rotation-85%  B sidebending -70%      Strength: Ankles -4/5     B quads and hamstrings are 3 to 3+/5 --weak -FALLS PRECAUTIONS    B hip flexors are 4 to 4+/5     Special Tests:        Negative spring/compression/valsalva/stork test and negative straight leg raise test with tight hamstrings     Negative homans sign    Neurological Screen:    Radiating tingling into R big toe     Balance:        good  Mental Status:         Alert and oriented  Sensation:       Intact to light touch      Body Structures Involved:  1. Bones  2. Joints  3. Muscles Body Functions Affected:  1. Sensory/Pain  2. Neuromusculoskeletal  3. Movement Related Activities and Participation Affected:  1. Learning and Applying Knowledge  2. General Tasks and Demands  3. Mobility  4. Self Care  5. Domestic Life   Number of elements (examined above) that affect the Plan of Care: 3: MODERATE COMPLEXITY   CLINICAL PRESENTATION:   Presentation: Evolving clinical presentation with changing clinical characteristics: MODERATE COMPLEXITY   CLINICAL DECISION MAKING:   Outcome Measure:    Tool Used: Modified Oswestry Low Back Pain Questionnaire  Score:  Initial: 9/50  Most Recent: X/50 (Date: -- )   Interpretation of Score: Each section is scored on a 0-5 scale, 5 representing the greatest disability. The scores of each section are added together for a total score of 50. Score 0 1-10 11-20 21-30 31-40 41-49 50   Modifier CH CI CJ CK CL CM CN     ? Changing and Maintaining Body Position:    [id number] - CURRENT STATUS: CI - 1%-19% impaired, limited or restricted    - GOAL STATUS: CH - 0% impaired, limited or restricted    - D/C STATUS:  ---------------To be determined---------------    Medical Necessity:   · Patient is expected to demonstrate progress in strength, range of motion, balance and coordination to increase independence with ADLs and improve safety during walking and transfers. · Skilled intervention continues to be required due to B low back pain and LE weakness are affecting gait and function. Reason for Services/Other Comments:  · Patient continues to require modification of therapeutic interventions to increase complexity of exercises. 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   Required minimal verbal cues to promote proper body alignment, promote proper body posture and promote proper body mechanics. Progressed resistance, range, repetitions and complexity of movement as indicated. Date:  10-29-18 Date:   Date:     Activity/Exercise Parameters Parameters Parameters   Knee to chest 2 x 30 seconds with towel     hamstring 2 x 30 seconds with belt     piriformis 2 x 30 seconds      Gluteal sets X 10 with 3 second hold     Sit to stand X 10     Abdominal bracing  X 10 with 3 second hold     Hip adduction X 10 with yellow theraball     Hip abduction X 10 with black band         Manual Therapy (    Soft Tissue Mobilization Duration  Duration: 12 Minutes to B lumbar paraspinals and gluteus medius while in L sidelying position -effleurage and petrisage for tightness        Therapeutic Modalities: for pain and edema                Lumbo-Sacral Spine Heat  Type: Moist pack  Duration : 10 minutes  Patient Position: Lying left side                             Lumbo-Sacral Spine Electric Stimulation  Type: Interferential  Placement: B lumbars and gluteus medius  Duration : 10 minutes  Patient Position: Lying left side                                                  NO CHARGE FOR MUSCLE STIMULATION      HEP: As above; handouts given to patient for all exercises. Treatment/Session Assessment:    · Response to Treatment: Excellent pain relief with manual therapy and muscle stimulation with heat-performed exercises well   · Compliance with Program/Exercises: compliant most of the time. · Recommendations/Intent for next treatment session: \"Next visit will focus on advancements to more challenging activities\". push lumbar and LE flexibility and strengthening (tight hamstrings ) and gait training    Total Treatment Duration: manual therapy x 12 minutes/exercises x 18 minutes /muscle stimulation with heat x 10 minutes /evaluation x 28 minutes -total treatment time was 68 minutes     PT Patient Time In/Time Out  Time In: 0900  Time Out: SHAMEKA Vega

## 2018-10-29 NOTE — PROGRESS NOTES
Ambulatory/Rehab Services H2 Model Falls Risk Assessment    Risk Factor Pts. ·   Confusion/Disorientation/Impulsivity  []    4 ·   Symptomatic Depression  []   2 ·   Altered Elimination  []   1 ·   Dizziness/Vertigo  []   1 ·   Gender (Male)  [x]   1 ·   Any administered antiepileptics (anticonvulsants):  []   2 ·   Any administered benzodiazepines:  []   1 ·   Visual Impairment (specify):  []   1 ·   Portable Oxygen Use  []   1 ·   Orthostatic ? BP  []   1 ·   History of Recent Falls (within 3 mos.)  []   5     Ability to Rise from Chair (choose one) Pts. ·   Ability to rise in a single movement  []   0 ·   Pushes up, successful in one attempt  [x]   1 ·   Multiple attempts, but successful  []   3 ·   Unable to rise without assistance  []   4   Total: (5 or greater = High Risk) 2     Falls Prevention Plan:   []                Physical Limitations to Exercise (specify):   []                Mobility Assistance Device (type):   []                Exercise/Equipment Adaptation (specify):    ©2010 [facility] [address] Patent #[id number].  Federal Law prohibits the replication, distribution or use without written permission from [facility] [email or web address]

## 2018-11-02 ENCOUNTER — HOSPITAL ENCOUNTER (OUTPATIENT)
Dept: PHYSICAL THERAPY | Age: 78
Discharge: HOME | End: 2018-11-02
Payer: MEDICARE

## 2018-11-02 PROCEDURE — 97110 THERAPEUTIC EXERCISES: CPT

## 2018-11-06 ENCOUNTER — HOSPITAL ENCOUNTER (OUTPATIENT)
Dept: PHYSICAL THERAPY | Age: 78
Discharge: HOME | End: 2018-11-06
Payer: MEDICARE

## 2018-11-06 PROCEDURE — 97140 MANUAL THERAPY 1/> REGIONS: CPT

## 2018-11-06 PROCEDURE — 97110 THERAPEUTIC EXERCISES: CPT

## 2018-11-06 NOTE — PROGRESS NOTES
Michael Carney  : 1940  [place]  Secondary: [address] [place],[address] at [facility]  [address] [facility], [place], [address] [place] [address]  [phone]   [phone]       OUTPATIENT PHYSICAL THERAPY:Daily Note 2018    ICD-10: Treatment Diagnosis: M54.5 low back pain and Z98.1 arthrodesis status and R26.89 other  Abnormalities of gait and mobility   Precautions: bending and lifting at the waist   Allergies: Clonidine and Simvastatin   Fall Risk Score: 2 (? 5 = High Risk)  MD Orders: evaluate and treat  MEDICAL/REFERRING DIAGNOSIS:  Arthrodesis status [Z98.1]   DATE OF ONSET: 9-10-18-laminectomy at L5-S1  REFERRING PHYSICIAN: Arjun Huggins MD  RETURN PHYSICIAN APPOINTMENT:unsure     INITIAL ASSESSMENT:  Mr. Reg Epstein is a 77 y.o. male presenting to physical therapy with complaints of B low back pain/soreness but with only mild radicular symptoms/tingling into his R big toe since receiving a L5-S1 laminectomy on 9-10-18-patient has long history of R side sciatica --patient ambulates independently into dept with moderate antalgic gait complaining of mainly R low back and R hip pain -pain level is 4/10 -mild tingling into R big toe -trunk range of motion is limited in flexion and extension-flattened lumbar curve with rounded shoulders and forward head on neck posture -incision looks good at low back -mild incision noted just below umbilicus where the surgery was attempted to be performed with an anterior approach on 18  but was not successful -weak B quads and hamstrings are noted -very good candidate for skilled physical therapy to include manual therapy , pain modalities as needed, and therapeutic exercises followed with cold pack -motivated patient     PROBLEM LIST (Impacting functional limitations):  1. Decreased Strength  2. Decreased ADL/Functional Activities  3. Decreased Transfer Abilities  4.  Decreased Ambulation Ability/Technique  5. Decreased Balance  6. Increased Pain  7. Decreased Activity Tolerance  8. Decreased Pacing Skills  9. Decreased Flexibility/Joint Mobility INTERVENTIONS PLANNED:  1. Cold  2. Electrical Stimulation  3. Gait Training  4. Heat  5. Home Exercise Program (HEP)  6. Manual Therapy  7. Range of Motion (ROM)  8. Therapeutic Exercise/Strengthening   TREATMENT PLAN:  Effective Dates: 10/29/2018 TO 12/28/2018 (60 days). Frequency/Duration: 2 times a week for 60 Days  GOALS: (Goals have been discussed and agreed upon with patient.)  Short-Term Functional Goals: Time Frame: 11-29-18  1. Low back pain is less than 4/10 to progress to DC goal   2. Trunk mobility is increased by 10%   3. Less rounded posture   4. Instruction in exercise program to allow increased ADLs. 5.ambulate independently with minimal + antalgic gait  Discharge Goals: Time Frame: 12-28-18  1. 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 Trunk mobility is 75% in all ranges to allow full personal care including washing and dressing and putting on shoes and socks   3. Independent ambulation with minimal antalgic gait to allow walking community distances   4. Able to walk/stand for at least 30-45 minutes   5. Sleep at least 6 hours without wakened by low back pain   6. low back pain outcome measure score is improved from 9/50 to 5/50   7. Rehabilitation Potential For Stated Goals: Good  Regarding Ml Escoto's therapy, I certify that the treatment plan above will be carried out by a therapist or under their direction. Thank you for this referral,  Dinesh Lucero PTA     Referring Physician Signature: Edmond De La Cruz MD              Date                    The information in this section was collected on 10-29-18 (except where otherwise noted).   HISTORY:   History of Present Injury/Illness (Reason for Referral):  L5-S1 laminectomy on 9-10-18 (attempted surgery on 9-7-18 with anterior approach was not successful )-B low back soreness with mild radiculopathy and antalgic gait   Past Medical History/Comorbidities:   Mr. Reg Epstein  has a past medical history of Arthritis, Calculus of kidney, Chronic kidney disease, Chronic kidney disease, stage III (moderate), Fracture of coccyx ([address] Gout, unspecified, History of left shoulder fracture, Hyperlipidemia, Microscopic hematuria, Sciatica of right side, Unspecified essential hypertension, and Urinary tract infection, site not specified. Mr. Reg Epstein  has a past surgical history that includes hx lithotripsy; pr total hip arthroplasty (Left); hx shoulder replacement (Left, 12/2014); L5-S1 laminectomy for instrapinal extradural lesion/tlif/posterior instrumentation (N/A, 9/10/2018); L5-S1 ALIF-ATTEMPTED (N/A, 9/7/2018); and LEFT PROXIMAL HUMERUS OPEN REDUCTION INTERNAL FIXATION  CHOICE ANES (Left, 12/3/2014). Social History/Living Environment:      Prior Level of Function/Work/Activity:  Independent with home ADLs but with increased low back pain with walking and standing   Current Medications:       Current Outpatient Medications:     HYDROcodone-acetaminophen (NORCO) 7.5-325 mg per tablet, Take 1 Tab by mouth every six (6) hours as needed for Pain. Max Daily Amount: 4 Tabs., Disp: 28 Tab, Rfl: 0    amLODIPine (NORVASC) 5 mg tablet, TAKE 1 TABLET EVERY DAY, Disp: 90 Tab, Rfl: 1    benazepril-hydroCHLOROthiazide (LOTENSIN HCT) 20-12.5 mg per tablet, TAKE 1 TABLET EVERY DAY, Disp: 90 Tab, Rfl: 1    multivitamin (ONE A DAY) tablet, Take 1 Tab by mouth daily. , Disp: , Rfl:     OTHER, Indications: many vitamins and herbal supplements, Disp: , Rfl:    Date Last Reviewed:  11/6/2018   Number of Personal Factors/Comorbidities that affect the Plan of Care:  (patient has a history of chronic kidney disease, gout,sciatica, and HTN) 1-2: MODERATE COMPLEXITY   EXAMINATION:   Observation/Orthostatic Postural Assessment: Patient ambulates independently into dept with moderate antalgic gait due to R low back and R hip pain -flattened lumbar curve with rounded shoulders   Palpation:         Tight R lumbar paraspinals and B gluteus medius -decreased muscle tone in R gastrocsoleus   ROM:         LE range is wfl     Trunk range of motion -    Flexion-66%  Extension-50%-rounded trunk posture  B rotation-85%  B sidebending -70%      Strength: Ankles -4/5     B quads and hamstrings are 3 to 3+/5 --weak -FALLS PRECAUTIONS    B hip flexors are 4 to 4+/5     Special Tests:        Negative spring/compression/valsalva/stork test and negative straight leg raise test with tight hamstrings     Negative homans sign    Neurological Screen:    Radiating tingling into R big toe     Balance:        good  Mental Status:         Alert and oriented  Sensation:       Intact to light touch      Body Structures Involved:  1. Bones  2. Joints  3. Muscles Body Functions Affected:  1. Sensory/Pain  2. Neuromusculoskeletal  3. Movement Related Activities and Participation Affected:  1. Learning and Applying Knowledge  2. General Tasks and Demands  3. Mobility  4. Self Care  5. Domestic Life   Number of elements (examined above) that affect the Plan of Care: 3: MODERATE COMPLEXITY   CLINICAL PRESENTATION:   Presentation: Evolving clinical presentation with changing clinical characteristics: MODERATE COMPLEXITY   CLINICAL DECISION MAKING:   Outcome Measure: Tool Used: Modified Oswestry Low Back Pain Questionnaire  Score:  Initial: 9/50  Most Recent: X/50 (Date: -- )   Interpretation of Score: Each section is scored on a 0-5 scale, 5 representing the greatest disability. The scores of each section are added together for a total score of 50. Score 0 1-10 11-20 21-30 31-40 41-49 50   Modifier CH CI CJ CK CL CM CN     ?  Changing and Maintaining Body Position:    [id number] - CURRENT STATUS: CI - 1%-19% impaired, limited or restricted    - GOAL STATUS: [address] [place] [address] Street - 0% impaired, limited or restricted    - D/C STATUS:  ---------------To be determined---------------    Medical Necessity:   · Patient is expected to demonstrate progress in strength, range of motion, balance and coordination to increase independence with ADLs and improve safety during walking and transfers. · Skilled intervention continues to be required due to B low back pain and LE weakness are affecting gait and function. Reason for Services/Other Comments:  · Patient continues to require modification of therapeutic interventions to increase complexity of exercises. Use of outcome tool(s) and clinical judgement create a POC that gives a:  (outcome measure illustrates up to 19% impairment) Questionable prediction of patient's progress: MODERATE COMPLEXITY            TREATMENT:   (In addition to Assessment/Re-Assessment sessions the following treatments were rendered)  Pre-treatment Symptoms/Complaints: Pt. Stated he was doing pretty good until he helped someone move some furniture at a yard sale over the weekend. Pain: Initial:   Pain Intensity 1: 3  Pain Location 1: Back  Pain Orientation 1: Left, Right, Lower  Pain Intervention(s) 1: Cold pack, Exercise, Heat applied  Post Session: 2/10 less pain and lower back tightness     Therapeutic Exercise: (45 Minutes)    :  Exercises per grid below to improve mobility, strength, balance and coordination. Required minimal verbal cues to promote proper body alignment, promote proper body posture and promote proper body mechanics. Progressed resistance, range, repetitions and complexity of movement as indicated.    Date:  10-29-18 Date:  11/2/18 Date:  11/6/18   Activity/Exercise Parameters Parameters Parameters   Knee to chest 2 x 30 seconds with towel 4x30 sec hold  4x30 sec hold BLE's    Hamstring stretch 2 x 30 seconds with belt 4x20 sec hold with strap  4x30 sec hold with strap BLE's    Piriformis stretch 2 x 30 seconds  4x20 sec hold  4x30 sec hold BLE's Gluteal sets X 10 with 3 second hold X 10 reps 3 sec hold  X 20 reps 5 sec hold    Sit to stand X 10 X 10 reps X 10 reps    Abdominal bracing  X 10 with 3 second hold X 10 reps 3 sec hold  X 10 reps 3 sec hold    Hip adduction X 10 with yellow theraball X 10 reps yellow t-ball X 10 reps yellow t-ball    Standing hip flexion  X 20 reps  X 20 reps    Standing hip abuction  2x10 reps  2x10 reps   standing hamstring curls  X 20 reps  X 20 reps    Seated heel toe raises  X 20 reps X 20 reps    Nu step   Level 4 x 10 mins Level 4 x 8 mins    Calf stretch  4x30 sec hold on incline 4x30 sec hold on incline   Hip abduction X 10 with black band X 10 reps black band X 10 reps black band       Manual Therapy (    Soft Tissue Mobilization Duration  Duration: 10 Minutes )Pt. Received soft tissue mobilization to bilateral lumbosacral paraspinals and gluteals to decrease pain and tightness. Therapeutic Modalities: for pain and edema                Lumbo-Sacral Spine Heat  Type: Moist pack  Duration : 10 minutes  Patient Position: Prone                             Lumbo-Sacral Spine Electric Stimulation  Type: Interferential  Placement: bilateral  lumbosacral paraspinals  Duration : 10 minutes  Patient Position: Prone                                                  NO CHARGE FOR MUSCLE STIMULATION      HEP: As above; handouts given to patient for all exercises. Treatment/Session Assessment:    · Response to Treatment: Pt was compliant with verbal and tactile cuing for proper technique and body mechanics. · Compliance with Program/Exercises: compliant most of the time. · Recommendations/Intent for next treatment session: \"Next visit will focus on advancements to more challenging activities\".     Total Treatment Duration: 65 mins     PT Patient Time In/Time Out  Time In: 0800  Time Out: 0905    Anabela Khan PTA

## 2018-11-09 ENCOUNTER — HOSPITAL ENCOUNTER (OUTPATIENT)
Dept: PHYSICAL THERAPY | Age: 78
Discharge: HOME | End: 2018-11-09
Payer: MEDICARE

## 2018-11-09 PROCEDURE — 97110 THERAPEUTIC EXERCISES: CPT

## 2018-11-09 PROCEDURE — 97140 MANUAL THERAPY 1/> REGIONS: CPT

## 2018-11-09 NOTE — PROGRESS NOTES
qa    Suann Flow  : 1940  [place]  Secondary: [address] at [facility]  [address] [facility], [place], [address] [place] [address]  [phone]   [phone]       OUTPATIENT PHYSICAL THERAPY:Daily Note 2018    ICD-10: Treatment Diagnosis: M54.5 low back pain and Z98.1 arthrodesis status and R26.89 other  Abnormalities of gait and mobility   Precautions: bending and lifting at the waist   Allergies: Clonidine and Simvastatin   Fall Risk Score: 2 (? 5 = High Risk)  MD Orders: evaluate and treat  MEDICAL/REFERRING DIAGNOSIS:  Arthrodesis status [Z98.1]   DATE OF ONSET: 9-10-18-laminectomy at L5-S1  REFERRING PHYSICIAN: Kathy March MD  RETURN PHYSICIAN APPOINTMENT:unsure     INITIAL ASSESSMENT:  Mr. Kerry Goodman is a 77 y.o. male presenting to physical therapy with complaints of B low back pain/soreness but with only mild radicular symptoms/tingling into his R big toe since receiving a L5-S1 laminectomy on 9-10-18-patient has long history of R side sciatica --patient ambulates independently into dept with moderate antalgic gait complaining of mainly R low back and R hip pain -pain level is 4/10 -mild tingling into R big toe -trunk range of motion is limited in flexion and extension-flattened lumbar curve with rounded shoulders and forward head on neck posture -incision looks good at low back -mild incision noted just below umbilicus where the surgery was attempted to be performed with an anterior approach on 18  but was not successful -weak B quads and hamstrings are noted -very good candidate for skilled physical therapy to include manual therapy , pain modalities as needed, and therapeutic exercises followed with cold pack -motivated patient     PROBLEM LIST (Impacting functional limitations):  1. Decreased Strength  2. Decreased ADL/Functional Activities  3. Decreased Transfer Abilities  4.  Decreased Ambulation Ability/Technique  5. Decreased Balance  6. Increased Pain  7. Decreased Activity Tolerance  8. Decreased Pacing Skills  9. Decreased Flexibility/Joint Mobility INTERVENTIONS PLANNED:  1. Cold  2. Electrical Stimulation  3. Gait Training  4. Heat  5. Home Exercise Program (HEP)  6. Manual Therapy  7. Range of Motion (ROM)  8. Therapeutic Exercise/Strengthening   TREATMENT PLAN:  Effective Dates: 10/29/2018 TO 12/28/2018 (60 days). Frequency/Duration: 2 times a week for 60 Days  GOALS: (Goals have been discussed and agreed upon with patient.)  Short-Term Functional Goals: Time Frame: 11-29-18  1. Low back pain is less than 4/10 to progress to DC goal   2. Trunk mobility is increased by 10%   3. Less rounded posture   4. Instruction in exercise program to allow increased ADLs. 5.ambulate independently with minimal + antalgic gait  Discharge Goals: Time Frame: 12-28-18  1. 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 Trunk mobility is 75% in all ranges to allow full personal care including washing and dressing and putting on shoes and socks   3. Independent ambulation with minimal antalgic gait to allow walking community distances   4. Able to walk/stand for at least 30-45 minutes   5. Sleep at least 6 hours without wakened by low back pain   6. low back pain outcome measure score is improved from 9/50 to 5/50   7. Rehabilitation Potential For Stated Goals: Good  Regarding Altagracia Escoto's therapy, I certify that the treatment plan above will be carried out by a therapist or under their direction. Thank you for this referral,  Neo Solorzano PTA     Referring Physician Signature: Aaron Clay MD              Date                    The information in this section was collected on 10-29-18 (except where otherwise noted).   HISTORY:   History of Present Injury/Illness (Reason for Referral):  L5-S1 laminectomy on 9-10-18 (attempted surgery on 9-7-18 with anterior approach was not successful )-B low back soreness with mild radiculopathy and antalgic gait   Past Medical History/Comorbidities:   Mr. Yanna Byrnes  has a past medical history of Arthritis, Calculus of kidney, Chronic kidney disease, Chronic kidney disease, stage III (moderate), Fracture of coccyx ([address] Gout, unspecified, History of left shoulder fracture, Hyperlipidemia, Microscopic hematuria, Sciatica of right side, Unspecified essential hypertension, and Urinary tract infection, site not specified. Mr. Yanna Byrnes  has a past surgical history that includes hx lithotripsy; pr total hip arthroplasty (Left); hx shoulder replacement (Left, 12/2014); L5-S1 laminectomy for instrapinal extradural lesion/tlif/posterior instrumentation (N/A, 9/10/2018); L5-S1 ALIF-ATTEMPTED (N/A, 9/7/2018); and LEFT PROXIMAL HUMERUS OPEN REDUCTION INTERNAL FIXATION  CHOICE ANES (Left, 12/3/2014). Social History/Living Environment:      Prior Level of Function/Work/Activity:  Independent with home ADLs but with increased low back pain with walking and standing   Current Medications:       Current Outpatient Medications:     HYDROcodone-acetaminophen (NORCO) 7.5-325 mg per tablet, Take 1 Tab by mouth every six (6) hours as needed for Pain. Max Daily Amount: 4 Tabs., Disp: 28 Tab, Rfl: 0    amLODIPine (NORVASC) 5 mg tablet, TAKE 1 TABLET EVERY DAY, Disp: 90 Tab, Rfl: 1    benazepril-hydroCHLOROthiazide (LOTENSIN HCT) 20-12.5 mg per tablet, TAKE 1 TABLET EVERY DAY, Disp: 90 Tab, Rfl: 1    multivitamin (ONE A DAY) tablet, Take 1 Tab by mouth daily. , Disp: , Rfl:     OTHER, Indications: many vitamins and herbal supplements, Disp: , Rfl:    Date Last Reviewed:  11/9/2018   Number of Personal Factors/Comorbidities that affect the Plan of Care:  (patient has a history of chronic kidney disease, gout,sciatica, and HTN) 1-2: MODERATE COMPLEXITY   EXAMINATION:   Observation/Orthostatic Postural Assessment: Patient ambulates independently into dept with moderate antalgic gait due to R low back and R hip pain -flattened lumbar curve with rounded shoulders   Palpation:         Tight R lumbar paraspinals and B gluteus medius -decreased muscle tone in R gastrocsoleus   ROM:         LE range is wfl     Trunk range of motion -    Flexion-66%  Extension-50%-rounded trunk posture  B rotation-85%  B sidebending -70%      Strength: Ankles -4/5     B quads and hamstrings are 3 to 3+/5 --weak -FALLS PRECAUTIONS    B hip flexors are 4 to 4+/5     Special Tests:        Negative spring/compression/valsalva/stork test and negative straight leg raise test with tight hamstrings     Negative homans sign    Neurological Screen:    Radiating tingling into R big toe     Balance:        good  Mental Status:         Alert and oriented  Sensation:       Intact to light touch      Body Structures Involved:  1. Bones  2. Joints  3. Muscles Body Functions Affected:  1. Sensory/Pain  2. Neuromusculoskeletal  3. Movement Related Activities and Participation Affected:  1. Learning and Applying Knowledge  2. General Tasks and Demands  3. Mobility  4. Self Care  5. Domestic Life   Number of elements (examined above) that affect the Plan of Care: 3: MODERATE COMPLEXITY   CLINICAL PRESENTATION:   Presentation: Evolving clinical presentation with changing clinical characteristics: MODERATE COMPLEXITY   CLINICAL DECISION MAKING:   Outcome Measure: Tool Used: Modified Oswestry Low Back Pain Questionnaire  Score:  Initial: 9/50  Most Recent: X/50 (Date: -- )   Interpretation of Score: Each section is scored on a 0-5 scale, 5 representing the greatest disability. The scores of each section are added together for a total score of 50. Score 0 1-10 11-20 21-30 31-40 41-49 50   Modifier CH CI CJ CK CL CM CN     ?  Changing and Maintaining Body Position:    [id number] - CURRENT STATUS: CI - 1%-19% impaired, limited or restricted    - GOAL STATUS: [address] [place], [place] [address] - 0% impaired, limited or restricted    - D/C STATUS:  ---------------To be determined---------------    Medical Necessity:   · Patient is expected to demonstrate progress in strength, range of motion, balance and coordination to increase independence with ADLs and improve safety during walking and transfers. · Skilled intervention continues to be required due to B low back pain and LE weakness are affecting gait and function. Reason for Services/Other Comments:  · Patient continues to require modification of therapeutic interventions to increase complexity of exercises. Use of outcome tool(s) and clinical judgement create a POC that gives a:  (outcome measure illustrates up to 19% impairment) Questionable prediction of patient's progress: MODERATE COMPLEXITY            TREATMENT:   (In addition to Assessment/Re-Assessment sessions the following treatments were rendered)  Pre-treatment Symptoms/Complaints: Pt. Stated he was sore in both gluteals today. Pain: Initial:   Pain Intensity 1: 1  Pain Location 1: Buttocks  Pain Orientation 1: Left, Right  Pain Intervention(s) 1: Cold pack, Exercise  Post Session: 0/10 no pain at the end of session      Therapeutic Exercise: (45 Minutes)    :  Exercises per grid below to improve mobility, strength, balance and coordination. Required minimal verbal cues to promote proper body alignment, promote proper body posture and promote proper body mechanics. Progressed resistance, range, repetitions and complexity of movement as indicated.    Date:  11-9-18 Date:  11/2/18 Date:  11/6/18   Activity/Exercise Parameters Parameters Parameters   Knee to chest 4 x 30 seconds BLE's  4x30 sec hold  4x30 sec hold BLE's    Hamstring stretch 4 x 30 seconds with strap  4x20 sec hold with strap  4x30 sec hold with strap BLE's    Piriformis stretch 4 x 30 seconds  4x20 sec hold  4x30 sec hold BLE's   Gluteal sets X 10 with 3 second hold X 10 reps 3 sec hold  X 20 reps 5 sec hold    Sit to stand X 10 X 10 reps X 10 reps    Abdominal bracing  X 10 with 3 second hold X 10 reps 3 sec hold  X 10 reps 3 sec hold    Hip adduction X10 with yellow theraball X 10 reps yellow t-ball X 10 reps yellow t-ball    Standing hip flexion X 20 reps  X 20 reps  X 20 reps    Standing hip abuction X 20 reps  2x10 reps  2x10 reps   standing hamstring curls X 20 reps  X 20 reps  X 20 reps    Seated heel toe raises X 20ps  re X 20 reps X 20 reps    Nu step  Level 5 x 10 mins Level 4 x 10 mins Level 4 x 8 mins    Calf stretch 4x30 sec hold BLE's  4x30 sec hold on incline 4x30 sec hold on incline   Hip abduction X 10 with black band X 10 reps black band X 10 reps black band       Manual Therapy (    Soft Tissue Mobilization Duration  Duration: 10 Minutes )Pt. Received soft tissue mobilization to bilateral lumbosacral paraspinals and gluteals to decrease pain and tightness. Focusing on piriformis tightness. Therapeutic Modalities: for pain and edema                               Lumbo-Sacral Spine Cold  Type: Cold/ice pack  Duration : 10 minutes  Patient Position: Supine              Lumbo-Sacral Spine Electric Stimulation  Type: Interferential  Placement: bilateral lumbosacral paraspinals and gluteals  Duration : 10 minutes  Patient Position: Prone                                                        HEP: As above; handouts given to patient for all exercises. Treatment/Session Assessment:    · Response to Treatment: Pt was compliant with all exercises and reported no pain at the end of todays session. · Compliance with Program/Exercises: compliant most of the time. · Recommendations/Intent for next treatment session: \"Next visit will focus on advancements to more challenging activities\".     Total Treatment Duration: 65 mins     PT Patient Time In/Time Out  Time In: 0800  Time Out: 0905    Houston Trotter, ROXI

## 2018-11-13 ENCOUNTER — HOSPITAL ENCOUNTER (OUTPATIENT)
Dept: PHYSICAL THERAPY | Age: 78
Discharge: HOME | End: 2018-11-13
Payer: MEDICARE

## 2018-11-13 PROCEDURE — 97110 THERAPEUTIC EXERCISES: CPT

## 2018-11-13 PROCEDURE — 97140 MANUAL THERAPY 1/> REGIONS: CPT

## 2018-11-13 NOTE — PROGRESS NOTES
qa    Liane Velez  : 1940  [place]  Secondary: [address] at [facility]  [address] [place] [address], [address]  [phone]   [phone]       OUTPATIENT PHYSICAL THERAPY:Daily Note 2018    ICD-10: Treatment Diagnosis: M54.5 low back pain and Z98.1 arthrodesis status and R26.89 other  Abnormalities of gait and mobility   Precautions: bending and lifting at the waist   Allergies: Clonidine and Simvastatin   Fall Risk Score: 2 (? 5 = High Risk)  MD Orders: evaluate and treat  MEDICAL/REFERRING DIAGNOSIS:  Arthrodesis status [Z98.1]   DATE OF ONSET: 9-10-18-laminectomy at L5-S1  REFERRING PHYSICIAN: Anupama Godinez MD  RETURN PHYSICIAN APPOINTMENT:unsure     INITIAL ASSESSMENT:  Mr. Holly Boas is a 77 y.o. male presenting to physical therapy with complaints of B low back pain/soreness but with only mild radicular symptoms/tingling into his R big toe since receiving a L5-S1 laminectomy on 9-10-18-patient has long history of R side sciatica --patient ambulates independently into dept with moderate antalgic gait complaining of mainly R low back and R hip pain -pain level is 4/10 -mild tingling into R big toe -trunk range of motion is limited in flexion and extension-flattened lumbar curve with rounded shoulders and forward head on neck posture -incision looks good at low back -mild incision noted just below umbilicus where the surgery was attempted to be performed with an anterior approach on 18  but was not successful -weak B quads and hamstrings are noted -very good candidate for skilled physical therapy to include manual therapy , pain modalities as needed, and therapeutic exercises followed with cold pack -motivated patient     PROBLEM LIST (Impacting functional limitations):  1. Decreased Strength  2. Decreased ADL/Functional Activities  3. Decreased Transfer Abilities  4.  Decreased Ambulation Ability/Technique  5. Decreased Balance  6. Increased Pain  7. Decreased Activity Tolerance  8. Decreased Pacing Skills  9. Decreased Flexibility/Joint Mobility INTERVENTIONS PLANNED:  1. Cold  2. Electrical Stimulation  3. Gait Training  4. Heat  5. Home Exercise Program (HEP)  6. Manual Therapy  7. Range of Motion (ROM)  8. Therapeutic Exercise/Strengthening   TREATMENT PLAN:  Effective Dates: 10/29/2018 TO 12/28/2018 (60 days). Frequency/Duration: 2 times a week for 60 Days  GOALS: (Goals have been discussed and agreed upon with patient.)  Short-Term Functional Goals: Time Frame: 11-29-18  1. Low back pain is less than 4/10 to progress to DC goal   2. Trunk mobility is increased by 10%   3. Less rounded posture   4. Instruction in exercise program to allow increased ADLs. 5.ambulate independently with minimal + antalgic gait  Discharge Goals: Time Frame: 12-28-18  1. 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 Trunk mobility is 75% in all ranges to allow full personal care including washing and dressing and putting on shoes and socks   3. Independent ambulation with minimal antalgic gait to allow walking community distances   4. Able to walk/stand for at least 30-45 minutes   5. Sleep at least 6 hours without wakened by low back pain   6. low back pain outcome measure score is improved from 9/50 to 5/50   7. Rehabilitation Potential For Stated Goals: Good  Regarding Luigi Broderick Jevon's therapy, I certify that the treatment plan above will be carried out by a therapist or under their direction. Thank you for this referral,  Diana Higgins PT     Referring Physician Signature: Jackie Sy MD              Date                    The information in this section was collected on 10-29-18 (except where otherwise noted).   HISTORY:   History of Present Injury/Illness (Reason for Referral):  L5-S1 laminectomy on 9-10-18 (attempted surgery on 9-7-18 with anterior approach was not successful )-B low back soreness with mild radiculopathy and antalgic gait   Past Medical History/Comorbidities:   Mr. Tl Garcia  has a past medical history of Arthritis, Calculus of kidney, Chronic kidney disease, Chronic kidney disease, stage III (moderate), Fracture of coccyx ([address] Gout, unspecified, History of left shoulder fracture, Hyperlipidemia, Microscopic hematuria, Sciatica of right side, Unspecified essential hypertension, and Urinary tract infection, site not specified. Mr. Tl Garcia  has a past surgical history that includes hx lithotripsy; pr total hip arthroplasty (Left); hx shoulder replacement (Left, 12/2014); L5-S1 laminectomy for instrapinal extradural lesion/tlif/posterior instrumentation (N/A, 9/10/2018); L5-S1 ALIF-ATTEMPTED (N/A, 9/7/2018); and LEFT PROXIMAL HUMERUS OPEN REDUCTION INTERNAL FIXATION  CHOICE ANES (Left, 12/3/2014). Social History/Living Environment:      Prior Level of Function/Work/Activity:  Independent with home ADLs but with increased low back pain with walking and standing   Current Medications:       Current Outpatient Medications:     HYDROcodone-acetaminophen (NORCO) 7.5-325 mg per tablet, Take 1 Tab by mouth every six (6) hours as needed for Pain. Max Daily Amount: 4 Tabs., Disp: 28 Tab, Rfl: 0    amLODIPine (NORVASC) 5 mg tablet, TAKE 1 TABLET EVERY DAY, Disp: 90 Tab, Rfl: 1    benazepril-hydroCHLOROthiazide (LOTENSIN HCT) 20-12.5 mg per tablet, TAKE 1 TABLET EVERY DAY, Disp: 90 Tab, Rfl: 1    multivitamin (ONE A DAY) tablet, Take 1 Tab by mouth daily. , Disp: , Rfl:     OTHER, Indications: many vitamins and herbal supplements, Disp: , Rfl:    Date Last Reviewed:  11/13/2018   Number of Personal Factors/Comorbidities that affect the Plan of Care:  (patient has a history of chronic kidney disease, gout,sciatica, and HTN) 1-2: MODERATE COMPLEXITY   EXAMINATION:   Observation/Orthostatic Postural Assessment:          Patient ambulates independently into dept with moderate antalgic gait due to R low back and R hip pain -flattened lumbar curve with rounded shoulders   Palpation:         Tight R lumbar paraspinals and B gluteus medius -decreased muscle tone in R gastrocsoleus   ROM:         LE range is wfl     Trunk range of motion -    Flexion-66%  Extension-50%-rounded trunk posture  B rotation-85%  B sidebending -70%      Strength: Ankles -4/5     B quads and hamstrings are 3 to 3+/5 --weak -FALLS PRECAUTIONS    B hip flexors are 4 to 4+/5     Special Tests:        Negative spring/compression/valsalva/stork test and negative straight leg raise test with tight hamstrings     Negative homans sign    Neurological Screen:    Radiating tingling into R big toe     Balance:        good  Mental Status:         Alert and oriented  Sensation:       Intact to light touch      Body Structures Involved:  1. Bones  2. Joints  3. Muscles Body Functions Affected:  1. Sensory/Pain  2. Neuromusculoskeletal  3. Movement Related Activities and Participation Affected:  1. Learning and Applying Knowledge  2. General Tasks and Demands  3. Mobility  4. Self Care  5. Domestic Life   Number of elements (examined above) that affect the Plan of Care: 3: MODERATE COMPLEXITY   CLINICAL PRESENTATION:   Presentation: Evolving clinical presentation with changing clinical characteristics: MODERATE COMPLEXITY   CLINICAL DECISION MAKING:   Outcome Measure: Tool Used: Modified Oswestry Low Back Pain Questionnaire  Score:  Initial: 9/50  Most Recent: X/50 (Date: -- )   Interpretation of Score: Each section is scored on a 0-5 scale, 5 representing the greatest disability. The scores of each section are added together for a total score of 50. Score 0 1-10 11-20 21-30 31-40 41-49 50   Modifier CH CI CJ CK CL CM CN     ?  Changing and Maintaining Body Position:    [id number] - CURRENT STATUS: CI - 1%-19% impaired, limited or restricted    - GOAL STATUS: CH - 0% impaired, limited or restricted    - D/C STATUS:  ---------------To be determined---------------    Medical Necessity:   · Patient is expected to demonstrate progress in strength, range of motion, balance and coordination to increase independence with ADLs and improve safety during walking and transfers. · Skilled intervention continues to be required due to B low back pain and LE weakness are affecting gait and function. Reason for Services/Other Comments:  · Patient continues to require modification of therapeutic interventions to increase complexity of exercises. Use of outcome tool(s) and clinical judgement create a POC that gives a:  (outcome measure illustrates up to 19% impairment) Questionable prediction of patient's progress: MODERATE COMPLEXITY            TREATMENT:   (In addition to Assessment/Re-Assessment sessions the following treatments were rendered)  Pre-treatment Symptoms/Complaints: Pt. Stated he was getting in and out of car and off of toilet okay as his legs are getting stronger .-\"I have not been on a pain pill for 4-5 days\"    Pain: Initial:   Pain Intensity 1: 2  Pain Location 1: Back, Buttocks  Pain Orientation 1: Left, Right  Pain Intervention(s) 1: Cold pack, Exercise, Heat applied  Post Session: 1/10 no pain at the end of session      Therapeutic Exercise: ( )x 45 minutes    :  Exercises per grid below to improve mobility, strength, balance and coordination. Required minimal verbal cues to promote proper body alignment, promote proper body posture and promote proper body mechanics. Progressed resistance, range, repetitions and complexity of movement as indicated.    Date:  11-9-18 Date:  11-13-18 Date:  11/6/18   Activity/Exercise Parameters Parameters Parameters   Knee to chest 4 x 30 seconds BLE's  4x30 sec hold with towel  4x30 sec hold BLE's    Hamstring stretch 4 x 30 seconds with strap  4 x 30 sec hold with belt 4x30 sec hold with strap BLE's    Piriformis stretch 4 x 30 seconds  4 x 300 sec hold  4x30 sec hold BLE's   Gluteal sets X 10 with 3 second hold 2 X 10 reps 3 sec hold  X 20 reps 5 sec hold    Sit to stand X 10 2 x 7 reps X 10 reps    Abdominal bracing  X 10 with 3 second hold 2 X 10 reps 3 sec hold  X 10 reps 3 sec hold    Hip adduction X10 with yellow theraball 2 X 10 reps yellow t-ball X 10 reps yellow t-ball    Standing hip flexion X 20 reps  X 15 reps to each LE with 3 lbs X 20 reps    Standing hip extension  X 15 reps to each LE with  3 lbs     Standing hip abuction X 20 reps  X 15 reps to each LE with 3 lbs  2x10 reps   standing hamstring curls X 20 reps  2 x 10 reps to each LE with 3 lbs   X 20 reps    Seated heel toe raises X 20ps  re  X 20 reps    Nu step  Level 5 x 10 mins Level 4 x 7 minutes Level 4 x 8 mins    Calf stretch 4x30 sec hold BLE's   4x30 sec hold on incline   Marching in place  X 20 to each LE with 3 lbs    Long arc quads  X 20 to each LE with 3 lbs    Hip abduction X 10 with black band 2 X 10 reps black band X 10 reps black band       Manual Therapy (      )Pt. Received soft tissue mobilization to bilateral lumbosacral paraspinals and gluteals x 12 minutes to decrease pain and tightness while in L sidelying position-effleurage and petrisage for tightness. Therapeutic Modalities: for pain and edema    Interferential muscle stimulation to B lumbars and gluteus medius while in R sidelying position x 10 minutes with heat                                                                                                       HEP: As above; handouts given to patient for all exercises. Treatment/Session Assessment:    · Response to Treatment: Pt performed exercises well -less pain and spasm -progressing   · Compliance with Program/Exercises: compliant most of the time. · Recommendations/Intent for next treatment session: \"Next visit will focus on advancements to more challenging activities\". continue LE and lumbar rehab    Total Treatment Duration: 67 minutes     PT Patient Time In/Time Out  Time In: 1330  Time Out: [address], PT

## 2018-11-14 NOTE — DISCHARGE SUMMARY
[address]  MR#: [id number]  : 1940  ACCOUNT #: [de-identified]   ADMIT DATE: 2018  DISCHARGE DATE: 2018    PRIMARY DIAGNOSIS:  L5-S1 spondylolisthesis with spinal stenosis and intraspinal extradural lesion. OPERATIONS PERFORMED:  L5-S1 anterior lumbar interbody fusion with interbody device followed by L5-S1 bilateral laminectomy, excision of intraspinal extradural lesion and posterior instrumentation. HISTORY OF PRESENT ILLNESS:  The patient is a 69-year-old gentleman who has L5-S1 spondylolisthesis, also a synovial cyst.  He had stenosis. He has failed to get better with conservative measures, so he is brought for surgical treatment. HOSPITAL COURSE:  The patient was admitted on 2018 and he underwent on the day of admission an L5-S1 anterior diskectomy, interbody fusion with interbody device. He tolerated this well. He was taken to his room, where he remained neurologically intact, afebrile with normal vital signs, and 3 days later he was brought back to the operating room, where he underwent a posterior surgery and that was done on 09/10/2018. He had an L5-S1 laminectomy, excision of intraspinal extradural lesion and instrumented fusion and he also tolerated this well, and he had a drain placed and when the drainage had decreased it was removed. His hemoglobin had remained stable in the 12.9 range. He was able to tolerate a regular diet, was able to void. He was seen by Physical Therapy and started gait training in his brace. DISPOSITION:  He made slow progress, but by 2018 he was doing well enough to be discharged home. We will arrange for him to have home PT. Prescriptions for pain medicine were written. He is to wear his brace whenever he is out of bed and try to walk as much as possible. Avoid any strenuous activity. Will follow him up in the office in 2 weeks' time. He will call if there are any problems prior to followup.       Jaquelin Amos III, MD SEL/  D: 11/14/2018 12:43     T: 11/14/2018 13:57  JOB #: [id number]  CC: Thurman Babinski, MD

## 2018-11-16 ENCOUNTER — HOSPITAL ENCOUNTER (OUTPATIENT)
Dept: PHYSICAL THERAPY | Age: 78
Discharge: HOME | End: 2018-11-16
Payer: MEDICARE

## 2018-11-16 PROCEDURE — 97110 THERAPEUTIC EXERCISES: CPT

## 2018-11-16 PROCEDURE — 97140 MANUAL THERAPY 1/> REGIONS: CPT

## 2018-11-16 NOTE — PROGRESS NOTES
tay Birmingham  : 1940  [place]  Secondary: [address] at [facility]  [facility], [place], [address] [place] [address]  [phone]   [phone]       OUTPATIENT PHYSICAL THERAPY:Daily Note 2018    ICD-10: Treatment Diagnosis: M54.5 low back pain and Z98.1 arthrodesis status and R26.89 other  Abnormalities of gait and mobility   Precautions: bending and lifting at the waist   Allergies: Clonidine and Simvastatin   Fall Risk Score: 2 (? 5 = High Risk)  MD Orders: evaluate and treat  MEDICAL/REFERRING DIAGNOSIS:  Arthrodesis status [Z98.1]   DATE OF ONSET: 9-10-18-laminectomy at L5-S1  REFERRING PHYSICIAN: Margaux Sanchez MD  RETURN PHYSICIAN APPOINTMENT:unsure     INITIAL ASSESSMENT:  Mr. Deann Baker is a 77 y.o. male presenting to physical therapy with complaints of B low back pain/soreness but with only mild radicular symptoms/tingling into his R big toe since receiving a L5-S1 laminectomy on 9-10-18-patient has long history of R side sciatica --patient ambulates independently into dept with moderate antalgic gait complaining of mainly R low back and R hip pain -pain level is 4/10 -mild tingling into R big toe -trunk range of motion is limited in flexion and extension-flattened lumbar curve with rounded shoulders and forward head on neck posture -incision looks good at low back -mild incision noted just below umbilicus where the surgery was attempted to be performed with an anterior approach on 18  but was not successful -weak B quads and hamstrings are noted -very good candidate for skilled physical therapy to include manual therapy , pain modalities as needed, and therapeutic exercises followed with cold pack -motivated patient     PROBLEM LIST (Impacting functional limitations):  1. Decreased Strength  2. Decreased ADL/Functional Activities  3. Decreased Transfer Abilities  4.  Decreased Ambulation Ability/Technique  5. Decreased Balance  6. Increased Pain  7. Decreased Activity Tolerance  8. Decreased Pacing Skills  9. Decreased Flexibility/Joint Mobility INTERVENTIONS PLANNED:  1. Cold  2. Electrical Stimulation  3. Gait Training  4. Heat  5. Home Exercise Program (HEP)  6. Manual Therapy  7. Range of Motion (ROM)  8. Therapeutic Exercise/Strengthening   TREATMENT PLAN:  Effective Dates: 10/29/2018 TO 12/28/2018 (60 days). Frequency/Duration: 2 times a week for 60 Days  GOALS: (Goals have been discussed and agreed upon with patient.)  Short-Term Functional Goals: Time Frame: 11-29-18  1. Low back pain is less than 4/10 to progress to DC goal   2. Trunk mobility is increased by 10%   3. Less rounded posture   4. Instruction in exercise program to allow increased ADLs. 5.ambulate independently with minimal + antalgic gait  Discharge Goals: Time Frame: 12-28-18  1. 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 Trunk mobility is 75% in all ranges to allow full personal care including washing and dressing and putting on shoes and socks   3. Independent ambulation with minimal antalgic gait to allow walking community distances   4. Able to walk/stand for at least 30-45 minutes   5. Sleep at least 6 hours without wakened by low back pain   6. low back pain outcome measure score is improved from 9/50 to 5/50   7. Rehabilitation Potential For Stated Goals: Good  Regarding Ozzie Escoto's therapy, I certify that the treatment plan above will be carried out by a therapist or under their direction. Thank you for this referral,  Gino Wynne PTA     Referring Physician Signature: Jerry Fofana MD              Date                    The information in this section was collected on 10-29-18 (except where otherwise noted).   HISTORY:   History of Present Injury/Illness (Reason for Referral):  L5-S1 laminectomy on 9-10-18 (attempted surgery on 9-7-18 with anterior approach was not successful )-B low back soreness with mild radiculopathy and antalgic gait   Past Medical History/Comorbidities:   Mr. Daisy Sam  has a past medical history of Arthritis, Calculus of kidney, Chronic kidney disease, Chronic kidney disease, stage III (moderate), Fracture of coccyx ([address] Gout, unspecified, History of left shoulder fracture, Hyperlipidemia, Microscopic hematuria, Sciatica of right side, Unspecified essential hypertension, and Urinary tract infection, site not specified. Mr. Daisy Sam  has a past surgical history that includes hx lithotripsy; pr total hip arthroplasty (Left); hx shoulder replacement (Left, 12/2014); L5-S1 laminectomy for instrapinal extradural lesion/tlif/posterior instrumentation (N/A, 9/10/2018); L5-S1 ALIF-ATTEMPTED (N/A, 9/7/2018); and LEFT PROXIMAL HUMERUS OPEN REDUCTION INTERNAL FIXATION  CHOICE ANES (Left, 12/3/2014). Social History/Living Environment:      Prior Level of Function/Work/Activity:  Independent with home ADLs but with increased low back pain with walking and standing   Current Medications:       Current Outpatient Medications:     HYDROcodone-acetaminophen (NORCO) 7.5-325 mg per tablet, Take 1 Tab by mouth every six (6) hours as needed for Pain. Max Daily Amount: 4 Tabs., Disp: 28 Tab, Rfl: 0    amLODIPine (NORVASC) 5 mg tablet, TAKE 1 TABLET EVERY DAY, Disp: 90 Tab, Rfl: 1    benazepril-hydroCHLOROthiazide (LOTENSIN HCT) 20-12.5 mg per tablet, TAKE 1 TABLET EVERY DAY, Disp: 90 Tab, Rfl: 1    multivitamin (ONE A DAY) tablet, Take 1 Tab by mouth daily. , Disp: , Rfl:     OTHER, Indications: many vitamins and herbal supplements, Disp: , Rfl:    Date Last Reviewed:  11/16/2018   Number of Personal Factors/Comorbidities that affect the Plan of Care:  (patient has a history of chronic kidney disease, gout,sciatica, and HTN) 1-2: MODERATE COMPLEXITY   EXAMINATION:   Observation/Orthostatic Postural Assessment: Patient ambulates independently into dept with moderate antalgic gait due to R low back and R hip pain -flattened lumbar curve with rounded shoulders   Palpation:         Tight R lumbar paraspinals and B gluteus medius -decreased muscle tone in R gastrocsoleus   ROM:         LE range is wfl     Trunk range of motion -    Flexion-66%  Extension-50%-rounded trunk posture  B rotation-85%  B sidebending -70%      Strength: Ankles -4/5     B quads and hamstrings are 3 to 3+/5 --weak -FALLS PRECAUTIONS    B hip flexors are 4 to 4+/5     Special Tests:        Negative spring/compression/valsalva/stork test and negative straight leg raise test with tight hamstrings     Negative homans sign    Neurological Screen:    Radiating tingling into R big toe     Balance:        good  Mental Status:         Alert and oriented  Sensation:       Intact to light touch      Body Structures Involved:  1. Bones  2. Joints  3. Muscles Body Functions Affected:  1. Sensory/Pain  2. Neuromusculoskeletal  3. Movement Related Activities and Participation Affected:  1. Learning and Applying Knowledge  2. General Tasks and Demands  3. Mobility  4. Self Care  5. Domestic Life   Number of elements (examined above) that affect the Plan of Care: 3: MODERATE COMPLEXITY   CLINICAL PRESENTATION:   Presentation: Evolving clinical presentation with changing clinical characteristics: MODERATE COMPLEXITY   CLINICAL DECISION MAKING:   Outcome Measure: Tool Used: Modified Oswestry Low Back Pain Questionnaire  Score:  Initial: 9/50  Most Recent: X/50 (Date: -- )   Interpretation of Score: Each section is scored on a 0-5 scale, 5 representing the greatest disability. The scores of each section are added together for a total score of 50. Score 0 1-10 11-20 21-30 31-40 41-49 50   Modifier CH CI CJ CK CL CM CN     ?  Changing and Maintaining Body Position:    [id number] - CURRENT STATUS: CI - 1%-19% impaired, limited or restricted    - GOAL STATUS: [facility] - 0% impaired, limited or restricted    - D/C STATUS:  ---------------To be determined---------------    Medical Necessity:   · Patient is expected to demonstrate progress in strength, range of motion, balance and coordination to increase independence with ADLs and improve safety during walking and transfers. · Skilled intervention continues to be required due to B low back pain and LE weakness are affecting gait and function. Reason for Services/Other Comments:  · Patient continues to require modification of therapeutic interventions to increase complexity of exercises. Use of outcome tool(s) and clinical judgement create a POC that gives a:  (outcome measure illustrates up to 19% impairment) Questionable prediction of patient's progress: MODERATE COMPLEXITY            TREATMENT:   (In addition to Assessment/Re-Assessment sessions the following treatments were rendered)  Pre-treatment Symptoms/Complaints: Pt. Stated he was getting in and out of car and off of toilet okay as his legs are getting stronger .-\"I have not been on a pain pill for 4-5 days\"    Pain: Initial:   Pain Intensity 1: 0  Pain Location 1: Back, Buttocks  Pain Orientation 1: Right  Post Session: 0/10 no pain     Therapeutic Exercise: (45 Minutes)    :  Exercises per grid below to improve mobility, strength, balance and coordination. Required minimal verbal cues to promote proper body alignment, promote proper body posture and promote proper body mechanics. Progressed resistance, range, repetitions and complexity of movement as indicated.    Date:  11-9-18 Date:  11-13-18 Date:  11/16/18   Activity/Exercise Parameters Parameters Parameters   Knee to chest 4 x 30 seconds BLE's  4x30 sec hold with towel  4x30 sec hold BLE's    Hamstring stretch 4 x 30 seconds with strap  4 x 30 sec hold with belt 4x30 sec hold with strap BLE's    Piriformis stretch 4 x 30 seconds  4 x 300 sec hold  4x30 sec hold BLE's   Gluteal sets X 10 with 3 second hold 2 X 10 reps 3 sec hold  X 20 reps 5 sec hold    Sit to stand X 10 2 x 7 reps 2 X 10 reps    Abdominal bracing  X 10 with 3 second hold 2 X 10 reps 3 sec hold  X 10 reps 3 sec hold    Hip adduction X10 with yellow theraball 2 X 10 reps yellow t-ball 2 X 10 reps yellow t-ball    Standing hip flexion X 20 reps  X 15 reps to each LE with 3 lbs X 20 reps    Standing hip extension  X 15 reps to each LE with  3 lbs  ------   Standing hip abuction X 20 reps  X 15 reps to each LE with 3 lbs  2x10 reps 3 lb wt. s    standing hamstring curls X 20 reps  2 x 10 reps to each LE with 3 lbs   X 20 reps 3 lb wt. s BLE's    Seated heel toe raises X 20ps  re  X 20 reps    Nu step  Level 5 x 10 mins Level 4 x 7 minutes Level 4 x 8 mins    Calf stretch 4x30 sec hold BLE's   4x30 sec hold on incline   Marching in place  X 20 to each LE with 3 lbs X 20 reps BLE's 3 lb wt. s   Long arc quads  X 20 to each LE with 3 lbs X 20 reps BLE's 3 lb   Hip abduction X 10 with black band 2 X 10 reps black band X 10 reps black band       Manual Therapy (    Soft Tissue Mobilization Duration  Duration: 10 Minutes )Pt. Received soft tissue mobilization to bilateral lumbosacral paraspinals and gluteals in left sidelying to decrease pain and muscular tightness. Therapeutic Modalities: for pain and edema                  Lumbo-Sacral Spine Heat  Type: Moist pack  Duration : 10 minutes  Patient Position: Lying left side                             Lumbo-Sacral Spine Electric Stimulation  Type: Interferential  Placement: right lumbosacral paraspinals and gluteals  Duration : 10 minutes  Patient Position: Lying left side                                                        HEP: As above; handouts given to patient for all exercises. Treatment/Session Assessment:    · Response to Treatment: Pt. Was compliant with all exercises and reported no pain or muscular tightness at the end of session.   · Compliance with Program/Exercises: compliant most of the time.  · Recommendations/Intent for next treatment session: \"Next visit will focus on advancements to more challenging activities\". continue LE and lumbar rehab    Total Treatment Duration: 65 minutes     PT Patient Time In/Time Out  Time In: 0800  Time Out: Xiomy Mendez [id number], PTA

## 2018-11-20 ENCOUNTER — HOSPITAL ENCOUNTER (OUTPATIENT)
Dept: PHYSICAL THERAPY | Age: 78
Discharge: HOME | End: 2018-11-20
Payer: MEDICARE

## 2018-11-20 PROCEDURE — 97140 MANUAL THERAPY 1/> REGIONS: CPT

## 2018-11-20 PROCEDURE — G8982 BODY POS GOAL STATUS: HCPCS

## 2018-11-20 PROCEDURE — 97110 THERAPEUTIC EXERCISES: CPT

## 2018-11-20 PROCEDURE — G8981 BODY POS CURRENT STATUS: HCPCS

## 2018-11-20 NOTE — PROGRESS NOTES
qa Cristie Bernheim  : 1940  [place]  Secondary: [address] [place] at [facility]  [facility], [place], [address] [place] [address]  [phone]   [phone]       OUTPATIENT PHYSICAL THERAPY:Daily Note and Progress Report 2018    ICD-10: Treatment Diagnosis: M54.5 low back pain and Z98.1 arthrodesis status and R26.89 other  Abnormalities of gait and mobility   Precautions: bending and lifting at the waist   Allergies: Clonidine and Simvastatin   Fall Risk Score: 2 (? 5 = High Risk)  MD Orders: evaluate and treat  MEDICAL/REFERRING DIAGNOSIS:  Arthrodesis status [Z98.1]   DATE OF ONSET: 9-10-18-laminectomy at L5-S1  REFERRING PHYSICIAN: Hugo Glaser MD  RETURN PHYSICIAN APPOINTMENT:unsure     INITIAL ASSESSMENT:  Mr. Rosy Santoyo is a 77 y.o. male presenting to physical therapy with complaints of B low back pain/soreness but with only mild radicular symptoms/tingling into his R big toe since receiving a L5-S1 laminectomy on 9-10-18-patient has long history of R side sciatica --patient ambulates independently into dept with moderate antalgic gait complaining of mainly R low back and R hip pain -pain level is 4/10 -mild tingling into R big toe -trunk range of motion is limited in flexion and extension-flattened lumbar curve with rounded shoulders and forward head on neck posture -incision looks good at low back -mild incision noted just below umbilicus where the surgery was attempted to be performed with an anterior approach on 18  but was not successful -weak B quads and hamstrings are noted -very good candidate for skilled physical therapy to include manual therapy , pain modalities as needed, and therapeutic exercises followed with cold pack -motivated patient     PROBLEM LIST (Impacting functional limitations):  1. Decreased Strength  2. Decreased ADL/Functional Activities  3. Decreased Transfer Abilities  4.  Decreased Ambulation Ability/Technique  5. Decreased Balance  6. Increased Pain  7. Decreased Activity Tolerance  8. Decreased Pacing Skills  9. Decreased Flexibility/Joint Mobility INTERVENTIONS PLANNED:  1. Cold  2. Electrical Stimulation  3. Gait Training  4. Heat  5. Home Exercise Program (HEP)  6. Manual Therapy  7. Range of Motion (ROM)  8. Therapeutic Exercise/Strengthening   TREATMENT PLAN:  Effective Dates: 10/29/2018 TO 12/28/2018 (60 days). Frequency/Duration: 2 times a week for 60 Days  GOALS: (Goals have been discussed and agreed upon with patient.)  Short-Term Functional Goals: Time Frame: 11-29-18  1. Low back pain is less than 4/10 to progress to DC goal -GOAL MET  2. Trunk mobility is increased by 10% -GOAL MET  3. Less rounded posture -GOAL MET  4. Instruction in exercise program to allow increased ADLs. -GOAL MET  5.ambulate independently with minimal + antalgic gait-GOAL MET  Discharge Goals: Time Frame: 12-28-18  1. 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 Trunk mobility is 75% in all ranges to allow full personal care including washing and dressing and putting on shoes and socks -PROGRESSING  3. Independent ambulation with minimal antalgic gait to allow walking community distances -GOOD PROGRESS TOWARD GOALS   4. Able to walk/stand for at least 30-45 minutes -GOOD PROGRESS  5.  Sleep at least 6 hours without wakened by low back pain -GOOD PROGRESS  6. low back pain outcome measure score is improved from 9/50 to 5/50 -ONGOING -LATEST SCORE IS 8/50    Patient has been seen for 7 visits of rehab and has made good progress with low back rehab and LE strengthening from 10-29-18 to 11-20-18-pain level is minimal at 0-1/10 with main complaint being low back stiffness -independent with home exercise program -independent ambulation with minimal to minimal + antalgic gait -decreased lumbar spasm -increased LE strength to 4-/5 -increased function and  patient feels strong enough to go hunting-trunk range of motion has improved with improved hamstring flexibility -very good progress-continue low back rehab with LE strengthening -motivated patient -pleasant gentleman to work with    7.    Rehabilitation Potential For Stated Goals: Good  Regarding Li Escoto's therapy, I certify that the treatment plan above will be carried out by a therapist or under their direction. Thank you for this referral,  Jayro Woods PT     Referring Physician Signature: Hugo Glaser MD              Date                    The information in this section was collected on 10-29-18 (except where otherwise noted). HISTORY:   History of Present Injury/Illness (Reason for Referral):  L5-S1 laminectomy on 9-10-18 (attempted surgery on 9-7-18 with anterior approach was not successful )-B low back soreness with mild radiculopathy and antalgic gait   Past Medical History/Comorbidities:   Mr. Rosy Santoyo  has a past medical history of Arthritis, Calculus of kidney, Chronic kidney disease, Chronic kidney disease, stage III (moderate), Fracture of coccyx ([facility] [address] Gout, unspecified, History of left shoulder fracture, Hyperlipidemia, Microscopic hematuria, Sciatica of right side, Unspecified essential hypertension, and Urinary tract infection, site not specified. Mr. Rosy Santoyo  has a past surgical history that includes hx lithotripsy; pr total hip arthroplasty (Left); hx shoulder replacement (Left, 12/2014); L5-S1 laminectomy for instrapinal extradural lesion/tlif/posterior instrumentation (N/A, 9/10/2018); L5-S1 ALIF-ATTEMPTED (N/A, 9/7/2018); and LEFT PROXIMAL HUMERUS OPEN REDUCTION INTERNAL FIXATION  CHOICE ANES (Left, 12/3/2014).   Social History/Living Environment:      Prior Level of Function/Work/Activity:  Independent with home ADLs but with increased low back pain with walking and standing   Current Medications:       Current Outpatient Medications:    HYDROcodone-acetaminophen (NORCO) 7.5-325 mg per tablet, Take 1 Tab by mouth every six (6) hours as needed for Pain. Max Daily Amount: 4 Tabs., Disp: 28 Tab, Rfl: 0    amLODIPine (NORVASC) 5 mg tablet, TAKE 1 TABLET EVERY DAY, Disp: 90 Tab, Rfl: 1    benazepril-hydroCHLOROthiazide (LOTENSIN HCT) 20-12.5 mg per tablet, TAKE 1 TABLET EVERY DAY, Disp: 90 Tab, Rfl: 1    multivitamin (ONE A DAY) tablet, Take 1 Tab by mouth daily. , Disp: , Rfl:     OTHER, Indications: many vitamins and herbal supplements, Disp: , Rfl:    Date Last Reviewed:  11/20/2018   Number of Personal Factors/Comorbidities that affect the Plan of Care:  (patient has a history of chronic kidney disease, gout,sciatica, and HTN) 1-2: MODERATE COMPLEXITY   EXAMINATION:   Observation/Orthostatic Postural Assessment:          Patient ambulates independently into dept with minimal  antalgic gait due to R low back and R hip pain -flattened lumbar curve with rounded shoulders   Palpation:        Less tight R lumbar paraspinals and B gluteus medius -decreased muscle tone in R gastrocsoleus   ROM:         LE range is wfl     Trunk range of motion -    Flexion-75%  Extension-60%-rounded trunk posture  B rotation-85%  B sidebending -75%      Strength: Ankles -4/5     B quads and hamstrings are now 3+ to 4-/5   B hip flexors are 4 to 4+/5     Special Tests:        Negative spring/compression/valsalva/stork test and negative straight leg raise test with tight hamstrings     Negative homans sign    Neurological Screen:    Radiating tingling into R big toe     Balance:        good  Mental Status:         Alert and oriented  Sensation:       Intact to light touch      Body Structures Involved:  1. Bones  2. Joints  3. Muscles Body Functions Affected:  1. Sensory/Pain  2. Neuromusculoskeletal  3. Movement Related Activities and Participation Affected:  1. Learning and Applying Knowledge  2. General Tasks and Demands  3. Mobility  4. Self Care  5.  Domestic Life   Number of elements (examined above) that affect the Plan of Care: 3: MODERATE COMPLEXITY   CLINICAL PRESENTATION:   Presentation: Evolving clinical presentation with changing clinical characteristics: MODERATE COMPLEXITY   CLINICAL DECISION MAKING:   Outcome Measure: Tool Used: Modified Oswestry Low Back Pain Questionnaire  Score:  Initial: 9/50  Most Recent: 8/50 (Date:11-20-18 -- )   Interpretation of Score: Each section is scored on a 0-5 scale, 5 representing the greatest disability. The scores of each section are added together for a total score of 50. Score 0 1-10 11-20 21-30 31-40 41-49 50   Modifier CH CI CJ CK CL CM CN     ? Changing and Maintaining Body Position:    [id number] - CURRENT STATUS: CI - 1%-19% impaired, limited or restricted    - GOAL STATUS: CH - 0% impaired, limited or restricted    - D/C STATUS:  ---------------To be determined---------------    Medical Necessity:   · Patient is expected to demonstrate progress in strength, range of motion, balance and coordination to increase independence with ADLs and improve safety during walking and transfers. · Skilled intervention continues to be required due to B low back pain and LE weakness are affecting gait and function. Reason for Services/Other Comments:  · Patient continues to require modification of therapeutic interventions to increase complexity of exercises.    Use of outcome tool(s) and clinical judgement create a POC that gives a:  (outcome measure illustrates up to 19% impairment) Questionable prediction of patient's progress: MODERATE COMPLEXITY            TREATMENT:   (In addition to Assessment/Re-Assessment sessions the following treatments were rendered)  Pre-treatment Symptoms/Complaints: Pt. Stated he was getting in and out of car better and feels well enough to go hunting     Pain: Initial:   Pain Intensity 1: 0  Pain Location 1: Back  Pain Orientation 1: Lower, Right, Left  Pain Intervention(s) 1: Cold pack, Exercise, Heat applied, Massage  Post Session: 0/10 no pain     Therapeutic Exercise: ( )x 45 minutes    :  Exercises per grid below to improve mobility, strength, balance and coordination. Required minimal verbal cues to promote proper body alignment, promote proper body posture and promote proper body mechanics. Progressed resistance, range, repetitions and complexity of movement as indicated. Date:  11-20-18 Date:  11-13-18 Date:  11/16/18   Activity/Exercise Parameters Parameters Parameters   Knee to chest  4x30 sec hold with towel  4x30 sec hold BLE's    Hamstring stretch  4 x 30 sec hold with belt 4x30 sec hold with strap BLE's    Piriformis stretch  4 x 300 sec hold  4x30 sec hold BLE's   Gluteal sets  2 X 10 reps 3 sec hold  X 20 reps 5 sec hold    Sit to stand X 10 2 x 7 reps 2 X 10 reps    Abdominal bracing   2 X 10 reps 3 sec hold  X 10 reps 3 sec hold    Hip adduction X 20 with yellow theraball 2 X 10 reps yellow t-ball 2 X 10 reps yellow t-ball    Standing hip flexion X 20 reps  To each LE with black band X 15 reps to each LE with 3 lbs X 20 reps    Standing hip extension X 20 reps to each LE with black band  X 15 reps to each LE with  3 lbs  ------   Standing hip abuction X 20 reps  To each LE with black band X 15 reps to each LE with 3 lbs  2x10 reps 3 lb wt. s    standing hamstring curls X 20 reps  To each LE with 2.5 lbs 2 x 10 reps to each LE with 3 lbs   X 20 reps 3 lb wt. s BLE's    Seated heel toe raises X 20 reps to each   X 20 reps    Nu step  Level 3 x 10 mins Level 4 x 7 minutes Level 4 x 8 mins    Calf stretch   4x30 sec hold on incline   Marching in place  x 20 reps with black band X 20 to each LE with 3 lbs X 20 reps BLE's 3 lb wt. s   Long arc quads X 20 reps to each LE with 2.5 lb X 20 to each LE with 3 lbs X 20 reps BLE's 3 lb   Seated hamstring curls with eccentric knee extension X 20 to each LE with red band     Hip abduction X 20 with black band 2 X 10 reps black band X 10 reps black band       Manual Therapy (    Soft Tissue Mobilization Duration  Duration: 12 Minutes )Pt. Received soft tissue mobilization to bilateral lumbosacral paraspinals and gluteals in Rt sidelying to decrease pain and muscular tightness. Therapeutic Modalities: for pain and edema                                                                                                         HEP: As above; handouts given to patient for all exercises. Treatment/Session Assessment:    · Response to Treatment:very good progress- Pt. Was compliant with all exercises and reported no pain or muscular tightness at the end of session. · Compliance with Program/Exercises: compliant most of the time. · Recommendations/Intent for next treatment session: \"Next visit will focus on advancements to more challenging activities\". continue LE and lumbar rehab    Total Treatment Duration: 57 minutes      Time in -1105  Time out-1205    Chrissy Chacko, PT

## 2018-11-21 ENCOUNTER — HOSPITAL ENCOUNTER (OUTPATIENT)
Dept: PHYSICAL THERAPY | Age: 78
Discharge: HOME | End: 2018-11-21
Payer: MEDICARE

## 2018-11-27 ENCOUNTER — HOSPITAL ENCOUNTER (OUTPATIENT)
Dept: PHYSICAL THERAPY | Age: 78
Discharge: HOME | End: 2018-11-27
Payer: MEDICARE

## 2018-11-27 PROCEDURE — 97110 THERAPEUTIC EXERCISES: CPT

## 2018-11-27 PROCEDURE — 97140 MANUAL THERAPY 1/> REGIONS: CPT

## 2018-11-27 NOTE — PROGRESS NOTES
qa    Lauryn Natarajan  : 1940  [place]  Secondary: [address] at [facility]  [address] [facility], [place], [address] [place] [address]  [phone]   [phone]       OUTPATIENT PHYSICAL THERAPY:Daily Note 2018    ICD-10: Treatment Diagnosis: M54.5 low back pain and Z98.1 arthrodesis status and R26.89 other  Abnormalities of gait and mobility   Precautions: bending and lifting at the waist   Allergies: Clonidine and Simvastatin   Fall Risk Score: 2 (? 5 = High Risk)  MD Orders: evaluate and treat  MEDICAL/REFERRING DIAGNOSIS:  Arthrodesis status [Z98.1]   DATE OF ONSET: 9-10-18-laminectomy at L5-S1  REFERRING PHYSICIAN: Ivan Skinner MD  RETURN PHYSICIAN APPOINTMENT:unsure     INITIAL ASSESSMENT:  Mr. Dunia Pedro is a 77 y.o. male presenting to physical therapy with complaints of B low back pain/soreness but with only mild radicular symptoms/tingling into his R big toe since receiving a L5-S1 laminectomy on 9-10-18-patient has long history of R side sciatica --patient ambulates independently into dept with moderate antalgic gait complaining of mainly R low back and R hip pain -pain level is 4/10 -mild tingling into R big toe -trunk range of motion is limited in flexion and extension-flattened lumbar curve with rounded shoulders and forward head on neck posture -incision looks good at low back -mild incision noted just below umbilicus where the surgery was attempted to be performed with an anterior approach on 18  but was not successful -weak B quads and hamstrings are noted -very good candidate for skilled physical therapy to include manual therapy , pain modalities as needed, and therapeutic exercises followed with cold pack -motivated patient     PROBLEM LIST (Impacting functional limitations):  1. Decreased Strength  2. Decreased ADL/Functional Activities  3. Decreased Transfer Abilities  4.  Decreased Ambulation Ability/Technique  5. Decreased Balance  6. Increased Pain  7. Decreased Activity Tolerance  8. Decreased Pacing Skills  9. Decreased Flexibility/Joint Mobility INTERVENTIONS PLANNED:  1. Cold  2. Electrical Stimulation  3. Gait Training  4. Heat  5. Home Exercise Program (HEP)  6. Manual Therapy  7. Range of Motion (ROM)  8. Therapeutic Exercise/Strengthening   TREATMENT PLAN:  Effective Dates: 10/29/2018 TO 12/28/2018 (60 days). Frequency/Duration: 2 times a week for 60 Days  GOALS: (Goals have been discussed and agreed upon with patient.)  Short-Term Functional Goals: Time Frame: 11-29-18  1. Low back pain is less than 4/10 to progress to DC goal -GOAL MET  2. Trunk mobility is increased by 10% -GOAL MET  3. Less rounded posture -GOAL MET  4. Instruction in exercise program to allow increased ADLs. -GOAL MET  5.ambulate independently with minimal + antalgic gait-GOAL MET  Discharge Goals: Time Frame: 12-28-18  1. 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 Trunk mobility is 75% in all ranges to allow full personal care including washing and dressing and putting on shoes and socks -PROGRESSING  3. Independent ambulation with minimal antalgic gait to allow walking community distances -GOOD PROGRESS TOWARD GOALS   4. Able to walk/stand for at least 30-45 minutes -GOOD PROGRESS  5.  Sleep at least 6 hours without wakened by low back pain -GOOD PROGRESS  6. low back pain outcome measure score is improved from 9/50 to 5/50 -ONGOING -LATEST SCORE IS 8/50    Patient has been seen for 7 visits of rehab and has made good progress with low back rehab and LE strengthening from 10-29-18 to 11-20-18-pain level is minimal at 0-1/10 with main complaint being low back stiffness -independent with home exercise program -independent ambulation with minimal to minimal + antalgic gait -decreased lumbar spasm -increased LE strength to 4-/5 -increased function and  patient feels strong enough to go hunting-trunk range of motion has improved with improved hamstring flexibility -very good progress-continue low back rehab with LE strengthening -motivated patient -pleasant gentleman to work with    7.    Rehabilitation Potential For Stated Goals: Good  Regarding Celestine Escoto's therapy, I certify that the treatment plan above will be carried out by a therapist or under their direction. Thank you for this referral,  Ceci Guadarrama PTA     Referring Physician Signature: Dejon Galvan MD              Date                    The information in this section was collected on 10-29-18 (except where otherwise noted). HISTORY:   History of Present Injury/Illness (Reason for Referral):  L5-S1 laminectomy on 9-10-18 (attempted surgery on 9-7-18 with anterior approach was not successful )-B low back soreness with mild radiculopathy and antalgic gait   Past Medical History/Comorbidities:   Mr. Elizabeth Sweet  has a past medical history of Arthritis, Calculus of kidney, Chronic kidney disease, Chronic kidney disease, stage III (moderate), Fracture of coccyx ([address] Gout, unspecified, History of left shoulder fracture, Hyperlipidemia, Microscopic hematuria, Sciatica of right side, Unspecified essential hypertension, and Urinary tract infection, site not specified. Mr. Elizabeth Sweet  has a past surgical history that includes hx lithotripsy; pr total hip arthroplasty (Left); hx shoulder replacement (Left, 12/2014); L5-S1 laminectomy for instrapinal extradural lesion/tlif/posterior instrumentation (N/A, 9/10/2018); L5-S1 ALIF-ATTEMPTED (N/A, 9/7/2018); and LEFT PROXIMAL HUMERUS OPEN REDUCTION INTERNAL FIXATION  CHOICE ANES (Left, 12/3/2014).   Social History/Living Environment:      Prior Level of Function/Work/Activity:  Independent with home ADLs but with increased low back pain with walking and standing   Current Medications:       Current Outpatient Medications:    HYDROcodone-acetaminophen (NORCO) 7.5-325 mg per tablet, Take 1 Tab by mouth every six (6) hours as needed for Pain. Max Daily Amount: 4 Tabs., Disp: 28 Tab, Rfl: 0    amLODIPine (NORVASC) 5 mg tablet, TAKE 1 TABLET EVERY DAY, Disp: 90 Tab, Rfl: 1    benazepril-hydroCHLOROthiazide (LOTENSIN HCT) 20-12.5 mg per tablet, TAKE 1 TABLET EVERY DAY, Disp: 90 Tab, Rfl: 1    multivitamin (ONE A DAY) tablet, Take 1 Tab by mouth daily. , Disp: , Rfl:     OTHER, Indications: many vitamins and herbal supplements, Disp: , Rfl:    Date Last Reviewed:  11/27/2018   Number of Personal Factors/Comorbidities that affect the Plan of Care:  (patient has a history of chronic kidney disease, gout,sciatica, and HTN) 1-2: MODERATE COMPLEXITY   EXAMINATION:   Observation/Orthostatic Postural Assessment:          Patient ambulates independently into dept with minimal  antalgic gait due to R low back and R hip pain -flattened lumbar curve with rounded shoulders   Palpation:        Less tight R lumbar paraspinals and B gluteus medius -decreased muscle tone in R gastrocsoleus   ROM:         LE range is wfl     Trunk range of motion -    Flexion-75%  Extension-60%-rounded trunk posture  B rotation-85%  B sidebending -75%      Strength: Ankles -4/5     B quads and hamstrings are now 3+ to 4-/5   B hip flexors are 4 to 4+/5     Special Tests:        Negative spring/compression/valsalva/stork test and negative straight leg raise test with tight hamstrings     Negative homans sign    Neurological Screen:    Radiating tingling into R big toe     Balance:        good  Mental Status:         Alert and oriented  Sensation:       Intact to light touch      Body Structures Involved:  1. Bones  2. Joints  3. Muscles Body Functions Affected:  1. Sensory/Pain  2. Neuromusculoskeletal  3. Movement Related Activities and Participation Affected:  1. Learning and Applying Knowledge  2. General Tasks and Demands  3. Mobility  4. Self Care  5.  Domestic Life   Number of elements (examined above) that affect the Plan of Care: 3: MODERATE COMPLEXITY   CLINICAL PRESENTATION:   Presentation: Evolving clinical presentation with changing clinical characteristics: MODERATE COMPLEXITY   CLINICAL DECISION MAKING:   Outcome Measure: Tool Used: Modified Oswestry Low Back Pain Questionnaire  Score:  Initial: 9/50  Most Recent: 8/50 (Date:11-20-18 -- )   Interpretation of Score: Each section is scored on a 0-5 scale, 5 representing the greatest disability. The scores of each section are added together for a total score of 50. Score 0 1-10 11-20 21-30 31-40 41-49 50   Modifier CH CI CJ CK CL CM CN     ? Changing and Maintaining Body Position:    [id number] - CURRENT STATUS: CI - 1%-19% impaired, limited or restricted    - GOAL STATUS: CH - 0% impaired, limited or restricted    - D/C STATUS:  ---------------To be determined---------------    Medical Necessity:   · Patient is expected to demonstrate progress in strength, range of motion, balance and coordination to increase independence with ADLs and improve safety during walking and transfers. · Skilled intervention continues to be required due to B low back pain and LE weakness are affecting gait and function. Reason for Services/Other Comments:  · Patient continues to require modification of therapeutic interventions to increase complexity of exercises. Use of outcome tool(s) and clinical judgement create a POC that gives a:  (outcome measure illustrates up to 19% impairment) Questionable prediction of patient's progress: MODERATE COMPLEXITY            TREATMENT:   (In addition to Assessment/Re-Assessment sessions the following treatments were rendered)  Pre-treatment Symptoms/Complaints: Pt. Reported more groin pain than back pain.      Pain: Initial:   Pain Intensity 1: 2  Pain Location 1: Back  Pain Orientation 1: Lower, Left, Right  Pain Intervention(s) 1: Cold pack, Exercise  Post Session: 0/10 no pain Therapeutic Exercise: (45 Minutes)    :  Exercises per grid below to improve mobility, strength, balance and coordination. Required minimal verbal cues to promote proper body alignment, promote proper body posture and promote proper body mechanics. Progressed resistance, range, repetitions and complexity of movement as indicated. Date:  11-20-18 Date:  11-27-18 Date:  11/16/18   Activity/Exercise Parameters Parameters Parameters   Knee to chest  4x30 sec hold 4x30 sec hold BLE's    Hamstring stretch  4 x 30 sec hold with belt 4x30 sec hold with strap BLE's    Piriformis stretch  4 x 30 sec hold BLE's  4x30 sec hold BLE's   Gluteal sets  2 X 10 reps 3 sec hold  X 20 reps 5 sec hold    Sit to stand X 10 2 x 7 reps 2 X 10 reps    Abdominal bracing   2 X 10 reps 3 sec hold  X 10 reps 3 sec hold    Hip adduction X 20 with yellow theraball 2 X 10 reps yellow t-ball 2 X 10 reps yellow t-ball    Standing hip flexion X 20 reps  To each LE with black band X 15 reps to each LE with 3 lbs X 20 reps    Standing hip extension X 20 reps to each LE with black band  X 15 reps to each LE with  3 lbs  ------   Standing hip abuction X 20 reps  To each LE with black band X 15 reps to each LE with 3 lbs  2x10 reps 3 lb wt. s    standing hamstring curls X 20 reps  To each LE with 2.5 lbs 2 x 10 reps to each LE with 3 lbs   X 20 reps 3 lb wt. s BLE's    Seated heel toe raises X 20 reps to each   X 20 reps    Nu step  Level 3 x 10 mins Level 4 x 7 minutes Level 4 x 8 mins    Calf stretch   4x30 sec hold on incline   Marching in place  x 20 reps with black band X 20 to each LE with 3 lbs X 20 reps BLE's 3 lb wt. s   Long arc quads X 20 reps to each LE with 2.5 lb X 20 to each LE with 3 lbs X 20 reps BLE's 3 lb   Seated hamstring curls with eccentric knee extension X 20 to each LE with red band     Hip abduction X 20 with black band 2 X 10 reps black band X 10 reps black band       Manual Therapy (    Soft Tissue Mobilization Duration  Duration: 10 Minutes )Pt. Received soft tissue mobilization to bilateral lumbosacral paraspinals and gluteals in Rt sidelying to decrease pain and muscular tightness. Therapeutic Modalities: for pain and edema (no charge for padma)                  Lumbo-Sacral Spine Heat  Type: Moist pack  Duration : 10 minutes  Patient Position: Prone                             Lumbo-Sacral Spine Electric Stimulation  Type: Interferential  Placement: bilateral lumbosacral paraspinals  Duration : 10 minutes  Patient Position: Prone                                                        HEP: As above; handouts given to patient for all exercises. Treatment/Session Assessment:    · Response to Treatment: Pt. Was compliant with all exercises and reported no pain at the end of todays session. · Compliance with Program/Exercises: compliant most of the time. · Recommendations/Intent for next treatment session: \"Next visit will focus on advancements to more challenging activities\". continue LE and lumbar rehab    Total Treatment Duration: 65 minutes       PT Patient Time In/Time Out  Time In: 0800  Time Out: 0905    PT Patient Time In/Time Out  Time In: 0800  Time Out: 0905    Gloria Barrientos, PTA

## 2018-11-30 ENCOUNTER — HOSPITAL ENCOUNTER (OUTPATIENT)
Dept: PHYSICAL THERAPY | Age: 78
Discharge: HOME | End: 2018-11-30
Payer: MEDICARE

## 2018-11-30 PROCEDURE — 97140 MANUAL THERAPY 1/> REGIONS: CPT

## 2018-11-30 PROCEDURE — 97110 THERAPEUTIC EXERCISES: CPT

## 2018-11-30 NOTE — PROGRESS NOTES
tay Lima  : 1940  [place]  Secondary: [address] [facility] [place] at [facility]  [facility], [place], [address] [place] [address]  [phone]   [phone]       OUTPATIENT PHYSICAL THERAPY:Daily Note 2018    ICD-10: Treatment Diagnosis: M54.5 low back pain and Z98.1 arthrodesis status and R26.89 other  Abnormalities of gait and mobility   Precautions: bending and lifting at the waist   Allergies: Clonidine and Simvastatin   Fall Risk Score: 2 (? 5 = High Risk)  MD Orders: evaluate and treat  MEDICAL/REFERRING DIAGNOSIS:  Arthrodesis status [Z98.1]   DATE OF ONSET: 9-10-18-laminectomy at L5-S1  REFERRING PHYSICIAN: Regine Marquez MD  RETURN PHYSICIAN APPOINTMENT:unsure     INITIAL ASSESSMENT:  Mr. Rick Clemons is a 77 y.o. male presenting to physical therapy with complaints of B low back pain/soreness but with only mild radicular symptoms/tingling into his R big toe since receiving a L5-S1 laminectomy on 9-10-18-patient has long history of R side sciatica --patient ambulates independently into dept with moderate antalgic gait complaining of mainly R low back and R hip pain -pain level is 4/10 -mild tingling into R big toe -trunk range of motion is limited in flexion and extension-flattened lumbar curve with rounded shoulders and forward head on neck posture -incision looks good at low back -mild incision noted just below umbilicus where the surgery was attempted to be performed with an anterior approach on 18  but was not successful -weak B quads and hamstrings are noted -very good candidate for skilled physical therapy to include manual therapy , pain modalities as needed, and therapeutic exercises followed with cold pack -motivated patient     PROBLEM LIST (Impacting functional limitations):  1. Decreased Strength  2. Decreased ADL/Functional Activities  3. Decreased Transfer Abilities  4.  Decreased Ambulation Ability/Technique  5. Decreased Balance  6. Increased Pain  7. Decreased Activity Tolerance  8. Decreased Pacing Skills  9. Decreased Flexibility/Joint Mobility INTERVENTIONS PLANNED:  1. Cold  2. Electrical Stimulation  3. Gait Training  4. Heat  5. Home Exercise Program (HEP)  6. Manual Therapy  7. Range of Motion (ROM)  8. Therapeutic Exercise/Strengthening   TREATMENT PLAN:  Effective Dates: 10/29/2018 TO 12/28/2018 (60 days). Frequency/Duration: 2 times a week for 60 Days  GOALS: (Goals have been discussed and agreed upon with patient.)  Short-Term Functional Goals: Time Frame: 11-29-18  1. Low back pain is less than 4/10 to progress to DC goal -GOAL MET  2. Trunk mobility is increased by 10% -GOAL MET  3. Less rounded posture -GOAL MET  4. Instruction in exercise program to allow increased ADLs. -GOAL MET  5.ambulate independently with minimal + antalgic gait-GOAL MET  Discharge Goals: Time Frame: 12-28-18  1. 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 Trunk mobility is 75% in all ranges to allow full personal care including washing and dressing and putting on shoes and socks -PROGRESSING  3. Independent ambulation with minimal antalgic gait to allow walking community distances -GOOD PROGRESS TOWARD GOALS   4. Able to walk/stand for at least 30-45 minutes -GOOD PROGRESS  5.  Sleep at least 6 hours without wakened by low back pain -GOOD PROGRESS  6. low back pain outcome measure score is improved from 9/50 to 5/50 -ONGOING -LATEST SCORE IS 8/50    Patient has been seen for 7 visits of rehab and has made good progress with low back rehab and LE strengthening from 10-29-18 to 11-20-18-pain level is minimal at 0-1/10 with main complaint being low back stiffness -independent with home exercise program -independent ambulation with minimal to minimal + antalgic gait -decreased lumbar spasm -increased LE strength to 4-/5 -increased function and  patient feels strong enough to go hunting-trunk range of motion has improved with improved hamstring flexibility -very good progress-continue low back rehab with LE strengthening -motivated patient -pleasant gentleman to work with    7.    Rehabilitation Potential For Stated Goals: Good  Regarding Altagraciakimberly Escoto's therapy, I certify that the treatment plan above will be carried out by a therapist or under their direction. Thank you for this referral,  Neo Solorzano PTA     Referring Physician Signature: Aaron Clay MD              Date                    The information in this section was collected on 10-29-18 (except where otherwise noted). HISTORY:   History of Present Injury/Illness (Reason for Referral):  L5-S1 laminectomy on 9-10-18 (attempted surgery on 9-7-18 with anterior approach was not successful )-B low back soreness with mild radiculopathy and antalgic gait   Past Medical History/Comorbidities:   Mr. Vy Evans  has a past medical history of Arthritis, Calculus of kidney, Chronic kidney disease, Chronic kidney disease, stage III (moderate), Fracture of coccyx ([facility] [address] Gout, unspecified, History of left shoulder fracture, Hyperlipidemia, Microscopic hematuria, Sciatica of right side, Unspecified essential hypertension, and Urinary tract infection, site not specified. Mr. Vy Evans  has a past surgical history that includes hx lithotripsy; pr total hip arthroplasty (Left); hx shoulder replacement (Left, 12/2014); L5-S1 laminectomy for instrapinal extradural lesion/tlif/posterior instrumentation (N/A, 9/10/2018); L5-S1 ALIF-ATTEMPTED (N/A, 9/7/2018); and LEFT PROXIMAL HUMERUS OPEN REDUCTION INTERNAL FIXATION  CHOICE ANES (Left, 12/3/2014).   Social History/Living Environment:      Prior Level of Function/Work/Activity:  Independent with home ADLs but with increased low back pain with walking and standing   Current Medications:       Current Outpatient Medications:    HYDROcodone-acetaminophen (NORCO) 7.5-325 mg per tablet, Take 1 Tab by mouth every six (6) hours as needed for Pain. Max Daily Amount: 4 Tabs., Disp: 28 Tab, Rfl: 0    amLODIPine (NORVASC) 5 mg tablet, TAKE 1 TABLET EVERY DAY, Disp: 90 Tab, Rfl: 1    benazepril-hydroCHLOROthiazide (LOTENSIN HCT) 20-12.5 mg per tablet, TAKE 1 TABLET EVERY DAY, Disp: 90 Tab, Rfl: 1    multivitamin (ONE A DAY) tablet, Take 1 Tab by mouth daily. , Disp: , Rfl:     OTHER, Indications: many vitamins and herbal supplements, Disp: , Rfl:    Date Last Reviewed:  11/30/2018   Number of Personal Factors/Comorbidities that affect the Plan of Care:  (patient has a history of chronic kidney disease, gout,sciatica, and HTN) 1-2: MODERATE COMPLEXITY   EXAMINATION:   Observation/Orthostatic Postural Assessment:          Patient ambulates independently into dept with minimal  antalgic gait due to R low back and R hip pain -flattened lumbar curve with rounded shoulders   Palpation:        Less tight R lumbar paraspinals and B gluteus medius -decreased muscle tone in R gastrocsoleus   ROM:         LE range is wfl     Trunk range of motion -    Flexion-75%  Extension-60%-rounded trunk posture  B rotation-85%  B sidebending -75%      Strength: Ankles -4/5     B quads and hamstrings are now 3+ to 4-/5   B hip flexors are 4 to 4+/5     Special Tests:        Negative spring/compression/valsalva/stork test and negative straight leg raise test with tight hamstrings     Negative homans sign    Neurological Screen:    Radiating tingling into R big toe     Balance:        good  Mental Status:         Alert and oriented  Sensation:       Intact to light touch      Body Structures Involved:  1. Bones  2. Joints  3. Muscles Body Functions Affected:  1. Sensory/Pain  2. Neuromusculoskeletal  3. Movement Related Activities and Participation Affected:  1. Learning and Applying Knowledge  2. General Tasks and Demands  3. Mobility  4. Self Care  5.  Domestic Life   Number of elements (examined above) that affect the Plan of Care: 3: MODERATE COMPLEXITY   CLINICAL PRESENTATION:   Presentation: Evolving clinical presentation with changing clinical characteristics: MODERATE COMPLEXITY   CLINICAL DECISION MAKING:   Outcome Measure: Tool Used: Modified Oswestry Low Back Pain Questionnaire  Score:  Initial: 9/50  Most Recent: 8/50 (Date:11-20-18 -- )   Interpretation of Score: Each section is scored on a 0-5 scale, 5 representing the greatest disability. The scores of each section are added together for a total score of 50. Score 0 1-10 11-20 21-30 31-40 41-49 50   Modifier CH CI CJ CK CL CM CN     ? Changing and Maintaining Body Position:    [id number] - CURRENT STATUS: CI - 1%-19% impaired, limited or restricted    - GOAL STATUS: CH - 0% impaired, limited or restricted    - D/C STATUS:  ---------------To be determined---------------    Medical Necessity:   · Patient is expected to demonstrate progress in strength, range of motion, balance and coordination to increase independence with ADLs and improve safety during walking and transfers. · Skilled intervention continues to be required due to B low back pain and LE weakness are affecting gait and function. Reason for Services/Other Comments:  · Patient continues to require modification of therapeutic interventions to increase complexity of exercises. Use of outcome tool(s) and clinical judgement create a POC that gives a:  (outcome measure illustrates up to 19% impairment) Questionable prediction of patient's progress: MODERATE COMPLEXITY            TREATMENT:   (In addition to Assessment/Re-Assessment sessions the following treatments were rendered)  Pre-treatment Symptoms/Complaints: Pt. Reported minimal low back pain today primarily on the left lower back.      Pain: Initial:   Pain Intensity 1: 2  Pain Location 1: Back  Pain Orientation 1: Lower, Left  Pain Intervention(s) 1: Cold pack, Exercise  Post Session: 0/10 no pain      Therapeutic Exercise: (45 Minutes)    :  Exercises per grid below to improve mobility, strength, balance and coordination. Required minimal verbal cues to promote proper body alignment, promote proper body posture and promote proper body mechanics. Progressed resistance, range, repetitions and complexity of movement as indicated. Date:  11-20-18 Date:  11-27-18 Date:  11/30/18   Activity/Exercise Parameters Parameters Parameters   Knee to chest  4x30 sec hold 4x30 sec hold BLE's    Hamstring stretch  4 x 30 sec hold with belt 4x30 sec hold with strap BLE's    Piriformis stretch  4 x 30 sec hold BLE's  4x30 sec hold BLE's   Gluteal sets  2 X 10 reps 3 sec hold  X 20 reps 5 sec hold    Sit to stand X 10 2 x 7 reps 2 X 10 reps    Abdominal bracing   2 X 10 reps 3 sec hold  X 10 reps 3 sec hold    Hip adduction X 20 with yellow theraball 2 X 10 reps yellow t-ball 2 X 10 reps yellow t-ball    Standing hip flexion X 20 reps  To each LE with black band X 15 reps to each LE with 3 lbs X 20 reps    Standing hip extension X 20 reps to each LE with black band  X 15 reps to each LE with  3 lbs  ------   Standing hip abuction X 20 reps  To each LE with black band X 15 reps to each LE with 3 lbs  2x10 reps 3 lb wt. s    standing hamstring curls X 20 reps  To each LE with 2.5 lbs 2 x 10 reps to each LE with 3 lbs   X 20 reps 3 lb wt. s BLE's    Seated heel toe raises X 20 reps to each   X 20 reps    Nu step  Level 3 x 10 mins Level 4 x 7 minutes Level 5 x 8 mins    Calf stretch   4x30 sec hold on incline   Marching in place  x 20 reps with black band X 20 to each LE with 3 lbs X 30 reps BLE's 3 lb wt. s   Long arc quads X 20 reps to each LE with 2.5 lb X 20 to each LE with 3 lbs X 30 reps BLE's 3 lb   Seated hamstring curls with eccentric knee extension X 20 to each LE with red band  Green band x 20 reps each 5 sec hold each   Hip flexor stretch   4x30 sec hold supine with therapist assist.    Hip abduction X 20 with black band 2 X 10 reps black band X 10 reps black band       Manual Therapy (    Soft Tissue Mobilization Duration  Duration: 10 Minutes )Pt. Received soft tissue mobilization to bilateral lumbosacral paraspinals in prone primarily focusing on the left lower back. Therapeutic Modalities: for pain and edema (no charge for estim)                  Lumbo-Sacral Spine Heat  Type: Moist pack  Duration : 10 minutes  Patient Position: Prone                             Lumbo-Sacral Spine Electric Stimulation  Type: Interferential  Placement: bilateral lumbosacral paraspinals   Duration : 10 minutes  Patient Position: Prone                                                  HEP: As above; handouts given to patient for all exercises. Treatment/Session Assessment:    · Response to Treatment: Pt. Reported no pain at the end of todays session. · Compliance with Program/Exercises: compliant most of the time. · Recommendations/Intent for next treatment session: \"Next visit will focus on advancements to more challenging activities\". continue LE and lumbar rehab    Total Treatment Duration: 65 minutes       PT Patient Time In/Time Out  Time In: 0800  Time Out: 0905    PT Patient Time In/Time Out  Time In: 0800  Time Out: 0905    Dinesh Lucero, PTA

## 2018-12-03 ENCOUNTER — HOSPITAL ENCOUNTER (OUTPATIENT)
Dept: PHYSICAL THERAPY | Age: 78
Discharge: HOME | End: 2018-12-03
Payer: MEDICARE

## 2018-12-03 PROCEDURE — 97110 THERAPEUTIC EXERCISES: CPT

## 2018-12-03 PROCEDURE — 97140 MANUAL THERAPY 1/> REGIONS: CPT

## 2018-12-03 NOTE — PROGRESS NOTES
qa 
 
Emma Tarango : 1940 [place] Secondary: [address] [place],[address] at [address] [facility], [facility], [address] [phone]   [phone] OUTPATIENT PHYSICAL THERAPY:Daily Note 12/3/2018 ICD-10: Treatment Diagnosis: M54.5 low back pain and Z98.1 arthrodesis status and R26.89 other  Abnormalities of gait and mobility Precautions: bending and lifting at the waist  
Allergies: Clonidine and Simvastatin Fall Risk Score: 2 (? 5 = High Risk) MD Orders: evaluate and treat  MEDICAL/REFERRING DIAGNOSIS: 
Arthrodesis status [Z98.1] DATE OF ONSET: 9-10-18-laminectomy at L5-S1 REFERRING PHYSICIAN: Aurelio De La Paz MD 
[address] INITIAL ASSESSMENT:  Mr. Earla Pallas is a 77 y.o. male presenting to physical therapy with complaints of B low back pain/soreness but with only mild radicular symptoms/tingling into his R big toe since receiving a L5-S1 laminectomy on 9-10-18-patient has long history of R side sciatica --patient ambulates independently into dept with moderate antalgic gait complaining of mainly R low back and R hip pain -pain level is 4/10 -mild tingling into R big toe -trunk range of motion is limited in flexion and extension-flattened lumbar curve with rounded shoulders and forward head on neck posture -incision looks good at low back -mild incision noted just below umbilicus where the surgery was attempted to be performed with an anterior approach on 18  but was not successful -weak B quads and hamstrings are noted -very good candidate for skilled physical therapy to include manual therapy , pain modalities as needed, and therapeutic exercises followed with cold pack -motivated patient PROBLEM LIST (Impacting functional limitations): 1. Decreased Strength 2. Decreased ADL/Functional Activities 3. Decreased Transfer Abilities 4. Decreased Ambulation Ability/Technique 5. Decreased Balance 6. Increased Pain 7. Decreased Activity Tolerance 8. Decreased Pacing Skills 9. Decreased Flexibility/Joint Mobility INTERVENTIONS PLANNED: 
1. Cold 2. Electrical Stimulation 3. Gait Training 4. Heat 5. Home Exercise Program (HEP) 6. Manual Therapy 7. Range of Motion (ROM) 8. Therapeutic Exercise/Strengthening TREATMENT PLAN: 
Effective Dates: 10/29/2018 TO 12/28/2018 (60 days). Frequency/Duration: 2 times a week for 60 Days GOALS: (Goals have been discussed and agreed upon with patient.) Short-Term Functional Goals: Time Frame: 11-29-18 1. Low back pain is less than 4/10 to progress to DC goal -GOAL MET 2. Trunk mobility is increased by 10% -GOAL MET 3. Less rounded posture -GOAL MET 4. Instruction in exercise program to allow increased ADLs. -GOAL MET 5.ambulate independently with minimal + antalgic gait-GOAL MET Discharge Goals: Time Frame: 12-28-18 1. 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 Trunk mobility is 75% in all ranges to allow full personal care including washing and dressing and putting on shoes and socks -PROGRESSING 
3. Independent ambulation with minimal antalgic gait to allow walking community distances -GOOD PROGRESS TOWARD GOALS 4. Able to walk/stand for at least 30-45 minutes -GOOD PROGRESS 5. Sleep at least 6 hours without wakened by low back pain -GOOD PROGRESS 6. low back pain outcome measure score is improved from 9/50 to 5/50 -ONGOING -LATEST SCORE IS 8/50 Patient has been seen for 12 visits of rehab and has made good progress with low back rehab and LE strengthening from 10-29-18 to 12-3-18-pain level is minimal at 0-2/10 with main complaint being L hip soreness not low back pain  -independent with home exercise program -independent ambulation with minimal to minimal + antalgic gait due to L hip discomfort nt low back pain /history of L AQUILES - -decreased lumbar spasm -increased LE strength to 4-/5 -increased function and  patient feels strong enough to go hunting-trunk range of motion has improved with improved hamstring flexibility -very good progress-continue low back rehab with LE strengthening -motivated patient -pleasant gentleman to work with  -monitor L hip as needed 7. Rehabilitation Potential For Stated Goals: Good Regarding Kane Escoto's therapy, I certify that the treatment plan above will be carried out by a therapist or under their direction. Thank you for this referral, Glenna Mota PT Referring Physician Signature: Blair Stroud MD            Date The information in this section was collected on 10-29-18 (except where otherwise noted). HISTORY:  
History of Present Injury/Illness (Reason for Referral): 
L5-S1 laminectomy on 9-10-18 (attempted surgery on 9-7-18 with anterior approach was not successful )-B low back soreness with mild radiculopathy and antalgic gait Past Medical History/Comorbidities:  
Mr. Sreedhar Dotson  has a past medical history of Arthritis, Calculus of kidney, Chronic kidney disease, Chronic kidney disease, stage III (moderate), Fracture of coccyx ([address] Gout, unspecified, History of left shoulder fracture, Hyperlipidemia, Microscopic hematuria, Sciatica of right side, Unspecified essential hypertension, and Urinary tract infection, site not specified. Mr. Sreedhar Dotson  has a past surgical history that includes hx lithotripsy; pr total hip arthroplasty (Left); hx shoulder replacement (Left, 12/2014); L5-S1 laminectomy for instrapinal extradural lesion/tlif/posterior instrumentation (N/A, 9/10/2018); L5-S1 ALIF-ATTEMPTED (N/A, 9/7/2018); and LEFT PROXIMAL HUMERUS OPEN REDUCTION INTERNAL FIXATION  CHOICE ANES (Left, 12/3/2014). Social History/Living Environment:  
  
Prior Level of Function/Work/Activity: Independent with home ADLs but with increased low back pain with walking and standing Current Medications:   
  
Current Outpatient Medications:  
  HYDROcodone-acetaminophen (NORCO) 7.5-325 mg per tablet, Take 1 Tab by mouth every six (6) hours as needed for Pain. Max Daily Amount: 4 Tabs., Disp: 28 Tab, Rfl: 0 
  amLODIPine (NORVASC) 5 mg tablet, TAKE 1 TABLET EVERY DAY, Disp: 90 Tab, Rfl: 1 
  benazepril-hydroCHLOROthiazide (LOTENSIN HCT) 20-12.5 mg per tablet, TAKE 1 TABLET EVERY DAY, Disp: 90 Tab, Rfl: 1 
  multivitamin (ONE A DAY) tablet, Take 1 Tab by mouth daily. , Disp: , Rfl:  
  OTHER, Indications: many vitamins and herbal supplements, Disp: , Rfl:   
Date Last Reviewed:  12/3/2018 Number of Personal Factors/Comorbidities that affect the Plan of Care: 
(patient has a history of chronic kidney disease, gout,sciatica, and HTN) 1-2: MODERATE COMPLEXITY EXAMINATION:  
Observation/Orthostatic Postural Assessment:   
      Patient ambulates independently into dept with minimal  antalgic gait due to R low back and R hip pain -flattened lumbar curve with rounded shoulders Palpation:   
    Less tight R lumbar paraspinals and B gluteus medius -decreased muscle tone in R gastrocsoleus ROM:   
     LE range is wfl  
 
Trunk range of motion - Flexion-75% Extension-60%-rounded trunk posture B rotation-85% B sidebending -75% Strength: Ankles -4/5 B quads and hamstrings are now 3+ to 4-/5 B hip flexors are 4 to 4+/5 Special Tests:   
    Negative spring/compression/valsalva/stork test and negative straight leg raise test with tight hamstrings Negative homans sign Neurological Screen: 
 
Radiating tingling into R big toe Balance:   
    good Mental Status:   
     Alert and oriented Sensation:  
    Intact to light touch Body Structures Involved: 1. Bones 2. Joints 3. Muscles Body Functions Affected: 1. Sensory/Pain 2.  Neuromusculoskeletal 
 3. Movement Related Activities and Participation Affected: 1. Learning and Applying Knowledge 2. General Tasks and Demands 3. Mobility 4. Self Care 5. Domestic Life Number of elements (examined above) that affect the Plan of Care: 3: MODERATE COMPLEXITY CLINICAL PRESENTATION:  
Presentation: Evolving clinical presentation with changing clinical characteristics: MODERATE COMPLEXITY CLINICAL DECISION MAKING:  
Outcome Measure: Tool Used: Modified Oswestry Low Back Pain Questionnaire Score:  Initial: 9/50  Most Recent: 8/50 (Date:11-20-18 -- ) Interpretation of Score: Each section is scored on a 0-5 scale, 5 representing the greatest disability. The scores of each section are added together for a total score of 50. Score 0 1-10 11-20 21-30 31-40 41-49 50 Modifier CH CI CJ CK CL CM CN  
 
? Changing and Maintaining Body Position:  
 [id number] - CURRENT STATUS: CI - 1%-19% impaired, limited or restricted  - GOAL STATUS: CH - 0% impaired, limited or restricted  - D/C STATUS:  ---------------To be determined--------------- Medical Necessity:  
· Patient is expected to demonstrate progress in strength, range of motion, balance and coordination to increase independence with ADLs and improve safety during walking and transfers. · Skilled intervention continues to be required due to B low back pain and LE weakness are affecting gait and function. Reason for Services/Other Comments: 
· Patient continues to require modification of therapeutic interventions to increase complexity of exercises. Use of outcome tool(s) and clinical judgement create a POC that gives a: 
(outcome measure illustrates up to 19% impairment) Questionable prediction of patient's progress: MODERATE COMPLEXITY  
  
 
 
 
TREATMENT:  
(In addition to Assessment/Re-Assessment sessions the following treatments were rendered) Pre-treatment Symptoms/Complaints: Pt. Reported minimal to no  low back pain today with main issue is soreness in L hip where he thinks he pulled a muscle in that area 2 weeks ago-( sore in L inguinal region ). Pain: Initial:  
Pain Intensity 1: 2 Pain Location 1: Hip Pain Orientation 1: Anterior, Left Pain Intervention(s) 1: Exercise, Heat applied  Post Session: 0/10 no pain in low back -2/10 in L groin/inguinal region -sore with active hip flexion in sitting Therapeutic Exercise: ( )x 30 minutes 
 
:  Exercises per grid below to improve mobility, strength, balance and coordination. Required minimal verbal cues to promote proper body alignment, promote proper body posture and promote proper body mechanics. Progressed resistance, range, repetitions and complexity of movement as indicated. Date: 
12-3-18 Date: 
11-27-18 Date: 
11/30/18 Activity/Exercise Parameters Parameters Parameters Knee to chest  4x30 sec hold 4x30 sec hold BLE's Hamstring stretch  4 x 30 sec hold with belt 4x30 sec hold with strap BLE's   
Piriformis stretch  4 x 30 sec hold BLE's  4x30 sec hold BLE's  
Gluteal sets  2 X 10 reps 3 sec hold  X 20 reps 5 sec hold Sit to stand X 10 2 x 7 reps 2 X 10 reps Abdominal bracing   2 X 10 reps 3 sec hold  X 10 reps 3 sec hold Hip adduction X 20 with yellow theraball 2 X 10 reps yellow t-ball 2 X 10 reps yellow t-ball Standing hip flexion X 15 reps  To each LE with black band X 15 reps to each LE with 3 lbs X 20 reps Standing hip extension X 15 reps to each LE with black band  X 15 reps to each LE with  3 lbs  ------ Standing hip abuction X 15 reps  To each LE with black band X 15 reps to each LE with 3 lbs  2x10 reps 3 lb wt. s   
standing hamstring curls  2 x 10 reps to each LE with 3 lbs   X 20 reps 3 lb wt. s BLE's   
     
     
Seated heel toe raises X 20 reps to each   X 20 reps Nu step   Level 4 x 7 minutes Level 5 x 8 mins Calf stretch   4x30 sec hold on incline Marching in place  x 20 reps with black band on R LE -sore on left X 20 to each LE with 3 lbs X 30 reps BLE's 3 lb wt. s Long arc quads X 20 reps to each LE with 3 lb X 20 to each LE with 3 lbs X 30 reps BLE's 3 lb Seated hamstring curls with eccentric knee extension X 20 to each LE with green  band  Green band x 20 reps each 5 sec hold each Hip flexor stretch   4x30 sec hold supine with therapist assist.   
Hip abduction X 20 with black band 2 X 10 reps black band X 10 reps black band Manual Therapy (    Soft Tissue Mobilization Duration Duration: 25 Minutes )Pt. Received soft tissue mobilization to bilateral lumbosacral paraspinals and L gluteus medius in R sidelying position -effleurage and petrisage for tightness . Therapeutic Modalities: for pain and edema Electrical muscle stimulation with heat to L hip and L lumbar region while in R sidelying position x 10 minutes NO CHARGE FOR MUSCLE STIMULATION 
 
HEP: As above; handouts given to patient for all exercises. Treatment/Session Assessment:   
· Response to Treatment: Pt. Reported no pain in low back and no tingling in R foot or toes. · Compliance with Program/Exercises: compliant most of the time. · Recommendations/Intent for next treatment session: \"Next visit will focus on advancements to more challenging activities\". continue LE and lumbar rehab Total Treatment Duration: 65 minutes PT Patient Time In/Time Out Time In: 1100 Time Out: [id number], PT

## 2018-12-07 ENCOUNTER — HOSPITAL ENCOUNTER (OUTPATIENT)
Dept: PHYSICAL THERAPY | Age: 78
Discharge: HOME | End: 2018-12-07
Payer: MEDICARE

## 2018-12-07 PROCEDURE — 97110 THERAPEUTIC EXERCISES: CPT

## 2018-12-07 PROCEDURE — 97140 MANUAL THERAPY 1/> REGIONS: CPT

## 2018-12-07 NOTE — PROGRESS NOTES
tay Potter : 1940 [place] Secondary: [address] [facility] [place],[address] at [place] [id number] [facility], [address] [place] [address] Street [phone]   [phone] OUTPATIENT PHYSICAL THERAPY:Daily Note 2018 ICD-10: Treatment Diagnosis: M54.5 low back pain and Z98.1 arthrodesis status and R26.89 other  Abnormalities of gait and mobility Precautions: bending and lifting at the waist  
Allergies: Clonidine and Simvastatin Fall Risk Score: 2 (? 5 = High Risk) MD Orders: evaluate and treat  MEDICAL/REFERRING DIAGNOSIS: 
Arthrodesis status [Z98.1] DATE OF ONSET: 9-10-18-laminectomy at L5-S1 REFERRING PHYSICIAN: Hilaria Martin MD 
[address] INITIAL ASSESSMENT:  Mr. Siddhartha Funk is a 77 y.o. male presenting to physical therapy with complaints of B low back pain/soreness but with only mild radicular symptoms/tingling into his R big toe since receiving a L5-S1 laminectomy on 9-10-18-patient has long history of R side sciatica --patient ambulates independently into dept with moderate antalgic gait complaining of mainly R low back and R hip pain -pain level is 4/10 -mild tingling into R big toe -trunk range of motion is limited in flexion and extension-flattened lumbar curve with rounded shoulders and forward head on neck posture -incision looks good at low back -mild incision noted just below umbilicus where the surgery was attempted to be performed with an anterior approach on 18  but was not successful -weak B quads and hamstrings are noted -very good candidate for skilled physical therapy to include manual therapy , pain modalities as needed, and therapeutic exercises followed with cold pack -motivated patient PROBLEM LIST (Impacting functional limitations): 1. Decreased Strength 2. Decreased ADL/Functional Activities 3. Decreased Transfer Abilities 4. Decreased Ambulation Ability/Technique 5. Decreased Balance 6. Increased Pain 7. Decreased Activity Tolerance 8. Decreased Pacing Skills 9. Decreased Flexibility/Joint Mobility INTERVENTIONS PLANNED: 
1. Cold 2. Electrical Stimulation 3. Gait Training 4. Heat 5. Home Exercise Program (HEP) 6. Manual Therapy 7. Range of Motion (ROM) 8. Therapeutic Exercise/Strengthening TREATMENT PLAN: 
Effective Dates: 10/29/2018 TO 12/28/2018 (60 days). Frequency/Duration: 2 times a week for 60 Days GOALS: (Goals have been discussed and agreed upon with patient.) Short-Term Functional Goals: Time Frame: 11-29-18 1. Low back pain is less than 4/10 to progress to DC goal -GOAL MET 2. Trunk mobility is increased by 10% -GOAL MET 3. Less rounded posture -GOAL MET 4. Instruction in exercise program to allow increased ADLs. -GOAL MET 5.ambulate independently with minimal + antalgic gait-GOAL MET Discharge Goals: Time Frame: 12-28-18 1. 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 Trunk mobility is 75% in all ranges to allow full personal care including washing and dressing and putting on shoes and socks -PROGRESSING 
3. Independent ambulation with minimal antalgic gait to allow walking community distances -GOOD PROGRESS TOWARD GOALS 4. Able to walk/stand for at least 30-45 minutes -GOOD PROGRESS 5. Sleep at least 6 hours without wakened by low back pain -GOOD PROGRESS 6. low back pain outcome measure score is improved from 9/50 to 5/50 -ONGOING -LATEST SCORE IS 8/50 Patient has been seen for 12 visits of rehab and has made good progress with low back rehab and LE strengthening from 10-29-18 to 12-3-18-pain level is minimal at 0-2/10 with main complaint being L hip soreness not low back pain  -independent with home exercise program -independent ambulation with minimal to minimal + antalgic gait due to L hip discomfort nt low back pain /history of L AQUILES - -decreased lumbar spasm -increased LE strength to 4-/5 -increased function and  patient feels strong enough to go hunting-trunk range of motion has improved with improved hamstring flexibility -very good progress-continue low back rehab with LE strengthening -motivated patient -pleasant gentleman to work with  -monitor L hip as needed 7. Rehabilitation Potential For Stated Goals: Good Regarding Christy Escoto's therapy, I certify that the treatment plan above will be carried out by a therapist or under their direction. Thank you for this referral, Yamileth Payne PTA Referring Physician Signature: Sruthi Perea MD            Date The information in this section was collected on 10-29-18 (except where otherwise noted). HISTORY:  
History of Present Injury/Illness (Reason for Referral): 
L5-S1 laminectomy on 9-10-18 (attempted surgery on 9-7-18 with anterior approach was not successful )-B low back soreness with mild radiculopathy and antalgic gait Past Medical History/Comorbidities:  
Mr. Emily Payton  has a past medical history of Arthritis, Calculus of kidney, Chronic kidney disease, Chronic kidney disease, stage III (moderate), Fracture of coccyx ([address] Gout, unspecified, History of left shoulder fracture, Hyperlipidemia, Microscopic hematuria, Sciatica of right side, Unspecified essential hypertension, and Urinary tract infection, site not specified. Mr. Emily Payton  has a past surgical history that includes hx lithotripsy; pr total hip arthroplasty (Left); hx shoulder replacement (Left, 12/2014); L5-S1 laminectomy for instrapinal extradural lesion/tlif/posterior instrumentation (N/A, 9/10/2018); L5-S1 ALIF-ATTEMPTED (N/A, 9/7/2018); and LEFT PROXIMAL HUMERUS OPEN REDUCTION INTERNAL FIXATION  CHOICE ANES (Left, 12/3/2014). Social History/Living Environment:  
  
Prior Level of Function/Work/Activity: Independent with home ADLs but with increased low back pain with walking and standing Current Medications:   
  
Current Outpatient Medications:  
  HYDROcodone-acetaminophen (NORCO) 7.5-325 mg per tablet, Take 1 Tab by mouth every six (6) hours as needed for Pain. Max Daily Amount: 4 Tabs., Disp: 28 Tab, Rfl: 0 
  amLODIPine (NORVASC) 5 mg tablet, TAKE 1 TABLET EVERY DAY, Disp: 90 Tab, Rfl: 1 
  benazepril-hydroCHLOROthiazide (LOTENSIN HCT) 20-12.5 mg per tablet, TAKE 1 TABLET EVERY DAY, Disp: 90 Tab, Rfl: 1 
  multivitamin (ONE A DAY) tablet, Take 1 Tab by mouth daily. , Disp: , Rfl:  
  OTHER, Indications: many vitamins and herbal supplements, Disp: , Rfl:   
Date Last Reviewed:  12/7/2018 Number of Personal Factors/Comorbidities that affect the Plan of Care: 
(patient has a history of chronic kidney disease, gout,sciatica, and HTN) 1-2: MODERATE COMPLEXITY EXAMINATION:  
Observation/Orthostatic Postural Assessment:   
      Patient ambulates independently into dept with minimal  antalgic gait due to R low back and R hip pain -flattened lumbar curve with rounded shoulders Palpation:   
    Less tight R lumbar paraspinals and B gluteus medius -decreased muscle tone in R gastrocsoleus ROM:   
     LE range is wfl  
 
Trunk range of motion - Flexion-75% Extension-60%-rounded trunk posture B rotation-85% B sidebending -75% Strength: Ankles -4/5 B quads and hamstrings are now 3+ to 4-/5 B hip flexors are 4 to 4+/5 Special Tests:   
    Negative spring/compression/valsalva/stork test and negative straight leg raise test with tight hamstrings Negative homans sign Neurological Screen: 
 
Radiating tingling into R big toe Balance:   
    good Mental Status:   
     Alert and oriented Sensation:  
    Intact to light touch Body Structures Involved: 1. Bones 2. Joints 3. Muscles Body Functions Affected: 1. Sensory/Pain 2.  Neuromusculoskeletal 
 3. Movement Related Activities and Participation Affected: 1. Learning and Applying Knowledge 2. General Tasks and Demands 3. Mobility 4. Self Care 5. Domestic Life Number of elements (examined above) that affect the Plan of Care: 3: MODERATE COMPLEXITY CLINICAL PRESENTATION:  
Presentation: Evolving clinical presentation with changing clinical characteristics: MODERATE COMPLEXITY CLINICAL DECISION MAKING:  
Outcome Measure: Tool Used: Modified Oswestry Low Back Pain Questionnaire Score:  Initial: 9/50  Most Recent: 8/50 (Date:11-20-18 -- ) Interpretation of Score: Each section is scored on a 0-5 scale, 5 representing the greatest disability. The scores of each section are added together for a total score of 50. Score 0 1-10 11-20 21-30 31-40 41-49 50 Modifier CH CI CJ CK CL CM CN  
 
? Changing and Maintaining Body Position:  
 [id number] - CURRENT STATUS: CI - 1%-19% impaired, limited or restricted  - GOAL STATUS: CH - 0% impaired, limited or restricted  - D/C STATUS:  ---------------To be determined--------------- Medical Necessity:  
· Patient is expected to demonstrate progress in strength, range of motion, balance and coordination to increase independence with ADLs and improve safety during walking and transfers. · Skilled intervention continues to be required due to B low back pain and LE weakness are affecting gait and function. Reason for Services/Other Comments: 
· Patient continues to require modification of therapeutic interventions to increase complexity of exercises. Use of outcome tool(s) and clinical judgement create a POC that gives a: 
(outcome measure illustrates up to 19% impairment) Questionable prediction of patient's progress: MODERATE COMPLEXITY  
  
 
 
 
TREATMENT:  
(In addition to Assessment/Re-Assessment sessions the following treatments were rendered) Pre-treatment Symptoms/Complaints: Pt. Reported he still has left groin pain but gets relief when he performs his stretches. Pain: Initial:  
Pain Intensity 1: 3 Pain Location 1: Groin Pain Orientation 1: Left Pain Intervention(s) 1: Exercise, Heat applied  Post Session: no back pain 2/10 left groin pain. Therapeutic Exercise: ( )  
 
:  Exercises per grid below to improve mobility, strength, balance and coordination. Required minimal verbal cues to promote proper body alignment, promote proper body posture and promote proper body mechanics. Progressed resistance, range, repetitions and complexity of movement as indicated. Date: 
12-3-18 Date: 
12/-18 Date: 
11/30/18 Activity/Exercise Parameters Parameters Parameters Knee to chest  4x30 sec hold 4x30 sec hold BLE's Hamstring stretch  4 x 30 sec hold with belt 4x30 sec hold with strap BLE's   
Piriformis stretch  4 x 30 sec hold BLE's  4x30 sec hold BLE's  
Gluteal sets  2 X 10 reps 3 sec hold  X 20 reps 5 sec hold Sit to stand X 10 2 x 7 reps 2 X 10 reps Abdominal bracing   2 X 10 reps 3 sec hold  X 10 reps 3 sec hold Hip adduction X 20 with yellow theraball 2 X 10 reps yellow t-ball 2 X 10 reps yellow t-ball Standing hip flexion X 15 reps  To each LE with black band X 15 reps to each LE with 3 lbs X 20 reps Standing hip extension X 15 reps to each LE with black band  X 15 reps to each LE with  3 lbs  ------ Standing hip abuction X 15 reps  To each LE with black band X 15 reps to each LE with 3 lbs  2x10 reps 3 lb wt. s   
standing hamstring curls  2 x 10 reps to each LE with 3 lbs   X 20 reps 3 lb wt. s BLE's   
     
     
Seated heel toe raises X 20 reps to each   X 20 reps Nu step   Level 4 x 7 minutes Level 5 x 8 mins Calf stretch   4x30 sec hold on incline Marching in place  x 20 reps with black band on R LE -sore on left X 20 to each LE with 3 lbs X 30 reps BLE's 3 lb wt. s Long arc quads X 20 reps to each LE with 3 lb X 20 to each LE with 3 lbs X 30 reps BLE's 3 lb Seated hamstring curls with eccentric knee extension X 20 to each LE with green  band  Green band x 20 reps each 5 sec hold each Hip flexor stretch   4x30 sec hold supine with therapist assist.   
Hip abduction X 20 with black band 2 X 10 reps black band X 10 reps black band Manual Therapy (      )Pt. Received soft tissue mobilization to bilateral lumbosacral paraspinals and L gluteus medius in R sidelying position -effleurage and petrisage for tightness . Therapeutic Modalities: for pain and edema Electrical muscle stimulation with heat to L hip and L lumbar region while in R sidelying position x 10 minutes NO CHARGE FOR MUSCLE STIMULATION 
 
HEP: As above; handouts given to patient for all exercises. Treatment/Session Assessment:   
· Response to Treatment: Pt. Reported no pain in low back and no tingling in R foot or toes. · Compliance with Program/Exercises: compliant most of the time. · Recommendations/Intent for next treatment session: \"Next visit will focus on advancements to more challenging activities\". continue LE and lumbar rehab Total Treatment Duration: 65 minutes PT Patient Time In/Time Out Time In: 0800 Time Out: 0900 Ernie Dupree PTA

## 2018-12-11 ENCOUNTER — HOSPITAL ENCOUNTER (OUTPATIENT)
Dept: PHYSICAL THERAPY | Age: 78
Discharge: HOME | End: 2018-12-11
Payer: MEDICARE

## 2018-12-11 PROCEDURE — 97140 MANUAL THERAPY 1/> REGIONS: CPT

## 2018-12-11 PROCEDURE — 97110 THERAPEUTIC EXERCISES: CPT

## 2018-12-11 NOTE — PROGRESS NOTES
tay Corona : 1940 [place] Secondary: [address] [facility],[address] at [address], Jonel knutson, [address] [place] [address] Street [phone]   [phone] OUTPATIENT PHYSICAL THERAPY:Daily Note 2018 ICD-10: Treatment Diagnosis: M54.5 low back pain and Z98.1 arthrodesis status and R26.89 other  Abnormalities of gait and mobility Precautions: bending and lifting at the waist  
Allergies: Clonidine and Simvastatin Fall Risk Score: 2 (? 5 = High Risk) MD Orders: evaluate and treat  MEDICAL/REFERRING DIAGNOSIS: 
Arthrodesis status [Z98.1] DATE OF ONSET: 9-10-18-laminectomy at L5-S1 REFERRING PHYSICIAN: Hina Hu MD 
[address] INITIAL ASSESSMENT:  Mr. Shae Dalal is a 77 y.o. male presenting to physical therapy with complaints of B low back pain/soreness but with only mild radicular symptoms/tingling into his R big toe since receiving a L5-S1 laminectomy on 9-10-18-patient has long history of R side sciatica --patient ambulates independently into dept with moderate antalgic gait complaining of mainly R low back and R hip pain -pain level is 4/10 -mild tingling into R big toe -trunk range of motion is limited in flexion and extension-flattened lumbar curve with rounded shoulders and forward head on neck posture -incision looks good at low back -mild incision noted just below umbilicus where the surgery was attempted to be performed with an anterior approach on 18  but was not successful -weak B quads and hamstrings are noted -very good candidate for skilled physical therapy to include manual therapy , pain modalities as needed, and therapeutic exercises followed with cold pack -motivated patient PROBLEM LIST (Impacting functional limitations): 1. Decreased Strength 2. Decreased ADL/Functional Activities 3. Decreased Transfer Abilities 4. Decreased Ambulation Ability/Technique 5. Decreased Balance 6. Increased Pain 7. Decreased Activity Tolerance 8. Decreased Pacing Skills 9. Decreased Flexibility/Joint Mobility INTERVENTIONS PLANNED: 
1. Cold 2. Electrical Stimulation 3. Gait Training 4. Heat 5. Home Exercise Program (HEP) 6. Manual Therapy 7. Range of Motion (ROM) 8. Therapeutic Exercise/Strengthening TREATMENT PLAN: 
Effective Dates: 10/29/2018 TO 12/28/2018 (60 days). Frequency/Duration: 2 times a week for 60 Days GOALS: (Goals have been discussed and agreed upon with patient.) Short-Term Functional Goals: Time Frame: 11-29-18 1. Low back pain is less than 4/10 to progress to DC goal -GOAL MET 2. Trunk mobility is increased by 10% -GOAL MET 3. Less rounded posture -GOAL MET 4. Instruction in exercise program to allow increased ADLs. -GOAL MET 5.ambulate independently with minimal + antalgic gait-GOAL MET Discharge Goals: Time Frame: 12-28-18 1. 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 Trunk mobility is 75% in all ranges to allow full personal care including washing and dressing and putting on shoes and socks -PROGRESSING 
3. Independent ambulation with minimal antalgic gait to allow walking community distances -GOOD PROGRESS TOWARD GOALS 4. Able to walk/stand for at least 30-45 minutes -GOOD PROGRESS 5. Sleep at least 6 hours without wakened by low back pain -GOOD PROGRESS 6. low back pain outcome measure score is improved from 9/50 to 5/50 -ONGOING -LATEST SCORE IS 8/50 Patient has been seen for 12 visits of rehab and has made good progress with low back rehab and LE strengthening from 10-29-18 to 12-3-18-pain level is minimal at 0-2/10 with main complaint being L hip soreness not low back pain  -independent with home exercise program -independent ambulation with minimal to minimal + antalgic gait due to L hip discomfort nt low back pain /history of L AQUILES - -decreased lumbar spasm -increased LE strength to 4-/5 -increased function and  patient feels strong enough to go hunting-trunk range of motion has improved with improved hamstring flexibility -very good progress-continue low back rehab with LE strengthening -motivated patient -pleasant gentleman to work with  -monitor L hip as needed 7. Rehabilitation Potential For Stated Goals: Good Regarding Ozzie Huang Jevon's therapy, I certify that the treatment plan above will be carried out by a therapist or under their direction. Thank you for this referral, Anabela Khan PTA Referring Physician Signature: Anupama Godinez MD            Date The information in this section was collected on 10-29-18 (except where otherwise noted). HISTORY:  
History of Present Injury/Illness (Reason for Referral): 
L5-S1 laminectomy on 9-10-18 (attempted surgery on 9-7-18 with anterior approach was not successful )-B low back soreness with mild radiculopathy and antalgic gait Past Medical History/Comorbidities:  
Mr. Holly Boas  has a past medical history of Arthritis, Calculus of kidney, Chronic kidney disease, Chronic kidney disease, stage III (moderate), Fracture of coccyx ([address] Gout, unspecified, History of left shoulder fracture, Hyperlipidemia, Microscopic hematuria, Sciatica of right side, Unspecified essential hypertension, and Urinary tract infection, site not specified. Mr. Holly Boas  has a past surgical history that includes hx lithotripsy; pr total hip arthroplasty (Left); hx shoulder replacement (Left, 12/2014); L5-S1 laminectomy for instrapinal extradural lesion/tlif/posterior instrumentation (N/A, 9/10/2018); L5-S1 ALIF-ATTEMPTED (N/A, 9/7/2018); and LEFT PROXIMAL HUMERUS OPEN REDUCTION INTERNAL FIXATION  CHOICE ANES (Left, 12/3/2014). Social History/Living Environment:  
  
Prior Level of Function/Work/Activity: Independent with home ADLs but with increased low back pain with walking and standing Current Medications:   
  
Current Outpatient Medications:  
  HYDROcodone-acetaminophen (NORCO) 7.5-325 mg per tablet, Take 1 Tab by mouth every six (6) hours as needed for Pain. Max Daily Amount: 4 Tabs., Disp: 28 Tab, Rfl: 0 
  amLODIPine (NORVASC) 5 mg tablet, TAKE 1 TABLET EVERY DAY, Disp: 90 Tab, Rfl: 1 
  benazepril-hydroCHLOROthiazide (LOTENSIN HCT) 20-12.5 mg per tablet, TAKE 1 TABLET EVERY DAY, Disp: 90 Tab, Rfl: 1 
  multivitamin (ONE A DAY) tablet, Take 1 Tab by mouth daily. , Disp: , Rfl:  
  OTHER, Indications: many vitamins and herbal supplements, Disp: , Rfl:   
Date Last Reviewed:  12/11/2018 Number of Personal Factors/Comorbidities that affect the Plan of Care: 
(patient has a history of chronic kidney disease, gout,sciatica, and HTN) 1-2: MODERATE COMPLEXITY EXAMINATION:  
Observation/Orthostatic Postural Assessment:   
      Patient ambulates independently into dept with minimal  antalgic gait due to R low back and R hip pain -flattened lumbar curve with rounded shoulders Palpation:   
    Less tight R lumbar paraspinals and B gluteus medius -decreased muscle tone in R gastrocsoleus ROM:   
     LE range is wfl  
 
Trunk range of motion - Flexion-75% Extension-60%-rounded trunk posture B rotation-85% B sidebending -75% Strength: Ankles -4/5 B quads and hamstrings are now 3+ to 4-/5 B hip flexors are 4 to 4+/5 Special Tests:   
    Negative spring/compression/valsalva/stork test and negative straight leg raise test with tight hamstrings Negative homans sign Neurological Screen: 
 
Radiating tingling into R big toe Balance:   
    good Mental Status:   
     Alert and oriented Sensation:  
    Intact to light touch Body Structures Involved: 1. Bones 2. Joints 3. Muscles Body Functions Affected: 1. Sensory/Pain 2.  Neuromusculoskeletal 
 3. Movement Related Activities and Participation Affected: 1. Learning and Applying Knowledge 2. General Tasks and Demands 3. Mobility 4. Self Care 5. Domestic Life Number of elements (examined above) that affect the Plan of Care: 3: MODERATE COMPLEXITY CLINICAL PRESENTATION:  
Presentation: Evolving clinical presentation with changing clinical characteristics: MODERATE COMPLEXITY CLINICAL DECISION MAKING:  
Outcome Measure: Tool Used: Modified Oswestry Low Back Pain Questionnaire Score:  Initial: 9/50  Most Recent: 8/50 (Date:11-20-18 -- ) Interpretation of Score: Each section is scored on a 0-5 scale, 5 representing the greatest disability. The scores of each section are added together for a total score of 50. Score 0 1-10 11-20 21-30 31-40 41-49 50 Modifier CH CI CJ CK CL CM CN  
 
? Changing and Maintaining Body Position:  
 [id number] - CURRENT STATUS: CI - 1%-19% impaired, limited or restricted  - GOAL STATUS: CH - 0% impaired, limited or restricted  - D/C STATUS:  ---------------To be determined--------------- Medical Necessity:  
· Patient is expected to demonstrate progress in strength, range of motion, balance and coordination to increase independence with ADLs and improve safety during walking and transfers. · Skilled intervention continues to be required due to B low back pain and LE weakness are affecting gait and function. Reason for Services/Other Comments: 
· Patient continues to require modification of therapeutic interventions to increase complexity of exercises. Use of outcome tool(s) and clinical judgement create a POC that gives a: 
(outcome measure illustrates up to 19% impairment) Questionable prediction of patient's progress: MODERATE COMPLEXITY  
  
 
 
 
TREATMENT:  
(In addition to Assessment/Re-Assessment sessions the following treatments were rendered) Pre-treatment Symptoms/Complaints: Pt. Reported his groin pain is much better now. Pain: Initial:  
Pain Intensity 1: 1 Pain Location 1: Groin Pain Orientation 1: Left Pain Intervention(s) 1: Exercise  Post Session: no pain reported. Therapeutic Exercise: (30 Minutes)  
 
:  Exercises per grid below to improve mobility, strength, balance and coordination. Required minimal verbal cues to promote proper body alignment, promote proper body posture and promote proper body mechanics. Progressed resistance, range, repetitions and complexity of movement as indicated. Date: 
12-3-18 Date: 
12/-18 Date: 
12/11/18 Activity/Exercise Parameters Parameters Parameters Knee to chest  4x30 sec hold 4x30 sec hold BLE's Hamstring stretch  4 x 30 sec hold with belt 4x30 sec hold with strap BLE's   
Piriformis stretch  4 x 30 sec hold BLE's  4x30 sec hold BLE's  
Gluteal sets  2 X 10 reps 3 sec hold  X 20 reps 5 sec hold Sit to stand X 10 2 x 7 reps 2 X 10 reps Abdominal bracing   2 X 10 reps 3 sec hold  X 10 reps 3 sec hold Hip adduction X 20 with yellow theraball 2 X 10 reps yellow t-ball 2 X 10 reps yellow t-ball Standing hip flexion X 15 reps  To each LE with black band X 15 reps to each LE with 3 lbs X 20 reps Standing hip extension X 15 reps to each LE with black band  X 15 reps to each LE with  3 lbs  ------ Standing hip abuction X 15 reps  To each LE with black band X 15 reps to each LE with 3 lbs  2x10 reps 3 lb wt. s   
standing hamstring curls  2 x 10 reps to each LE with 3 lbs   X 20 reps 3 lb wt. s BLE's   
     
     
Seated heel toe raises X 20 reps to each   X 20 reps Nu step   Level 4 x 7 minutes Level 5 x 8 mins Calf stretch   4x30 sec hold on incline Marching in place  x 20 reps with black band on R LE -sore on left X 20 to each LE with 3 lbs X 30 reps BLE's 3 lb wt. s Long arc quads X 20 reps to each LE with 3 lb X 20 to each LE with 3 lbs X 30 reps BLE's 3 lb Seated hamstring curls with eccentric knee extension X 20 to each LE with green  band  Green band x 20 reps each 5 sec hold each Hip flexor stretch   4x30 sec hold supine with therapist assist.   
Hip abduction X 20 with black band 2 X 10 reps black band X 10 reps black band Manual Therapy (    Soft Tissue Mobilization Duration Duration: 15 Minutes ) Pt. Received soft tissue mobilization to bilateral lumbosacral paraspinals and left gluteals to decrease pain and spasms. Therapeutic Modalities: for pain and edema (moist hot pack with estim) Lumbo-Sacral Spine Electric Stimulation Type: Interferential 
Placement: bilateral lumbosacral paraspinals Duration : 15 minutes Patient Position: Prone NO CHARGE FOR MUSCLE STIMULATION 
 
HEP: As above; handouts given to patient for all exercises. Treatment/Session Assessment:   
· Response to Treatment: Pt. Reported being pain free at the end of today's session. · Compliance with Program/Exercises: compliant most of the time. · Recommendations/Intent for next treatment session: \"Next visit will focus on advancements to more challenging activities\". continue LE and lumbar rehab Total Treatment Duration: 60 minutes PT Patient Time In/Time Out Time In: 0800 Time Out: 0900 Vandana Del Cid PTA

## 2018-12-13 ENCOUNTER — HOSPITAL ENCOUNTER (OUTPATIENT)
Dept: PHYSICAL THERAPY | Age: 78
Discharge: HOME | End: 2018-12-13
Payer: MEDICARE

## 2018-12-13 PROCEDURE — 97140 MANUAL THERAPY 1/> REGIONS: CPT

## 2018-12-13 PROCEDURE — 97110 THERAPEUTIC EXERCISES: CPT

## 2018-12-13 NOTE — PROGRESS NOTES
qa    Lauryn Natarajan  : 1940  [place]  Secondary: [address] [place] at [facility]  [address] [place] [address], [place], [address] [place] [address]  [phone]   [phone]       OUTPATIENT PHYSICAL THERAPY:Daily Note 2018    ICD-10: Treatment Diagnosis: M54.5 low back pain and Z98.1 arthrodesis status and R26.89 other  Abnormalities of gait and mobility   Precautions: bending and lifting at the waist   Allergies: Clonidine and Simvastatin   Fall Risk Score: 2 (? 5 = High Risk)  MD Orders: evaluate and treat  MEDICAL/REFERRING DIAGNOSIS:  Arthrodesis status [Z98.1]   DATE OF ONSET: 9-10-18-laminectomy at L5-S1  REFERRING PHYSICIAN: Ivan Skinner MD  RETURN PHYSICIAN APPOINTMENT:unsure     INITIAL ASSESSMENT:  Mr. Dunia Pedro is a 77 y.o. male presenting to physical therapy with complaints of B low back pain/soreness but with only mild radicular symptoms/tingling into his R big toe since receiving a L5-S1 laminectomy on 9-10-18-patient has long history of R side sciatica --patient ambulates independently into dept with moderate antalgic gait complaining of mainly R low back and R hip pain -pain level is 4/10 -mild tingling into R big toe -trunk range of motion is limited in flexion and extension-flattened lumbar curve with rounded shoulders and forward head on neck posture -incision looks good at low back -mild incision noted just below umbilicus where the surgery was attempted to be performed with an anterior approach on 18  but was not successful -weak B quads and hamstrings are noted -very good candidate for skilled physical therapy to include manual therapy , pain modalities as needed, and therapeutic exercises followed with cold pack -motivated patient     PROBLEM LIST (Impacting functional limitations):  1. Decreased Strength  2. Decreased ADL/Functional Activities  3. Decreased Transfer Abilities  4.  Decreased Ambulation Ability/Technique  5. Decreased Balance  6. Increased Pain  7. Decreased Activity Tolerance  8. Decreased Pacing Skills  9. Decreased Flexibility/Joint Mobility INTERVENTIONS PLANNED:  1. Cold  2. Electrical Stimulation  3. Gait Training  4. Heat  5. Home Exercise Program (HEP)  6. Manual Therapy  7. Range of Motion (ROM)  8. Therapeutic Exercise/Strengthening   TREATMENT PLAN:  Effective Dates: 10/29/2018 TO 12/28/2018 (60 days). Frequency/Duration: 2 times a week for 60 Days  GOALS: (Goals have been discussed and agreed upon with patient.)  Short-Term Functional Goals: Time Frame: 11-29-18  1. Low back pain is less than 4/10 to progress to DC goal -GOAL MET  2. Trunk mobility is increased by 10% -GOAL MET  3. Less rounded posture -GOAL MET  4. Instruction in exercise program to allow increased ADLs. -GOAL MET  5.ambulate independently with minimal + antalgic gait-GOAL MET  Discharge Goals: Time Frame: 12-28-18  1. 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 Trunk mobility is 75% in all ranges to allow full personal care including washing and dressing and putting on shoes and socks -PROGRESSING  3. Independent ambulation with minimal antalgic gait to allow walking community distances -GOOD PROGRESS TOWARD GOALS   4. Able to walk/stand for at least 30-45 minutes -GOOD PROGRESS  5.  Sleep at least 6 hours without wakened by low back pain -GOOD PROGRESS  6. low back pain outcome measure score is improved from 9/50 to 5/50 -ONGOING -LATEST SCORE IS 8/50    Patient has been seen for 12 visits of rehab and has made good progress with low back rehab and LE strengthening from 10-29-18 to 12-3-18-pain level is minimal at 0-2/10 with main complaint being L hip soreness not low back pain  -independent with home exercise program -independent ambulation with minimal to minimal + antalgic gait due to L hip discomfort nt low back pain /history of L AQUILES - -decreased lumbar spasm -increased LE strength to 4-/5 -increased function and  patient feels strong enough to go hunting-trunk range of motion has improved with improved hamstring flexibility -very good progress-continue low back rehab with LE strengthening -motivated patient -pleasant gentleman to work with  -monitor L hip as needed  7. Rehabilitation Potential For Stated Goals: Good  Regarding Garwin Lloyd Escoto's therapy, I certify that the treatment plan above will be carried out by a therapist or under their direction. Thank you for this referral,  Gino Wynne PTA     Referring Physician Signature: Jerry Fofana MD              Date                    The information in this section was collected on 10-29-18 (except where otherwise noted). HISTORY:   History of Present Injury/Illness (Reason for Referral):  L5-S1 laminectomy on 9-10-18 (attempted surgery on 9-7-18 with anterior approach was not successful )-B low back soreness with mild radiculopathy and antalgic gait   Past Medical History/Comorbidities:   Mr. Araceli Ontiveros  has a past medical history of Arthritis, Calculus of kidney, Chronic kidney disease, Chronic kidney disease, stage III (moderate), Fracture of coccyx ([facility] [address] Gout, unspecified, History of left shoulder fracture, Hyperlipidemia, Microscopic hematuria, Sciatica of right side, Unspecified essential hypertension, and Urinary tract infection, site not specified. Mr. Araceli Ontiveros  has a past surgical history that includes hx lithotripsy; pr total hip arthroplasty (Left); hx shoulder replacement (Left, 12/2014); L5-S1 laminectomy for instrapinal extradural lesion/tlif/posterior instrumentation (N/A, 9/10/2018); L5-S1 ALIF-ATTEMPTED (N/A, 9/7/2018); and LEFT PROXIMAL HUMERUS OPEN REDUCTION INTERNAL FIXATION  CHOICE ANES (Left, 12/3/2014).   Social History/Living Environment:      Prior Level of Function/Work/Activity:  Independent with home ADLs but with increased low back pain with walking and standing   Current Medications:       Current Outpatient Medications:     amLODIPine (NORVASC) 5 mg tablet, TAKE 1 TABLET EVERY DAY, Disp: 90 Tab, Rfl: 1    benazepril-hydroCHLOROthiazide (LOTENSIN HCT) 20-12.5 mg per tablet, TAKE 1 TABLET EVERY DAY, Disp: 90 Tab, Rfl: 1    HYDROcodone-acetaminophen (NORCO) 7.5-325 mg per tablet, Take 1 Tab by mouth every six (6) hours as needed for Pain. Max Daily Amount: 4 Tabs., Disp: 28 Tab, Rfl: 0    multivitamin (ONE A DAY) tablet, Take 1 Tab by mouth daily. , Disp: , Rfl:     OTHER, Indications: many vitamins and herbal supplements, Disp: , Rfl:    Date Last Reviewed:  12/13/2018   Number of Personal Factors/Comorbidities that affect the Plan of Care:  (patient has a history of chronic kidney disease, gout,sciatica, and HTN) 1-2: MODERATE COMPLEXITY   EXAMINATION:   Observation/Orthostatic Postural Assessment:          Patient ambulates independently into dept with minimal  antalgic gait due to R low back and R hip pain -flattened lumbar curve with rounded shoulders   Palpation:        Less tight R lumbar paraspinals and B gluteus medius -decreased muscle tone in R gastrocsoleus   ROM:         LE range is wfl     Trunk range of motion -    Flexion-75%  Extension-60%-rounded trunk posture  B rotation-85%  B sidebending -75%      Strength: Ankles -4/5     B quads and hamstrings are now 3+ to 4-/5   B hip flexors are 4 to 4+/5     Special Tests:        Negative spring/compression/valsalva/stork test and negative straight leg raise test with tight hamstrings     Negative homans sign    Neurological Screen:    Radiating tingling into R big toe     Balance:        good  Mental Status:         Alert and oriented  Sensation:       Intact to light touch      Body Structures Involved:  1. Bones  2. Joints  3. Muscles Body Functions Affected:  1. Sensory/Pain  2. Neuromusculoskeletal  3. Movement Related Activities and Participation Affected:  1.  Learning and Applying Knowledge  2. General Tasks and Demands  3. Mobility  4. Self Care  5. Domestic Life   Number of elements (examined above) that affect the Plan of Care: 3: MODERATE COMPLEXITY   CLINICAL PRESENTATION:   Presentation: Evolving clinical presentation with changing clinical characteristics: MODERATE COMPLEXITY   CLINICAL DECISION MAKING:   Outcome Measure: Tool Used: Modified Oswestry Low Back Pain Questionnaire  Score:  Initial: 9/50  Most Recent: 8/50 (Date:11-20-18 -- )   Interpretation of Score: Each section is scored on a 0-5 scale, 5 representing the greatest disability. The scores of each section are added together for a total score of 50. Score 0 1-10 11-20 21-30 31-40 41-49 50   Modifier CH CI CJ CK CL CM CN     ? Changing and Maintaining Body Position:    [id number] - CURRENT STATUS: CI - 1%-19% impaired, limited or restricted    - GOAL STATUS: CH - 0% impaired, limited or restricted    - D/C STATUS:  ---------------To be determined---------------    Medical Necessity:   · Patient is expected to demonstrate progress in strength, range of motion, balance and coordination to increase independence with ADLs and improve safety during walking and transfers. · Skilled intervention continues to be required due to B low back pain and LE weakness are affecting gait and function. Reason for Services/Other Comments:  · Patient continues to require modification of therapeutic interventions to increase complexity of exercises. Use of outcome tool(s) and clinical judgement create a POC that gives a:  (outcome measure illustrates up to 19% impairment) Questionable prediction of patient's progress: MODERATE COMPLEXITY            TREATMENT:   (In addition to Assessment/Re-Assessment sessions the following treatments were rendered)  Pre-treatment Symptoms/Complaints: Pt. Reported left groin pain was better since he started stretching his hip flexors.       Pain: Initial:   Pain Intensity 1: 1  Pain Location 1: Back, Groin  Pain Orientation 1: Lower, Left  Pain Intervention(s) 1: Exercise  Post Session: no pain reported. Therapeutic Exercise: (40 Minutes)     :  Exercises per grid below to improve mobility, strength, balance and coordination. Required minimal verbal cues to promote proper body alignment, promote proper body posture and promote proper body mechanics. Progressed resistance, range, repetitions and complexity of movement as indicated. Date:  12-13-18 Date:  12/7/18 Date:  12/11/18   Activity/Exercise Parameters Parameters Parameters   Knee to chest 4x30 sec hold BLE's 4x30 sec hold 4x30 sec hold BLE's    Hamstring stretch 4x30 sec hold BLE's  4 x 30 sec hold with belt 4x30 sec hold with strap BLE's    Piriformis stretch 4x30 sec hold BLE's  4 x 30 sec hold BLE's  4x30 sec hold BLE's   Gluteal sets 2x10 reps 5 sec hold  2 X 10 reps 3 sec hold  X 20 reps 5 sec hold    Sit to stand -------- 2 x 7 reps 2 X 10 reps    Abdominal bracing  X 10 reps 5 sec hold  2 X 10 reps 3 sec hold  X 10 reps 3 sec hold    Hip adduction X 20 with yellow theraball 2 X 10 reps yellow t-ball 2 X 10 reps yellow t-ball    Standing hip flexion -------- X 15 reps to each LE with 3 lbs X 20 reps    Standing hip extension ---------- X 15 reps to each LE with  3 lbs  ------   Standing hip abuction ---------- X 15 reps to each LE with 3 lbs  2x10 reps 3 lb wt. s    standing hamstring curls ---------- 2 x 10 reps to each LE with 3 lbs   X 20 reps 3 lb wt. s BLE's                Seated heel toe raises X 20 reps to each   X 20 reps    Nu step  Level 5 x 11 mins  Level 4 x 7 minutes Level 5 x 8 mins    Calf stretch 4x30 sec hold each on incline  4x30 sec hold on incline   Marching in place X 20 reps 3 lb wt. s  X 20 to each LE with 3 lbs X 30 reps BLE's 3 lb wt. s   Long arc quads -------- X 20 to each LE with 3 lbs X 30 reps BLE's 3 lb   Seated hamstring curls with eccentric knee extension ------------  Green band x 20 reps each 5 sec hold each   Hip flexor stretch 4x30 sec hold BLE's in supine   4x30 sec hold supine with therapist assist.    Hip abduction X 20 with black band 2 X 10 reps black band X 10 reps black band       Manual Therapy (    Soft Tissue Mobilization Duration  Duration: 15 Minutes ) Pt. Received soft tissue mobilization to bilateral lumbosacral paraspinals and left gluteals to decrease pain and spasms. Therapeutic Modalities: for pain and edema (moist hot pack with estim)                       Lumbo-Sacral Spine Electric Stimulation  Type: Interferential  Placement: bilateral lumbosacral paraspinals  Duration : 10 minutes  Patient Position: Prone                                                    NO CHARGE FOR MUSCLE STIMULATION    HEP: As above; handouts given to patient for all exercises. Treatment/Session Assessment:    · Response to Treatment: Pt. Reported no pain and decrease stiffness after session. · Compliance with Program/Exercises: compliant most of the time. · Recommendations/Intent for next treatment session: \"Next visit will focus on advancements to more challenging activities\". continue LE and lumbar rehab    Total Treatment Duration: 65 minutes     PT Patient Time In/Time Out  Time In: 0800  Time Out: [facility]

## 2018-12-17 ENCOUNTER — HOSPITAL ENCOUNTER (OUTPATIENT)
Dept: PHYSICAL THERAPY | Age: 78
Discharge: HOME | End: 2018-12-17
Payer: MEDICARE

## 2018-12-17 PROCEDURE — 97140 MANUAL THERAPY 1/> REGIONS: CPT

## 2018-12-17 PROCEDURE — 97110 THERAPEUTIC EXERCISES: CPT

## 2018-12-17 NOTE — PROGRESS NOTES
tay Palma  : 1940  [place]  Secondary: [address] [place],[address] at [facility]  [address] [place] [address], [place], [address] [place] [address]  [phone]   [phone]       OUTPATIENT PHYSICAL THERAPY:Daily Note 2018    ICD-10: Treatment Diagnosis: M54.5 low back pain and Z98.1 arthrodesis status and R26.89 other  Abnormalities of gait and mobility   Precautions: bending and lifting at the waist   Allergies: Clonidine and Simvastatin   Fall Risk Score: 2 (? 5 = High Risk)  MD Orders: evaluate and treat  MEDICAL/REFERRING DIAGNOSIS:  Arthrodesis status [Z98.1]   DATE OF ONSET: 9-10-18-laminectomy at L5-S1  REFERRING PHYSICIAN: Edmond De La Cruz MD  RETURN PHYSICIAN APPOINTMENT:unsure     INITIAL ASSESSMENT:  Mr. Daisy Sam is a 77 y.o. male presenting to physical therapy with complaints of B low back pain/soreness but with only mild radicular symptoms/tingling into his R big toe since receiving a L5-S1 laminectomy on 9-10-18-patient has long history of R side sciatica --patient ambulates independently into dept with moderate antalgic gait complaining of mainly R low back and R hip pain -pain level is 4/10 -mild tingling into R big toe -trunk range of motion is limited in flexion and extension-flattened lumbar curve with rounded shoulders and forward head on neck posture -incision looks good at low back -mild incision noted just below umbilicus where the surgery was attempted to be performed with an anterior approach on 18  but was not successful -weak B quads and hamstrings are noted -very good candidate for skilled physical therapy to include manual therapy , pain modalities as needed, and therapeutic exercises followed with cold pack -motivated patient     PROBLEM LIST (Impacting functional limitations):  1. Decreased Strength  2. Decreased ADL/Functional Activities  3. Decreased Transfer Abilities  4.  Decreased Ambulation Ability/Technique  5. Decreased Balance  6. Increased Pain  7. Decreased Activity Tolerance  8. Decreased Pacing Skills  9. Decreased Flexibility/Joint Mobility INTERVENTIONS PLANNED:  1. Cold  2. Electrical Stimulation  3. Gait Training  4. Heat  5. Home Exercise Program (HEP)  6. Manual Therapy  7. Range of Motion (ROM)  8. Therapeutic Exercise/Strengthening   TREATMENT PLAN:  Effective Dates: 10/29/2018 TO 12/28/2018 (60 days). Frequency/Duration: 2 times a week for 60 Days  GOALS: (Goals have been discussed and agreed upon with patient.)  Short-Term Functional Goals: Time Frame: 11-29-18  1. Low back pain is less than 4/10 to progress to DC goal -GOAL MET  2. Trunk mobility is increased by 10% -GOAL MET  3. Less rounded posture -GOAL MET  4. Instruction in exercise program to allow increased ADLs. -GOAL MET  5.ambulate independently with minimal + antalgic gait-GOAL MET  Discharge Goals: Time Frame: 12-28-18  1. 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 Trunk mobility is 75% in all ranges to allow full personal care including washing and dressing and putting on shoes and socks -PROGRESSING  3. Independent ambulation with minimal antalgic gait to allow walking community distances -GOOD PROGRESS TOWARD GOALS   4. Able to walk/stand for at least 30-45 minutes -GOOD PROGRESS  5.  Sleep at least 6 hours without wakened by low back pain -GOOD PROGRESS  6. low back pain outcome measure score is improved from 9/50 to 5/50 -ONGOING -LATEST SCORE IS 8/50    Patient has been seen for 12 visits of rehab and has made good progress with low back rehab and LE strengthening from 10-29-18 to 12-3-18-pain level is minimal at 0-2/10 with main complaint being L hip soreness not low back pain  -independent with home exercise program -independent ambulation with minimal to minimal + antalgic gait due to L hip discomfort nt low back pain /history of L AQUILES - -decreased lumbar spasm -increased LE strength to 4-/5 -increased function and  patient feels strong enough to go hunting-trunk range of motion has improved with improved hamstring flexibility -very good progress-continue low back rehab with LE strengthening -motivated patient -pleasant gentleman to work with  -monitor L hip as needed  7. Rehabilitation Potential For Stated Goals: Good  Regarding Ciera Escoto's therapy, I certify that the treatment plan above will be carried out by a therapist or under their direction. Thank you for this referral,  Antoinette Real PTA     Referring Physician Signature: Jhonathan Messina MD              Date                    The information in this section was collected on 10-29-18 (except where otherwise noted). HISTORY:   History of Present Injury/Illness (Reason for Referral):  L5-S1 laminectomy on 9-10-18 (attempted surgery on 9-7-18 with anterior approach was not successful )-B low back soreness with mild radiculopathy and antalgic gait   Past Medical History/Comorbidities:   Mr. Marcell Cowan  has a past medical history of Arthritis, Calculus of kidney, Chronic kidney disease, Chronic kidney disease, stage III (moderate), Fracture of coccyx ([facility] [address] Gout, unspecified, History of left shoulder fracture, Hyperlipidemia, Microscopic hematuria, Sciatica of right side, Unspecified essential hypertension, and Urinary tract infection, site not specified. Mr. Marcell Cowan  has a past surgical history that includes hx lithotripsy; pr total hip arthroplasty (Left); hx shoulder replacement (Left, 12/2014); L5-S1 laminectomy for instrapinal extradural lesion/tlif/posterior instrumentation (N/A, 9/10/2018); L5-S1 ALIF-ATTEMPTED (N/A, 9/7/2018); and LEFT PROXIMAL HUMERUS OPEN REDUCTION INTERNAL FIXATION  CHOICE ANES (Left, 12/3/2014).   Social History/Living Environment:      Prior Level of Function/Work/Activity:  Independent with home ADLs but with increased low back pain with walking and standing   Current Medications:       Current Outpatient Medications:     amLODIPine (NORVASC) 5 mg tablet, TAKE 1 TABLET EVERY DAY, Disp: 90 Tab, Rfl: 1    benazepril-hydroCHLOROthiazide (LOTENSIN HCT) 20-12.5 mg per tablet, TAKE 1 TABLET EVERY DAY, Disp: 90 Tab, Rfl: 1    HYDROcodone-acetaminophen (NORCO) 7.5-325 mg per tablet, Take 1 Tab by mouth every six (6) hours as needed for Pain. Max Daily Amount: 4 Tabs., Disp: 28 Tab, Rfl: 0    multivitamin (ONE A DAY) tablet, Take 1 Tab by mouth daily. , Disp: , Rfl:     OTHER, Indications: many vitamins and herbal supplements, Disp: , Rfl:    Date Last Reviewed:  12/17/2018   Number of Personal Factors/Comorbidities that affect the Plan of Care:  (patient has a history of chronic kidney disease, gout,sciatica, and HTN) 1-2: MODERATE COMPLEXITY   EXAMINATION:   Observation/Orthostatic Postural Assessment:          Patient ambulates independently into dept with minimal  antalgic gait due to R low back and R hip pain -flattened lumbar curve with rounded shoulders   Palpation:        Less tight R lumbar paraspinals and B gluteus medius -decreased muscle tone in R gastrocsoleus   ROM:         LE range is wfl     Trunk range of motion -    Flexion-75%  Extension-60%-rounded trunk posture  B rotation-85%  B sidebending -75%      Strength: Ankles -4/5     B quads and hamstrings are now 3+ to 4-/5   B hip flexors are 4 to 4+/5     Special Tests:        Negative spring/compression/valsalva/stork test and negative straight leg raise test with tight hamstrings     Negative homans sign    Neurological Screen:    Radiating tingling into R big toe     Balance:        good  Mental Status:         Alert and oriented  Sensation:       Intact to light touch      Body Structures Involved:  1. Bones  2. Joints  3. Muscles Body Functions Affected:  1. Sensory/Pain  2. Neuromusculoskeletal  3. Movement Related Activities and Participation Affected:  1.  Learning and Applying Knowledge  2. General Tasks and Demands  3. Mobility  4. Self Care  5. Domestic Life   Number of elements (examined above) that affect the Plan of Care: 3: MODERATE COMPLEXITY   CLINICAL PRESENTATION:   Presentation: Evolving clinical presentation with changing clinical characteristics: MODERATE COMPLEXITY   CLINICAL DECISION MAKING:   Outcome Measure: Tool Used: Modified Oswestry Low Back Pain Questionnaire  Score:  Initial: 9/50  Most Recent: 8/50 (Date:11-20-18 -- )   Interpretation of Score: Each section is scored on a 0-5 scale, 5 representing the greatest disability. The scores of each section are added together for a total score of 50. Score 0 1-10 11-20 21-30 31-40 41-49 50   Modifier CH CI CJ CK CL CM CN     ? Changing and Maintaining Body Position:    [id number] - CURRENT STATUS: CI - 1%-19% impaired, limited or restricted    - GOAL STATUS: CH - 0% impaired, limited or restricted    - D/C STATUS:  ---------------To be determined---------------    Medical Necessity:   · Patient is expected to demonstrate progress in strength, range of motion, balance and coordination to increase independence with ADLs and improve safety during walking and transfers. · Skilled intervention continues to be required due to B low back pain and LE weakness are affecting gait and function. Reason for Services/Other Comments:  · Patient continues to require modification of therapeutic interventions to increase complexity of exercises. Use of outcome tool(s) and clinical judgement create a POC that gives a:  (outcome measure illustrates up to 19% impairment) Questionable prediction of patient's progress: MODERATE COMPLEXITY            TREATMENT:   (In addition to Assessment/Re-Assessment sessions the following treatments were rendered)  Pre-treatment Symptoms/Complaints: Pt. 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       Pain: Initial:   Pain Intensity 1: 1  Pain Location 1: Back, Groin  Pain Orientation 1: Lower, Left  Pain Intervention(s) 1: Exercise  Post Session: no pain reported. Therapeutic Exercise: (40 Minutes)     :  Exercises per grid below to improve mobility, strength, balance and coordination. Required minimal verbal cues to promote proper body alignment, promote proper body posture and promote proper body mechanics. Progressed resistance, range, repetitions and complexity of movement as indicated. Date:  12-13-18 Date:  12/17/18 Date:  12/11/18   Activity/Exercise Parameters Parameters Parameters   Knee to chest 4x30 sec hold BLE's 4x30 sec hold 4x30 sec hold BLE's    Hamstring stretch 4x30 sec hold BLE's  4 x 30 sec hold with belt 4x30 sec hold with strap BLE's    Piriformis stretch 4x30 sec hold BLE's  4 x 30 sec hold BLE's  4x30 sec hold BLE's   Gluteal sets 2x10 reps 5 sec hold  2 X 10 reps 3 sec hold  X 20 reps 5 sec hold    Sit to stand -------- 2 x 10 reps 2 X 10 reps    Abdominal bracing  X 10 reps 5 sec hold  2 X 10 reps 3 sec hold  X 10 reps 3 sec hold    Hip adduction X 20 with yellow theraball 2 X 10 reps yellow t-ball 2 X 10 reps yellow t-ball    Standing hip flexion -------- X 15 reps to each LE with 3 lbs X 20 reps    Standing hip extension ---------- X 15 reps to each LE with  3 lbs  ------   Standing hip abuction ---------- X 15 reps to each LE with 3 lbs  2x10 reps 3 lb wt. s    standing hamstring curls ---------- 2 x 10 reps to each LE with 3 lbs   X 20 reps 3 lb wt. s BLE's                Seated heel toe raises X 20 reps to each   X 20 reps    Nu step  Level 5 x 11 mins  Level 4 x 7 minutes Level 5 x 8 mins    Calf stretch 4x30 sec hold each on incline  4x30 sec hold on incline   Marching in place X 20 reps 3 lb wt. s  X 20 to each LE with 3 lbs X 30 reps BLE's 3 lb wt. s   Long arc quads -------- X 20 to each LE with 3 lbs X 30 reps BLE's 3 lb   Seated hamstring curls with eccentric knee extension ------------  Green band x 20 reps each 5 sec hold each   Hip flexor stretch 4x30 sec hold BLE's in supine   4x30 sec hold supine with therapist assist.    Hip abduction X 20 with black band 2 X 10 reps black band X 10 reps black band       Manual Therapy (    Soft Tissue Mobilization Duration  Duration: 15 Minutes ) Pt. Received soft tissue mobilization to bilateral lumbosacral paraspinals and left gluteals to decrease pain and spasms. Therapeutic Modalities: for pain and edema (moist hot pack with estim)                       Lumbo-Sacral Spine Electric Stimulation  Type: Interferential  Placement: bilateral lumbosacral paraspinals  Duration : 10 minutes  Patient Position: Prone                                                    NO CHARGE FOR MUSCLE STIMULATION    HEP: As above; handouts given to patient for all exercises. Treatment/Session Assessment:    · Response to Treatment: Pt. Was compliant with all exercises. Pt. Is very anxious to be discharged from therapy. · Compliance with Program/Exercises: compliant most of the time. · Recommendations/Intent for next treatment session: \"Next visit will focus on advancements to more challenging activities\". continue LE and lumbar rehab    Total Treatment Duration: 65 minutes     PT Patient Time In/Time Out  Time In: 0800  Pascual Pham PTA

## 2018-12-19 ENCOUNTER — HOSPITAL ENCOUNTER (OUTPATIENT)
Dept: PHYSICAL THERAPY | Age: 78
Discharge: HOME | End: 2018-12-19
Payer: MEDICARE

## 2018-12-19 PROCEDURE — G8983 BODY POS D/C STATUS: HCPCS

## 2018-12-19 PROCEDURE — G8982 BODY POS GOAL STATUS: HCPCS

## 2018-12-19 PROCEDURE — 97110 THERAPEUTIC EXERCISES: CPT

## 2018-12-19 PROCEDURE — 97140 MANUAL THERAPY 1/> REGIONS: CPT

## 2018-12-19 NOTE — PROGRESS NOTES
tay Forbes Pontiff  : 1940  [place]  Secondary: [address] [place],[address] at [facility]  [address] [facility], [place], [address] [place] [address]  [phone]   [phone]       OUTPATIENT PHYSICAL [address] Note and Discharge 2018    ICD-10: Treatment Diagnosis: M54.5 low back pain and Z98.1 arthrodesis status and R26.89 other  Abnormalities of gait and mobility   Precautions: bending and lifting at the waist   Allergies: Clonidine and Simvastatin   Fall Risk Score: 2 (? 5 = High Risk)  MD Orders: evaluate and treat  MEDICAL/REFERRING DIAGNOSIS:  Arthrodesis status [Z98.1]   DATE OF ONSET: 9-10-18-laminectomy at L5-S1  REFERRING PHYSICIAN: Aaron Clay MD  RETURN PHYSICIAN APPOINTMENT:unsure     INITIAL ASSESSMENT:  Mr. Vy Evans is a 77 y.o. male presenting to physical therapy with complaints of B low back pain/soreness but with only mild radicular symptoms/tingling into his R big toe since receiving a L5-S1 laminectomy on 9-10-18-patient has long history of R side sciatica --patient ambulates independently into dept with moderate antalgic gait complaining of mainly R low back and R hip pain -pain level is 4/10 -mild tingling into R big toe -trunk range of motion is limited in flexion and extension-flattened lumbar curve with rounded shoulders and forward head on neck posture -incision looks good at low back -mild incision noted just below umbilicus where the surgery was attempted to be performed with an anterior approach on 18  but was not successful -weak B quads and hamstrings are noted -very good candidate for skilled physical therapy to include manual therapy , pain modalities as needed, and therapeutic exercises followed with cold pack -motivated patient     PROBLEM LIST (Impacting functional limitations):  1. Decreased Strength  2. Decreased ADL/Functional Activities  3. Decreased Transfer Abilities  4.  Decreased Ambulation Ability/Technique  5. Decreased Balance  6. Increased Pain  7. Decreased Activity Tolerance  8. Decreased Pacing Skills  9. Decreased Flexibility/Joint Mobility INTERVENTIONS PLANNED:  1. Cold  2. Electrical Stimulation  3. Gait Training  4. Heat  5. Home Exercise Program (HEP)  6. Manual Therapy  7. Range of Motion (ROM)  8. Therapeutic Exercise/Strengthening   TREATMENT PLAN:  Effective Dates: 10/29/2018 TO 12/28/2018 (60 days). Frequency/Duration: 2 times a week for 60 Days  GOALS: (Goals have been discussed and agreed upon with patient.)  Short-Term Functional Goals: Time Frame: 11-29-18  1. Low back pain is less than 4/10 to progress to DC goal -GOAL MET  2. Trunk mobility is increased by 10% -GOAL MET  3. Less rounded posture -GOAL MET  4. Instruction in exercise program to allow increased ADLs. -GOAL MET  5.ambulate independently with minimal + antalgic gait-GOAL MET  Discharge Goals: Time Frame: 12-28-18  1. 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 Trunk mobility is 75% in all ranges to allow full personal care including washing and dressing and putting on shoes and socks -GOAL MET  3. Independent ambulation with minimal antalgic gait to allow walking community distances -GOAL MET   4. Able to walk/stand for at least 30-45 minutes -GOAL MET  5.  Sleep at least 6 hours without wakened by low back pain -GOOD PROGRESS  6. low back pain outcome measure score is improved from 9/50 to 5/50 -ONGOING -LATEST SCORE IS 6/50    Patient has been seen for 15 visits of rehab and has made good progress with low back rehab and LE strengthening from 10-29-18 to 12-19-18-pain level is minimal at 0-2/10 with main complaint being L hip soreness not low back pain  -independent with home exercise program -independent ambulation with minimal to minimal + antalgic gait due to L hip discomfort nt low back pain /history of L AQUILES - -decreased lumbar spasm -increased LE strength to 4-/5 -increased function and  patient feels strong enough to go hunting-trunk range of motion has improved with improved hamstring flexibility -very good progress-outcome measure score is Improved from 9/50 to 6/50 -DC to home exercise program -motivated patient -pleasant gentleman to work with    Rehabilitation Potential For Stated Goals: Good  Regarding Delaney Escoto's therapy, I certify that the treatment plan above will be carried out by a therapist or under their direction. Thank you for this referral,  Xavier Lopez PT     Referring Physician Signature: Marva Betancourt MD              Date                    The information in this section was collected on 10-29-18 (except where otherwise noted). HISTORY:   History of Present Injury/Illness (Reason for Referral):  L5-S1 laminectomy on 9-10-18 (attempted surgery on 9-7-18 with anterior approach was not successful )-B low back soreness with mild radiculopathy and antalgic gait   Past Medical History/Comorbidities:   Mr. Jaye Singer  has a past medical history of Arthritis, Calculus of kidney, Chronic kidney disease, Chronic kidney disease, stage III (moderate), Fracture of coccyx ([facility] [address] Gout, unspecified, History of left shoulder fracture, Hyperlipidemia, Microscopic hematuria, Sciatica of right side, Unspecified essential hypertension, and Urinary tract infection, site not specified. Mr. Jaye Singer  has a past surgical history that includes hx lithotripsy; pr total hip arthroplasty (Left); hx shoulder replacement (Left, 12/2014); L5-S1 laminectomy for instrapinal extradural lesion/tlif/posterior instrumentation (N/A, 9/10/2018); L5-S1 ALIF-ATTEMPTED (N/A, 9/7/2018); and LEFT PROXIMAL HUMERUS OPEN REDUCTION INTERNAL FIXATION  CHOICE ANES (Left, 12/3/2014).   Social History/Living Environment:      Prior Level of Function/Work/Activity:  Independent with home ADLs but with increased low back pain with walking and standing   Current Medications:       Current Outpatient Medications:     amLODIPine (NORVASC) 5 mg tablet, TAKE 1 TABLET EVERY DAY, Disp: 90 Tab, Rfl: 1    benazepril-hydroCHLOROthiazide (LOTENSIN HCT) 20-12.5 mg per tablet, TAKE 1 TABLET EVERY DAY, Disp: 90 Tab, Rfl: 1    HYDROcodone-acetaminophen (NORCO) 7.5-325 mg per tablet, Take 1 Tab by mouth every six (6) hours as needed for Pain. Max Daily Amount: 4 Tabs., Disp: 28 Tab, Rfl: 0    multivitamin (ONE A DAY) tablet, Take 1 Tab by mouth daily. , Disp: , Rfl:     OTHER, Indications: many vitamins and herbal supplements, Disp: , Rfl:    Date Last Reviewed:  12/19/2018   Number of Personal Factors/Comorbidities that affect the Plan of Care:  (patient has a history of chronic kidney disease, gout,sciatica, and HTN) 1-2: MODERATE COMPLEXITY   EXAMINATION:   Observation/Orthostatic Postural Assessment:          Patient ambulates independently into dept with minimal  antalgic gait due to R low back and R hip pain -flattened lumbar curve with rounded shoulders   Palpation:        Less tight R lumbar paraspinals and B gluteus medius -decreased muscle tone in R gastrocsoleus   ROM:         LE range is wfl     Trunk range of motion -    Flexion-80%  Extension-66%-less rounded trunk posture  B rotation-85%  B sidebending -75%      Strength: Ankles -4/5     B quads and hamstrings are now  4-/5   B hip flexors are 4 to 4+/5     Special Tests:        Negative spring/compression/valsalva/stork test and negative straight leg raise test with tight hamstrings     Negative homans sign    Neurological Screen:    Radiating tingling into R big toe     Balance:        good  Mental Status:         Alert and oriented  Sensation:       Intact to light touch      Body Structures Involved:  1. Bones  2. Joints  3. Muscles Body Functions Affected:  1. Sensory/Pain  2. Neuromusculoskeletal  3. Movement Related Activities and Participation Affected:  1.  Learning and Applying Knowledge  2. General Tasks and Demands  3. Mobility  4. Self Care  5. Domestic Life   Number of elements (examined above) that affect the Plan of Care: 3: MODERATE COMPLEXITY   CLINICAL PRESENTATION:   Presentation: Evolving clinical presentation with changing clinical characteristics: MODERATE COMPLEXITY   CLINICAL DECISION MAKING:   Outcome Measure: Tool Used: Modified Oswestry Low Back Pain Questionnaire  Score:  Initial: 9/50  Most Recent: 6/50 (Date:12-19-18 -- )   Interpretation of Score: Each section is scored on a 0-5 scale, 5 representing the greatest disability. The scores of each section are added together for a total score of 50. Score 0 1-10 11-20 21-30 31-40 41-49 50   Modifier CH CI CJ CK CL CM CN ? Changing and Maintaining Body Position:        - GOAL STATUS: CH - 0% impaired, limited or restricted    - D/C STATUS:  CI - 1%-19% impaired, limited or restricted        Medical Necessity:   · DC to home exercise program  Reason for Services/Other Comments:  · DC to home program   Use of outcome tool(s) and clinical judgement create a POC that gives a:  (outcome measure illustrates up to 19% impairment) Questionable prediction of patient's progress: MODERATE COMPLEXITY            TREATMENT:   (In addition to Assessment/Re-Assessment sessions the following treatments were rendered)  Pre-treatment Symptoms/Complaints: Pt. Reported no  back discomfort today an dfeels stronger in his legs and feels he can now perform his  duties with minimal issues       Pain: Initial:   Pain Intensity 1: 0  Pain Location 1: Back  Pain Orientation 1: Lower, Left  Pain Intervention(s) 1: Exercise  Post Session: no pain reported in low back -DC to home program.     Therapeutic Exercise: ( ) x 30 minutes    :  Exercises per grid below to improve mobility, strength, balance and coordination.   Required minimal verbal cues to promote proper body alignment, promote proper body posture and promote proper body mechanics. Progressed resistance, range, repetitions and complexity of movement as indicated. Date:  12-13-18 Date:  12/17/18 Date:  12/19/18   Activity/Exercise Parameters Parameters Parameters   Knee to chest 4x30 sec hold BLE's 4x30 sec hold    Hamstring stretch 4x30 sec hold BLE's  4 x 30 sec hold with belt    Piriformis stretch 4x30 sec hold BLE's  4 x 30 sec hold BLE's     Gluteal sets 2x10 reps 5 sec hold  2 X 10 reps 3 sec hold      Sit to stand -------- 2 x 10 reps 2 X 10 reps    Abdominal bracing  X 10 reps 5 sec hold  2 X 10 reps 3 sec hold     Hip adduction X 20 with yellow theraball 2 X 10 reps yellow t-ball 2 X 10 reps yellow t-ball    Standing hip flexion -------- X 15 reps to each LE with 3 lbs  x 15 to each LE with black band   Standing hip extension ---------- X 15 reps to each LE with  3 lbs  X 15 to each LE with black band------   Standing hip abuction ---------- X 15 reps to each LE with 3 lbs  X 15 to each LE with black band   standing hamstring curls ---------- 2 x 10 reps to each LE with 3 lbs   X 20 reps 3 lb wt. s BLE's                Seated heel toe raises X 20 reps to each   X 20 reps    Nu step  Level 5 x 11 mins  Level 4 x 7 minutes Level 4 x 10 minutes    Calf stretch 4x30 sec hold each on incline  4x30 sec hold on incline   Marching in place X 20 reps 3 lb wt. s  X 20 to each LE with 3 lbs X 20 reps to each LE with black band   Long arc quads -------- X 20 to each LE with 3 lbs X 20 reps to each LE with 3 lbs    Seated hamstring curls with eccentric knee extension ------------  Green band x 20 reps each 5 sec hold each   Hip flexor stretch 4x30 sec hold BLE's in supine       Hip abduction X 20 with black band 2 X 10 reps black band 2 X 10 reps black band       Manual Therapy (    Soft Tissue Mobilization Duration  Duration: 25 Minutes ) Pt.  Received soft tissue mobilization to bilateral lumbosacral paraspinals and left gluteals to decrease pain and spasms while in R sidelying position. Therapeutic Modalities: for pain and edema (moist hot pack with estim)                       Lumbo-Sacral Spine Electric Stimulation  Type: Interferential  Placement: B lumbars and gluteus medius  Duration : 10 minutes  Patient Position: Lying right side                                                    NO CHARGE FOR MUSCLE STIMULATION    HEP: As above; handouts given to patient for all exercises. Treatment/Session Assessment:    · Response to Treatment: Pt. Was compliant with all exercises. -most goals met-DC to home program .  · Compliance with Program/Exercises: compliant most of the time.   · Recommendations/Intent for next treatment session: \"most goals met-pain level in back is 0/10 -antalgic gait is due to hip soreness not low back -patient is able to perform his  duties -DC to home program    Total Treatment Duration: 65 minutes     PT Patient Time In/Time Out  Time In: 0800  Time Out: 0908  Manpreet Silva, PT

## 2019-01-23 ENCOUNTER — PATIENT OUTREACH (OUTPATIENT)
Dept: CASE MANAGEMENT | Age: 79
End: 2019-01-23

## 2019-01-23 NOTE — PROGRESS NOTES
This note will not be viewable in [address] 30 day ROMA Outreach not completed due to [employer] being on [facility]. Episode closed at this time as no further ROMA is indicated at this time.

## 2019-11-14 RX ORDER — CEFAZOLIN SODIUM/WATER 2 G/20 ML
2 SYRINGE (ML) INTRAVENOUS ONCE
Status: CANCELLED | OUTPATIENT
Start: 2019-11-14 | End: 2019-11-14

## 2019-12-04 ENCOUNTER — HOSPITAL ENCOUNTER (OUTPATIENT)
Dept: SURGERY | Age: 79
Discharge: HOME | End: 2019-12-04
Attending: ORTHOPAEDIC SURGERY
Payer: MEDICARE

## 2019-12-04 VITALS
WEIGHT: 185 LBS | TEMPERATURE: 97.8 F | HEART RATE: 72 BPM | HEIGHT: 66 IN | OXYGEN SATURATION: 92 % | BODY MASS INDEX: 29.73 KG/M2 | SYSTOLIC BLOOD PRESSURE: 124 MMHG | DIASTOLIC BLOOD PRESSURE: 82 MMHG | RESPIRATION RATE: 18 BRPM

## 2019-12-04 LAB
ALBUMIN SERPL-MCNC: 3.9 G/DL (ref 3.2–4.6)
ALBUMIN/GLOB SERPL: 1.1 {RATIO} (ref 1.2–3.5)
ALP SERPL-CCNC: 69 U/L (ref 50–136)
ALT SERPL-CCNC: 25 U/L (ref 12–65)
ANION GAP SERPL CALC-SCNC: 6 MMOL/L (ref 7–16)
APPEARANCE UR: CLEAR
APTT PPP: 28.2 SEC (ref 24.7–39.8)
AST SERPL-CCNC: 22 U/L (ref 15–37)
ATRIAL RATE: 64 BPM
BACTERIA SPEC CULT: NORMAL
BILIRUB SERPL-MCNC: 0.6 MG/DL (ref 0.2–1.1)
BILIRUB UR QL: NEGATIVE
BUN SERPL-MCNC: 29 MG/DL (ref 8–23)
CALCIUM SERPL-MCNC: 9.4 MG/DL (ref 8.3–10.4)
CALCULATED P AXIS, ECG09: -27 DEGREES
CALCULATED R AXIS, ECG10: -60 DEGREES
CALCULATED T AXIS, ECG11: 51 DEGREES
CHLORIDE SERPL-SCNC: 105 MMOL/L (ref 98–107)
CO2 SERPL-SCNC: 26 MMOL/L (ref 21–32)
COLOR UR: YELLOW
CREAT SERPL-MCNC: 1.67 MG/DL (ref 0.8–1.5)
CRP SERPL-MCNC: <0.3 MG/DL (ref 0–0.9)
DIAGNOSIS, 93000: NORMAL
ERYTHROCYTE [DISTWIDTH] IN BLOOD BY AUTOMATED COUNT: 12.4 % (ref 11.9–14.6)
ERYTHROCYTE [SEDIMENTATION RATE] IN BLOOD: 21 MM/HR (ref 0–20)
GLOBULIN SER CALC-MCNC: 3.7 G/DL (ref 2.3–3.5)
GLUCOSE SERPL-MCNC: 114 MG/DL (ref 65–100)
GLUCOSE UR STRIP.AUTO-MCNC: NEGATIVE MG/DL
HCT VFR BLD AUTO: 43.6 % (ref 41.1–50.3)
HGB BLD-MCNC: 14.8 G/DL (ref 13.6–17.2)
HGB UR QL STRIP: NEGATIVE
INR PPP: 1
KETONES UR QL STRIP.AUTO: NEGATIVE MG/DL
LEUKOCYTE ESTERASE UR QL STRIP.AUTO: NEGATIVE
MAGNESIUM SERPL-MCNC: 2 MG/DL (ref 1.8–2.4)
MCH RBC QN AUTO: 33.9 PG (ref 26.1–32.9)
MCHC RBC AUTO-ENTMCNC: 33.9 G/DL (ref 31.4–35)
MCV RBC AUTO: 100 FL (ref 79.6–97.8)
NITRITE UR QL STRIP.AUTO: NEGATIVE
NRBC # BLD: 0 K/UL (ref 0–0.2)
P-R INTERVAL, ECG05: 226 MS
PH UR STRIP: 6 [PH] (ref 5–9)
PLATELET # BLD AUTO: 241 K/UL (ref 150–450)
PMV BLD AUTO: 11 FL (ref 9.4–12.3)
POTASSIUM SERPL-SCNC: 4 MMOL/L (ref 3.5–5.1)
PROT SERPL-MCNC: 7.6 G/DL (ref 6.3–8.2)
PROT UR STRIP-MCNC: NEGATIVE MG/DL
PROTHROMBIN TIME: 13.2 SEC (ref 11.7–14.5)
Q-T INTERVAL, ECG07: 432 MS
QRS DURATION, ECG06: 108 MS
QTC CALCULATION (BEZET), ECG08: 445 MS
RBC # BLD AUTO: 4.36 M/UL (ref 4.23–5.6)
SERVICE CMNT-IMP: NORMAL
SODIUM SERPL-SCNC: 137 MMOL/L (ref 136–145)
SP GR UR REFRACTOMETRY: 1.01 (ref 1–1.02)
UROBILINOGEN UR QL STRIP.AUTO: 0.2 EU/DL (ref 0.2–1)
VENTRICULAR RATE, ECG03: 64 BPM
WBC # BLD AUTO: 8.9 K/UL (ref 4.3–11.1)

## 2019-12-04 PROCEDURE — 87641 MR-STAPH DNA AMP PROBE: CPT

## 2019-12-04 PROCEDURE — 85652 RBC SED RATE AUTOMATED: CPT

## 2019-12-04 PROCEDURE — 85027 COMPLETE CBC AUTOMATED: CPT

## 2019-12-04 PROCEDURE — 83735 ASSAY OF MAGNESIUM: CPT

## 2019-12-04 PROCEDURE — 85730 THROMBOPLASTIN TIME PARTIAL: CPT

## 2019-12-04 PROCEDURE — 80053 COMPREHEN METABOLIC PANEL: CPT

## 2019-12-04 PROCEDURE — 85610 PROTHROMBIN TIME: CPT

## 2019-12-04 PROCEDURE — 86140 C-REACTIVE PROTEIN: CPT

## 2019-12-04 PROCEDURE — 93005 ELECTROCARDIOGRAM TRACING: CPT | Performed by: ORTHOPAEDIC SURGERY

## 2019-12-04 PROCEDURE — 81003 URINALYSIS AUTO W/O SCOPE: CPT

## 2019-12-04 NOTE — PERIOP NOTES
Lab results within anesthesia guidelines and c reactive protein pending. Recent Results (from the past 12 hour(s))   CBC W/O DIFF    Collection Time: 12/04/19  2:32 PM   Result Value Ref Range    WBC 8.9 4.3 - 11.1 K/uL    RBC 4.36 4.23 - 5.6 M/uL    HGB 14.8 13.6 - 17.2 g/dL    HCT 43.6 41.1 - 50.3 %    .0 (H) 79.6 - 97.8 FL    MCH 33.9 (H) 26.1 - 32.9 PG    MCHC 33.9 31.4 - 35.0 g/dL    RDW 12.4 11.9 - 14.6 %    PLATELET 383 270 - 943 K/uL    MPV 11.0 9.4 - 12.3 FL    ABSOLUTE NRBC 0.00 0.0 - 0.2 K/uL   SED RATE, AUTOMATED    Collection Time: 12/04/19  2:32 PM   Result Value Ref Range    Sed rate, automated 21 (H) 0 - 20 mm/hr   URINALYSIS W/ RFLX MICROSCOPIC    Collection Time: 12/04/19  2:32 PM   Result Value Ref Range    Color YELLOW      Appearance CLEAR      Specific gravity 1.012 1.001 - 1.023      pH (UA) 6.0 5.0 - 9.0      Protein NEGATIVE  NEG mg/dL    Glucose NEGATIVE  mg/dL    Ketone NEGATIVE  NEG mg/dL    Bilirubin NEGATIVE  NEG      Blood NEGATIVE  NEG      Urobilinogen 0.2 0.2 - 1.0 EU/dL    Nitrites NEGATIVE  NEG      Leukocyte Esterase NEGATIVE  NEG     C REACTIVE PROTEIN, QT    Collection Time: 12/04/19  2:50 PM   Result Value Ref Range    C-Reactive protein <0.3 0.0 - 0.9 mg/dL   METABOLIC PANEL, COMPREHENSIVE    Collection Time: 12/04/19  2:50 PM   Result Value Ref Range    Sodium 137 136 - 145 mmol/L    Potassium 4.0 3.5 - 5.1 mmol/L    Chloride 105 98 - 107 mmol/L    CO2 26 21 - 32 mmol/L    Anion gap 6 (L) 7 - 16 mmol/L    Glucose 114 (H) 65 - 100 mg/dL    BUN 29 (H) 8 - 23 MG/DL    Creatinine 1.67 (H) 0.8 - 1.5 MG/DL    GFR est AA 51 (L) >60 ml/min/1.73m2    GFR est non-AA 42 (L) >60 ml/min/1.73m2    Calcium 9.4 8.3 - 10.4 MG/DL    Bilirubin, total 0.6 0.2 - 1.1 MG/DL    ALT (SGPT) 25 12 - 65 U/L    AST (SGOT) 22 15 - 37 U/L    Alk.  phosphatase 69 50 - 136 U/L    Protein, total 7.6 6.3 - 8.2 g/dL    Albumin 3.9 3.2 - 4.6 g/dL    Globulin 3.7 (H) 2.3 - 3.5 g/dL    A-G Ratio 1.1 (L) 1.2 - 3.5     PROTHROMBIN TIME + INR    Collection Time: 12/04/19  2:50 PM   Result Value Ref Range    Prothrombin time 13.2 11.7 - 14.5 sec    INR 1.0     PTT    Collection Time: 12/04/19  2:50 PM   Result Value Ref Range    aPTT 28.2 24.7 - 39.8 SEC   MAGNESIUM    Collection Time: 12/04/19  2:50 PM   Result Value Ref Range    Magnesium 2.0 1.8 - 2.4 mg/dL

## 2019-12-04 NOTE — PERIOP NOTES
PLEASE CONTINUE TAKING ALL PRESCRIPTION MEDICATIONS UP TO THE DAY OF SURGERY UNLESS OTHERWISE DIRECTED BELOW. DISCONTINUE all vitamins and supplements 7 days prior to surgery. Home Medications to take  the day of surgery    N/A           Home Medications   to Hold              Comments                Please do not bring home medications with you on the day of surgery unless otherwise directed by your nurse. If you are instructed to bring home medications, please give them to your nurse as they will be administered by the nursing staff. If you have any questions, please call [facility] [phone] or [address] [place] [address] [phone].

## 2019-12-04 NOTE — PERIOP NOTES
Patient verified name and     Order for consent was found in EHR and matches case posting; patient verified. Type 3 surgery, PAT walk in assessment complete. Labs per surgeon: CBC, CMP, C reactive protein, Magnesium, PT/PTT, Sed rate and UA; results pending  Labs per anesthesia protocol: no additional labs required  EKG: done today and EKG tracing from 2019 placed on chart for anesthesia reference. Will have anesthesia to review. Patient instructed to go to outpatient lab 2019 to have type and cross drawn. Instructed to go anytime between [id number]. Instructed to have green armband placed on right arm and to not remove the green armband. Verbalized understanding    Patient had lumbar surgery with hardware in 2018. XR Spine Lumbar 2 or 3 V found in Chart review under Imaging for anesthesia reference    [facility] approved surgical skin cleanser and instructions given per hospital policy. Patient provided with and instructed on educational handouts including Guide to Surgery, Pain Management, Hand Hygiene, Blood Transfusion Education, and [facility] Brochure. Patient answered medical/surgical history questions at their best of ability. All prior to admission medications documented in [facility]. Original medication prescription bottle not visualized during patient appointment. Patient instructed to hold all vitamins 7 days prior to surgery and NSAIDS 5 days prior to surgery, patient verbalized understanding. Patient teach back successful and patient demonstrates knowledge of instructions.

## 2019-12-07 PROBLEM — T84.84XA PAINFUL ORTHOPAEDIC HARDWARE (HCC): Status: ACTIVE | Noted: 2019-12-07

## 2019-12-07 PROBLEM — M19.112 POST-TRAUMATIC OSTEOARTHRITIS OF LEFT SHOULDER: Status: ACTIVE | Noted: 2019-12-07

## 2019-12-07 NOTE — H&P
[facility] HISTORY AND PHYSICAL    Subjective:     Patient is a [de-identified] y.o. LHD MALE WITH LEFT SHOULDER PAIN. SEE OFFICE NOTE. Patient Active Problem List    Diagnosis Date Noted    Post-traumatic osteoarthritis of left shoulder 12/07/2019    Painful orthopaedic hardware ([facility] [address] 12/07/2019    Spinal stenosis of lumbosacral region 09/07/2018    Spondylolisthesis at L5-S1 level 09/07/2018    Spinal stenosis 09/07/2018    Hyperuricemia 06/22/2018    History of renal stone 03/13/2018    Gout 03/13/2018    CKD (chronic kidney disease) stage 3, GFR 30-59 ml/min ([facility]) 03/13/2018    Benign hypertensive kidney disease with chronic kidney disease 03/13/2018    Glucose intolerance (impaired glucose tolerance) 03/13/2018    Mixed hyperlipidemia 03/13/2018    History of left shoulder fracture 01/14/2016    Family history of malignant neoplasm of prostate 12/23/2013     Past Medical History:   Diagnosis Date    Arthritis     Calculus of kidney 12/23/2013    Chronic kidney disease     Chronic kidney disease, stage III (moderate) ([address] 12/23/2013    Fracture of coccyx ([facility] [address] 1/14/2016    Gout, unspecified 12/23/2013    History of left shoulder fracture 1/14/2016    Hyperlipidemia 1/14/2016    Microscopic hematuria 12/23/2013    Sciatica of right side 1/14/2016    Sleep apnea     does not wear cpap    Unspecified essential hypertension 12/23/2013    Urinary tract infection, site not specified 12/23/2013      Past Surgical History:   Procedure Laterality Date    HX LITHOTRIPSY      HX LUMBAR FUSION  09/07/2018    L5-S1 fusion and L5-S1 laminectomy with hardware    HX SHOULDER REPLACEMENT Left 12/2014    left shoulder    TOTAL HIP ARTHROPLASTY Left       Prior to Admission medications    Medication Sig Start Date End Date Taking?  Authorizing Provider   benazepril-hydroCHLOROthiazide (LOTENSIN HCT) 20-12.5 mg per tablet TAKE 1 TABLET EVERY DAY 6/20/19   Cherie Brito PA-C   amLODIPine (NORVASC) 5 mg tablet TAKE 1 TABLET EVERY DAY 5/22/19   Jeronimo Coleman MD   allopurinol (ZYLOPRIM) 100 mg tablet Take 1 Tab by mouth daily. 12/20/18   Jeronimo oCleman MD   multivitamin (ONE A DAY) tablet Take 1 Tab by mouth daily. Provider, Historical   OTHER Indications: many vitamins and herbal supplements    Provider, Historical     Allergies   Allergen Reactions    Clonidine Other (comments)     Lethargy, Patient denies allergy    Simvastatin Unknown (comments)     Noted in office visit note, 5/26/2015,  Patient denies allergy      Social History     Tobacco Use    Smoking status: Never Smoker    Smokeless tobacco: Never Used   Substance Use Topics    Alcohol use: Yes     Alcohol/week: 0.0 standard drinks     Comment: \"beer every once and awhile\"      Family History   Problem Relation Age of Onset    Cancer Mother         breast    Cancer Father         unsure    Heart Disease Father     No Known Problems Maternal Grandmother     No Known Problems Maternal Grandfather     No Known Problems Paternal Grandmother     No Known Problems Paternal Grandfather       Review of Systems  A comprehensive review of systems was negative except for that written in the HPI. Objective:     No data found. Visit Vitals  Ht 5' 7\" (1.702 m)   Wt 83 kg (183 lb)   BMI 28.66 kg/m²     General:  Alert, cooperative, no distress, appears stated age. Head:  Normocephalic, without obvious abnormality, atraumatic. Back:   Symmetric, no curvature. ROM normal. No CVA tenderness. Lungs:   Clear to auscultation bilaterally. Chest wall:  No tenderness or deformity. Heart:  Regular rate and rhythm, S1, S2 normal, no murmur, click, rub or gallop. Extremities: Extremities normal, atraumatic, no cyanosis or edema. Pulses: 2+ and symmetric all extremities.    Skin: Skin color, texture, turgor normal. No rashes or lesions   Lymph nodes: Cervical, supraclavicular, and axillary nodes normal.   Neurologic: CNII-XII intact. Normal strength, sensation and reflexes throughout. Assessment:     Principal Problem:    Post-traumatic osteoarthritis of left shoulder (12/7/2019)    Active Problems:    Painful orthopaedic hardware [facility]) (12/7/2019)        Plan:     The various methods of treatment have been discussed with the patient and family. PATIENT HAS EXHAUSTED NON-OPERATIVE MODALITIES     After consideration of risks, benefits and other options for treatment, the patient has consented to surgical intervention.     SEE OFFICE NOTE    Curtis Andrews.,  MD

## 2019-12-07 NOTE — BRIEF OP NOTE
BRIEF OPERATIVE NOTE    Date of Procedure: 12/12/2019     Preoperative Diagnosis:  POST-TRAUMATIC DJD LEFT SHOULDER      PAINFUL HARDWARE LEFT SHOULDER     Postoperative Diagnosis:  SAME    Procedure(s): 1. INCISION, DEBRIDEMENT, HARDWARE REMOVAL LEFT SHOULDER     2.  REVERSE LEFT TOTAL SHOULDER ARTHROPLASTY WITH DELTA EXTEND PROSTHESIS, BICEPS TENODESIS, LATISSIMUS DORSI AND TERES MAJOR TENDON TRANSFERS    Surgeon(s) and Role:     * Eliezer Rahman MD - Primary         Assistant Staff:  Rome Dunn NP       Surgical Staff:  Circ-1: (Unknown)  Scrub Tech-1: (Unknown)  Scrub Tech-2: (Unknown)  Scrub Tech-3: (Unknown)  No case tracking events are documented in the log. Anesthesia:  GENERAL WITH INTERSCALENE BLOCK    Estimated Blood Loss: 500 cc. Complications: NONE    Implants:   Implant Name Type Inv.  Item Serial No.  Lot No. LRB No. Used Action   CEMENT BONE SIMPLEX P 1/PACK - [id number]  CEMENT BONE SIMPLEX P 1/PACK [id number] [employer] [facility] Left 1 Implanted   COMP METAGLENE LNG PEG +10MM -- DELTA XTEND - [id number]  COMP METAGLENE LNG PEG +10MM -- DELTA XTEND [id number] [facility]THOP[employer] [id number] Left 1 Implanted   COMPNT GLENOSPHRE ECC D42MM +6 -- DELTA XTEND - [id number]  COMPNT GLENOSPHRE ECC D42MM +6 -- DELTA XTEND [id number] [facility] [employer] [id number] Left 1 Implanted   CEMENT BONE SIMPLEX P 1/PACK - [id number]  CEMENT BONE SIMPLEX P 1/PACK [id number] [employer] [facility]062 Left 1 Implanted   SCR BNE CRTX ST 4.5X50MM SS --  - [id number]  SCR BNE CRTX ST 4.5X50MM SS --  [id number] [employer] [place] [id number] Left 1 Implanted   SCR BNE CRTX ST 4.5X60MM SS --  - [id number]  SCR BNE CRTX ST 4.5X60MM SS --  [id number] [employer] [place] [id number] Left 1 Implanted   RSTRCTR JADYN BNE BIOABSRB 14MM -- BIOSTOP G - [id number]  RSTRCTR JADYN BNE BIOABSRB 14MM -- BIOSTOP G [id number] [facility] [id number] Left 1 Implanted   STEM HUM MBLOC EPI STD SZ2 12M -- DELTA XTEND - [id number]  STEM HUM MBLOC EPI STD SZ2 12M -- DELTA XTEND [id number] [facility] [employer]THOPEDICS [id number] Left 1 Implanted   CUP HUM STD DELT 42MM +6 MM NK -- DELTA Judith Vazquez - [id number]  CUP HUM STD DELT 42MM +6 MM NK -- DELTA Judith Vazquez [id number] [id number] Left 1 Implanted       Andrés Corral MD

## 2019-12-10 NOTE — ADVANCED PRACTICE NURSE
Total Joint Surgery Preoperative Chart Review      Patient ID:  [facility]  [id number]  60 y.o.  1940  Surgeon: Dr. Eddie Kenny  Date of Surgery: 12/12/2019  Procedure: Total Left Shoulder Arthroplasty  Primary Care Physician: Jori Brandt [phone]  Specialty Physician(s):      Subjective:   Aiden Area is a [de-identified] y.o. WHITE OR  male who presents for preoperative evaluation for Total Left Shoulder arthroplasty. This is a preoperative chart review note based on data collected by the nurse at the surgical Pre-Assessment visit. Past Medical History:   Diagnosis Date    Arthritis     Calculus of kidney 12/23/2013    Chronic kidney disease     Chronic kidney disease, stage III (moderate) ([address] 12/23/2013    Fracture of coccyx [facility] 1/14/2016    Gout, unspecified 12/23/2013    History of left shoulder fracture 1/14/2016    Hyperlipidemia 1/14/2016    Microscopic hematuria 12/23/2013    Sciatica of right side 1/14/2016    Sleep apnea     does not wear cpap    Unspecified essential hypertension 12/23/2013    Urinary tract infection, site not specified 12/23/2013      Past Surgical History:   Procedure Laterality Date    HX LITHOTRIPSY      HX LUMBAR FUSION  09/07/2018    L5-S1 fusion and L5-S1 laminectomy with hardware    HX SHOULDER REPLACEMENT Left 12/2014    left shoulder    TOTAL HIP ARTHROPLASTY Left      Family History   Problem Relation Age of Onset    Cancer Mother         breast    Cancer Father         unsure    Heart Disease Father     No Known Problems Maternal Grandmother     No Known Problems Maternal Grandfather     No Known Problems Paternal Grandmother     No Known Problems Paternal Grandfather       Social History     Tobacco Use    Smoking status: Never Smoker    Smokeless tobacco: Never Used   Substance Use Topics    Alcohol use:  Yes     Alcohol/week: 0.0 standard drinks     Comment: \"beer every once and awhile\"       Prior to Admission medications    Medication Sig Start Date End Date Taking? Authorizing Provider   benazepril-hydroCHLOROthiazide (LOTENSIN HCT) 20-12.5 mg per tablet TAKE 1 TABLET EVERY DAY 6/20/19  Yes Wilber Fuentes PA-C   multivitamin (ONE A DAY) tablet Take 1 Tab by mouth daily. Yes Provider, Historical   amLODIPine (NORVASC) 5 mg tablet TAKE 1 TABLET EVERY DAY 5/22/19   Ethan Matthews MD   allopurinol (ZYLOPRIM) 100 mg tablet Take 1 Tab by mouth daily. 12/20/18   Ethan Matthews MD   OTHER Indications: many vitamins and herbal supplements    Provider, Historical     Allergies   Allergen Reactions    Clonidine Other (comments)     Lethargy, Patient denies allergy    Simvastatin Unknown (comments)     Noted in office visit note, 5/26/2015,  Patient denies allergy          Objective:     Physical Exam:   No data found. ECG:    EKG Results     Procedure 720 Value Units Date/Time    EKG, 12 LEAD, INITIAL [[id number]] Collected:  12/04/19 1452    Order Status:  Completed Updated:  12/04/19 1755     Ventricular Rate 64 BPM      Atrial Rate 64 BPM      P-R Interval 226 ms      QRS Duration 108 ms      Q-T Interval 432 ms      QTC Calculation (Bezet) 445 ms      Calculated P Axis -27 degrees      Calculated R Axis -60 degrees      Calculated T Axis 51 degrees      Diagnosis --     Sinus rhythm with 1st degree A-V block  Left axis deviation  Possible Anterior infarct , age undetermined  Abnormal ECG  When compared with ECG of 29-AUG-2018 10:50,  No significant change was found  Confirmed by EZEKIEL MCKINNEY (), Natanael Galeano ([id number]) on 12/4/2019 5:55:31 PM            Data Review:   Labs:     Results for Dary Dias (MRN [id number]) as of 12/10/2019 11:26   Ref.  Range 12/4/2019 14:50   Sodium Latest Ref Range: 136 - 145 mmol/L 137   Potassium Latest Ref Range: 3.5 - 5.1 mmol/L 4.0   Chloride Latest Ref Range: 98 - 107 mmol/L 105   CO2 Latest Ref Range: 21 - 32 mmol/L 26   Anion gap Latest Ref Range: 7 - 16 mmol/L 6 (L)   Glucose Latest Ref Range: 65 - 100 mg/dL 114 (H)   BUN Latest Ref Range: 8 - 23 MG/DL 29 (H)   Creatinine Latest Ref Range: 0.8 - 1.5 MG/DL 1.67 (H)   Calcium Latest Ref Range: 8.3 - 10.4 MG/DL 9.4   Magnesium Latest Ref Range: 1.8 - 2.4 mg/dL 2.0   GFR est non-AA Latest Ref Range: >60 ml/min/1.73m2 42 (L)   GFR est AA Latest Ref Range: >60 ml/min/1.73m2 51 (L)   Bilirubin, total Latest Ref Range: 0.2 - 1.1 MG/DL 0.6   Protein, total Latest Ref Range: 6.3 - 8.2 g/dL 7.6   Albumin Latest Ref Range: 3.2 - 4.6 g/dL 3.9   Globulin Latest Ref Range: 2.3 - 3.5 g/dL 3.7 (H)   A-G Ratio Latest Ref Range: 1.2 - 3.5   1.1 (L)   ALT (SGPT) Latest Ref Range: 12 - 65 U/L 25   AST Latest Ref Range: 15 - 37 U/L 22   Alk. phosphatase Latest Ref Range: 50 - 136 U/L 69     Problem List:  )  Patient Active Problem List   Diagnosis Code    Family history of malignant neoplasm of prostate Z80.42    History of left shoulder fracture Z87.81    History of renal stone Z87.442    Gout M10.9    CKD (chronic kidney disease) stage 3, GFR 30-59 ml/min (HCC) N18.3    Benign hypertensive kidney disease with chronic kidney disease I12.9    Glucose intolerance (impaired glucose tolerance) R73.02    Mixed hyperlipidemia E78.2    Hyperuricemia E79.0    Spinal stenosis of lumbosacral region M48.07    Spondylolisthesis at L5-S1 level M43.17    Spinal stenosis M48.00    Post-traumatic osteoarthritis of left shoulder M19.112    Painful orthopaedic hardware [facility]) T84.84XA       Total Joint Surgery Pre-Assessment Recommendations: The patient is not compliant with wearing CPAP. Recommend oxygen saturation monitoring during hospitalization and oxygen at 3 liter via nc qhs.   PEP therapy BID      Signed By: NAINA Ortega    December 10, 2019

## 2019-12-11 ENCOUNTER — ANESTHESIA EVENT (OUTPATIENT)
Dept: SURGERY | Age: 79
DRG: 483 | End: 2019-12-11
Payer: MEDICARE

## 2019-12-11 ENCOUNTER — HOSPITAL ENCOUNTER (OUTPATIENT)
Dept: LAB | Age: 79
Discharge: HOME | DRG: 483 | End: 2019-12-11
Payer: MEDICARE

## 2019-12-11 PROCEDURE — 86850 RBC ANTIBODY SCREEN: CPT

## 2019-12-11 PROCEDURE — 36415 COLL VENOUS BLD VENIPUNCTURE: CPT

## 2019-12-11 PROCEDURE — 86923 COMPATIBILITY TEST ELECTRIC: CPT

## 2019-12-12 ENCOUNTER — ANESTHESIA (OUTPATIENT)
Dept: SURGERY | Age: 79
DRG: 483 | End: 2019-12-12
Payer: MEDICARE

## 2019-12-12 ENCOUNTER — HOSPITAL ENCOUNTER (INPATIENT)
Age: 79
LOS: 2 days | Discharge: HOME HEALTH | DRG: 483 | End: 2019-12-14
Attending: ORTHOPAEDIC SURGERY | Admitting: ORTHOPAEDIC SURGERY
Payer: MEDICARE

## 2019-12-12 ENCOUNTER — APPOINTMENT (OUTPATIENT)
Dept: GENERAL RADIOLOGY | Age: 79
DRG: 483 | End: 2019-12-12
Attending: ORTHOPAEDIC SURGERY
Payer: MEDICARE

## 2019-12-12 PROBLEM — S46.001S OSTEOARTHRITIS OF BOTH SHOULDERS DUE TO ROTATOR CUFF INJURY: Status: ACTIVE | Noted: 2019-12-12

## 2019-12-12 PROBLEM — M19.112: Status: ACTIVE | Noted: 2019-12-12

## 2019-12-12 PROBLEM — M19.111 OSTEOARTHRITIS OF BOTH SHOULDERS DUE TO ROTATOR CUFF INJURY: Status: ACTIVE | Noted: 2019-12-12

## 2019-12-12 PROBLEM — M19.112 OSTEOARTHRITIS OF BOTH SHOULDERS DUE TO ROTATOR CUFF INJURY: Status: ACTIVE | Noted: 2019-12-12

## 2019-12-12 PROBLEM — S46.002S OSTEOARTHRITIS OF BOTH SHOULDERS DUE TO ROTATOR CUFF INJURY: Status: ACTIVE | Noted: 2019-12-12

## 2019-12-12 LAB
ALBUMIN SERPL-MCNC: 3.1 G/DL (ref 3.2–4.6)
ALBUMIN/GLOB SERPL: 1.2 {RATIO} (ref 1.2–3.5)
ALP SERPL-CCNC: 55 U/L (ref 50–136)
ALT SERPL-CCNC: 23 U/L (ref 12–65)
ANION GAP SERPL CALC-SCNC: 9 MMOL/L (ref 7–16)
AST SERPL-CCNC: 21 U/L (ref 15–37)
BILIRUB SERPL-MCNC: 0.5 MG/DL (ref 0.2–1.1)
BUN SERPL-MCNC: 27 MG/DL (ref 8–23)
CALCIUM SERPL-MCNC: 8.8 MG/DL (ref 8.3–10.4)
CHLORIDE SERPL-SCNC: 107 MMOL/L (ref 98–107)
CO2 SERPL-SCNC: 24 MMOL/L (ref 21–32)
CREAT SERPL-MCNC: 1.7 MG/DL (ref 0.8–1.5)
ERYTHROCYTE [DISTWIDTH] IN BLOOD BY AUTOMATED COUNT: 12.8 % (ref 11.9–14.6)
EST. AVERAGE GLUCOSE BLD GHB EST-MCNC: 137 MG/DL
GLOBULIN SER CALC-MCNC: 2.5 G/DL (ref 2.3–3.5)
GLUCOSE BLD STRIP.AUTO-MCNC: 110 MG/DL (ref 65–100)
GLUCOSE SERPL-MCNC: 210 MG/DL (ref 65–100)
HBA1C MFR BLD: 6.4 %
HCT VFR BLD AUTO: 36.2 % (ref 41.1–50.3)
HGB BLD-MCNC: 12 G/DL (ref 13.6–17.2)
MAGNESIUM SERPL-MCNC: 1.4 MG/DL (ref 1.8–2.4)
MCH RBC QN AUTO: 34.1 PG (ref 26.1–32.9)
MCHC RBC AUTO-ENTMCNC: 33.1 G/DL (ref 31.4–35)
MCV RBC AUTO: 102.8 FL (ref 79.6–97.8)
NRBC # BLD: 0 K/UL (ref 0–0.2)
PLATELET # BLD AUTO: 240 K/UL (ref 150–450)
PMV BLD AUTO: 10.6 FL (ref 9.4–12.3)
POTASSIUM SERPL-SCNC: 4.1 MMOL/L (ref 3.5–5.1)
PROT SERPL-MCNC: 5.6 G/DL (ref 6.3–8.2)
RBC # BLD AUTO: 3.52 M/UL (ref 4.23–5.6)
SODIUM SERPL-SCNC: 140 MMOL/L (ref 136–145)
WBC # BLD AUTO: 16 K/UL (ref 4.3–11.1)

## 2019-12-12 PROCEDURE — 0RRK00Z REPLACEMENT OF LEFT SHOULDER JOINT WITH REVERSE BALL AND SOCKET SYNTHETIC SUBSTITUTE, OPEN APPROACH: ICD-10-PCS | Performed by: ORTHOPAEDIC SURGERY

## 2019-12-12 PROCEDURE — 36415 COLL VENOUS BLD VENIPUNCTURE: CPT

## 2019-12-12 PROCEDURE — 77030039425 HC BLD LARYNG TRULITE DISP TELE -A: Performed by: ANESTHESIOLOGY

## 2019-12-12 PROCEDURE — 77030002937 HC SUT MERS J&J -B: Performed by: ORTHOPAEDIC SURGERY

## 2019-12-12 PROCEDURE — C1776 JOINT DEVICE (IMPLANTABLE): HCPCS | Performed by: ORTHOPAEDIC SURGERY

## 2019-12-12 PROCEDURE — 73030 X-RAY EXAM OF SHOULDER: CPT

## 2019-12-12 PROCEDURE — 77030037088 HC TUBE ENDOTRACH ORAL NSL COVD-A: Performed by: ANESTHESIOLOGY

## 2019-12-12 PROCEDURE — 74011250636 HC RX REV CODE- 250/636: Performed by: NURSE ANESTHETIST, CERTIFIED REGISTERED

## 2019-12-12 PROCEDURE — 77030003602 HC NDL NRV BLK BBMI -B: Performed by: ANESTHESIOLOGY

## 2019-12-12 PROCEDURE — 77030002913 HC SUT ETHBND J&J -B: Performed by: ORTHOPAEDIC SURGERY

## 2019-12-12 PROCEDURE — 77030006788 HC BLD SAW OSC STRY -B: Performed by: ORTHOPAEDIC SURGERY

## 2019-12-12 PROCEDURE — 77030018836 HC SOL IRR NACL ICUM -A: Performed by: ORTHOPAEDIC SURGERY

## 2019-12-12 PROCEDURE — 74011250636 HC RX REV CODE- 250/636: Performed by: ORTHOPAEDIC SURGERY

## 2019-12-12 PROCEDURE — C1713 ANCHOR/SCREW BN/BN,TIS/BN: HCPCS | Performed by: ORTHOPAEDIC SURGERY

## 2019-12-12 PROCEDURE — 74011250636 HC RX REV CODE- 250/636: Performed by: ANESTHESIOLOGY

## 2019-12-12 PROCEDURE — 76010010054 HC POST OP PAIN BLOCK: Performed by: ORTHOPAEDIC SURGERY

## 2019-12-12 PROCEDURE — 83735 ASSAY OF MAGNESIUM: CPT

## 2019-12-12 PROCEDURE — 76210000006 HC OR PH I REC 0.5 TO 1 HR: Performed by: ORTHOPAEDIC SURGERY

## 2019-12-12 PROCEDURE — 77030013708 HC HNDPC SUC IRR PULS STRY –B: Performed by: ORTHOPAEDIC SURGERY

## 2019-12-12 PROCEDURE — 77030012547: Performed by: ORTHOPAEDIC SURGERY

## 2019-12-12 PROCEDURE — 65270000029 HC RM PRIVATE

## 2019-12-12 PROCEDURE — 82962 GLUCOSE BLOOD TEST: CPT

## 2019-12-12 PROCEDURE — 74011000302 HC RX REV CODE- 302: Performed by: NURSE PRACTITIONER

## 2019-12-12 PROCEDURE — 94762 N-INVAS EAR/PLS OXIMTRY CONT: CPT

## 2019-12-12 PROCEDURE — 77030031139 HC SUT VCRL2 J&J -A: Performed by: ORTHOPAEDIC SURGERY

## 2019-12-12 PROCEDURE — 77030035643 HC BLD SAW OSC PRECIS STRY -C: Performed by: ORTHOPAEDIC SURGERY

## 2019-12-12 PROCEDURE — 77030019908 HC STETH ESOPH SIMS -A: Performed by: ANESTHESIOLOGY

## 2019-12-12 PROCEDURE — 74011000250 HC RX REV CODE- 250: Performed by: NURSE ANESTHETIST, CERTIFIED REGISTERED

## 2019-12-12 PROCEDURE — 77030003827 HC BIT DRL J&J -B: Performed by: ORTHOPAEDIC SURGERY

## 2019-12-12 PROCEDURE — 74011000250 HC RX REV CODE- 250: Performed by: ANESTHESIOLOGY

## 2019-12-12 PROCEDURE — 74011250636 HC RX REV CODE- 250/636: Performed by: NURSE PRACTITIONER

## 2019-12-12 PROCEDURE — 80053 COMPREHEN METABOLIC PANEL: CPT

## 2019-12-12 PROCEDURE — 85027 COMPLETE CBC AUTOMATED: CPT

## 2019-12-12 PROCEDURE — 76060000036 HC ANESTHESIA 2.5 TO 3 HR: Performed by: ORTHOPAEDIC SURGERY

## 2019-12-12 PROCEDURE — 77030040922 HC BLNKT HYPOTHRM STRY -A: Performed by: ANESTHESIOLOGY

## 2019-12-12 PROCEDURE — 77030011283 HC ELECTRD NDL COVD -A: Performed by: ORTHOPAEDIC SURGERY

## 2019-12-12 PROCEDURE — 77030018846 HC SOL IRR STRL H20 ICUM -A: Performed by: ORTHOPAEDIC SURGERY

## 2019-12-12 PROCEDURE — 76942 ECHO GUIDE FOR BIOPSY: CPT | Performed by: ORTHOPAEDIC SURGERY

## 2019-12-12 PROCEDURE — 77010033678 HC OXYGEN DAILY

## 2019-12-12 PROCEDURE — 77030002996 HC SUT SLK J&J -A: Performed by: ORTHOPAEDIC SURGERY

## 2019-12-12 PROCEDURE — 83036 HEMOGLOBIN GLYCOSYLATED A1C: CPT

## 2019-12-12 PROCEDURE — 76010000171 HC OR TIME 2 TO 2.5 HR INTENSV-TIER 1: Performed by: ORTHOPAEDIC SURGERY

## 2019-12-12 PROCEDURE — 77030002986 HC SUT PROL J&J -A: Performed by: ORTHOPAEDIC SURGERY

## 2019-12-12 PROCEDURE — 86580 TB INTRADERMAL TEST: CPT | Performed by: NURSE PRACTITIONER

## 2019-12-12 PROCEDURE — 77030018547 HC SUT ETHBND1 J&J -B: Performed by: ORTHOPAEDIC SURGERY

## 2019-12-12 PROCEDURE — 77030014007 HC SPNG HEMSTAT J&J -B: Performed by: ORTHOPAEDIC SURGERY

## 2019-12-12 DEVICE — RESTRICTOR CEM DIA14MM UNIV FEM CNL UHMWPE BIOSTP G: Type: IMPLANTABLE DEVICE | Site: SHOULDER | Status: FUNCTIONAL

## 2019-12-12 DEVICE — SCREW BNE L60MM DIA4.5MM PROX CORT TIB S STL ST LOK FULL: Type: IMPLANTABLE DEVICE | Site: SHOULDER | Status: FUNCTIONAL

## 2019-12-12 DEVICE — COMPONENT GLENOSPHERE ECCENTRIC XTEND 42MM PLUS 6MM: Type: IMPLANTABLE DEVICE | Site: SHOULDER | Status: FUNCTIONAL

## 2019-12-12 DEVICE — CEMENT BNE 20ML 41GM FULL DOSE PMMA W/ TOBRA M VISC RADPQ: Type: IMPLANTABLE DEVICE | Site: SHOULDER | Status: FUNCTIONAL

## 2019-12-12 DEVICE — STEM HUM SZ 2 DIA12MM 155DEG SHLDR CO CHROM ALLY STD LEN: Type: IMPLANTABLE DEVICE | Site: SHOULDER | Status: FUNCTIONAL

## 2019-12-12 DEVICE — COMPONENT GLEN FIX DIA10MM SHLDR METAGLENE LNG PEG GLOB: Type: IMPLANTABLE DEVICE | Site: SHOULDER | Status: FUNCTIONAL

## 2019-12-12 DEVICE — CUP HUM DIA42MM +6MM OFFSET STD SHLDR POLYETH DELT XTEND: Type: IMPLANTABLE DEVICE | Site: SHOULDER | Status: FUNCTIONAL

## 2019-12-12 DEVICE — SCREW BNE L50MM DIA4.5MM PROX CORT TIB S STL ST LOK FULL: Type: IMPLANTABLE DEVICE | Site: SHOULDER | Status: FUNCTIONAL

## 2019-12-12 RX ORDER — MIDAZOLAM HYDROCHLORIDE 1 MG/ML
2 INJECTION, SOLUTION INTRAMUSCULAR; INTRAVENOUS
Status: DISCONTINUED | OUTPATIENT
Start: 2019-12-12 | End: 2019-12-12 | Stop reason: HOSPADM

## 2019-12-12 RX ORDER — MAGNESIUM SULFATE HEPTAHYDRATE 40 MG/ML
2 INJECTION, SOLUTION INTRAVENOUS ONCE
Status: COMPLETED | OUTPATIENT
Start: 2019-12-12 | End: 2019-12-14

## 2019-12-12 RX ORDER — SODIUM CHLORIDE 0.9 % (FLUSH) 0.9 %
5-40 SYRINGE (ML) INJECTION AS NEEDED
Status: DISCONTINUED | OUTPATIENT
Start: 2019-12-12 | End: 2019-12-12 | Stop reason: HOSPADM

## 2019-12-12 RX ORDER — FACIAL-BODY WIPES
10 EACH TOPICAL DAILY PRN
Status: DISCONTINUED | OUTPATIENT
Start: 2019-12-12 | End: 2019-12-14 | Stop reason: HOSPADM

## 2019-12-12 RX ORDER — HYDROMORPHONE HYDROCHLORIDE 2 MG/1
2 TABLET ORAL
Status: DISCONTINUED | OUTPATIENT
Start: 2019-12-12 | End: 2019-12-14 | Stop reason: HOSPADM

## 2019-12-12 RX ORDER — SODIUM CHLORIDE 0.9 % (FLUSH) 0.9 %
5-40 SYRINGE (ML) INJECTION AS NEEDED
Status: DISCONTINUED | OUTPATIENT
Start: 2019-12-12 | End: 2019-12-14 | Stop reason: HOSPADM

## 2019-12-12 RX ORDER — DEXAMETHASONE SODIUM PHOSPHATE 4 MG/ML
INJECTION, SOLUTION INTRA-ARTICULAR; INTRALESIONAL; INTRAMUSCULAR; INTRAVENOUS; SOFT TISSUE AS NEEDED
Status: DISCONTINUED | OUTPATIENT
Start: 2019-12-12 | End: 2019-12-12 | Stop reason: HOSPADM

## 2019-12-12 RX ORDER — ONDANSETRON 2 MG/ML
4 INJECTION INTRAMUSCULAR; INTRAVENOUS
Status: DISCONTINUED | OUTPATIENT
Start: 2019-12-12 | End: 2019-12-14 | Stop reason: HOSPADM

## 2019-12-12 RX ORDER — HYDROMORPHONE HYDROCHLORIDE 1 MG/ML
1 INJECTION, SOLUTION INTRAMUSCULAR; INTRAVENOUS; SUBCUTANEOUS
Status: DISCONTINUED | OUTPATIENT
Start: 2019-12-12 | End: 2019-12-14 | Stop reason: HOSPADM

## 2019-12-12 RX ORDER — CEFAZOLIN SODIUM/WATER 2 G/20 ML
2 SYRINGE (ML) INTRAVENOUS EVERY 8 HOURS
Status: COMPLETED | OUTPATIENT
Start: 2019-12-13 | End: 2019-12-13

## 2019-12-12 RX ORDER — HYDROMORPHONE HYDROCHLORIDE 2 MG/ML
0.5 INJECTION, SOLUTION INTRAMUSCULAR; INTRAVENOUS; SUBCUTANEOUS
Status: DISCONTINUED | OUTPATIENT
Start: 2019-12-12 | End: 2019-12-12 | Stop reason: HOSPADM

## 2019-12-12 RX ORDER — ALLOPURINOL 100 MG/1
100 TABLET ORAL DAILY
Status: DISCONTINUED | OUTPATIENT
Start: 2019-12-13 | End: 2019-12-12

## 2019-12-12 RX ORDER — PROPOFOL 10 MG/ML
INJECTION, EMULSION INTRAVENOUS AS NEEDED
Status: DISCONTINUED | OUTPATIENT
Start: 2019-12-12 | End: 2019-12-12 | Stop reason: HOSPADM

## 2019-12-12 RX ORDER — OXYCODONE HYDROCHLORIDE 5 MG/1
5 TABLET ORAL
Status: DISCONTINUED | OUTPATIENT
Start: 2019-12-12 | End: 2019-12-12 | Stop reason: HOSPADM

## 2019-12-12 RX ORDER — SODIUM CHLORIDE, SODIUM LACTATE, POTASSIUM CHLORIDE, CALCIUM CHLORIDE 600; 310; 30; 20 MG/100ML; MG/100ML; MG/100ML; MG/100ML
25 INJECTION, SOLUTION INTRAVENOUS CONTINUOUS
Status: DISCONTINUED | OUTPATIENT
Start: 2019-12-12 | End: 2019-12-12 | Stop reason: HOSPADM

## 2019-12-12 RX ORDER — NEOSTIGMINE METHYLSULFATE 1 MG/ML
INJECTION, SOLUTION INTRAVENOUS AS NEEDED
Status: DISCONTINUED | OUTPATIENT
Start: 2019-12-12 | End: 2019-12-12 | Stop reason: HOSPADM

## 2019-12-12 RX ORDER — FENTANYL CITRATE 50 UG/ML
INJECTION, SOLUTION INTRAMUSCULAR; INTRAVENOUS AS NEEDED
Status: DISCONTINUED | OUTPATIENT
Start: 2019-12-12 | End: 2019-12-12 | Stop reason: HOSPADM

## 2019-12-12 RX ORDER — AMLODIPINE BESYLATE 5 MG/1
5 TABLET ORAL DAILY
Status: DISCONTINUED | OUTPATIENT
Start: 2019-12-13 | End: 2019-12-12

## 2019-12-12 RX ORDER — DOCUSATE SODIUM 100 MG/1
100 CAPSULE, LIQUID FILLED ORAL DAILY
Status: DISCONTINUED | OUTPATIENT
Start: 2019-12-13 | End: 2019-12-14 | Stop reason: HOSPADM

## 2019-12-12 RX ORDER — NALOXONE HYDROCHLORIDE 0.4 MG/ML
0.2 INJECTION, SOLUTION INTRAMUSCULAR; INTRAVENOUS; SUBCUTANEOUS
Status: DISCONTINUED | OUTPATIENT
Start: 2019-12-12 | End: 2019-12-12 | Stop reason: HOSPADM

## 2019-12-12 RX ORDER — SODIUM CHLORIDE, SODIUM LACTATE, POTASSIUM CHLORIDE, CALCIUM CHLORIDE 600; 310; 30; 20 MG/100ML; MG/100ML; MG/100ML; MG/100ML
75 INJECTION, SOLUTION INTRAVENOUS CONTINUOUS
Status: DISCONTINUED | OUTPATIENT
Start: 2019-12-12 | End: 2019-12-12 | Stop reason: HOSPADM

## 2019-12-12 RX ORDER — HYDROCHLOROTHIAZIDE 25 MG/1
12.5 TABLET ORAL DAILY
Status: DISCONTINUED | OUTPATIENT
Start: 2019-12-13 | End: 2019-12-14 | Stop reason: HOSPADM

## 2019-12-12 RX ORDER — NALOXONE HYDROCHLORIDE 0.4 MG/ML
0.2 INJECTION, SOLUTION INTRAMUSCULAR; INTRAVENOUS; SUBCUTANEOUS AS NEEDED
Status: DISCONTINUED | OUTPATIENT
Start: 2019-12-12 | End: 2019-12-12 | Stop reason: HOSPADM

## 2019-12-12 RX ORDER — MAGNESIUM CITRATE
296 SOLUTION, ORAL ORAL
Status: DISCONTINUED | OUTPATIENT
Start: 2019-12-12 | End: 2019-12-14 | Stop reason: HOSPADM

## 2019-12-12 RX ORDER — ONDANSETRON 2 MG/ML
4 INJECTION INTRAMUSCULAR; INTRAVENOUS
Status: DISCONTINUED | OUTPATIENT
Start: 2019-12-12 | End: 2019-12-12 | Stop reason: HOSPADM

## 2019-12-12 RX ORDER — DEXAMETHASONE SODIUM PHOSPHATE 4 MG/ML
INJECTION, SOLUTION INTRA-ARTICULAR; INTRALESIONAL; INTRAMUSCULAR; INTRAVENOUS; SOFT TISSUE
Status: COMPLETED | OUTPATIENT
Start: 2019-12-12 | End: 2019-12-12

## 2019-12-12 RX ORDER — CEFAZOLIN SODIUM/WATER 2 G/20 ML
2 SYRINGE (ML) INTRAVENOUS ONCE
Status: COMPLETED | OUTPATIENT
Start: 2019-12-12 | End: 2019-12-12

## 2019-12-12 RX ORDER — SODIUM CHLORIDE 9 MG/ML
75 INJECTION, SOLUTION INTRAVENOUS CONTINUOUS
Status: DISPENSED | OUTPATIENT
Start: 2019-12-12 | End: 2019-12-13

## 2019-12-12 RX ORDER — LIDOCAINE HYDROCHLORIDE 20 MG/ML
INJECTION, SOLUTION EPIDURAL; INFILTRATION; INTRACAUDAL; PERINEURAL AS NEEDED
Status: DISCONTINUED | OUTPATIENT
Start: 2019-12-12 | End: 2019-12-12 | Stop reason: HOSPADM

## 2019-12-12 RX ORDER — MIDAZOLAM HYDROCHLORIDE 1 MG/ML
2 INJECTION, SOLUTION INTRAMUSCULAR; INTRAVENOUS ONCE
Status: COMPLETED | OUTPATIENT
Start: 2019-12-12 | End: 2019-12-12

## 2019-12-12 RX ORDER — SODIUM CHLORIDE 0.9 % (FLUSH) 0.9 %
5-40 SYRINGE (ML) INJECTION EVERY 8 HOURS
Status: DISCONTINUED | OUTPATIENT
Start: 2019-12-12 | End: 2019-12-12 | Stop reason: HOSPADM

## 2019-12-12 RX ORDER — ROCURONIUM BROMIDE 10 MG/ML
INJECTION, SOLUTION INTRAVENOUS AS NEEDED
Status: DISCONTINUED | OUTPATIENT
Start: 2019-12-12 | End: 2019-12-12 | Stop reason: HOSPADM

## 2019-12-12 RX ORDER — SODIUM CHLORIDE 0.9 % (FLUSH) 0.9 %
5-40 SYRINGE (ML) INJECTION EVERY 8 HOURS
Status: DISCONTINUED | OUTPATIENT
Start: 2019-12-12 | End: 2019-12-14 | Stop reason: HOSPADM

## 2019-12-12 RX ORDER — BUPIVACAINE HYDROCHLORIDE AND EPINEPHRINE 5; 5 MG/ML; UG/ML
INJECTION, SOLUTION EPIDURAL; INTRACAUDAL; PERINEURAL
Status: COMPLETED | OUTPATIENT
Start: 2019-12-12 | End: 2019-12-12

## 2019-12-12 RX ORDER — GLYCOPYRROLATE 0.2 MG/ML
INJECTION INTRAMUSCULAR; INTRAVENOUS AS NEEDED
Status: DISCONTINUED | OUTPATIENT
Start: 2019-12-12 | End: 2019-12-12 | Stop reason: HOSPADM

## 2019-12-12 RX ORDER — ONDANSETRON 2 MG/ML
INJECTION INTRAMUSCULAR; INTRAVENOUS AS NEEDED
Status: DISCONTINUED | OUTPATIENT
Start: 2019-12-12 | End: 2019-12-12 | Stop reason: HOSPADM

## 2019-12-12 RX ORDER — EPHEDRINE SULFATE/0.9% NACL/PF 50 MG/5 ML
SYRINGE (ML) INTRAVENOUS AS NEEDED
Status: DISCONTINUED | OUTPATIENT
Start: 2019-12-12 | End: 2019-12-12 | Stop reason: HOSPADM

## 2019-12-12 RX ORDER — LANOLIN ALCOHOL/MO/W.PET/CERES
1 CREAM (GRAM) TOPICAL
Status: DISCONTINUED | OUTPATIENT
Start: 2019-12-13 | End: 2019-12-14 | Stop reason: HOSPADM

## 2019-12-12 RX ORDER — FENTANYL CITRATE 50 UG/ML
100 INJECTION, SOLUTION INTRAMUSCULAR; INTRAVENOUS ONCE
Status: COMPLETED | OUTPATIENT
Start: 2019-12-12 | End: 2019-12-12

## 2019-12-12 RX ORDER — LIDOCAINE HYDROCHLORIDE 10 MG/ML
0.1 INJECTION INFILTRATION; PERINEURAL AS NEEDED
Status: DISCONTINUED | OUTPATIENT
Start: 2019-12-12 | End: 2019-12-12 | Stop reason: HOSPADM

## 2019-12-12 RX ORDER — LISINOPRIL 20 MG/1
20 TABLET ORAL DAILY
Status: DISCONTINUED | OUTPATIENT
Start: 2019-12-13 | End: 2019-12-14 | Stop reason: HOSPADM

## 2019-12-12 RX ADMIN — PHENYLEPHRINE HYDROCHLORIDE 100 MCG: 10 INJECTION INTRAVENOUS at 18:55

## 2019-12-12 RX ADMIN — ROCURONIUM BROMIDE 40 MG: 10 INJECTION, SOLUTION INTRAVENOUS at 16:49

## 2019-12-12 RX ADMIN — Medication 2 G: at 16:42

## 2019-12-12 RX ADMIN — SODIUM CHLORIDE, SODIUM LACTATE, POTASSIUM CHLORIDE, AND CALCIUM CHLORIDE: 600; 310; 30; 20 INJECTION, SOLUTION INTRAVENOUS at 18:36

## 2019-12-12 RX ADMIN — HYDROMORPHONE HYDROCHLORIDE 0.5 MG: 2 INJECTION INTRAMUSCULAR; INTRAVENOUS; SUBCUTANEOUS at 19:38

## 2019-12-12 RX ADMIN — Medication 10 MG: at 17:26

## 2019-12-12 RX ADMIN — PHENYLEPHRINE HYDROCHLORIDE 50 MCG: 10 INJECTION INTRAVENOUS at 18:19

## 2019-12-12 RX ADMIN — BUPIVACAINE HYDROCHLORIDE AND EPINEPHRINE BITARTRATE 20 ML: 5; .005 INJECTION, SOLUTION EPIDURAL; INTRACAUDAL; PERINEURAL at 14:57

## 2019-12-12 RX ADMIN — LIDOCAINE HYDROCHLORIDE 100 MG: 20 INJECTION, SOLUTION EPIDURAL; INFILTRATION; INTRACAUDAL; PERINEURAL at 16:49

## 2019-12-12 RX ADMIN — FENTANYL CITRATE 50 MCG: 50 INJECTION INTRAMUSCULAR; INTRAVENOUS at 18:43

## 2019-12-12 RX ADMIN — DEXAMETHASONE SODIUM PHOSPHATE 10 MG: 4 INJECTION, SOLUTION INTRAMUSCULAR; INTRAVENOUS at 17:43

## 2019-12-12 RX ADMIN — TUBERCULIN PURIFIED PROTEIN DERIVATIVE 5 UNITS: 5 INJECTION, SOLUTION INTRADERMAL at 23:29

## 2019-12-12 RX ADMIN — Medication 10 MG: at 18:00

## 2019-12-12 RX ADMIN — PHENYLEPHRINE HYDROCHLORIDE 50 MCG: 10 INJECTION INTRAVENOUS at 18:31

## 2019-12-12 RX ADMIN — PROPOFOL 200 MG: 10 INJECTION, EMULSION INTRAVENOUS at 16:49

## 2019-12-12 RX ADMIN — Medication 3 MG: at 19:03

## 2019-12-12 RX ADMIN — Medication 10 ML: at 23:29

## 2019-12-12 RX ADMIN — SODIUM CHLORIDE 75 ML/HR: 900 INJECTION, SOLUTION INTRAVENOUS at 23:28

## 2019-12-12 RX ADMIN — DEXAMETHASONE SODIUM PHOSPHATE 5 MG: 4 INJECTION, SOLUTION INTRAMUSCULAR; INTRAVENOUS at 14:57

## 2019-12-12 RX ADMIN — MIDAZOLAM 1 MG: 1 INJECTION INTRAMUSCULAR; INTRAVENOUS at 15:01

## 2019-12-12 RX ADMIN — PHENYLEPHRINE HYDROCHLORIDE 100 MCG: 10 INJECTION INTRAVENOUS at 19:05

## 2019-12-12 RX ADMIN — ONDANSETRON 4 MG: 2 INJECTION INTRAMUSCULAR; INTRAVENOUS at 18:49

## 2019-12-12 RX ADMIN — MAGNESIUM SULFATE HEPTAHYDRATE 2 G: 40 INJECTION, SOLUTION INTRAVENOUS at 23:27

## 2019-12-12 RX ADMIN — SODIUM CHLORIDE, SODIUM LACTATE, POTASSIUM CHLORIDE, AND CALCIUM CHLORIDE 25 ML/HR: 600; 310; 30; 20 INJECTION, SOLUTION INTRAVENOUS at 12:00

## 2019-12-12 RX ADMIN — FENTANYL CITRATE 50 MCG: 50 INJECTION INTRAMUSCULAR; INTRAVENOUS at 15:01

## 2019-12-12 RX ADMIN — GLYCOPYRROLATE 0.4 MG: 0.2 INJECTION, SOLUTION INTRAMUSCULAR; INTRAVENOUS at 19:03

## 2019-12-12 RX ADMIN — FENTANYL CITRATE 50 MCG: 50 INJECTION INTRAMUSCULAR; INTRAVENOUS at 17:10

## 2019-12-12 RX ADMIN — SODIUM CHLORIDE, SODIUM LACTATE, POTASSIUM CHLORIDE, AND CALCIUM CHLORIDE: 600; 310; 30; 20 INJECTION, SOLUTION INTRAVENOUS at 17:01

## 2019-12-12 NOTE — ANESTHESIA PREPROCEDURE EVALUATION
Relevant Problems   No relevant active problems       Anesthetic History   No history of anesthetic complications            Review of Systems / Medical History  Patient summary reviewed and pertinent labs reviewed    Pulmonary        Sleep apnea: No treatment           Neuro/Psych   Within defined limits           Cardiovascular    Hypertension: well controlled          Hyperlipidemia    Exercise tolerance: >4 METS  Comments: Denies CP, SOB or changes in functional status   GI/Hepatic/Renal         Renal disease: stones and CRI       Endo/Other        Arthritis     Other Findings              Physical Exam    Airway  Mallampati: II  TM Distance: 4 - 6 cm  Neck ROM: normal range of motion   Mouth opening: Normal     Cardiovascular    Rhythm: regular  Rate: normal         Dental    Dentition: Lower dentition intact  Comments: Missing numerous teeth   Pulmonary  Breath sounds clear to auscultation               Abdominal  GI exam deferred       Other Findings            Anesthetic Plan    ASA: 3  Anesthesia type: general      Post-op pain plan if not by surgeon: peripheral nerve block single    Induction: Intravenous  Anesthetic plan and risks discussed with: Patient

## 2019-12-12 NOTE — ANESTHESIA PROCEDURE NOTES
Peripheral Block    Start time: 12/12/2019 2:53 PM  End time: 12/12/2019 2:57 PM  Performed by: Ronak Holley MD  Authorized by: Ronak Holley MD       Pre-procedure: Indications: at surgeon's request and post-op pain management    Preanesthetic Checklist: patient identified, risks and benefits discussed, site marked, timeout performed, anesthesia consent given and patient being monitored    Timeout Time: 14:53          Block Type:   Block Type:   Interscalene  Laterality:  Left  Monitoring:  Standard ASA monitoring, responsive to questions, continuous pulse ox, oxygen, frequent vital sign checks and heart rate  Injection Technique:  Single shot  Procedures: ultrasound guided and nerve stimulator    Patient Position: supine  Prep: chlorhexidine    Location:  Interscalene  Needle Type:  Stimuplex  Needle Gauge:  22 G  Needle Localization:  Nerve stimulator and ultrasound guidance  Motor Response: minimal motor response >0.4 mA      Assessment:  Number of attempts:  1  Injection Assessment:  Incremental injection every 5 mL, negative aspiration for CSF, ultrasound image on chart, no paresthesia, local visualized surrounding nerve on ultrasound, negative aspiration for blood and no intravascular symptoms  Patient tolerance:  Patient tolerated the procedure well with no immediate complications

## 2019-12-12 NOTE — H&P
Date of Surgery Update:  Salvatore Jones was seen and examined. History and physical has been reviewed. The patient has been examined.  There have been no significant clinical changes since the completion of the originally dated History and Physical.    Signed By: Vikki Munoz MD     December 12, 2019 11:42 AM

## 2019-12-12 NOTE — DISCHARGE SUMMARY
[facility] Discharge Summary      Patient ID:  Damian Macdonald  [id number]  [de-identified] y.o.  1940    Allergies: Clonidine and Simvastatin    Admit date: 12/12/2019    Discharge date and time: 12/14/2019    Admitting Physician: Ivon De La Cruz MD     Discharge Physician: Ivon De La Cruz MD      * Admission Diagnoses: Post-traumatic osteoarthritis of left shoulder [M19.112]  Pain due to internal orthopedic prosthetic devices, implants and grafts, initial encounter [facility]) [T84.84XA]  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 [M19.111, M19.112, S46.001S, S46.002S]  Post-traumatic arthrosis of left shoulder [M19.112]    * Discharge Diagnoses:   Hospital Problems as of 12/14/2019 Date Reviewed: 12/12/2019          Codes Class Noted - Resolved POA    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 ICD-10-CM: M19.111, M19.112, S46.001S, S46.002S  ICD-9-CM: 715.89, 908.9  12/12/2019 - Present Unknown        Post-traumatic arthrosis of left shoulder ICD-10-CM: M19.112  ICD-9-CM: 715.21  12/12/2019 - Present Unknown        * (Principal) Post-traumatic osteoarthritis of left shoulder ICD-10-CM: E24.711  ICD-9-CM: 715.21  12/7/2019 - Present Yes        Painful orthopaedic hardware [facility]) ICD-10-CM: J61.76JG  ICD-9-CM: 996.78  12/7/2019 - Present Yes              Surgeon: Ivon De La Cruz MD      Preoperative Medical Clearance: yes    * Procedure: Procedure(s):  LEFT SHOULDER HARDWARE REMOVAL  Procedure(s): 1.   INCISION, DEBRIDEMENT, HARDWARE REMOVAL LEFT SHOULDER                                     2.  REVERSE LEFT TOTAL SHOULDER ARTHROPLASTY WITH DELTA EXTEND PROSTHESIS, BICEPS TENODESIS, LATISSIMUS DORSI AND TERES MAJOR TENDON TRANSFERS            Perioperative Antibiotics: Ancef  _x__                                                Vancomycin  ___          Post Op complications: none        * Discharge Condition: good  Wound appears to be healing without any evidence of infection.          * Discharged to: Home    * Follow-up Care/Discharge instructions:  - Resume pre hospital diet            - Resume home medications per medical continuation form     CONTINUE PHYSICAL THERAPY  Sling left shoulder  - Follow up in office as scheduled       Signed:  Vania Simpson MD  12/15/2019  1:45 PM

## 2019-12-13 LAB
ANION GAP SERPL CALC-SCNC: 11 MMOL/L (ref 7–16)
BUN SERPL-MCNC: 33 MG/DL (ref 8–23)
CALCIUM SERPL-MCNC: 8.9 MG/DL (ref 8.3–10.4)
CHLORIDE SERPL-SCNC: 105 MMOL/L (ref 98–107)
CO2 SERPL-SCNC: 22 MMOL/L (ref 21–32)
CREAT SERPL-MCNC: 2.03 MG/DL (ref 0.8–1.5)
ERYTHROCYTE [DISTWIDTH] IN BLOOD BY AUTOMATED COUNT: 12.7 % (ref 11.9–14.6)
GLUCOSE SERPL-MCNC: 269 MG/DL (ref 65–100)
HCT VFR BLD AUTO: 35.7 % (ref 41.1–50.3)
HGB BLD-MCNC: 11.7 G/DL (ref 13.6–17.2)
MAGNESIUM SERPL-MCNC: 2.1 MG/DL (ref 1.8–2.4)
MCH RBC QN AUTO: 33.8 PG (ref 26.1–32.9)
MCHC RBC AUTO-ENTMCNC: 32.8 G/DL (ref 31.4–35)
MCV RBC AUTO: 103.2 FL (ref 79.6–97.8)
NRBC # BLD: 0 K/UL (ref 0–0.2)
PLATELET # BLD AUTO: 241 K/UL (ref 150–450)
PMV BLD AUTO: 11.2 FL (ref 9.4–12.3)
POTASSIUM SERPL-SCNC: 4.4 MMOL/L (ref 3.5–5.1)
RBC # BLD AUTO: 3.46 M/UL (ref 4.23–5.6)
SODIUM SERPL-SCNC: 138 MMOL/L (ref 136–145)
WBC # BLD AUTO: 16.1 K/UL (ref 4.3–11.1)

## 2019-12-13 PROCEDURE — 36415 COLL VENOUS BLD VENIPUNCTURE: CPT

## 2019-12-13 PROCEDURE — 83735 ASSAY OF MAGNESIUM: CPT

## 2019-12-13 PROCEDURE — 74011250636 HC RX REV CODE- 250/636: Performed by: ORTHOPAEDIC SURGERY

## 2019-12-13 PROCEDURE — 85027 COMPLETE CBC AUTOMATED: CPT

## 2019-12-13 PROCEDURE — 97161 PT EVAL LOW COMPLEX 20 MIN: CPT

## 2019-12-13 PROCEDURE — 74011250637 HC RX REV CODE- 250/637: Performed by: NURSE PRACTITIONER

## 2019-12-13 PROCEDURE — 97110 THERAPEUTIC EXERCISES: CPT

## 2019-12-13 PROCEDURE — 94760 N-INVAS EAR/PLS OXIMETRY 1: CPT

## 2019-12-13 PROCEDURE — 80048 BASIC METABOLIC PNL TOTAL CA: CPT

## 2019-12-13 PROCEDURE — 74011250636 HC RX REV CODE- 250/636: Performed by: NURSE PRACTITIONER

## 2019-12-13 PROCEDURE — 74011250637 HC RX REV CODE- 250/637: Performed by: ORTHOPAEDIC SURGERY

## 2019-12-13 PROCEDURE — 97530 THERAPEUTIC ACTIVITIES: CPT

## 2019-12-13 PROCEDURE — 65270000029 HC RM PRIVATE

## 2019-12-13 RX ADMIN — HYDROMORPHONE HYDROCHLORIDE 1 MG: 1 INJECTION, SOLUTION INTRAMUSCULAR; INTRAVENOUS; SUBCUTANEOUS at 20:01

## 2019-12-13 RX ADMIN — MULTIPLE VITAMINS W/ MINERALS TAB 1 TABLET: TAB at 09:53

## 2019-12-13 RX ADMIN — Medication 2 G: at 01:00

## 2019-12-13 RX ADMIN — Medication 2 G: at 18:09

## 2019-12-13 RX ADMIN — Medication 10 ML: at 06:24

## 2019-12-13 RX ADMIN — LISINOPRIL 20 MG: 20 TABLET ORAL at 09:53

## 2019-12-13 RX ADMIN — Medication 2 G: at 09:53

## 2019-12-13 RX ADMIN — DOCUSATE SODIUM 100 MG: 100 CAPSULE, LIQUID FILLED ORAL at 09:53

## 2019-12-13 RX ADMIN — FERROUS SULFATE TAB 325 MG (65 MG ELEMENTAL FE) 325 MG: 325 (65 FE) TAB at 09:53

## 2019-12-13 NOTE — PROGRESS NOTES
Patient c/o tightness of left arm and hand dt ace bandaging. Left hand warm to touch and purple in color. .  Patient repositioned,. Patient able to move fingers on left hand and has strong  on left hand. Left hand elevated. Patient verbalized relief and improvement after reposition. Patient denies pain at present time and declines pain medication. Will monitor.

## 2019-12-13 NOTE — PROGRESS NOTES
Problem: Mobility Impaired (Adult and Pediatric)  Goal: *Acute Goals and Plan of Care (Insert Text)  Description  GOALS (1-4 days):  (1.)  Patient will move from supine to sit and sit to supine  in bed with STAND BY ASSIST.    (2.)  Patient will transfer from bed to chair and chair to bed with STAND BY ASSIST using the least restrictive device. (3.)  Patient will ambulate with STAND BY ASSIST for 150 feet with the least restrictive device. (4.)  Patient will be independent with shoulder HEP to increase range of motion per MD orders. ________________________________________________________________________________________________     Outcome: Progressing Towards Goal     PHYSICAL THERAPY: Initial Assessment and AM 12/13/2019  INPATIENT: Hospital Day: 2  [place] / Product Type: Medicare /      NAME/AGE/GENDER: Prakash Monique is a [de-identified] y.o. 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   INCISION, DEBRIDEMENT, HARDWARE REMOVAL LEFT SHOULDER                                     2.  REVERSE LEFT TOTAL SHOULDER ARTHROPLASTY WITH DELTA EXTEND PROSTHESIS, BICEPS TENODESIS, LATISSIMUS DORSI AND TERES MAJOR TENDON TRANSFERS  (Left)  1 Day Post-Op  ICD-10: Treatment Diagnosis:   Pain in Left Shoulder (M25.512)  Stiffness of Left Shoulder, Not elsewhere classified (M25.612)  Other abnormalities of gait and mobility (R26.89)   Precaution/Allergies:  Clonidine and Simvastatin      ASSESSMENT:     Mr. Rosa Sebastian presents with decreased rom and strength of left UE as well as decreased functional mobility and gait s/p left tsa with hardware removal.  He plans to go home with wife. He was instructed in safety and restrictions listed below. He is unsteady on his feet. No questions. Active and passive range of motion  LEFT  Elbow wrist and hand   No other motion   nwb  Shoulder LEFT   wbat ble   Sling  Shoulder LEFT   Question: Reason for PT? Answer: LEFT REVERSE TOTAL SHOULDER       This section established at most recent assessment   PROBLEM LIST (Impairments causing functional limitations):  Decreased Sutter with Bed Mobility  Decreased Sutter with Transfers  Decreased Sutter with Ambulation   Decreased Sutter with shoulder HEP   INTERVENTIONS PLANNED: (Benefits and precautions of physical therapy have been discussed with the patient.)  Bed Mobility Training  Transfer Training  Gait Training  Therapeutic Exercises per MD orders  Modalities for Pain     TREATMENT PLAN: Frequency/Duration: twice daily for duration of hospital stay  Rehabilitation Potential For Stated Goals: Good     RECOMMENDED REHABILITATION/EQUIPMENT: (at time of discharge pending progress): Continue Skilled Therapy and to be determined. HISTORY:   History of Present Injury/Illness (Reason for Referral):  S/p left tsa with hardware removal  Past Medical History/Comorbidities:   Mr. Lilian Saba  has a past medical history of Arthritis, Calculus of kidney (12/23/2013), Chronic kidney disease, Chronic kidney disease, stage III (moderate) ([address] (12/23/2013), Fracture of coccyx ([address] (1/14/2016), Gout, unspecified (12/23/2013), History of left shoulder fracture (1/14/2016), Hyperlipidemia (1/14/2016), Microscopic hematuria (12/23/2013), Sciatica of right side (1/14/2016), Sleep apnea, Unspecified essential hypertension (12/23/2013), and Urinary tract infection, site not specified (12/23/2013).   Mr. Lilian Saba  has a past surgical history that includes hx lithotripsy; pr total hip arthroplasty (Left); hx shoulder replacement (Left, 12/2014); and hx lumbar fusion (09/07/2018). Social History/Living Environment:   Home Environment: Private residence  # Steps to Enter: 3  One/Two Story Residence: One story  Living Alone: No  Support Systems: Spouse/Significant Other/Partner, Family member(s)  Patient Expects to be Discharged to[de-identified] Private residence  Current DME Used/Available at Home: [address], [place], straight, Shower chair, [place] Tanisha  Prior Level of Function/Work/Activity:  Independent, cane at times with gait   Number of Personal Factors/Comorbidities that affect the Plan of Care: 1-2: MODERATE COMPLEXITY   EXAMINATION:   Most Recent Physical Functioning:   Gross Assessment:  AROM: Generally decreased, functional(LE's)  Strength: Generally decreased, functional(LE's)    LUE AROM  L Elbow Flexion: 90(aarom)  L Elbow Extension: 30(aarom)  L Wrist Flexion: (wfl aarom)  L Wrist Extension: (wfl aarom)  Left Hand Grasp: Yes          Posture:  Posture (WDL): Exceptions to WDL  Posture Assessment: Rounded shoulders  Balance:  Sitting: Intact  Standing: Pull to stand; With support Bed Mobility:  Supine to Sit: Minimum assistance  Wheelchair Mobility:     Transfers:  Sit to Stand: Minimum assistance  Stand to Sit: Minimum assistance  Bed to Chair: Minimum assistance  Gait:     Speed/Maria Victoria: Delayed  Step Length: Left shortened;Right shortened  Gait Abnormalities: Decreased step clearance;Shuffling gait  Distance (ft): 3 Feet (ft)  Assistive Device: Brace/Splint; Other (comment)(hand held assist)  Ambulation - Level of Assistance: Minimal assistance  Interventions: Manual cues; Safety awareness training; Tactile cues; Verbal cues      Body Structures Involved:  Bones  Joints  Muscles  Ligaments Body Functions Affected: Movement Related Activities and Participation Affected:   Mobility   Number of elements that affect the Plan of Care: 3: MODERATE COMPLEXITY CLINICAL PRESENTATION:   Presentation: Stable and uncomplicated: LOW COMPLEXITY   CLINICAL DECISION MAKIN[address] [place] [id number] AM-PAC 6 Clicks   Basic Mobility Inpatient Short Form  How much difficulty does the patient currently have. .. Unable A Lot A Little None   1. Turning over in bed (including adjusting bedclothes, sheets and blankets)? [] 1   [] 2   [x] 3   [] 4   2. Sitting down on and standing up from a chair with arms ( e.g., wheelchair, bedside commode, etc.)   [] 1   [] 2   [x] 3   [] 4   3. Moving from lying on back to sitting on the side of the bed? [] 1   [] 2   [x] 3   [] 4   How much help from another person does the patient currently need. .. Total A Lot A Little None   4. Moving to and from a bed to a chair (including a wheelchair)? [] 1   [] 2   [x] 3   [] 4   5. Need to walk in hospital room? [] 1   [x] 2   [] 3   [] 4   6. Climbing 3-5 steps with a railing? [] 1   [x] 2   [] 3   [] 4   © , Trustees of [address] [place] [address], under license to [employer]. All rights reserved      Score:  Initial: 16 Most Recent: X (Date: -- )    Interpretation of Tool:  Represents activities that are increasingly more difficult (i.e. Bed mobility, Transfers, Gait). Medical Necessity:     Patient is expected to demonstrate progress in   strength, range of motion, and balance   to   decrease assistance required with exercises and functional mobility   . Reason for Services/Other Comments:  Patient   continues to require present interventions due to patient's inability to perform exercises and functional mobility independently   .    Use of outcome tool(s) and clinical judgement create a POC that gives a: Clear prediction of patient's progress: LOW COMPLEXITY            TREATMENT:   (In addition to Assessment/Re-Assessment sessions the following treatments were rendered)   Pre-treatment Symptoms/Complaints:  none  Pain: Initial:      Post Session:  0-still numb     Therapeutic Exercise: (10 Minutes):  Exercises per grid below to improve mobility and strength. Required minimal visual, verbal, and manual cues to promote proper body alignment, promote proper body posture, and promote proper body mechanics. Progressed range and repetitions as indicated. Date:  12/13 Date:   Date:     ACTIVITY/EXERCISE AM PM AM PM AM PM   Gripping 10        Wrist Flexion/Extension 10aa        Wrist Ulnar/Radial Deviation         Pronation/Supination 10aa        Elbow Flexion/Extension 10aa        Shoulder Flexion/Extension         Shoulder AB/ADduction         Shoulder IR/ER         Pulleys         Pendulums         Shrugs         Isometric:                 Flexion         Extension         ABduction         ADduction         Biceps/Triceps                  B = bilateral; AA = active assistive; A = active; P = passive  Education:  [x]  Home Exercises  [x]  Sling Application   [x]  Movement Precautions   []  Pulleys   [x]  Use of Ice   []  Other:   Treatment/Session Assessment:    Response to Treatment:  pt. Did fine  Interdisciplinary Collaboration:   Registered Nurse  After treatment position/precautions:   Up in chair  Bed/Chair-wheels locked  Bed in low position  Caregiver at bedside  Call light within reach  Family at bedside   Compliance with Program/Exercises: Will assess as treatment progresses. Recommendations/Intent for next treatment session:  Treatment next visit will focus on increasing Mr. Mayens independence with bed mobility, transfers, gait training, strength/ROM exercises, modalities for pain, and patient education.    Total Treatment Duration:  PT Patient Time In/Time Out  Time In: 0845  Time Out: Marcela Medico, PT

## 2019-12-13 NOTE — PROGRESS NOTES
Orthopedic Joint Progress Note    2019  Admit Date: 2019  Admit Diagnosis: Post-traumatic osteoarthritis of left shoulder [M19.112]  Pain due to internal orthopedic prosthetic devices, implants and grafts, initial encounter [facility]) [T84.84XA]  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 [M19.111, M19.112, S46.001S, S46.002S]  Post-traumatic arthrosis of left shoulder [M19.112]    1 Day Post-Op    Subjective:     Suly Purvis PATIENT SITTING UP IN BED; WIFE AT BEDSIDE    Review of Systems: Pertinent items are noted in HPI. Objective:     PT/OT:     PATIENT MOBILITY                           Vital Signs:    Blood pressure 103/59, pulse 93, temperature 97.9 °F (36.6 °C), resp. rate 16, height 5' 6\" (1.676 m), weight 84 kg (185 lb 1.6 oz), SpO2 93 %.   Temp (24hrs), Av.9 °F (36.6 °C), Min:97.4 °F (36.3 °C), Max:98.5 °F (36.9 °C)      Pain Control:   Pain Assessment  Pain Scale 1: Numeric (0 - 10)  Pain Intensity 1: 0  Pain Onset 1: at rest  Pain Location 1: Shoulder  Pain Orientation 1: Left  Pain Description 1: Aching  Pain Intervention(s) 1: Declines    Meds:  Current Facility-Administered Medications   Medication Dose Route Frequency    multivitamin, tx-iron-ca-min (THERA-M w/ IRON) tablet 1 Tab  1 Tab Oral DAILY    0.9% sodium chloride infusion  75 mL/hr IntraVENous CONTINUOUS    sodium chloride (NS) flush 5-40 mL  5-40 mL IntraVENous Q8H    sodium chloride (NS) flush 5-40 mL  5-40 mL IntraVENous PRN    ondansetron (ZOFRAN) injection 4 mg  4 mg IntraVENous Q4H PRN    tuberculin injection 5 Units  5 Units IntraDERMal ONCE    bisacodyl (DULCOLAX) suppository 10 mg  10 mg Rectal DAILY PRN    sodium phosphate (FLEET'S) enema 1 Enema  1 Enema Rectal PRN    ferrous sulfate tablet 325 mg  1 Tab Oral DAILY WITH BREAKFAST    docusate sodium (COLACE) capsule 100 mg  100 mg Oral DAILY    HYDROmorphone (PF) (DILAUDID) injection 1 mg  1 mg IntraVENous Q2H PRN    HYDROmorphone (DILAUDID) tablet 2 mg  2 mg Oral Q4H PRN    magnesium citrate solution 296 mL  296 mL Oral DAILY PRN    ceFAZolin (ANCEF) 2 g/20 mL in sterile water IV syringe  2 g IntraVENous Q8H    lisinopril (PRINIVIL, ZESTRIL) tablet 20 mg  20 mg Oral DAILY    Or    hydroCHLOROthiazide (HYDRODIURIL) tablet 12.5 mg  12.5 mg Oral DAILY        LAB:    Lab Results   Component Value Date/Time    INR 1.0 12/04/2019 02:50 PM    INR 1.2 09/09/2018 10:36 AM     Lab Results   Component Value Date/Time    HGB 11.7 (L) 12/13/2019 03:53 AM    HGB 12.0 (L) 12/12/2019 09:00 PM    HGB 14.8 12/04/2019 02:32 PM       Wound Abdomen (Active)   Number of days: 462       Wound Back Lower (Active)   Number of days: 459       Wound Shoulder Right (Active)   Dressing Status Clean, dry, and intact 12/12/2019  8:29 PM   Dressing Type ABD pad;Elastic bandage 12/12/2019  8:29 PM   Drainage Amount None 12/12/2019  8:12 PM   Number of days: 1         Physical Exam:  General: alert, cooperative, no distress, appears stated age  Cardiovascular negative  Lungs: negative  Musculoskeletal: PATIENT CAN MOVE LEFT ELBOW, WRIST, HAND  Neurological: MEDIA, ULNAR, RADIAL NERVE WORKING PROPERLY; N/V INTACT  Skin:INCISION AND DRESSING TO LEFT SHOULDER CLEAN,. DRY, INTACT    Assessment:      Principal Problem:    Post-traumatic osteoarthritis of left shoulder (12/7/2019)    Active Problems:    Painful orthopaedic hardware ([address] (12/7/2019)      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 (12/12/2019)      Post-traumatic arthrosis of left shoulder (12/12/2019)         Plan:     Continue PT/OT/Rehab  Consult: Rehab team including PT, OT, recreational therapy, and     Patient Expects to be Discharged to[de-identified] Private residence     [address]

## 2019-12-13 NOTE — ROUTINE PROCESS
TRANSFER - OUT REPORT: 
 
Verbal report given to RN Maria Esther(name) on Jordi Alvarado  being transferred to ortho floor(unit) for routine post - op Report consisted of patients Situation, Background, Assessment and  
Recommendations(SBAR). Information from the following report(s) SBAR, OR Summary, Procedure Summary and MAR was reviewed with the receiving nurse. Lines:  
Peripheral IV 12/12/19 Right Hand (Active) Site Assessment Clean, dry, & intact 12/12/2019  8:12 PM  
Phlebitis Assessment 0 12/12/2019  8:12 PM  
Infiltration Assessment 0 12/12/2019  8:12 PM  
Dressing Status Clean, dry, & intact 12/12/2019  8:12 PM  
Dressing Type Transparent 12/12/2019  8:12 PM  
Hub Color/Line Status Green; Infusing 12/12/2019  8:12 PM  
  
 
Opportunity for questions and clarification was provided. Patient transported with: 
 O2 @ 2 liters

## 2019-12-13 NOTE — PROGRESS NOTES
Care Management Interventions  Mode of Transport at Discharge: Self  Transition of Care Consult (CM Consult): Discharge Planning, Home Health  Current Support Network: Lives with Spouse  Plan discussed with Pt/Family/Caregiver: Yes  Freedom of Choice Offered: Yes  Discharge Location  Discharge Placement: Unable to determine at this time    Order rec'd to arrange home health. Patient did not want to arrange until he spoke with his wife. He also did not want me calling his wife at home due to her going home to sleep. He said he would discuss with her after she gets back to the hospital after 5pm.   I will ask weekend  SW to f/u with pt/spouse tomorrow am and arrange [facility] per their preferred provider.

## 2019-12-13 NOTE — PROGRESS NOTES
Problem: Mobility Impaired (Adult and Pediatric)  Goal: *Acute Goals and Plan of Care (Insert Text)  Description  GOALS (1-4 days):  (1.)  Patient will move from supine to sit and sit to supine  in bed with STAND BY ASSIST.    (2.)  Patient will transfer from bed to chair and chair to bed with STAND BY ASSIST using the least restrictive device. (3.)  Patient will ambulate with STAND BY ASSIST for 150 feet with the least restrictive device. (4.)  Patient will be independent with shoulder HEP to increase range of motion per MD orders. ________________________________________________________________________________________________     Outcome: Progressing Towards Goal     PHYSICAL THERAPY: Daily Note and PM 12/13/2019  INPATIENT: Hospital Day: 2  [place] / [place] / Product Type: Medicare /      NAME/AGE/GENDER: Prakash Monique is a [de-identified] y.o. 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   INCISION, DEBRIDEMENT, HARDWARE REMOVAL LEFT SHOULDER                                     2.  REVERSE LEFT TOTAL SHOULDER ARTHROPLASTY WITH DELTA EXTEND PROSTHESIS, BICEPS TENODESIS, LATISSIMUS DORSI AND TERES MAJOR TENDON TRANSFERS  (Left)  1 Day Post-Op  ICD-10: Treatment Diagnosis:   · Pain in Left Shoulder (M25.512)  · Stiffness of Left Shoulder, Not elsewhere classified (M25.612)  · Other abnormalities of gait and mobility (R26.89)   Precaution/Allergies:  Clonidine and Simvastatin      ASSESSMENT:     Mr. Rosa Sebastian presents with decreased rom and strength of left UE as well as decreased functional mobility and gait s/p left tsa with hardware removal.  He plans to go home with wife. Reviewed safety and precautions with left UE listed below. He still reports no pain. Discussed more pain when block wears off. Wife and sister present. He did elbow/hand/wrist rom on side of bed. He ambulated around the room a few times, did not want to go in hale. He is unsteady on his feet but did better than this am.  He reports normally being unsteady. No questions. Active and passive range of motion  LEFT  Elbow wrist and hand   No other motion   nwb  Shoulder LEFT   wbat ble   Sling  Shoulder LEFT   Question: Reason for PT? Answer: LEFT REVERSE TOTAL SHOULDER       This section established at most recent assessment   PROBLEM LIST (Impairments causing functional limitations):  1. Decreased [place] with Bed Mobility  2. Decreased [place] with Transfers  3. Decreased [place] with Ambulation   4. Decreased [place] with shoulder HEP   INTERVENTIONS PLANNED: (Benefits and precautions of physical therapy have been discussed with the patient.)  1. Bed Mobility Training  2. Transfer Training  3. Gait Training  4. Therapeutic Exercises per MD orders  5. Modalities for Pain     TREATMENT PLAN: Frequency/Duration: twice daily for duration of hospital stay  Rehabilitation Potential For Stated Goals: Good     RECOMMENDED REHABILITATION/EQUIPMENT: (at time of discharge pending progress): Continue Skilled Therapy and to be determined.               HISTORY:   History of Present Injury/Illness (Reason for Referral):  S/p left tsa with hardware removal  Past Medical History/Comorbidities:   Mr. Akiko Monterroso  has a past medical history of Arthritis, Calculus of kidney (12/23/2013), Chronic kidney disease, Chronic kidney disease, stage III (moderate) ([address] (12/23/2013), Fracture of coccyx ([facility] (1/14/2016), Gout, unspecified (12/23/2013), History of left shoulder fracture (1/14/2016), Hyperlipidemia (1/14/2016), Microscopic hematuria (12/23/2013), Sciatica of right side (1/14/2016), Sleep apnea, Unspecified essential hypertension (12/23/2013), and Urinary tract infection, site not specified (12/23/2013). Mr. Moriah Chen  has a past surgical history that includes hx lithotripsy; pr total hip arthroplasty (Left); hx shoulder replacement (Left, 12/2014); and hx lumbar fusion (09/07/2018). Social History/Living Environment:   Home Environment: Private residence  # Steps to Enter: 3  One/Two Story Residence: One story  Living Alone: No  Support Systems: Spouse/Significant Other/Partner, Family member(s)  Patient Expects to be Discharged to[de-identified] Private residence  Current DME Used/Available at Home: Vidal Toussaint, straight, Shower chair, Renelda Cheeks  Prior Level of Function/Work/Activity:  Independent, cane at times with gait   Number of Personal Factors/Comorbidities that affect the Plan of Care: 1-2: MODERATE COMPLEXITY   EXAMINATION:   Most Recent Physical Functioning:   Gross Assessment:  AROM: Generally decreased, functional(LE's)  Strength: Generally decreased, functional(LE's)    LUE AROM  L Elbow Flexion: 90(aarom)  L Elbow Extension: 30(aarom)  L Wrist Flexion: (wfl aarom)  L Wrist Extension: (wfl aarom)  Left Hand Grasp: Yes          Posture:  Posture (WDL): Exceptions to WDL  Posture Assessment: Rounded shoulders  Balance:  Sitting: Intact  Standing: With support Bed Mobility:  Supine to Sit: Contact guard assistance  Sit to Supine: Contact guard assistance  Wheelchair Mobility:     Transfers:  Sit to Stand: Contact guard assistance  Stand to Sit: Contact guard assistance  Bed to Chair: Minimum assistance  Duration: 15 Minutes  Gait:     Speed/Maria Victoria: Delayed  Step Length: Left shortened;Right shortened  Gait Abnormalities: Decreased step clearance; Foot drop  Distance (ft): 40 Feet (ft)  Assistive Device: Brace/Splint  Ambulation - Level of Assistance: Contact guard assistance  Interventions: Safety awareness training;Verbal cues      Body Structures Involved:  1. Bones  2. Joints  3. Muscles  4. Ligaments Body Functions Affected:  1. Movement Related Activities and Participation Affected:  1. Mobility   Number of elements that affect the Plan of Care: 3: MODERATE COMPLEXITY   CLINICAL PRESENTATION:   Presentation: Stable and uncomplicated: LOW COMPLEXITY   CLINICAL DECISION MAKIN[address] [place] [address] AM-PAC 6 Clicks   Basic Mobility Inpatient Short Form  How much difficulty does the patient currently have. .. Unable A Lot A Little None   1. Turning over in bed (including adjusting bedclothes, sheets and blankets)? [] 1   [] 2   [x] 3   [] 4   2. Sitting down on and standing up from a chair with arms ( e.g., wheelchair, bedside commode, etc.)   [] 1   [] 2   [x] 3   [] 4   3. Moving from lying on back to sitting on the side of the bed? [] 1   [] 2   [x] 3   [] 4   How much help from another person does the patient currently need. .. Total A Lot A Little None   4. Moving to and from a bed to a chair (including a wheelchair)? [] 1   [] 2   [x] 3   [] 4   5. Need to walk in hospital room? [] 1   [x] 2   [] 3   [] 4   6. Climbing 3-5 steps with a railing? [] 1   [x] 2   [] 3   [] 4   © , Trustees of [address] [place] [address], under license to [employer]. All rights reserved      Score:  Initial: 16 Most Recent: X (Date: -- )    Interpretation of Tool:  Represents activities that are increasingly more difficult (i.e. Bed mobility, Transfers, Gait). Medical Necessity:     · Patient is expected to demonstrate progress in   · strength, range of motion, and balance  ·  to   · decrease assistance required with exercises and functional mobility   · . Reason for Services/Other Comments:  · Patient   · continues to require present interventions due to patient's inability to perform exercises and functional mobility independently   · .    Use of outcome tool(s) and clinical judgement create a POC that gives a: Clear prediction of patient's progress: LOW COMPLEXITY            TREATMENT:   (In addition to Assessment/Re-Assessment sessions the following treatments were rendered)   Pre-treatment Symptoms/Complaints:  none  Pain: Initial:      Post Session:  0-still numb     Therapeutic Exercise: (10 Minutes):  Exercises per grid below to improve mobility and strength. Required minimal visual, verbal, and manual cues to promote proper body alignment, promote proper body posture, and promote proper body mechanics. Progressed range and repetitions as indicated. Therapeutic Activity: (  15 Minutes ):  Therapeutic activities including Bed transfers, Ambulation on level ground and standing to improve mobility and strength. Required minimal Safety awareness training;Verbal cues to promote static and dynamic balance in standing. Date:  12/13 Date:   Date:     ACTIVITY/EXERCISE AM PM AM PM AM PM   Gripping 10 10a       Wrist Flexion/Extension 10aa 10a       Wrist Ulnar/Radial Deviation         Pronation/Supination 10aa 10a       Elbow Flexion/Extension 10aa 10aa       Shoulder Flexion/Extension         Shoulder AB/ADduction         Shoulder IR/ER         Pulleys         Pendulums         Shrugs         Isometric:                 Flexion         Extension         ABduction         ADduction         Biceps/Triceps                  B = bilateral; AA = active assistive; A = active; P = passive  Education:  [x]  Home Exercises  [x]  Sling Application   [x]  Movement Precautions   []  Pulleys   [x]  Use of Ice   []  Other:   Treatment/Session Assessment:    · Response to Treatment:  pt. Did well  · Interdisciplinary Collaboration:   o Registered Nurse  · After treatment position/precautions:   o Supine in bed  o Bed/Chair-wheels locked  o Bed in low position  o Caregiver at bedside  o Call light within reach  o Family at bedside   · Compliance with Program/Exercises:  Will assess as treatment progresses. · Recommendations/Intent for next treatment session:  Treatment next visit will focus on increasing Mr. Escoto's independence with bed mobility, transfers, gait training, strength/ROM exercises, modalities for pain, and patient education.    Total Treatment Duration:  PT Patient Time In/Time Out  Time In: 1410  Time Out: [address], PT

## 2019-12-13 NOTE — OP NOTES
[place]  OPERATIVE REPORT    Name:  Nida Domingo  MR#:  [id number]  :  1940  ACCOUNT #:  [de-identified]  DATE OF SERVICE:  2019    PREOPERATIVE DIAGNOSES:  1. Posttraumatic degenerative joint disease, left shoulder. 2.  Painful hardware, left shoulder. POSTOPERATIVE DIAGNOSES:  1. Posttraumatic degenerative joint disease, left shoulder. 2.  Painful hardware, left shoulder. PROCEDURE PERFORMED:  1. Incision and debridement, hardware removal of left shoulder. 2.  Reverse left total shoulder arthroplasty with a Delta Xtend prosthesis, biceps tenodesis, latissimus dorsi and teres major tendon transfer. SURGEON:  Ivelisse Ortiz. Michael Ramos MD.    FIRST ASSISTANTRed Osvaldo Paris NP. Use of a first assistant was necessary due to complexity of operative procedure. First assistant was present during the entire case. First assistant assisted with careful dissection, hardware removal, placement of implants, use of first assistant was necessary to optimize the patient's safety and outcome, the 80 modifier was applied. ANESTHESIA:  General with interscalene block. COMPLICATIONS:  None. IMPLANTS:  Hardware utilized is a DePuy +10 metaglene, 42 eccentric +6 mm lateralized glenoid, 42 +6 cup, 12.2 stem, 14 mm Biostop G, 60 mm superior, 50 mm inferior nonlocking cortical screw. ESTIMATED BLOOD LOSS:  500 mL. PATHOLOGY:  Severe end-stage posttraumatic DJD with penetrating hardware. CPT CODES:  [id number], [id number], 93150 with a modifier 80 on all those for first assistant. ICD-10 CODE:  M19.012, T84.84X. INDICATIONS:  The patient is a 40-year-old gentleman who is now 5 years status post ORIF of left proximal humerus fracture. The patient has developed sore, painful, stiff left shoulder.   Preoperative physical exam and radiographic studies demonstrate posttraumatic DJD of the left shoulder with painful hardware, complete collapse to the humeral head, penetrating the hardware into the glenoid. The patient has exhausted nonoperative modalities and following preoperative medical clearance brought to the operative suite for operative intervention. PROCEDURE:  Following identification of the patient, the patient was taken to the operative suite. Following administration of general anesthesia, interscalene block for postoperative pain control and 2 g of IV Ancef. The patient was very carefully positioned on the operating room table in the beach chair fashion. Left shoulder was then prepped and draped in sterile fashion. His previous deltopectoral incision was identified and marked. InteguSeal was applied. Once the InteguSeal was allowed to cure, the skin was incised. Subcutaneous tissue was then dissected down to the cephalic vein. This was dissected proximally and distally. The plate was encased in scar tissue. There was noted to be a perforation of the vein and it was tied off utilizing 2-0 silk ties. The deltoid was then retracted laterally off of the plate and humeral shaft. Strap muscles retracted medially. There was a marked deformity. The plate was then exposed in its entirety. All screws were removed. The plate was removed as well. Attention was then carried back to mobilizing the proximal humerus. The soft tissue structures were then released circumferentially. Humerus was then very carefully dislocated from the wound. Proximal cut was then made. There was noted to be a malunion of the proximal humerus with collapse. The tuberosities were in a malunited position. It was elected to mobilize the greater tuberosity. This was then mobilized and tagged for later repair. The humerus was then completely mobilized. The axillary nerve was protected. Radial nerve was protected throughout the procedure. At this point, attention was then turned to the glenoid. Glenoid was then meticulously exposed. There was marked DJD with posterior wear.   Glenoid was then meticulously exposed. Starter wire was drilled. The glenoid was then drilled and reamed to a +10 depth. The high side was reamed down as well. Once the glenoid was prepared, a +10 metaglene was inserted and secured with a 60 mm superior and 50 mm inferior nonlocking cortical screw and metaglene was stable. A 42 eccentric and +6 lateralized glenoid was then inserted and secured in a standard fashion. Metaglene and glenosphere were stable. Humerus was then dislocated back from the wound and reamed up to 14 mm. The canal was then vigorously debrided. It was elected to utilize a 12 mm stem due to alignment. It was noted that a 12 mm stem with a 42 +6 cup gave excellent stability and mobility. At this point, the humeral canal was irrigated and dried. Latissimus dorsi and teres major tendons were then released off the humeral shaft to mobilize 2 cm medial and tagged for later posterior transfer. Radial nerve and axillary nerve were protected. The humeral canal was then irrigated and dried. The 14 mm Biostop G was then placed distally. Antibiotic-impregnated cement was mixed and a 12.2 stem was cemented in the appropriate version. The fins of the prosthesis were loaded with #5 Mersilene sutures. Cement was allowed to cure. Once the cement was allowed to cure, the true 42 +6 cup was inserted. Shoulder was reduced. There was excellent stability with excellent mobility. At this point, the greater tuberosity was then repaired back to the fins of the prosthesis using #5 Mersilene sutures. The latissimus dorsi and teres major tendons were then transferred posteriorly and secured with #5 and #2 Mersilene sutures. Biceps was tenodesed using #5 Mersilene sutures. The arm was put through range of motion and stable. Axillary and radial nerves were intact. The wound was then irrigated. Deltopectoral interval was approximated with #5 Mersilene sutures.   The skin was closed with 0 Vicryl figure-of-eight sutures, and a 0 Prolene subcuticular stitch. A sterile dressing was applied. Sling and swathe was applied. The patient was then transferred to the recovery room in stable condition.         MD ANN-MARIE Austin/SHAYNA_SHANICE_ARTEMIO/BC_MIL  D:  12/12/2019 19:39  T:  12/13/2019 6:32  JOB #:  [id number]  CC:  Kenyon Rooney MD

## 2019-12-13 NOTE — PROGRESS NOTES
TRANSFER - IN REPORT:    Verbal report received from [facility] RN(name) on Bk Ramirez  being received from ED(unit) for routine progression of care      Report consisted of patients Situation, Background, Assessment and   Recommendations(SBAR). Information from the following report(s) SBAR, Kardex, OR Summary, Procedure Summary, MAR and Recent Results was reviewed with the receiving nurse. Opportunity for questions and clarification was provided. Assessment completed upon patients arrival to unit and care assumed.

## 2019-12-13 NOTE — ANESTHESIA POSTPROCEDURE EVALUATION
Procedure(s):  LEFT SHOULDER HARDWARE REMOVAL  Procedure(s): 1. INCISION, DEBRIDEMENT, HARDWARE REMOVAL LEFT SHOULDER                                     2.  REVERSE LEFT TOTAL SHOULDER ARTHROPLASTY WITH DELTA EXTEND PROSTHESIS, BICEPS TENODESIS, LATISSIMUS DORSI AND TERES MAJOR TENDON TRANSFERS . general, regional    Anesthesia Post Evaluation      Multimodal analgesia: multimodal analgesia used between 6 hours prior to anesthesia start to PACU discharge  Patient location during evaluation: bedside  Patient participation: complete - patient participated  Level of consciousness: awake and alert  Pain management: adequate  Airway patency: patent  Anesthetic complications: no  Cardiovascular status: hemodynamically stable  Respiratory status: spontaneous ventilation  Hydration status: euvolemic  Comments: Patient stable and may discharge at this time.         Vitals Value Taken Time   /59 12/12/2019  8:11 PM   Temp 36.9 °C (98.5 °F) 12/12/2019  7:17 PM   Pulse 89 12/12/2019  8:11 PM   Resp 18 12/12/2019  8:11 PM   SpO2 100 % 12/12/2019  8:11 PM

## 2019-12-13 NOTE — PROGRESS NOTES
Orthopedic Joint Progress Note    2019  Admit Date: 2019  Admit Diagnosis: Post-traumatic osteoarthritis of left shoulder [M19.112]  Pain due to internal orthopedic prosthetic devices, implants and grafts, initial encounter [facility]) [T84.84XA]  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 [M19.111, M19.112, S46.001S, S46.002S]  Post-traumatic arthrosis of left shoulder [M19.112]    1 Day Post-Op    Subjective:     [address]; STATES HE FEELS WELL    Review of Systems: Pertinent items are noted in HPI. Objective:     PT/OT:     PATIENT MOBILITY    Bed Mobility  Supine to Sit: Contact guard assistance  Sit to Supine: Contact guard assistance  Transfers  Sit to Stand: Contact guard assistance  Stand to Sit: Contact guard assistance  Bed to Chair: Minimum assistance      Gait  Speed/Maria Victoria: Delayed  Step Length: Left shortened, Right shortened  Gait Abnormalities: Decreased step clearance, Foot drop  Ambulation - Level of Assistance: Contact guard assistance  Distance (ft): 40 Feet (ft)  Assistive Device: Brace/Splint  Interventions: Safety awareness training, Verbal cues            Vital Signs:    Blood pressure 101/67, pulse 87, temperature 96.1 °F (35.6 °C), resp. rate 17, height 5' 6\" (1.676 m), weight 84 kg (185 lb 1.6 oz), SpO2 91 %.   Temp (24hrs), Av.4 °F (36.3 °C), Min:96.1 °F (35.6 °C), Max:98.5 °F (36.9 °C)      Pain Control:   Pain Assessment  Pain Scale 1: Numeric (0 - 10)  Pain Intensity 1: 0  Pain Onset 1: at rest  Pain Location 1: Shoulder  Pain Orientation 1: Left  Pain Description 1: Aching  Pain Intervention(s) 1: Declines    Meds:  Current Facility-Administered Medications   Medication Dose Route Frequency    multivitamin, tx-iron-ca-min (THERA-M w/ IRON) tablet 1 Tab  1 Tab Oral DAILY    0.9% sodium chloride infusion  75 mL/hr IntraVENous CONTINUOUS    sodium chloride (NS) flush 5-40 mL  5-40 mL IntraVENous Q8H    sodium chloride (NS) flush 5-40 mL  5-40 mL IntraVENous PRN    ondansetron (ZOFRAN) injection 4 mg  4 mg IntraVENous Q4H PRN    tuberculin injection 5 Units  5 Units IntraDERMal ONCE    bisacodyl (DULCOLAX) suppository 10 mg  10 mg Rectal DAILY PRN    sodium phosphate (FLEET'S) enema 1 Enema  1 Enema Rectal PRN    ferrous sulfate tablet 325 mg  1 Tab Oral DAILY WITH BREAKFAST    docusate sodium (COLACE) capsule 100 mg  100 mg Oral DAILY    HYDROmorphone (PF) (DILAUDID) injection 1 mg  1 mg IntraVENous Q2H PRN    HYDROmorphone (DILAUDID) tablet 2 mg  2 mg Oral Q4H PRN    magnesium citrate solution 296 mL  296 mL Oral DAILY PRN    ceFAZolin (ANCEF) 2 g/20 mL in sterile water IV syringe  2 g IntraVENous Q8H    lisinopril (PRINIVIL, ZESTRIL) tablet 20 mg  20 mg Oral DAILY    Or    hydroCHLOROthiazide (HYDRODIURIL) tablet 12.5 mg  12.5 mg Oral DAILY        LAB:    Lab Results   Component Value Date/Time    INR 1.0 12/04/2019 02:50 PM    INR 1.2 09/09/2018 10:36 AM     Lab Results   Component Value Date/Time    HGB 11.7 (L) 12/13/2019 03:53 AM    HGB 12.0 (L) 12/12/2019 09:00 PM    HGB 14.8 12/04/2019 02:32 PM       Wound Abdomen (Active)   Number of days: 462       Wound Back Lower (Active)   Number of days: 459       Wound Shoulder Right (Active)   Dressing Status Clean, dry, and intact 12/13/2019  9:30 AM   Dressing Type Elastic bandage 12/13/2019  9:30 AM   Drainage Amount None 12/12/2019  8:12 PM   Number of days: 1         Physical Exam:  General: alert, cooperative, no distress, appears stated age  Cardiovascular negative  Lungs: negative  Musculoskeletal: MOVES LEFT ELBOW, WRIST, AND HAND  Neurological: N/V INTACT  Skin:INCISION AND DRESSING TO LEFT SHOULDER CLEAN, DRY, INTACT    Assessment:      Principal Problem:    Post-traumatic osteoarthritis of left shoulder (12/7/2019)    Active Problems:    Painful orthopaedic hardware ([address] (12/7/2019)      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 (12/12/2019)      Post-traumatic arthrosis of left shoulder (12/12/2019)         Plan:     Continue PT/OT/Rehab  Consult: Rehab team including PT, OT, recreational therapy, and     Patient Expects to be Discharged to[de-identified] Private residence     [address] [place] [address]

## 2019-12-13 NOTE — PROGRESS NOTES
L UE bandage removed. Incision with no redness, redressed with xeroform, gauze and tegaderm. Has been up in recliner with ice pack to shoulder. Resting in bed now.

## 2019-12-13 NOTE — PROGRESS NOTES
In recliner. Ace bandage dry and intact to L UE. No complaints. Has good movement to hand, thumb still has numbness.

## 2019-12-13 NOTE — PROGRESS NOTES
Admission assessment completed. Pt is alert & oriented x4. Able to verbalize needs. Resting quietly with no distress noted. Dressing to left shoulder is clean, dry and intact. Ice pack provided. Neurovascular and peripheral vascular checks WNL. Incentive Spirometry at bedside. Patient encouraged to use hourly x 10 repetitions. Patient voiding clear yellow urine without difficulty. Pain is being managed with Dilaudid with patient tolerating well. Bed low and locked. Call light within reach. Patient instructed to call for assistance. Pt verbalizes understanding. Will monitor.

## 2019-12-14 VITALS
HEART RATE: 89 BPM | WEIGHT: 185.1 LBS | DIASTOLIC BLOOD PRESSURE: 73 MMHG | SYSTOLIC BLOOD PRESSURE: 117 MMHG | TEMPERATURE: 98.9 F | HEIGHT: 66 IN | OXYGEN SATURATION: 92 % | BODY MASS INDEX: 29.75 KG/M2 | RESPIRATION RATE: 16 BRPM

## 2019-12-14 LAB
ANION GAP SERPL CALC-SCNC: 5 MMOL/L (ref 7–16)
BUN SERPL-MCNC: 37 MG/DL (ref 8–23)
CALCIUM SERPL-MCNC: 8.6 MG/DL (ref 8.3–10.4)
CHLORIDE SERPL-SCNC: 105 MMOL/L (ref 98–107)
CO2 SERPL-SCNC: 30 MMOL/L (ref 21–32)
CREAT SERPL-MCNC: 1.84 MG/DL (ref 0.8–1.5)
ERYTHROCYTE [DISTWIDTH] IN BLOOD BY AUTOMATED COUNT: 12.6 % (ref 11.9–14.6)
GLUCOSE SERPL-MCNC: 167 MG/DL (ref 65–100)
HCT VFR BLD AUTO: 32.5 % (ref 41.1–50.3)
HGB BLD-MCNC: 10.6 G/DL (ref 13.6–17.2)
MAGNESIUM SERPL-MCNC: 2.1 MG/DL (ref 1.8–2.4)
MCH RBC QN AUTO: 33.4 PG (ref 26.1–32.9)
MCHC RBC AUTO-ENTMCNC: 32.6 G/DL (ref 31.4–35)
MCV RBC AUTO: 102.5 FL (ref 79.6–97.8)
NRBC # BLD: 0 K/UL (ref 0–0.2)
PLATELET # BLD AUTO: 201 K/UL (ref 150–450)
PMV BLD AUTO: 10.7 FL (ref 9.4–12.3)
POTASSIUM SERPL-SCNC: 4.1 MMOL/L (ref 3.5–5.1)
RBC # BLD AUTO: 3.17 M/UL (ref 4.23–5.6)
SODIUM SERPL-SCNC: 140 MMOL/L (ref 136–145)
WBC # BLD AUTO: 13.8 K/UL (ref 4.3–11.1)

## 2019-12-14 PROCEDURE — 85027 COMPLETE CBC AUTOMATED: CPT

## 2019-12-14 PROCEDURE — 74011250636 HC RX REV CODE- 250/636: Performed by: NURSE PRACTITIONER

## 2019-12-14 PROCEDURE — 97110 THERAPEUTIC EXERCISES: CPT

## 2019-12-14 PROCEDURE — 80048 BASIC METABOLIC PNL TOTAL CA: CPT

## 2019-12-14 PROCEDURE — 74011250637 HC RX REV CODE- 250/637: Performed by: NURSE PRACTITIONER

## 2019-12-14 PROCEDURE — 74011250637 HC RX REV CODE- 250/637: Performed by: ORTHOPAEDIC SURGERY

## 2019-12-14 PROCEDURE — 97116 GAIT TRAINING THERAPY: CPT

## 2019-12-14 PROCEDURE — 36415 COLL VENOUS BLD VENIPUNCTURE: CPT

## 2019-12-14 PROCEDURE — 83735 ASSAY OF MAGNESIUM: CPT

## 2019-12-14 RX ADMIN — HYDROMORPHONE HYDROCHLORIDE 2 MG: 2 TABLET ORAL at 05:47

## 2019-12-14 RX ADMIN — MULTIPLE VITAMINS W/ MINERALS TAB 1 TABLET: TAB at 08:36

## 2019-12-14 RX ADMIN — HYDROMORPHONE HYDROCHLORIDE 2 MG: 2 TABLET ORAL at 00:22

## 2019-12-14 RX ADMIN — HYDROMORPHONE HYDROCHLORIDE 2 MG: 2 TABLET ORAL at 11:52

## 2019-12-14 RX ADMIN — HYDROMORPHONE HYDROCHLORIDE 1 MG: 1 INJECTION, SOLUTION INTRAMUSCULAR; INTRAVENOUS; SUBCUTANEOUS at 02:43

## 2019-12-14 RX ADMIN — DOCUSATE SODIUM 100 MG: 100 CAPSULE, LIQUID FILLED ORAL at 08:35

## 2019-12-14 RX ADMIN — FERROUS SULFATE TAB 325 MG (65 MG ELEMENTAL FE) 325 MG: 325 (65 FE) TAB at 08:33

## 2019-12-14 RX ADMIN — HYDROCHLOROTHIAZIDE 12.5 MG: 25 TABLET ORAL at 08:35

## 2019-12-14 NOTE — PROGRESS NOTES
Problem: Mobility Impaired (Adult and Pediatric)  Goal: *Acute Goals and Plan of Care (Insert Text)  Description  GOALS (1-4 days):  (1.)  Patient will move from supine to sit and sit to supine  in bed with STAND BY ASSIST.    (2.)  Patient will transfer from bed to chair and chair to bed with STAND BY ASSIST using the least restrictive device. (3.)  Patient will ambulate with STAND BY ASSIST for 150 feet with the least restrictive device. (4.)  Patient will be independent with shoulder HEP to increase range of motion per MD orders. ________________________________________________________________________________________________     Outcome: Progressing Towards Goal     PHYSICAL THERAPY: Daily Note, Treatment Day: 1st and AM 12/14/2019  INPATIENT: Hospital Day: 3  [place] / [place] / Product Type: Medicare /      NAME/AGE/GENDER: [place] is a [de-identified] y.o. male   PRIMARY DIAGNOSIS: Post-traumatic osteoarthritis of left shoulder [M19.112]  Pain due to internal orthopedic prosthetic devices, implants and grafts, initial encounter ([facility] [address] [T84.84XA]  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 [M19.111, M19.112, S46.001S, S46.002S]  Post-traumatic arthrosis of left shoulder [M19.112] Post-traumatic osteoarthritis of left shoulder Post-traumatic osteoarthritis of left shoulder  Procedure(s) (LRB):  LEFT SHOULDER HARDWARE REMOVAL (Left)  Procedure(s): 1.   INCISION, DEBRIDEMENT, HARDWARE REMOVAL LEFT SHOULDER                                     2.  REVERSE LEFT TOTAL SHOULDER ARTHROPLASTY WITH DELTA EXTEND PROSTHESIS, BICEPS TENODESIS, LATISSIMUS DORSI AND TERES MAJOR TENDON TRANSFERS  (Left)  2 Days Post-Op  ICD-10: Treatment Diagnosis:   · Pain in Left Shoulder (M25.512)  · Stiffness of Left Shoulder, Not elsewhere classified (M25.612)  · Other abnormalities of gait and mobility (R26.89)   Precaution/Allergies:  Clonidine and Simvastatin      ASSESSMENT: Mr. Della Calero presents with decreased rom and strength of left UE as well as decreased functional mobility and gait s/p left tsa with hardware removal.  He plans to go home with wife. Reviewed safety and precautions with left UE listed below. He still reports no pain. Discussed more pain when block wears off. Wife and sister present. He did elbow/hand/wrist rom on side of bed. He ambulated around the room a few times, did not want to go in hale. He is unsteady on his feet but did better than this am.  He reports normally being unsteady. No questions. 12/14--Pt performed supine to sit to stand. Pt performed exercises in bed and sitting edge of bed. Pt ambulated in hallway. Pt in bedside chair. Pt in chair with needs in reach. Pt's wife present. PM--Pt performed sit to stand. Pt performed exercises for upper extremity. Pt returned supine. Needs in reach. Pt's wife present. Active and passive range of motion  LEFT  Elbow wrist and hand   No other motion   nwb  Shoulder LEFT   wbat ble   Sling  Shoulder LEFT   Question: Reason for PT? Answer: LEFT REVERSE TOTAL SHOULDER       This section established at most recent assessment   PROBLEM LIST (Impairments causing functional limitations):  1. Decreased Jerome with Bed Mobility  2. Decreased Jerome with Transfers  3. Decreased Jerome with Ambulation   4. Decreased Jerome with shoulder HEP   INTERVENTIONS PLANNED: (Benefits and precautions of physical therapy have been discussed with the patient.)  1. Bed Mobility Training  2. Transfer Training  3. Gait Training  4. Therapeutic Exercises per MD orders  5. Modalities for Pain     TREATMENT PLAN: Frequency/Duration: twice daily for duration of hospital stay  Rehabilitation Potential For Stated Goals: Good     RECOMMENDED REHABILITATION/EQUIPMENT: (at time of discharge pending progress): Continue Skilled Therapy and to be determined.               HISTORY:   History of Present Injury/Illness (Reason for Referral):  S/p left tsa with hardware removal  Past Medical History/Comorbidities:   Mr. Latanya Hernandez  has a past medical history of Arthritis, Calculus of kidney (12/23/2013), Chronic kidney disease, Chronic kidney disease, stage III (moderate) ([facility] [address] (12/23/2013), Fracture of coccyx ([facility] [address] (1/14/2016), Gout, unspecified (12/23/2013), History of left shoulder fracture (1/14/2016), Hyperlipidemia (1/14/2016), Microscopic hematuria (12/23/2013), Sciatica of right side (1/14/2016), Sleep apnea, Unspecified essential hypertension (12/23/2013), and Urinary tract infection, site not specified (12/23/2013). Mr. Latanya Hernandez  has a past surgical history that includes hx lithotripsy; pr total hip arthroplasty (Left); hx shoulder replacement (Left, 12/2014); and hx lumbar fusion (09/07/2018). Social History/Living Environment:   Home Environment: Private residence  # Steps to Enter: 3  One/Two Story Residence: One story  Living Alone: No  Support Systems: Spouse/Significant Other/Partner, Family member(s)  Patient Expects to be Discharged to[de-identified] Private residence  Current DME Used/Available at Home: Cristobal Barer, straight, Shower chair, [place]  Prior Level of Function/Work/Activity:  Independent, cane at times with gait   Number of Personal Factors/Comorbidities that affect the Plan of Care: 1-2: MODERATE COMPLEXITY   EXAMINATION:   Most Recent Physical Functioning:   Gross Assessment:                  Posture:     Balance:  Sitting: Intact  Standing: With support Bed Mobility:  Sit to Supine: Minimum assistance  Wheelchair Mobility:     Transfers:  Sit to Stand: Contact guard assistance  Stand to Sit: Contact guard assistance  Gait:     Distance (ft): 25 Feet (ft)  Ambulation - Level of Assistance: Contact guard assistance      Body Structures Involved:  1. Bones  2. Joints  3. Muscles  4. Ligaments Body Functions Affected:  1. Movement Related Activities and Participation Affected:  1.  Mobility   Number of elements that affect the Plan of Care: 3: MODERATE COMPLEXITY   CLINICAL PRESENTATION:   Presentation: Stable and uncomplicated: LOW COMPLEXITY   CLINICAL DECISION [facility] [place] [address] [id number] AM-PAC 6 Clicks   Basic Mobility Inpatient Short Form  How much difficulty does the patient currently have. .. Unable A Lot A Little None   1. Turning over in bed (including adjusting bedclothes, sheets and blankets)? [] 1   [] 2   [x] 3   [] 4   2. Sitting down on and standing up from a chair with arms ( e.g., wheelchair, bedside commode, etc.)   [] 1   [] 2   [x] 3   [] 4   3. Moving from lying on back to sitting on the side of the bed? [] 1   [] 2   [x] 3   [] 4   How much help from another person does the patient currently need. .. Total A Lot A Little None   4. Moving to and from a bed to a chair (including a wheelchair)? [] 1   [] 2   [x] 3   [] 4   5. Need to walk in hospital room? [] 1   [x] 2   [] 3   [] 4   6. Climbing 3-5 steps with a railing? [] 1   [x] 2   [] 3   [] 4   © , Trustees of [facility] [place] [address] [address], under license to [employer]. All rights reserved      Score:  Initial: 16 Most Recent: X (Date: -- )    Interpretation of Tool:  Represents activities that are increasingly more difficult (i.e. Bed mobility, Transfers, Gait). Medical Necessity:     · Patient is expected to demonstrate progress in   · strength, range of motion, and balance  ·  to   · decrease assistance required with exercises and functional mobility   · . Reason for Services/Other Comments:  · Patient   · continues to require present interventions due to patient's inability to perform exercises and functional mobility independently   · .    Use of outcome tool(s) and clinical judgement create a POC that gives a: Clear prediction of patient's progress: LOW COMPLEXITY            TREATMENT:   (In addition to Assessment/Re-Assessment sessions the following treatments were rendered)   Pre-treatment Symptoms/Complaints:  none  Pain: Initial: Pain Intensity 1: 3  Pain Location 1: Shoulder  Pain Orientation 1: Left  Post Session:  0-still numb     Therapeutic Exercise: ( 15 minutes):  Exercises per grid below to improve mobility and strength. Required minimal visual, verbal, and manual cues to promote proper body alignment, promote proper body posture, and promote proper body mechanics. Progressed range and repetitions as indicated. Therapeutic Activity: (    ):  Therapeutic activities including Bed transfers, Ambulation on level ground and standing to improve mobility and strength. Required minimal   to promote static and dynamic balance in standing. Gait Training (  8 minutes):  Gait training to improve and/or restore physical functioning as related to mobility. Assistive Device: Brace/Splint  Ambulation - Level of Assistance: Contact guard assistance  Distance (ft): 25 Feet (ft)  Interventions: Safety awareness training, Verbal cues       Date:  12/13 Date:  12/14 Date:     ACTIVITY/EXERCISE AM PM AM PM AM PM   Gripping 10 10a 10a 10     Wrist Flexion/Extension 10aa 10a 10a 10     Wrist Ulnar/Radial Deviation         Pronation/Supination 10aa 10a 10a 10     Elbow Flexion/Extension 10aa 10aa 10aa 10     Shoulder Flexion/Extension    5AA     Shoulder AB/ADduction         Shoulder IR/ER         Pulleys         Pendulums         Shrugs         Isometric:                 Flexion         Extension         ABduction         ADduction         Biceps/Triceps                  B = bilateral; AA = active assistive; A = active; P = passive  Education:  [x]  Home Exercises  [x]  Sling Application   [x]  Movement Precautions   []  Pulleys   [x]  Use of Ice   []  Other:   Treatment/Session Assessment:    · Response to Treatment:  Pt agreeable to exercise and ambulate.   · Interdisciplinary Collaboration:   o Physical Therapist  o Registered Nurse  · After treatment position/precautions:   o Supine in bed  o Bed/Chair-wheels locked  o Bed in low position  o Caregiver at bedside  o Call light within reach  o RN notified   · Compliance with Program/Exercises: Will assess as treatment progresses. · Recommendations/Intent for next treatment session:  Treatment next visit will focus on increasing Mr. Escoto's independence with bed mobility, transfers, gait training, strength/ROM exercises, modalities for pain, and patient education.    Total Treatment Duration:  PT Patient Time In/Time Out  Time In: 1345  Time Out: [address] [facility],

## 2019-12-14 NOTE — PROGRESS NOTES
discharge education provided to patient and all questions were answered. pt discharged with belongings, prescriptions, and discharge packet. pt taken out in wheel chair.

## 2019-12-14 NOTE — PROGRESS NOTES
Care Management Interventions  PCP Verified by CM: Yes  Mode of Transport at Discharge: Self  Transition of Care Consult (CM Consult): [address]: No  Reason Outside [place]: Patient already serviced by other home care/hospice agency(Pt requested Interim)  Current Support Network: Lives with Spouse  Plan discussed with Pt/Family/Caregiver: Yes  Freedom of Choice Offered: Yes  Discharge Location  Discharge Placement: Home with home health  The Plan for Transition of Care is related to the following treatment goals: increase strength. The Patient and spouse was provided with a choice of provider and agrees   with the discharge plan. [x] Yes [] No    Freedom of choice list was provided with basic dialogue that supports the patient's individualized plan of care/goals, treatment preferences and shares the quality data associated with the providers.  [x] Yes [] No

## 2019-12-14 NOTE — PROGRESS NOTES
Problem: Mobility Impaired (Adult and Pediatric)  Goal: *Acute Goals and Plan of Care (Insert Text)  Description  GOALS (1-4 days):  (1.)  Patient will move from supine to sit and sit to supine  in bed with STAND BY ASSIST.    (2.)  Patient will transfer from bed to chair and chair to bed with STAND BY ASSIST using the least restrictive device. (3.)  Patient will ambulate with STAND BY ASSIST for 150 feet with the least restrictive device. (4.)  Patient will be independent with shoulder HEP to increase range of motion per MD orders. ________________________________________________________________________________________________     Outcome: Progressing Towards Goal     PHYSICAL THERAPY: Daily Note, Treatment Day: 1st and AM 12/14/2019  INPATIENT: Hospital Day: 3  [place] / [place] / Product Type: Medicare /      NAME/AGE/GENDER: Dhiraj Men is a [de-identified] y.o. 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   INCISION, DEBRIDEMENT, HARDWARE REMOVAL LEFT SHOULDER                                     2.  REVERSE LEFT TOTAL SHOULDER ARTHROPLASTY WITH DELTA EXTEND PROSTHESIS, BICEPS TENODESIS, LATISSIMUS DORSI AND TERES MAJOR TENDON TRANSFERS  (Left)  2 Days Post-Op  ICD-10: Treatment Diagnosis:   · Pain in Left Shoulder (M25.512)  · Stiffness of Left Shoulder, Not elsewhere classified (M25.612)  · Other abnormalities of gait and mobility (R26.89)   Precaution/Allergies:  Clonidine and Simvastatin      ASSESSMENT: Mr. Virginia Thomas presents with decreased rom and strength of left UE as well as decreased functional mobility and gait s/p left tsa with hardware removal.  He plans to go home with wife. Reviewed safety and precautions with left UE listed below. He still reports no pain. Discussed more pain when block wears off. Wife and sister present. He did elbow/hand/wrist rom on side of bed. He ambulated around the room a few times, did not want to go in hale. He is unsteady on his feet but did better than this am.  He reports normally being unsteady. No questions. 12/14--Pt performed supine to sit to stand. Pt performed exercises in bed and sitting edge of bed. Pt ambulated in hallway. Pt in bedside chair. Pt in chair with needs in reach. Pt's wife present. Active and passive range of motion  LEFT  Elbow wrist and hand   No other motion   nwb  Shoulder LEFT   wbat ble   Sling  Shoulder LEFT   Question: Reason for PT? Answer: LEFT REVERSE TOTAL SHOULDER       This section established at most recent assessment   PROBLEM LIST (Impairments causing functional limitations):  1. Decreased [place] with Bed Mobility  2. Decreased [place] with Transfers  3. Decreased [place] with Ambulation   4. Decreased [place] with shoulder HEP   INTERVENTIONS PLANNED: (Benefits and precautions of physical therapy have been discussed with the patient.)  1. Bed Mobility Training  2. Transfer Training  3. Gait Training  4. Therapeutic Exercises per MD orders  5. Modalities for Pain     TREATMENT PLAN: Frequency/Duration: twice daily for duration of hospital stay  Rehabilitation Potential For Stated Goals: Good     RECOMMENDED REHABILITATION/EQUIPMENT: (at time of discharge pending progress): Continue Skilled Therapy and to be determined.               HISTORY:   History of Present Injury/Illness (Reason for Referral):  S/p left tsa with hardware removal  Past Medical History/Comorbidities:   Mr. Virginia Thomas  has a past medical history of Arthritis, Calculus of kidney (12/23/2013), Chronic kidney disease, Chronic kidney disease, stage III (moderate) ([facility] [address] (12/23/2013), Fracture of coccyx ([facility] [address] (1/14/2016), Gout, unspecified (12/23/2013), History of left shoulder fracture (1/14/2016), Hyperlipidemia (1/14/2016), Microscopic hematuria (12/23/2013), Sciatica of right side (1/14/2016), Sleep apnea, Unspecified essential hypertension (12/23/2013), and Urinary tract infection, site not specified (12/23/2013). Mr. Malik Diamond  has a past surgical history that includes hx lithotripsy; pr total hip arthroplasty (Left); hx shoulder replacement (Left, 12/2014); and hx lumbar fusion (09/07/2018). Social History/Living Environment:   Home Environment: Private residence  # Steps to Enter: 3  One/Two Story Residence: One story  Living Alone: No  Support Systems: Spouse/Significant Other/Partner, Family member(s)  Patient Expects to be Discharged to[de-identified] Private residence  Current DME Used/Available at Home: [place], straight, Shower chair, [employer] [place]  Prior Level of Function/Work/Activity:  Independent, cane at times with gait   Number of Personal Factors/Comorbidities that affect the Plan of Care: 1-2: MODERATE COMPLEXITY   EXAMINATION:   Most Recent Physical Functioning:   Gross Assessment:                  Posture:     Balance:  Sitting: Intact  Standing: With support Bed Mobility:     Wheelchair Mobility:     Transfers:  Sit to Stand: Contact guard assistance  Stand to Sit: Contact guard assistance  Gait:     Distance (ft): 125 Feet (ft)  Ambulation - Level of Assistance: Contact guard assistance      Body Structures Involved:  1. Bones  2. Joints  3. Muscles  4. Ligaments Body Functions Affected:  1. Movement Related Activities and Participation Affected:  1.  Mobility   Number of elements that affect the Plan of Care: 3: MODERATE COMPLEXITY   CLINICAL PRESENTATION:   Presentation: Stable and uncomplicated: LOW COMPLEXITY   CLINICAL DECISION MAKING: [facility] Mobility Inpatient Short Form  How much difficulty does the patient currently have. .. Unable A Lot A Little None   1. Turning over in bed (including adjusting bedclothes, sheets and blankets)? [] 1   [] 2   [x] 3   [] 4   2. Sitting down on and standing up from a chair with arms ( e.g., wheelchair, bedside commode, etc.)   [] 1   [] 2   [x] 3   [] 4   3. Moving from lying on back to sitting on the side of the bed? [] 1   [] 2   [x] 3   [] 4   How much help from another person does the patient currently need. .. Total A Lot A Little None   4. Moving to and from a bed to a chair (including a wheelchair)? [] 1   [] 2   [x] 3   [] 4   5. Need to walk in hospital room? [] 1   [x] 2   [] 3   [] 4   6. Climbing 3-5 steps with a railing? [] 1   [x] 2   [] 3   [] 4   © 2007, Trustees of [address] [place] [address] [address] [address], under license to [employer]. All rights reserved      Score:  Initial: 16 Most Recent: X (Date: -- )    Interpretation of Tool:  Represents activities that are increasingly more difficult (i.e. Bed mobility, Transfers, Gait). Medical Necessity:     · Patient is expected to demonstrate progress in   · strength, range of motion, and balance  ·  to   · decrease assistance required with exercises and functional mobility   · . Reason for Services/Other Comments:  · Patient   · continues to require present interventions due to patient's inability to perform exercises and functional mobility independently   · .    Use of outcome tool(s) and clinical judgement create a POC that gives a: Clear prediction of patient's progress: LOW COMPLEXITY            TREATMENT:   (In addition to Assessment/Re-Assessment sessions the following treatments were rendered)   Pre-treatment Symptoms/Complaints:  none  Pain: Initial:   Pain Intensity 1: 3  Pain Location 1: Shoulder  Pain Orientation 1: Left  Post Session:  0-still numb     Therapeutic Exercise: ( 15 minutes):  Exercises per grid below to improve mobility and strength. Required minimal visual, verbal, and manual cues to promote proper body alignment, promote proper body posture, and promote proper body mechanics. Progressed range and repetitions as indicated. Therapeutic Activity: (    ):  Therapeutic activities including Bed transfers, Ambulation on level ground and standing to improve mobility and strength. Required minimal   to promote static and dynamic balance in standing. Gait Training (  8 minutes):  Gait training to improve and/or restore physical functioning as related to mobility. Assistive Device: Brace/Splint  Ambulation - Level of Assistance: Contact guard assistance  Distance (ft): 125 Feet (ft)  Interventions: Safety awareness training, Verbal cues       Date:  12/13 Date:  12/14 Date:     ACTIVITY/EXERCISE AM PM AM PM AM PM   Gripping 10 10a 10a      Wrist Flexion/Extension 10aa 10a 10a      Wrist Ulnar/Radial Deviation         Pronation/Supination 10aa 10a 10a      Elbow Flexion/Extension 10aa 10aa 10aa      Shoulder Flexion/Extension         Shoulder AB/ADduction         Shoulder IR/ER         Pulleys         Pendulums         Shrugs         Isometric:                 Flexion         Extension         ABduction         ADduction         Biceps/Triceps                  B = bilateral; AA = active assistive; A = active; P = passive  Education:  [x]  Home Exercises  [x]  Sling Application   [x]  Movement Precautions   []  Pulleys   [x]  Use of Ice   []  Other:   Treatment/Session Assessment:    · Response to Treatment:  Pt agreeable to exercise and ambulate. · Interdisciplinary Collaboration:   o Physical Therapist  o Registered Nurse  · After treatment position/precautions:   o Up in chair  o Bed/Chair-wheels locked  o Bed in low position  o Caregiver at bedside  o Call light within reach  o RN notified   · Compliance with Program/Exercises: Will assess as treatment progresses.   · Recommendations/Intent for next treatment session:  Treatment next visit will focus on increasing Mr. Escoto's independence with bed mobility, transfers, gait training, strength/ROM exercises, modalities for pain, and patient education.    Total Treatment Duration:  PT Patient Time In/Time Out  Time In: 1110  Time Out: 1200 [facility],

## 2019-12-14 NOTE — DISCHARGE INSTRUCTIONS
Patient Education        Shoulder Replacement Surgery: What to Expect at Home  Your Recovery    Shoulder replacement surgery replaces the worn parts of your shoulder joint. When you leave the hospital, your arm will be in a sling. It will be helpful if there is someone to help you at home for the next few weeks or until you have more energy and can move around better. You will go home with a bandage and stitches, staples, tissue glue, or tape strips. You can remove the bandage when your doctor tells you to. If you have staples, your doctor will remove them in 10 to 21 days. If you have stitches that are not the type that dissolve, your doctor will remove them in 10 to 14 days. Glue or tape strips will fall off on their own over time. You may still have some mild pain, and the area may be swollen for several months after surgery. Your doctor will give you medicine for the pain. You will continue the rehabilitation program (rehab) you started in the hospital. The better you do with your rehab exercises, the sooner you will get your strength and movement back. Depending on your job, you may be able to return to work as early as 2 to 3 weeks after surgery, as long as you avoid certain arm movements, such as lifting. This care sheet gives you a general idea about how long it will take for you to recover. But each person recovers at a different pace. Follow the steps below to get better as quickly as possible. How can you care for yourself at home? Activity    · Rest when you feel tired. You may take a nap, but don't stay in bed all day.     · Work with your physical therapist to learn the best way to exercise.     · You will have a sling to wear at night.  And it's a good idea to also put a small stack of folded sheets or towels under your upper arm while you are in bed to keep your arm from dropping too far back.     · Your arm should stay next to your body or in front of it for several weeks, both while you are up and during sleep.     · Don't lift anything with the affected arm for 6 weeks.     · Ask your doctor when you can drive again.     · Ask your doctor when it is okay for you to have sex.     · Your doctor may advise you to give up activities that put stress on that shoulder. This includes sports such as weight lifting or tennis, unless your tennis arm was not the one affected. Diet    · By the time you leave the hospital, you will probably be eating your normal diet. If your stomach is upset, try bland, low-fat foods like plain rice, broiled chicken, toast, and yogurt. Your doctor may recommend that you take iron and vitamin supplements.     · Drink plenty of fluids (unless your doctor tells you not to).     · You may notice that your bowel movements are not regular right after your surgery. This is common. Try to avoid constipation and straining with bowel movements. You may want to take a fiber supplement every day. If you have not had a bowel movement after a couple of days, ask your doctor about taking a mild laxative. Medicines    · Your doctor will tell you if and when you can restart your medicines. He or she will also give you instructions about taking any new medicines.     · If you take blood thinners, such as warfarin (Coumadin), clopidogrel (Plavix), or aspirin, be sure to talk to your doctor. He or she will tell you if and when to start taking those medicines again. Make sure that you understand exactly what your doctor wants you to do.     · Be safe with medicines. Take pain medicines exactly as directed. ? If the doctor gave you a prescription medicine for pain, take it as prescribed. ? If you are not taking a prescription pain medicine, ask your doctor if you can take an over-the-counter medicine.     · If you think your pain medicine is making you sick to your stomach:  ? Take your medicine after meals (unless your doctor has told you not to). ? Ask your doctor for a different pain medicine.   · If your doctor prescribed antibiotics, take them as directed. Don't stop taking them just because you feel better. You need to take the full course of antibiotics.     · If you take a blood thinner, be sure you get instructions about how to take your medicine safely. Blood thinners can cause serious bleeding problems. Incision care    · If your doctor told you how to care for your cut (incision), follow your doctor's instructions. You will have a dressing over the cut. A dressing helps the incision heal and protects it. Your doctor will tell you how to take care of this.     · If you did not get instructions, follow this general advice:  ? If you have strips of tape on the cut the doctor made, leave the tape on for a week or until it falls off.  ? If you have stitches or staples, your doctor will tell you when to come back to have them removed. ? If you have skin adhesive on the cut, leave it on until it falls off. Skin adhesive is also called glue or liquid stitches. ? Change the bandage every day. ? Wash the area daily with warm water, and pat it dry. Don't use hydrogen peroxide or alcohol. They can slow healing. ? You may cover the area with a gauze bandage if it oozes fluid or rubs against clothing. ? You may shower 24 to 48 hours after surgery. Pat the incision dry. Don't swim or take a bath for the first 2 weeks, or until your doctor tells you it is okay. Exercise    · Shoulder rehabilitation is a series of exercises you do after your surgery. This helps you get back your shoulder's range of motion and strength. You will work with your doctor and physical therapist to plan this exercise program. To get the best results, you need to do the exercises correctly and as often and as long as your doctor tells you.    Ice    · For pain, put ice or a cold pack on the area for 10 to 20 minutes at a time. Put a thin cloth between the ice and your skin.    Follow-up care is a key part of your treatment and safety. Be sure to make and go to all appointments, and call your doctor if you are having problems. It's also a good idea to know your test results and keep a list of the medicines you take. When should you call for help? Call 911 anytime you think you may need emergency care. For example, call if:    · You have severe trouble breathing.     · You have symptoms of a blood clot in your lung (called a pulmonary embolism). These may include:  ? Sudden chest pain. ? Trouble breathing. ? Coughing up blood.    Call your doctor now or seek immediate medical care if:    · You have severe or increasing pain.     · You have symptoms of infection, such as:  ? Increased pain, swelling, warmth, or redness. ? Red streaks or pus. ? A fever.     · You have tingling, weakness, or numbness in your arm.     · Your arm turns cold or changes color.     · You have symptoms of a blood clot in your leg (called a deep vein thrombosis). These may include:  ? Pain in the calf, back of the knee, thigh, or groin. ? Redness and swelling in the leg or groin.    Watch closely for changes in your health, and be sure to contact your doctor if:    · You do not get better as expected. Where can you learn more? Go to http://harlan-laure.info/. Enter L524 in the search box to learn more about \"Shoulder Replacement Surgery: What to Expect at Home. \"  Current as of: June 26, 2019  Content Version: 12.2  © 4510-1485 [employer]. Care instructions adapted under license by [employer] (which disclaims liability or warranty for this information). If you have questions about a medical condition or this instruction, always ask your healthcare professional. [place] [address] any warranty or liability for your use of this information. MEDICATIONS    Dilaudid Tablet 2mg:  Take 1-2 tablets by mouth every 4-6 hours as needed (PRN) for pain  Promethazine HCL Tablet 25 mg: Take 1 tablet by mouth every 8 hours as needed (PRN) for nausea   Restoril Capsule 15 mg: Take 1 tablet by mouth at bedtime as needed (PRN) for sleep

## 2019-12-14 NOTE — PROGRESS NOTES
2019         Post Op day: 2 Days Post-Op     Admit Date: 2019  Admit Diagnosis: Post-traumatic osteoarthritis of left shoulder [M19.112]  Pain due to internal orthopedic prosthetic devices, implants and grafts, initial encounter [facility]) [T84.84XA]  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 [M19.111, M19.112, S46.001S, S46.002S]  Post-traumatic arthrosis of left shoulder [M19.112]        Subjective: pain controlled with pain meds, No SOB, No Chest Pain, No Nausea or Vomiting     Objective:   Vital Signs are Stable, No Acute Distress, Alert and Oriented, Dressing is Dry,  Neurovascular exam is normal.     Assessment / Plan :  Patient Active Problem List   Diagnosis Code    Family history of malignant neoplasm of prostate Z80.42    History of left shoulder fracture Z87.81    History of renal stone Z87.442    Gout M10.9    CKD (chronic kidney disease) stage 3, GFR 30-59 ml/min ([facility]) N18.3    Benign hypertensive kidney disease with chronic kidney disease I12.9    Glucose intolerance (impaired glucose tolerance) R73.02    Mixed hyperlipidemia E78.2    Hyperuricemia E79.0    Spinal stenosis of lumbosacral region M48.07    Spondylolisthesis at L5-S1 level M43.17    Spinal stenosis M48.00    Post-traumatic osteoarthritis of left shoulder M19.112    Painful orthopaedic hardware (HCC) T84.84XA    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 M19.111, M19.112, S46.001S, S46.002S    Post-traumatic arthrosis of left shoulder M19.112      Patient Vitals for the past 8 hrs:   BP Temp Pulse Resp SpO2   19 0340 109/64 97.6 °F (36.4 °C) 82 17 93 %   19 0022 105/62 97.4 °F (36.3 °C) 88 18 91 %    Temp (24hrs), Av.3 °F (36.3 °C), Min:96.1 °F (35.6 °C), Max:98.5 °F (36.9 °C)    Body mass index is 29.88 kg/m².     Lab Results   Component Value Date/Time    HGB 10.6 (L) 2019 04:07 AM      Pt seen by and discussed with Supervising Physician   Continue PT, current pain meds  Will continue to monitor.    Home likely tomorrow       Signed By: KAELYN Huertas

## 2019-12-14 NOTE — PROGRESS NOTES
Dilaudid 2 mg po for c/o pain. Slept at intervals during shift. No further c/o voiced. No change in NV status noted. Sling and swathe in use. Dsng dry and intact. Call light within reach.

## 2019-12-14 NOTE — PROGRESS NOTES
Patient resting in bed  Radial pulses +2 bilaterally  Dressing to surgical incision on left shoulder dry and intact  IS at bedside  Bed low and locked  Call light in reach

## 2019-12-14 NOTE — PROGRESS NOTES
Family asking if pt would go home today, informed family that based on note from rounding dr today discharge would be tomorrow.  Pt and family are ready for discharge

## 2019-12-15 NOTE — DISCHARGE SUMMARY
[facility] SUMMARY    Name:  Radha Escobedo  MR#:  [id number]  :  1940  ACCOUNT #:  [de-identified]  ADMIT DATE:  2019  DISCHARGE DATE:  2019    ADMISSION DIAGNOSES:  Posttraumatic degenerative joint disease, left shoulder and painful hardware, left shoulder. DISCHARGE DIAGNOSES:  Posttraumatic degenerative joint disease, left shoulder and painful hardware, left shoulder. Please see H and P, operative summaries, and consult for details. HISTORY OF PRESENT ILLNESS:  The patient is an 79-year-old gentleman, who was admitted on 2019, underwent an uncomplicated I and D and hardware removal of left shoulder, reverse left total shoulder arthroplasty with a Delta Xtend prosthesis, biceps tenodesis, latissimus dorsi, and teres major tendon transfer. On postoperative day #1, hemoglobin was 11.7, potassium was 4.4, magnesium was 2.1. The patient was comfortable. He began on physical therapy as per reverse shoulder arthroplasty protocol. On postoperative day #2, all labs were within normal limits. The patient was comfortable. He was discharged home on postoperative day #2. He will continue therapy on the outside and follow up in my office in 10 days. MD ANN-MARIE [email or web address]  D:  12/15/2019 8:41  T:  12/15/2019 15:58  JOB #:  [id number]  CC:   Fuad Rush MD

## 2019-12-16 ENCOUNTER — PATIENT OUTREACH (OUTPATIENT)
Dept: CASE MANAGEMENT | Age: 79
End: 2019-12-16

## 2019-12-16 LAB
ABO + RH BLD: NORMAL
BLD PROD TYP BPU: NORMAL
BLD PROD TYP BPU: NORMAL
BLOOD GROUP ANTIBODIES SERPL: NORMAL
BPU ID: NORMAL
BPU ID: NORMAL
CROSSMATCH RESULT,%XM: NORMAL
CROSSMATCH RESULT,%XM: NORMAL
SPECIMEN EXP DATE BLD: NORMAL
STATUS OF UNIT,%ST: NORMAL
STATUS OF UNIT,%ST: NORMAL
UNIT DIVISION, %UDIV: 0
UNIT DIVISION, %UDIV: 0

## 2019-12-16 NOTE — PROGRESS NOTES
Transition of Care Hospital Discharge Follow-Up      Date/Time:  2019 11:28 AM    Patient was admitted to [facility] on 19 and discharged on 19 for Posttraumatic degenerative joint disease, left shoulder and painful hardware, I and D and hardware removal of left shoulder, reverse left total shoulder arthroplasty. The physician discharge summary was available at the time of outreach. Patient was contacted within 1 business days of discharge. Advance Care Planning:   Does patient have an Advance Directive:  not on file          Inpatient RRAT score: 21  Was this a readmission? no   Patient stated reason for the readmission: increased pain    Care Transition Nurse (CTN) contacted the family by telephone to perform post hospital discharge assessment. Verified name and  with family as identifiers. Provided introduction to self, and explanation of the CTN role. Family received hospital discharge instructions. CTN reviewed discharge instructions and red flags with family who verbalized understanding. Family given an opportunity to ask questions and does not have any further questions or concerns at this time. The family agrees to contact the PCP office for questions related to their healthcare. CTN provided contact information for future reference. Disease Specific:   N/A    Patients top risk factors for readmission:  functional physical ability,     Home Health orders at discharge: [facility]: [facility]  Date of initial visit: Initial outreach from [facility] 19. Spouse reports they missed the lorraine. [facility]im contacted. Expecting a call today to schedule. Patient's spouse to contact CTN if outreach from [facility] not received today as planned.     Durable Medical Equipment ordered at discharge: none  [address] received: n/a    Medication(s):   Medications at Discharge: see below    Medication reconciliation was performed with family, who verbalizes understanding of administration of home medications. There were no barriers to obtaining medications identified at this time. Current Outpatient Medications   Medication Sig    benazepril-hydroCHLOROthiazide (LOTENSIN HCT) 20-12.5 mg per tablet TAKE 1 TABLET EVERY DAY    amLODIPine (NORVASC) 5 mg tablet TAKE 1 TABLET EVERY DAY    allopurinol (ZYLOPRIM) 100 mg tablet Take 1 Tab by mouth daily.  multivitamin (ONE A DAY) tablet Take 1 Tab by mouth daily.  OTHER Indications: many vitamins and herbal supplements     No current facility-administered medications for this visit. There are no discontinued medications. [facility] follow up appointment(s): No future appointments. Non-[facility] follow up appointment(s): n/a  7 Day follow up with PCP or Specialist: Dr. Libby Fung 12/26/19  Patient's spouse to contact [address] [place] [address] office today to schedule follow up appointment regarding hosptial discharge follow up and to obtain BP medication refill. Transportation: Arranged        Goals        Patient Stated     Understands red flags post discharge. (pt-stated)      Patient spouse agrees to contact [facility]/PCP with any medical issues for the next 30 days. Patient spouse agrees to contact PCP for hospital follow up appointment and to obtain BP medication refill. Next Outreach Scheduled: 12/23/19      This note will not be viewable in [address]

## 2019-12-23 ENCOUNTER — PATIENT OUTREACH (OUTPATIENT)
Dept: CASE MANAGEMENT | Age: 79
End: 2019-12-23

## 2019-12-23 NOTE — PROGRESS NOTES
ROMA follow up call. Unable to reach patient. Message left reminding patient of follow up appointment with Dr. Yajaira Meadows 12/26/19 and requesting a return call. CTN will follow up per ROMA protocol if no return call received. This note will not be viewable in [address]

## 2019-12-31 ENCOUNTER — PATIENT OUTREACH (OUTPATIENT)
Dept: CASE MANAGEMENT | Age: 79
End: 2019-12-31

## 2019-12-31 NOTE — PROGRESS NOTES
ROMA follow up call. Unable to reach patient. Message left with contact number requesting a return call. Will continue to outreach if no return call received. This note will not be viewable in [address]

## 2020-01-07 ENCOUNTER — PATIENT OUTREACH (OUTPATIENT)
Dept: CASE MANAGEMENT | Age: 80
End: 2020-01-07

## 2020-01-07 NOTE — PROGRESS NOTES
ROMA follow up call. Unable to reach patient. Message left with contact information requesting a return call. Per CC patient did attend appointment with PCP today. Will continue to outreach if no return call received. This note will not be viewable in [address]

## 2020-01-09 ENCOUNTER — HOSPITAL ENCOUNTER (OUTPATIENT)
Dept: PHYSICAL THERAPY | Age: 80
Discharge: HOME | End: 2020-01-09
Payer: MEDICARE

## 2020-01-09 PROCEDURE — 97110 THERAPEUTIC EXERCISES: CPT

## 2020-01-09 PROCEDURE — 97162 PT EVAL MOD COMPLEX 30 MIN: CPT

## 2020-01-09 PROCEDURE — 97140 MANUAL THERAPY 1/> REGIONS: CPT

## 2020-01-09 NOTE — PROGRESS NOTES
Mckenzie Mcneal  : 1940  Primary: Betty Banda Medicare Advantage  Secondary:  [address] at [address], [address] [place] [address] Street  [phone]   [phone]      OUTPATIENT PHYSICAL THERAPY: Daily Treatment Note 2020  ICD-10: Treatment Diagnosis: Pain in left shoulder (M25.512)                Treatment Diagnosis 2: General Muscle Weakness (M62.81)                Treatment Diagnosis 3: Stiffness in Joint Right (M25.611)        Precautions: Left Reverse TSA: No internal rotation or extension  Allergies: Clonidine and Simvastatin   TREATMENT PLAN:  Effective Dates: 2020 TO 3/9/2020 (60 days). Frequency/Duration: 2 times a week for 60 Day(s)    NO IR or Extension past neutral for 6 weeks  NO excessive stretching, lifting or pushing off with Left Arm to avoid acromial stress fracture. PROM supine FE to 120 deg, ER to tolerance, pain free sub maximal deltoid isometrics and scapula for activation weeks 3-6 MEDICAL/REFERRING DIAGNOSIS:  Left shoulder pain [M25.512]   DATE OF ONSET: Patient reports progressive left shoulder pain after ORIF Humerus and reverse TSA left on 2019  REFERRING PHYSICIAN: Rajeev Corona MD MD Orders: Evaluate and Treat   Return MD Appointment: 4 weeks       Pre-treatment Symptoms/Complaints:  Mr. Pb Henderson is a [de-identified] y.o. male presenting to physical therapy with complaints of left shoulder pain, stiffness and weakness after a Reverse TSA 2019. He reports having an ORIF of left humerus for fracture and feels that the hardware failed leading to progressive pain and loss of ROM.  Patient is eager to decrease pain and improve overall mobility.     Pain: Initial: Pain Intensity 1: 7  Pain Location 1: Shoulder  Pain Orientation 1: Left  Pain Intervention(s) 1: Ice  Post Session:  5/10   Medications Last Reviewed:  1/10/2020  Updated Objective Findings:  See evaluation note from today   TREATMENT:   THERAPEUTIC EXERCISE: 10 minutes):  Exercises per grid below to improve mobility, strength, balance, and dynamic movement of back - generalized to improve functional bending, lifting, carrying, reaching, and overhead activites. Required moderate visual, verbal and tactile cues to promote proper body posture, promote proper body mechanics and promote proper body breathing techniques. Progressed resistance, range, repetitions, and complexity of movement as indicated. Date:  1/9/2020 Date:   Date:     Activity/Exercise Parameters Parameters Parameters   HEP 3min     UBE      Codmans 3 min     Supine AAROM flexion with right UE 20     AROM elbow 20     Radha      Supine Wand Flexion ER      Isometric Deltoid  10x flex/Abd     Scapula Retraction 20             Patient also instructed in correct posture and precautions    MANUAL THERAPY: (15 minutes): Joint mobilization and Soft tissue mobilization was utilized and necessary because of the patient's restricted joint motion, loss of articular motion and restricted motion of soft tissue   PROM with limited IR   Soft tissue mobilization     MODALITIES: (8 minutes):      *  Cold Pack Therapy in order to provide analgesia and reduce inflammation and edema. No Charge    HEP: As above; handouts given to patient for all exercises. Treatment/Session Summary:    · Response to Treatment:  Patient had fair tolerance to ROM, less s/s when assisted with right UE vs wand. · Baseline vitals (1/9/2020): BP: , HR: , SpO: next visit  · Communication/Consultation:  None today  · Equipment provided today:  None today  · Recommendations/Intent for next treatment session: Next visit will focus on progression of ROM per physician guidlines.     Total Treatment Billable Duration:  53 minutes: 28 evaluation, 10 therapeutic exercise, 15 manual therapy   PT Patient Time In/Time Out  Time In: 1630  Time Out: [address], PT

## 2020-01-09 NOTE — THERAPY EVALUATION
Cornelius Sanders  : 1940  Primary: Bettina Banda Medicare Advantage  Secondary:  [address] at [address], [address]  [phone]   [phone]       OUTPATIENT PHYSICAL THERAPY:Initial Assessment 1/10/2020    ICD-10: Treatment Diagnosis: Pain in left shoulder (M25.512)                Treatment Diagnosis 2: General Muscle Weakness (M62.81)                Treatment Diagnosis 3: Stiffness in Joint Right (M25.611)      Precautions: Left Reverse TSA: No internal rotation or extension  Allergies: Clonidine and Simvastatin   TREATMENT PLAN:  Effective Dates: 2020 TO 3/9/2020 (60 days). Frequency/Duration: 2 times a week for 60 Day(s) MEDICAL/REFERRING DIAGNOSIS:  Left shoulder pain [M25.512]   DATE OF ONSET: Patient reports progressive left shoulder pain after ORIF Humerus and reverse TSA left on 2019  REFERRING PHYSICIAN: Celena Gonzalez MD MD Orders: Evaluate and Treat   Return MD Appointment: 4 weeks     INITIAL ASSESSMENT:  Mr. Caitlyn Rodriguez is a [de-identified] y.o. male presenting to physical therapy with complaints of left shoulder pain, stiffness and weakness after a Reverse TSA 2019. He reports having an ORIF of left humerus for fracture and feels that the hardware failed leading to progressive pain and loss of ROM. Patient is eager to decrease pain and improve overall mobility. Patient presents with increased pain, decreased strength, decreased ROM, decreased flexibility, decreased activity tolerance, and overall impaired functional mobility. Patient is a good candidate for skilled intervention with services to include manual therapy, modalities as needed, therapeutic exercises, and activity modification. PROBLEM LIST (Impacting functional limitations):  1. Decreased Strength  2. Decreased ADL/Functional Activities  3. Decreased Ambulation Ability/Technique  4. Increased Pain  5. Decreased Activity Tolerance  6.  Decreased Flexibility/Joint Mobility INTERVENTIONS PLANNED: (Treatment may consist of any combination of the following)  1. Cold  2. Electrical Stimulation  3. Gait Training  4. Heat  5. Home Exercise Program (HEP)  6. Manual Therapy  7. Neuromuscular Re-education/Strengthening  8. Range of Motion (ROM)  9. Therapeutic Activites  10. Therapeutic Exercise/Strengthening  11. Ultrasound (US)      Short-Term Functional Goals: Time Frame: 1/9/2020 to 2/6/2020        1. Patient demonstrates independence with home exercise program without verbal cueing provided by therapist.  2.   Patient will report no more than 5/10 pain at rest in order to demonstrate improved self pain control and tolerance. 3.   Patient will be educated in and demonstrate improved upright posture including sitting and standing. 4.   Patient will be educated in and demonstrate proper squat lift technique in order to reduce stress on left shoulder, improve safety, and reduce risk of injury. Discharge Goals: Time Frame: 1/9/2020 to 3/9/2020  1. Patient will improve pain to 1/10 for improved tolerance to sleeping greater than six hours. 2. Patient will improve left shoulder flexion ROM to 120 degrees for increased ability to perform tasks above shoulder level. 3. Patient will improve left shoulder strength to 4 to 4+/5 for ability to work around the house and fish. 4. Patient will improve  to Disabilities of the Arm, Shoulder and Hand (DASH) Questionnaire - Quick Version to 20/55 to demonstrate increased tolerance to activity. GOALS: (Goals have been discussed and agreed upon with patient.)    Outcome Measure: Tool Used: Disabilities of the Arm, Shoulder and Hand (DASH) Questionnaire - Quick Version  Score:  Initial: 44/55  Most Recent: X/55 (Date: -- )   Interpretation of Score: The DASH is designed to measure the activities of daily living in person's with upper extremity dysfunction or pain.   Each section is scored on a 1-5 scale, 5 representing the greatest disability. The scores of each section are added together for a total score of 55. Medical Necessity:   Patient is expected to demonstrate progress in strength, range of motion, and functional technique to decrease assistance required with daily activity, increase independence with overhead reching, and improve safety during lifting. Patient demonstrates good rehab potential due to higher previous functional level. Reason for Services/Other Comments:  Patient demonstrates capacity to improve ROM and strength which will increase independence, increase safety, and allow for return to previous activity. Patient continues to require skilled intervention due to modification of therapeutic interventions to increase complexity of exercises . Total Evaluation Duration: 28 minutes    Rehabilitation Potential For Stated Goals: Good  Regarding Sherren Plana Cooper's therapy, I certify that the treatment plan above will be carried out by a therapist or under their direction. Thank you for this referral,  Abril Boss, PT   MSR  Referring Physician Signature: Benito Dela Cruz MD              Date                     PAIN/SUBJECTIVE:    Initial: Pain Intensity 1: 7  Pain Location 1: Shoulder  Pain Orientation 1: Left  Pain Intervention(s) 1: Ice  Post Session:  5/10    HISTORY:    History of Injury/Illness (Reason for Referral):   Mr. Josiah De La Rosa is a [de-identified] y.o. male presenting to physical therapy with complaints of left shoulder pain, stiffness and weakness after a Reverse TSA 12/12/2019. He reports having an ORIF of left humerus for fracture and feels that the hardware failed leading to progressive pain and loss of ROM.   -Present symptoms/complaints (on day of evaluation)  Pain Scale:  · Current: 7/10  · Best: 4/10  · Worst: 9/10           Aggravating factors: Self Care and trying to use arm         Relieving factors: rest by side    Dominant Side:  left  Past Medical History/Comorbidities: Mr. Giselle Brownlee  has a past medical history of Arthritis, Calculus of kidney (12/23/2013), Chronic kidney disease, Chronic kidney disease, stage III (moderate) ([facility] [address] (12/23/2013), Fracture of coccyx ([facility] [address] (1/14/2016), Gout, unspecified (12/23/2013), History of left shoulder fracture (1/14/2016), Hyperlipidemia (1/14/2016), Microscopic hematuria (12/23/2013), Sciatica of right side (1/14/2016), Sleep apnea, Unspecified essential hypertension (12/23/2013), and Urinary tract infection, site not specified (12/23/2013). Mr. Giselle Brownlee  has a past surgical history that includes hx lithotripsy; pr total hip arthroplasty (Left); hx shoulder replacement (Left, 12/2014); and hx lumbar fusion (09/07/2018). Social History/Living Environment:   Patient lives with wife in a one story home  Prior Level of Function/Work/Activity:  Normal  Other Clinical Tests:  X-RAY Positive for OA            Ambulatory/Rehab Services H2 Model Falls Risk Assessment    Risk Factors:       (1)  Gender [Male] Ability to Rise from Chair:       (1)  Pushes up, successful in one attempt    Falls Prevention Plan:       No modifications necessary    Total: (5 or greater = High Risk): 2    ©2010 [facility] [employer] [address] [place] [address] States Patent #[id number]. Federal Law prohibits the replication, distribution or use without written permission from [facility] 3yy game platform    Current Medications:        Current Outpatient Medications:     benazepril-hydroCHLOROthiazide (LOTENSIN HCT) 20-12.5 mg per tablet, TAKE 1 TABLET EVERY DAY, Disp: 90 Tab, Rfl: 1    multivitamin (ONE A DAY) tablet, Take 1 Tab by mouth daily. , Disp: , Rfl:     OTHER, Indications: many vitamins and herbal supplements, Disp: , Rfl:     Date Last Reviewed:  1/10/2020    Number of Personal Factors/Comorbidities that affect the Plan of Care: 1-2: MODERATE COMPLEXITY    EXAMINATION:    Observation/Orthostatic Postural Assessment:          Rounded shoulders and increased protraction left scapula  Palpation:          TTP at incision site  ROM:    AROM/PROM         Joint: Eval Date: 1/10/2020  Re-Assess Date:  Re-Assess Date:    ACTIVE ROM (standing) RIGHT LEFT RIGHT LEFT RIGHT LEFT   Shoulder Flexion 145 52       Shoulder Abduction 136 40       Shoulder Internal Rotation (Apley's) T12 45 by side       Shoulder External Rotation (Apley's) C7 12 by side       Elbow ROM [facility]       PASSIVE ROM (supine)         Shoulder Flexion  94       Shoulder Abduction  68       Shoulder Internal Rotation  NT       Shoulder External Rotation  35                                    Strength:    Joint: Eval Date: 1/9/2020  Re-Assess Date:  Re-Assess Date:     RIGHT LEFT RIGHT LEFT RIGHT LEFT   Shoulder Flexion 5/5 3-/5       Shoulder Abduction  (C5) 4+/5 3-/5       Shoulder Internal Rotation 5/5 NT       Shoulder External Rotation 5/5 3/5       Elbow Flexion  (C6) 5/5 4-/5       Elbow Extension (C7) 5/5 4/5                               Special Tests:  NT    Neurological Screen:    Radiating symptoms? No  Functional Mobility: Patient limited to activity at waist to chest          Body Structures Involved:  1. Bones  2. Joints  3. Muscles  4. Ligaments Body Functions Affected:  1. Sensory/Pain  2. Neuromusculoskeletal Activities and Participation Affected:  1. General Tasks and Demands  2. Mobility  3.  Self Care    Number of elements (examined above) that affect the Plan of Care: 3: MODERATE COMPLEXITY    CLINICAL PRESENTATION:    Presentation: Evolving clinical presentation with changing clinical characteristics: MODERATE COMPLEXITY    CLINICAL DECISION MAKING:    Use of outcome tool(s) and clinical judgement create a POC that gives a: Questionable prediction of patient's progress: MODERATE COMPLEXITY

## 2020-01-14 ENCOUNTER — HOSPITAL ENCOUNTER (OUTPATIENT)
Dept: PHYSICAL THERAPY | Age: 80
Discharge: HOME | End: 2020-01-14
Payer: MEDICARE

## 2020-01-14 ENCOUNTER — PATIENT OUTREACH (OUTPATIENT)
Dept: CASE MANAGEMENT | Age: 80
End: 2020-01-14

## 2020-01-14 PROCEDURE — 97140 MANUAL THERAPY 1/> REGIONS: CPT

## 2020-01-14 PROCEDURE — 97110 THERAPEUTIC EXERCISES: CPT

## 2020-01-14 NOTE — PROGRESS NOTES
Cheyenne Brown  : 1940  Primary: Ena Banda Medicare Advantage  Secondary:  [address] at [address], [address]  [phone]   [phone]      OUTPATIENT PHYSICAL THERAPY: Daily Treatment Note 2020  ICD-10: Treatment Diagnosis: Pain in left shoulder (M25.512)                Treatment Diagnosis 2: General Muscle Weakness (M62.81)                Treatment Diagnosis 3: Stiffness in Joint Right (M25.611)        Precautions: Left Reverse TSA: No internal rotation or extension  Allergies: Clonidine and Simvastatin   TREATMENT PLAN:  Effective Dates: 2020 TO 3/9/2020 (60 days). Frequency/Duration: 2 times a week for 60 Day(s)    NO IR or Extension past neutral for 6 weeks  NO excessive stretching, lifting or pushing off with Left Arm to avoid acromial stress fracture. PROM supine FE to 120 deg, ER to tolerance, pain free sub maximal deltoid isometrics and scapula for activation weeks 3-6 MEDICAL/REFERRING DIAGNOSIS:  Left shoulder pain [M25.512]   DATE OF ONSET: Patient reports progressive left shoulder pain after ORIF Humerus and reverse TSA left on 2019  REFERRING PHYSICIAN: Kobi Mansfield MD MD Orders: Evaluate and Treat   Return MD Appointment: 4 weeks       Pre-treatment Symptoms/Complaints:  Patient reports continued soreness and limited ROM    Pain: Initial: Pain Intensity 1: 2  Pain Location 1: Shoulder  Pain Orientation 1: Left  Pain Intervention(s) 1: Ice, Exercise  Post Session:  1/10   Medications Last Reviewed:  2020  Updated Objective Findings:  Not Today   TREATMENT:   THERAPEUTIC EXERCISE: (30 minutes):  Exercises per grid below to improve mobility, strength, balance, and dynamic movement of back - generalized to improve functional bending, lifting, carrying, reaching, and overhead activites.   Required moderate visual, verbal and tactile cues to promote proper body posture, promote proper body mechanics and promote proper body breathing techniques. Progressed resistance, range, repetitions, and complexity of movement as indicated. Date:  1/9/2020 Date:  1/14/2020 Date:     Activity/Exercise Parameters Parameters Parameters   HEP 3min     UBE      Codmans 3 min 4 min    Supine AAROM flexion with right UE 20 20x    AROM elbow 20 30x    Pulley  5 min    Supine Wand Flexion ER  20x    Isometric Deltoid  10x flex/Abd 10x flex/Abd    Scapula Retraction 20 20x    Light ball roll on table  30x      Patient also instructed in correct posture and precautions    MANUAL THERAPY: (23 minutes): Joint mobilization and Soft tissue mobilization was utilized and necessary because of the patient's restricted joint motion, loss of articular motion and restricted motion of soft tissue   PROM with limited IR   Soft tissue mobilization     MODALITIES: (8 minutes):      *  Cold Pack Therapy in order to provide analgesia and reduce inflammation and edema. No Charge    HEP: As above; handouts given to patient for all exercises. Treatment/Session Summary:    · Response to Treatment:  Patient had fair tolerance to PROM with less reported soreness. · Baseline vitals (1/9/2020): BP:122/81 , HR: 88, SpO: 95  · Communication/Consultation:  None today  · Equipment provided today:  None today  · Recommendations/Intent for next treatment session: Next visit will focus on progression of ROM per physician guidlines.     Total Treatment Billable Duration:  53 minutes  PT Patient Time In/Time Out  Time In: 1530  Time Out: [address] [facility], PT

## 2020-01-14 NOTE — PROGRESS NOTES
Another attempt to outreach to patient for ROMA follow up. Unable to reach patient. No answer. Per CC patient remains engaged with outpatient PT. Appointment scheduled today. Will continue to outreach if no return call received from previous message left with CTN contact information requesting a return call. This note will not be viewable in [address]

## 2020-01-16 ENCOUNTER — HOSPITAL ENCOUNTER (OUTPATIENT)
Dept: PHYSICAL THERAPY | Age: 80
Discharge: HOME | End: 2020-01-16
Payer: MEDICARE

## 2020-01-16 PROCEDURE — 97110 THERAPEUTIC EXERCISES: CPT

## 2020-01-16 PROCEDURE — 97140 MANUAL THERAPY 1/> REGIONS: CPT

## 2020-01-16 NOTE — PROGRESS NOTES
Rubi Alvarado  : 1940  Primary: Solange Banda Medicare Advantage  Secondary:  [address] at [address]  [address], [facility], [address]  [phone]   [phone]      OUTPATIENT PHYSICAL THERAPY: Daily Treatment Note 2020  ICD-10: Treatment Diagnosis: Pain in left shoulder (M25.512)                Treatment Diagnosis 2: General Muscle Weakness (M62.81)                Treatment Diagnosis 3: Stiffness in Joint Right (M25.611)        Precautions: Left Reverse TSA: No internal rotation or extension  Allergies: Clonidine and Simvastatin   TREATMENT PLAN:  Effective Dates: 2020 TO 3/9/2020 (60 days). Frequency/Duration: 2 times a week for 60 Day(s)    NO IR or Extension past neutral for 6 weeks  NO excessive stretching, lifting or pushing off with Left Arm to avoid acromial stress fracture. PROM supine FE to 120 deg, ER to tolerance, pain free sub maximal deltoid isometrics and scapula for activation weeks 3-6 MEDICAL/REFERRING DIAGNOSIS:  Left shoulder pain [M25.512]   DATE OF ONSET: Patient reports progressive left shoulder pain after ORIF Humerus and reverse TSA left on 2019  REFERRING PHYSICIAN: Mabel Negrete MD MD Orders: Evaluate and Treat   Return MD Appointment: 4 weeks       Pre-treatment Symptoms/Complaints:  Patient reports decreased pain and tightness after last session. Pain: Initial: Pain Intensity 1: 2  Pain Location 1: Shoulder  Pain Orientation 1: Left  Post Session:  1/10   Medications Last Reviewed:  2020  Updated Objective Findings:  Not Today   TREATMENT:   THERAPEUTIC EXERCISE: (30 minutes):  Exercises per grid below to improve mobility, strength, balance, and dynamic movement of back - generalized to improve functional bending, lifting, carrying, reaching, and overhead activites.   Required moderate visual, verbal and tactile cues to promote proper body posture, promote proper body mechanics and promote proper body breathing techniques. Progressed resistance, range, repetitions, and complexity of movement as indicated. Date:  1/9/2020 Date:  1/14/2020 Date:  1-16-20   Activity/Exercise Parameters Parameters Parameters   HEP 3min     UBE      Codmans 3 min 4 min 4 minutes   Supine AAROM flexion with right UE 20 20x 2 x 10   AROM elbow 20 30x 3 x 10   Pulley  5 min 2 x 3 minutes   Supine Wand Flexion ER  20x 1 x 10   Isometric Deltoid  10x flex/Abd 10x flex/Abd 10 x 5 sec   Scapula Retraction 20 20x 2 x 10   Light ball roll on table  30x      Patient also instructed in correct posture and precautions    MANUAL THERAPY: (23 minutes): Joint mobilization and Soft tissue mobilization was utilized and necessary because of the patient's restricted joint motion, loss of articular motion and restricted motion of soft tissue   PROM with limited IR   Soft tissue mobilization seated     MODALITIES: (5 minutes): *  Hot Pack Therapy in order to provide analgesia and relieve muscle spasm. No Charge    HEP: As above; handouts given to patient for all exercises. Treatment/Session Summary:    · Response to Treatment:  Decreased pain and tightness. · Baseline vitals (1/9/2020): BP:122/81 , HR: 88, SpO: 95  · Communication/Consultation:  None today  · Equipment provided today:  None today  · Recommendations/Intent for next treatment session: Next visit will focus on progression of ROM per physician guidlines.     Total Treatment Billable Duration:  53 minutes  PT Patient Time In/Time Out  Time In: 1430  Time Out: 1531  Gladys Callaway, ROXI

## 2020-01-21 ENCOUNTER — HOSPITAL ENCOUNTER (OUTPATIENT)
Dept: PHYSICAL THERAPY | Age: 80
Discharge: HOME | End: 2020-01-21
Payer: MEDICARE

## 2020-01-21 PROCEDURE — 97140 MANUAL THERAPY 1/> REGIONS: CPT

## 2020-01-21 PROCEDURE — 97110 THERAPEUTIC EXERCISES: CPT

## 2020-01-21 NOTE — PROGRESS NOTES
Omari Betancourt  : 1940  Primary: Wan Banda Medicare Advantage  Secondary:  [address] at [address], [place], [address] [place] [address]  [phone]   [phone]      OUTPATIENT PHYSICAL THERAPY: Daily Treatment Note 2020  ICD-10: Treatment Diagnosis: Pain in left shoulder (M25.512)                Treatment Diagnosis 2: General Muscle Weakness (M62.81)                Treatment Diagnosis 3: Stiffness in Joint Right (M25.611)        Precautions: Left Reverse TSA: No internal rotation or extension  Allergies: Clonidine and Simvastatin   TREATMENT PLAN:  Effective Dates: 2020 TO 3/9/2020 (60 days). Frequency/Duration: 2 times a week for 60 Day(s)    NO IR or Extension past neutral for 6 weeks  NO excessive stretching, lifting or pushing off with Left Arm to avoid acromial stress fracture. PROM supine FE to 120 deg, ER to tolerance, pain free sub maximal deltoid isometrics and scapula for activation weeks 3-6 MEDICAL/REFERRING DIAGNOSIS:  Left shoulder pain [M25.512]   DATE OF ONSET: Patient reports progressive left shoulder pain after ORIF Humerus and reverse TSA left on 2019  REFERRING PHYSICIAN: Rolo Jefferson MD MD Orders: Evaluate and Treat   Return MD Appointment: 4 weeks       Pre-treatment Symptoms/Complaints:  Patient reports decreased pain and tightness after last session. Pain: Initial: Pain Intensity 1: 2  Pain Location 1: Shoulder  Pain Orientation 1: Left  Post Session:  1/10   Medications Last Reviewed:  2020  Updated Objective Findings:  Not Today   TREATMENT:   THERAPEUTIC EXERCISE: (38 minutes):  Exercises per grid below to improve mobility, strength, balance, and dynamic movement of back - generalized to improve functional bending, lifting, carrying, reaching, and overhead activites.   Required moderate visual, verbal and tactile cues to promote proper body posture, promote proper body mechanics and promote proper body breathing techniques. Progressed resistance, range, repetitions, and complexity of movement as indicated. Date:  1/21/2020 Date:  1/14/2020 Date:  1-16-20   Activity/Exercise Parameters Parameters Parameters   HEP 3min     UBE      Codmans 4 min 4 min 4 minutes   Supine AAROM flexion with right UE 20x wand 20x 2 x 10   AROM elbow 3x10  1# 30x 3 x 10   Tricep Extension 3x10 yellow     Pulley 2 x3min 5 min 2 x 3 minutes   Supine Wand Flexion ER 20x 20x 1 x 10   Isometric Deltoid  [id number] flex/Abd/Ext  10x flex/Abd 10 x 5 sec   Scapula Retraction 3x10 20x 2 x 10   Light ball roll on table 30x floor seated 30x                        Patient also instructed in correct posture and precautions    MANUAL THERAPY: (15 minutes): Joint mobilization and Soft tissue mobilization was utilized and necessary because of the patient's restricted joint motion, loss of articular motion and restricted motion of soft tissue   PROM with limited IR   Soft tissue mobilization seated     MODALITIES: (5 minutes): *  Hot Pack Therapy in order to provide analgesia and relieve muscle spasm. No Charge    HEP: As above; handouts given to patient for all exercises. Treatment/Session Summary:    · Response to Treatment:  Patient able to use wand for supine flexion without pain. · Baseline vitals (1/9/2020): BP:122/81 , HR: 88, SpO: 95  · Communication/Consultation:  None today  · Equipment provided today:  None today  · Recommendations/Intent for next treatment session: Next visit will focus on progression of ROM per physician guidlines.     Total Treatment Billable Duration:  53 minutes  PT Patient Time In/Time Out  Time In: 1530  Time Out: [address], PT

## 2020-01-23 ENCOUNTER — PATIENT OUTREACH (OUTPATIENT)
Dept: CASE MANAGEMENT | Age: 80
End: 2020-01-23

## 2020-01-23 NOTE — PROGRESS NOTES
ROMA follow up call. Spoke with patient's spouse. She reports patient doing well. He remains engaged with outpatient PT twice weekly. Scheduled for another follow up with Dr. Lora Conner 2/10/101. Reports no issues at this time and agrees to contact CTN with any future needs/concerns. Will close episode and remove myself from care team.This note will not be viewable in [address]

## 2020-01-24 ENCOUNTER — HOSPITAL ENCOUNTER (OUTPATIENT)
Dept: PHYSICAL THERAPY | Age: 80
Discharge: HOME | End: 2020-01-24
Payer: MEDICARE

## 2020-01-24 PROCEDURE — 97016 VASOPNEUMATIC DEVICE THERAPY: CPT

## 2020-01-24 PROCEDURE — 97140 MANUAL THERAPY 1/> REGIONS: CPT

## 2020-01-24 PROCEDURE — 97110 THERAPEUTIC EXERCISES: CPT

## 2020-01-24 NOTE — PROGRESS NOTES
Mckenzie Mcneal  : 1940  Primary: Betty Banda Medicare Advantage  Secondary:  [address] at [address], [address] [place] [address]  [phone]   [phone]      OUTPATIENT PHYSICAL THERAPY: Daily Treatment Note 2020  ICD-10: Treatment Diagnosis: Pain in left shoulder (M25.512)                Treatment Diagnosis 2: General Muscle Weakness (M62.81)                Treatment Diagnosis 3: Stiffness in Joint Right (M25.611)        Precautions: Left Reverse TSA: No internal rotation or extension  Allergies: Clonidine and Simvastatin   TREATMENT PLAN:  Effective Dates: 2020 TO 3/9/2020 (60 days). Frequency/Duration: 2 times a week for 60 Day(s)    NO IR or Extension past neutral for 6 weeks  NO excessive stretching, lifting or pushing off with Left Arm to avoid acromial stress fracture. PROM supine FE to 120 deg, ER to tolerance, pain free sub maximal deltoid isometrics and scapula for activation weeks 3-6 MEDICAL/REFERRING DIAGNOSIS:  Left shoulder pain [M25.512]   DATE OF ONSET: Patient reports progressive left shoulder pain after ORIF Humerus and reverse TSA left on 2019  REFERRING PHYSICIAN: Rajeev Corona MD MD Orders: Evaluate and Treat   Return MD Appointment: 4 weeks       Pre-treatment Symptoms/Complaints:  Patient reported no pain and anxious to go fishing  in a tournament       Pain: Initial: Pain Intensity 1: 0  Pain Location 1: Shoulder  Pain Orientation 1: Left  Post Session:  0/10 less tightness   Medications Last Reviewed:  2020  Updated Objective Findings: Pt. Presented a non guarded left shoulder today. TREATMENT:   THERAPEUTIC EXERCISE: (25  minutes):  Exercises per grid below to improve mobility, strength, balance, and dynamic movement of back - generalized to improve functional bending, lifting, carrying, reaching, and overhead activites.   Required moderate visual, verbal and tactile cues to promote proper body posture, promote proper body mechanics and promote proper body breathing techniques. Progressed resistance, range, repetitions, and complexity of movement as indicated. Date:  1/21/2020 Date:  1/24/2020 Date:  1-16-20   Activity/Exercise Parameters Parameters Parameters   HEP 3min -------    UBE  ------    Codmans 4 min 4 min 4 minutes   Supine AAROM flexion with right UE 20x wand 20x 2 x 10   AROM elbow 3x10  1# X 30 rep 1lb wt. s  3 x 10   Tricep Extension 3x10 yellow 3x10 reps yellow tband    Pulley 2 x3min X 5 mins 2 x 3 minutes   Supine Wand Flexion ER 20x 20x 1 x 10   Isometric Deltoid  [id number] flex/Abd/Ext  X 15 reps in flexion, abduction, & extension 10 x 5 sec   Scapula Retraction 3x10 X 30 reps  2 x 10   Light ball roll on table 30x floor seated Seated blue ball on floor x 30 reps                         Patient also instructed in correct posture and precautions    MANUAL THERAPY: (15 minutes): Joint mobilization and Soft tissue mobilization was utilized and necessary because of the patient's restricted joint motion, loss of articular motion and restricted motion of soft tissue   PROM to left shoulder as per MD guidelines   Soft tissue mobilization seated     MODALITIES: (15 minutes):      Pt. received vaso pneumatic compression device to left shoulder in sitting x 15 mins     HEP: As above; handouts given to patient for all exercises. Treatment/Session Summary:    · Response to Treatment:  Patient was compliant with all exercises and reported feeling much better after session. · Baseline vitals (1/9/2020): BP:122/81 , HR: 88, SpO: 95  · Communication/Consultation:  None today  · Equipment provided today:  None today  · Recommendations/Intent for next treatment session: Next visit will focus on progression of ROM per physician guidlines.     Total Treatment Billable Duration:  55 minutes  PT Patient Time In/Time Out  Time In: 1430  Time Out: [address], PTA

## 2020-01-28 ENCOUNTER — HOSPITAL ENCOUNTER (OUTPATIENT)
Dept: PHYSICAL THERAPY | Age: 80
Discharge: HOME | End: 2020-01-28
Payer: MEDICARE

## 2020-01-28 PROCEDURE — 97140 MANUAL THERAPY 1/> REGIONS: CPT

## 2020-01-28 PROCEDURE — 97110 THERAPEUTIC EXERCISES: CPT

## 2020-01-28 NOTE — PROGRESS NOTES
Mckenzie Mcneal  : 1940  Primary: Betty Banda Medicare Advantage  Secondary:  [address] at [address], [address]  [phone]   [phone]       OUTPATIENT PHYSICAL THERAPY:Progress Report 2020    ICD-10: Treatment Diagnosis: Pain in left shoulder (M25.512)                Treatment Diagnosis 2: General Muscle Weakness (M62.81)                Treatment Diagnosis 3: Stiffness in Joint Right (M25.611)      Precautions: Left Reverse TSA: No internal rotation or extension  Allergies: Clonidine and Simvastatin   TREATMENT PLAN:  Effective Dates: 2020 TO 3/9/2020 (60 days). Frequency/Duration: 2 times a week for 60 Day(s) MEDICAL/REFERRING DIAGNOSIS:  Left shoulder pain [M25.512]   DATE OF ONSET: Patient reports progressive left shoulder pain after ORIF Humerus and reverse TSA left on 2019  REFERRING PHYSICIAN: Rajeev Corona MD MD Orders: Evaluate and Treat   Return MD Appointment: 4 weeks     INITIAL ASSESSMENT:  Mr. Pb Henderson is a [de-identified] y.o. male presenting to physical therapy with complaints of left shoulder pain, stiffness and weakness after a Reverse TSA 2019. He reports having an ORIF of left humerus for fracture and feels that the hardware failed leading to progressive pain and loss of ROM. Patient is eager to decrease pain and improve overall mobility. Patient presents with increased pain, decreased strength, decreased ROM, decreased flexibility, decreased activity tolerance, and overall impaired functional mobility. Patient is a good candidate for skilled intervention with services to include manual therapy, modalities as needed, therapeutic exercises, and activity modification. PROGRESS NOTE (2020):  Patient has been seen for 6 sessions of physical therapy from 2020 to 2020. Patient reports feeling 50% better with pain and daily activity.  Patient remains limited secondary to weakness and decreased ROM. Patient will benefit from continued skilled physical therapy to address remaining goals and deficits. PROBLEM LIST (Impacting functional limitations):  1. Decreased Strength  2. Decreased ADL/Functional Activities  3. Decreased Ambulation Ability/Technique  4. Increased Pain  5. Decreased Activity Tolerance  6. Decreased Flexibility/Joint Mobility INTERVENTIONS PLANNED: (Treatment may consist of any combination of the following)  1. Cold  2. Electrical Stimulation  3. Gait Training  4. Heat  5. Home Exercise Program (HEP)  6. Manual Therapy  7. Neuromuscular Re-education/Strengthening  8. Range of Motion (ROM)  9. Therapeutic Activites  10. Therapeutic Exercise/Strengthening  11. Ultrasound (US)      Short-Term Functional Goals: Time Frame: 1/9/2020 to 2/6/2020        1. Patient demonstrates independence with home exercise program without verbal cueing provided by therapist. GOAL MET  2. Patient will report no more than 5/10 pain at rest in order to demonstrate improved self pain control and tolerance. GOAL MET  3. Patient will be educated in and demonstrate improved upright posture including sitting and standing. GOAL MET  4. Patient will be educated in and demonstrate proper squat lift technique in order to reduce stress on left shoulder, improve safety, and reduce risk of injury. GOAL MET    Discharge Goals: Time Frame: 1/9/2020 to 3/9/2020  1. Patient will improve pain to 1/10 for improved tolerance to sleeping greater than six hours. ON GOING  2. Patient will improve left shoulder flexion ROM to 120 degrees for increased ability to perform tasks above shoulder level. ON GOING  3. Patient will improve left shoulder strength to 4 to 4+/5 for ability to work around the house and fish. ON GOING  4. Patient will improve  to Disabilities of the Arm, Shoulder and Hand (DASH) Questionnaire - Quick Version to 20/55 to demonstrate increased tolerance to activity.        GOALS: (Goals have been discussed and agreed upon with patient.)    Outcome Measure: Tool Used: Disabilities of the Arm, Shoulder and Hand (DASH) Questionnaire - Quick Version  Score:  Initial: 44/55  Most Recent: 35/55 (Date: 1/28/2020 )   Interpretation of Score: The DASH is designed to measure the activities of daily living in person's with upper extremity dysfunction or pain. Each section is scored on a 1-5 scale, 5 representing the greatest disability. The scores of each section are added together for a total score of 55. Observation/Orthostatic Postural Assessment:          Rounded shoulders and increased protraction left scapula  Palpation:         Mild TTP at incision site  ROM:    AROM/PROM         Joint: Eval Date: 1/28/2020  Re-Assess Date: 1/28/2020  Re-Assess Date:    ACTIVE ROM (standing) RIGHT LEFT RIGHT LEFT RIGHT LEFT   Shoulder Flexion 145 52  72     Shoulder Abduction 136 40  55     Shoulder Internal Rotation (Apley's) T12 45 by side  53     Shoulder External Rotation (Apley's) C7 12 by side  45  To belt anterior     Elbow ROM [facility]     PASSIVE ROM (supine)         Shoulder Flexion  94  115     Shoulder Abduction  68  75     Shoulder Internal Rotation  NT  45     Shoulder External Rotation  35  63 @ 45                                  Strength:    Joint: Eval Date: 1/28/2020  Re-Assess Date:  Re-Assess Date:     RIGHT LEFT RIGHT LEFT RIGHT LEFT   Shoulder Flexion 5/5 3-/5  3-/5     Shoulder Abduction  (C5) 4+/5 3-/5  3-/5     Shoulder Internal Rotation 5/5 NT  3/5     Shoulder External Rotation 5/5 3/5  3/5     Elbow Flexion  (C6) 5/5 4-/5  4/5     Elbow Extension (C7) 5/5 4/5  4/5                             Special Tests:  NT    Neurological Screen:    Radiating symptoms?  No  Functional Mobility: Patient limited to activity at waist to chest    Medical Necessity:   Patient is expected to demonstrate progress in strength, range of motion, and functional technique to decrease assistance required with daily activity, increase independence with overhead reching, and improve safety during lifting. Patient demonstrates good rehab potential due to higher previous functional level. Reason for Services/Other Comments:  Patient demonstrates capacity to improve ROM and strength which will increase independence, increase safety, and allow for return to previous activity. Patient continues to require skilled intervention due to modification of therapeutic interventions to increase complexity of exercises . Rehabilitation Potential For Stated Goals: Good  Regarding Betzy Escoto's therapy, I certify that the treatment plan above will be carried out by a therapist or under their direction.   Thank you for this referral,  Neel Fagan, PT   MSR

## 2020-01-28 NOTE — PROGRESS NOTES
Brennan Tian  : 1940  Primary: Itzel Banda Medicare Advantage  Secondary:  [address] at [address], [address]  [phone]   [phone]      OUTPATIENT PHYSICAL THERAPY: Daily Treatment Note 2020  ICD-10: Treatment Diagnosis: Pain in left shoulder (M25.512)                Treatment Diagnosis 2: General Muscle Weakness (M62.81)                Treatment Diagnosis 3: Stiffness in Joint Right (M25.611)        Precautions: Left Reverse TSA: No internal rotation or extension  Allergies: Clonidine and Simvastatin   TREATMENT PLAN:  Effective Dates: 2020 TO 3/9/2020 (60 days). Frequency/Duration: 2 times a week for 60 Day(s)    NO IR or Extension past neutral for 6 weeks  NO excessive stretching, lifting or pushing off with Left Arm to avoid acromial stress fracture. PROM supine FE to 120 deg, ER to tolerance, pain free sub maximal deltoid isometrics and scapula for activation weeks 3-6 MEDICAL/REFERRING DIAGNOSIS:  Left shoulder pain [M25.512]   DATE OF ONSET: Patient reports progressive left shoulder pain after ORIF Humerus and reverse TSA left on 2019  REFERRING PHYSICIAN: Juan Brunner., MD MD Orders: Evaluate and Treat   Return MD Appointment: 4 weeks       Pre-treatment Symptoms/Complaints:  Patient reports doing better with motion and sleeping      Pain: Initial: Pain Intensity 1: 0  Pain Location 1: Shoulder  Pain Orientation 1: Left  Post Session:  0/10 less tightness   Medications Last Reviewed:  2020  Updated Objective Findings:  See progress note   TREATMENT:   THERAPEUTIC EXERCISE: (43  minutes):  Exercises per grid below to improve mobility, strength, balance, and dynamic movement of back - generalized to improve functional bending, lifting, carrying, reaching, and overhead activites.   Required moderate visual, verbal and tactile cues to promote proper body posture, promote proper body mechanics and promote proper body breathing techniques. Progressed resistance, range, repetitions, and complexity of movement as indicated. Date:  1/21/2020 Date:  1/24/2020 Date:  1/28/2020   Activity/Exercise Parameters Parameters Parameters   HEP 3min -------    UBE  ------ 6 min forward slow   Codmans 4 min 4 min 4 minutes   Supine AAROM flexion with right UE 20x wand 20x 2 x 10 standing   AROM elbow 3x10  1# X 30 rep 1lb wt. s  3 x 10   Tricep Extension 3x10 yellow 3x10 reps yellow tband 3x10 yellow   Bicep curl   3x10 1#   Pulley 2 x3min X 5 mins 2 x 3 minutes   Supine Wand Flexion ER 20x 20x ---   Isometric Deltoid  [id number] flex/Abd/Ext  X 15 reps in flexion, abduction, & extension 10 x 5 sec   Scapula Retraction 3x10 X 30 reps  ---   Light ball roll on table 30x floor seated Seated blue ball on floor x 30 reps  Seated blue ball on floor x 30 reps    Wall ladder   10x 5sec   Row   3x10 red   Low Row   3x10 yellow   Isometric ER IR   10x10 sec   Ceiling punch supine   2x10   AROM Scaption supine   2x10           Patient also instructed in correct posture and precautions    MANUAL THERAPY: (10 minutes): Joint mobilization and Soft tissue mobilization was utilized and necessary because of the patient's restricted joint motion, loss of articular motion and restricted motion of soft tissue   PROM to left shoulder as per MD guidelines   Soft tissue mobilization seated     MODALITIES: (0 minutes):      Pt. received vaso pneumatic compression device to left shoulder in sitting x 15 mins Not today    HEP: As above; handouts given to patient for all exercises. Treatment/Session Summary:    · Response to Treatment:  Patient progressing well with next phase of treatment.    · Baseline vitals (1/9/2020): BP:122/81 , HR: 88, SpO: 95  · Communication/Consultation:  None today  · Equipment provided today:  None today  · Recommendations/Intent for next treatment session: Next visit will focus on progression of ROM per physician mehnaz.     Total Treatment Billable Duration:  53 minutes  PT Patient Time In/Time Out  Time In: 1428  Time Out: 301 [address],

## 2020-01-31 ENCOUNTER — HOSPITAL ENCOUNTER (OUTPATIENT)
Dept: PHYSICAL THERAPY | Age: 80
Discharge: HOME | End: 2020-01-31
Payer: MEDICARE

## 2020-01-31 PROCEDURE — 97110 THERAPEUTIC EXERCISES: CPT

## 2020-01-31 PROCEDURE — 97140 MANUAL THERAPY 1/> REGIONS: CPT

## 2020-01-31 NOTE — PROGRESS NOTES
Eugenie Lin  : 1940  Primary: Merlyn Banda Medicare Advantage  Secondary:  [address] at [address], [place], [address] [place] [address]  [phone]   [phone]      OUTPATIENT PHYSICAL THERAPY: Daily Treatment Note 2020  ICD-10: Treatment Diagnosis: Pain in left shoulder (M25.512)                Treatment Diagnosis 2: General Muscle Weakness (M62.81)                Treatment Diagnosis 3: Stiffness in Joint Right (M25.611)        Precautions: Left Reverse TSA: No internal rotation or extension  Allergies: Clonidine and Simvastatin   TREATMENT PLAN:  Effective Dates: 2020 TO 3/9/2020 (60 days). Frequency/Duration: 2 times a week for 60 Day(s)    NO IR or Extension past neutral for 6 weeks  NO excessive stretching, lifting or pushing off with Left Arm to avoid acromial stress fracture. PROM supine FE to 120 deg, ER to tolerance, pain free sub maximal deltoid isometrics and scapula for activation weeks 3-6 MEDICAL/REFERRING DIAGNOSIS:  Left shoulder pain [M25.512]   DATE OF ONSET: Patient reports progressive left shoulder pain after ORIF Humerus and reverse TSA left on 2019  REFERRING PHYSICIAN: Ras Miles MD MD Orders: Evaluate and Treat   Return MD Appointment: 4 weeks       Pre-treatment Symptoms/Complaints:  Patient reported a sharp pain occasionally in left anterior shoulder. 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   Required moderate visual, verbal and tactile cues to promote proper body posture, promote proper body mechanics and promote proper body breathing techniques. Progressed resistance, range, repetitions, and complexity of movement as indicated. Date:  1/31/2020 Date:  1/24/2020 Date:  1/28/2020   Activity/Exercise Parameters Parameters Parameters   UBE X 8 mins fwd level 3  ------ 6 min forward slow   Codmans 4 min in all directions  4 min 4 minutes   Supine AAROM flexion with right UE 20x wand 20x 2 x 10 standing   AROM elbow 3x10  1# X 30 rep 1lb wt. s  3 x 10   Tricep Extension 3x10 yellow 3x10 reps yellow tband 3x10 yellow   Bicep curl 3x10 1 lb wt. s   3x10 1#   Pulley X 5 mins  X 5 mins 2 x 3 minutes   Supine Wand Flexion ER 20x 20x ---   Isometric Deltoid  X 20 reps each 5 sec hold  X 15 reps in flexion, abduction, & extension 10 x 5 sec   Scapula Retraction 3x10 X 30 reps  ---   Light ball roll on table 30x floor seated green ball  Seated blue ball on floor x 30 reps  Seated blue ball on floor x 30 reps    Wall ladder X 10 reps 5 sec hold   10x 5sec   Row 3x10 reps yellow  3x10 red   Low Row 3x10 reps yellow   3x10 yellow   Isometric ER IR X 10 x 10 sec hold   10x10 sec   Ceiling punch supine 2x10   2x10   AROM Scaption supine 2x10   2x10           Patient also instructed in correct posture and precautions    MANUAL THERAPY: (10 minutes): Joint mobilization and Soft tissue mobilization was utilized and necessary because of the patient's restricted joint motion, loss of articular motion and restricted motion of soft tissue   PROM to left shoulder as per MD guidelines   Soft tissue mobilization seated     MODALITIES: (0 minutes):      Pt. declined      HEP: As above; handouts given to patient for all exercises. Treatment/Session Summary:    · Response to Treatment:  Patient continues to show progress with range of motion and strength.   · Baseline vitals (1/9/2020): BP:122/81 , HR: 88, SpO: 95  · Communication/Consultation:  None today  · Equipment provided today:  None today  · Recommendations/Intent for next treatment session: Next visit will focus on progression of ROM per physician guidlines.     Total Treatment Billable Duration:  55 minutes  PT Patient Time In/Time Out  Time In: 1430  Time Out: Glendy 45, PTA

## 2020-02-04 ENCOUNTER — HOSPITAL ENCOUNTER (OUTPATIENT)
Dept: PHYSICAL THERAPY | Age: 80
Discharge: HOME | End: 2020-02-04
Payer: MEDICARE

## 2020-02-04 PROCEDURE — 97140 MANUAL THERAPY 1/> REGIONS: CPT

## 2020-02-04 PROCEDURE — 97110 THERAPEUTIC EXERCISES: CPT

## 2020-02-04 NOTE — PROGRESS NOTES
Kanu Christine  : 1940  Primary: Honorio Saldivarna Medicare Advantage  Secondary:  [address] at [facility]  [address] [place] [address], [place], [address] [place] [address]  [phone]   [phone]      OUTPATIENT PHYSICAL THERAPY: Daily Treatment Note 2020  ICD-10: Treatment Diagnosis: Pain in left shoulder (M25.512)                Treatment Diagnosis 2: General Muscle Weakness (M62.81)                Treatment Diagnosis 3: Stiffness in Joint Right (M25.611)        Precautions: Left Reverse TSA: No internal rotation or extension  Allergies: Clonidine and Simvastatin   TREATMENT PLAN:  Effective Dates: 2020 TO 3/9/2020 (60 days). Frequency/Duration: 2 times a week for 60 Day(s)    NO IR or Extension past neutral for 6 weeks  NO excessive stretching, lifting or pushing off with Left Arm to avoid acromial stress fracture. PROM supine FE to 120 deg, ER to tolerance, pain free sub maximal deltoid isometrics and scapula for activation weeks 3-6 MEDICAL/REFERRING DIAGNOSIS:  Left shoulder pain [M25.512]   DATE OF ONSET: Patient reports progressive left shoulder pain after ORIF Humerus and reverse TSA left on 2019  REFERRING PHYSICIAN: Mohsen Robledo MD MD Orders: Evaluate and Treat   Return MD Appointment: 4 weeks       Pre-treatment Symptoms/Complaints:  Patient reports no pain at rest. Main C/O is intermittent pain top of shoulder. Pain: Initial: Pain Intensity 1: 2  Pain Location 1: Shoulder  Pain Orientation 1: Upper, Left  Post Session:  0/10    Medications Last Reviewed:  2020  Updated Objective Findings:  Improved ROM    TREATMENT:   THERAPEUTIC EXERCISE: (45  minutes):  Exercises per grid below to improve mobility, strength, balance, and dynamic movement of back - generalized to improve functional bending, lifting, carrying, reaching, and overhead activites.   Required moderate visual, verbal and tactile cues to promote proper body posture, promote proper body mechanics and promote proper body breathing techniques. Progressed resistance, range, repetitions, and complexity of movement as indicated. Date:  1/31/2020 Date:  2-4-200 Date:  1/28/2020   Activity/Exercise Parameters Parameters Parameters   UBE X 8 mins fwd level 3  8 minutes forward 6 min forward slow   Codmans 4 min in all directions  4 min 4 minutes   Supine AAROM flexion with right UE 20x wand 20x 2 x 10 standing   AROM elbow 3x10  1# X 30 rep 2lb wt. s  3 x 10   Tricep Extension 3x10 yellow 3x10 reps yellow tband 3x10 yellow   Bicep curl 3x10 1 lb wt.s  2 lbs 3 x 10 3x10 1#   Pulley X 5 mins  X 5 mins 2 x 3 minutes   Supine Wand Flexion ER 20x 20x ---   Isometric Deltoid  X 20 reps each 5 sec hold  X 15 reps in flexion, abduction, & extension 10 x 5 sec   Scapula Retraction 3x10 X 30 reps  ---   Light ball roll on table 30x floor seated green ball  Seated blue ball on floor x 30 reps  Seated blue ball on floor x 30 reps    Wall ladder X 10 reps 5 sec hold  10 x 10 sec 10x 5sec   Row 3x10 reps yellow Red 2 x 15 3x10 red   Low Row 3x10 reps yellow  Yellow 2 x 15 3x10 yellow   Isometric ER IR X 10 x 10 sec hold  10 x 10 sec 10x10 sec   Ceiling punch supine 2x10  2 x 10 2x10   AROM Scaption supine 2x10  2 x 10 2x10           Patient also instructed in correct posture and precautions    MANUAL THERAPY: (10 minutes): Joint mobilization and Soft tissue mobilization was utilized and necessary because of the patient's restricted joint motion, loss of articular motion and restricted motion of soft tissue   PROM to left shoulder as per MD guidelines   Soft tissue mobilization seated     MODALITIES: (0 minutes):      Pt. declined      HEP: As above; handouts given to patient for all exercises. Treatment/Session Summary:    · Response to Treatment:  Improved ROM .   · Baseline vitals (1/9/2020): BP:122/81 , HR: 88, SpO: 95  · Communication/Consultation:  None today  · Equipment provided today:  None today  · Recommendations/Intent for next treatment session: Next visit will focus on progression of ROM per physician guidlines.     Total Treatment Billable Duration:  55 minutes  PT Patient Time In/Time Out  Time In: 1873  Time Out: 209 [address]

## 2020-02-07 ENCOUNTER — HOSPITAL ENCOUNTER (OUTPATIENT)
Dept: PHYSICAL THERAPY | Age: 80
Discharge: HOME | End: 2020-02-07
Payer: MEDICARE

## 2020-02-07 PROCEDURE — 97140 MANUAL THERAPY 1/> REGIONS: CPT

## 2020-02-07 PROCEDURE — 97110 THERAPEUTIC EXERCISES: CPT

## 2020-02-07 NOTE — PROGRESS NOTES
Esther Venegas  : 1940  Primary: Paul Banda Medicare Advantage  Secondary:  [address] at [address], [address]  [phone]   [phone]      OUTPATIENT PHYSICAL THERAPY: Daily Treatment Note 2020  ICD-10: Treatment Diagnosis: Pain in left shoulder (M25.512)                Treatment Diagnosis 2: General Muscle Weakness (M62.81)                Treatment Diagnosis 3: Stiffness in Joint Right (M25.611)        Precautions: Left Reverse TSA: No internal rotation or extension  Allergies: Clonidine and Simvastatin   TREATMENT PLAN:  Effective Dates: 2020 TO 3/9/2020 (60 days). Frequency/Duration: 2 times a week for 60 Day(s)    NO IR or Extension past neutral for 6 weeks  NO excessive stretching, lifting or pushing off with Left Arm to avoid acromial stress fracture. PROM supine FE to 120 deg, ER to tolerance, pain free sub maximal deltoid isometrics and scapula for activation weeks 3-6 MEDICAL/REFERRING DIAGNOSIS:  Left shoulder pain [M25.512]   DATE OF ONSET: Patient reports progressive left shoulder pain after ORIF Humerus and reverse TSA left on 2019  REFERRING PHYSICIAN: Jovan Culver MD MD Orders: Evaluate and Treat   Return MD Appointment: 4 weeks       Pre-treatment Symptoms/Complaints:  Patient reports no pain at rest. Main C/O is tenderness top of shoulder. Pain: Initial: Pain Intensity 1: 2  Pain Location 1: Shoulder  Pain Orientation 1: Left, Upper  Post Session:  0/10    Medications Last Reviewed:  2020  Updated Objective Findings:  Improved ROM    TREATMENT:   THERAPEUTIC EXERCISE: (40  minutes):  Exercises per grid below to improve mobility, strength, balance, and dynamic movement of back - generalized to improve functional bending, lifting, carrying, reaching, and overhead activites.   Required moderate visual, verbal and tactile cues to promote proper body posture, promote proper body mechanics and promote proper body breathing techniques. Progressed resistance, range, repetitions, and complexity of movement as indicated. Date:  1/31/2020 Date:  2-4-200 Date:  2-7-20   Activity/Exercise Parameters Parameters Parameters   UBE X 8 mins fwd level 3  8 minutes forward 8 minutes level 3   Codmans 4 min in all directions  4 min 4 minutes   Supine AAROM flexion with right UE 20x wand 20x 2 x 10 standing   AROM elbow 3x10  1# X 30 rep 2lb wt.s   2 x 10 with heat   Tricep Extension 3x10 yellow 3x10 reps yellow tband 3x10 red    Bicep curl 3x10 1 lb wt.s  2 lbs 3 x 10 3x10 2 lbs   Pulley X 5 mins  X 5 mins 2 x 3 minutes   Supine Wand Flexion ER 20x 20x 1 x 20   Isometric Deltoid  X 20 reps each 5 sec hold  X 15 reps in flexion, abduction, & extension 10 x 5 sec   Scapula Retraction 3x10 X 30 reps  X 30 with heat   Light ball roll on table 30x floor seated green ball  Seated blue ball on floor x 30 reps  Seated blue ball on floor x 30 reps    Wall ladder X 10 reps 5 sec hold  10 x 10 sec 10x 5sec   Row 3x10 reps yellow Red 2 x 15 3x10 red   Low Row 3x10 reps yellow  Yellow 2 x 15 3x10 red    Isometric ER IR X 10 x 10 sec hold  10 x 10 sec 10x10 sec   Ceiling punch supine 2x10  2 x 10    AROM Scaption supine 2x10  2 x 10 2x10           Patient also instructed in correct posture and precautions    MANUAL THERAPY: (13 minutes): Joint mobilization and Soft tissue mobilization was utilized and necessary because of the patient's restricted joint motion, loss of articular motion and restricted motion of soft tissue   PROM to left shoulder as per MD guidelines   Soft tissue mobilization seated     MODALITIES: (5 minutes): *  Hot Pack Therapy in order to provide analgesia. to left shoulder in sitting     HEP: As above; handouts given to patient for all exercises. Treatment/Session Summary:    · Response to Treatment:  Improved ROM .   · Baseline vitals (1/9/2020): BP:122/81 , HR: 88, SpO: 95  · Communication/Consultation:  None today  · Equipment provided today:  None today  · Recommendations/Intent for next treatment session: Next visit will focus on progression of ROM per physician guidlines.     Total Treatment Billable Duration:  53 minutes  PT Patient Time In/Time Out  Time In: 1430  Time Out: 1535  Mary Ellen Brown PTA

## 2020-02-11 ENCOUNTER — HOSPITAL ENCOUNTER (OUTPATIENT)
Dept: PHYSICAL THERAPY | Age: 80
Discharge: HOME | End: 2020-02-11
Payer: MEDICARE

## 2020-02-11 PROCEDURE — 97140 MANUAL THERAPY 1/> REGIONS: CPT

## 2020-02-11 PROCEDURE — 97110 THERAPEUTIC EXERCISES: CPT

## 2020-02-11 NOTE — PROGRESS NOTES
Reji Mcarthur  : 1940  Primary: Venus Banda Medicare Advantage  Secondary:  [address] at [address], [address]  [phone]   [phone]      OUTPATIENT PHYSICAL THERAPY: Daily Treatment Note 2020  ICD-10: Treatment Diagnosis: Pain in left shoulder (M25.512)                Treatment Diagnosis 2: General Muscle Weakness (M62.81)                Treatment Diagnosis 3: Stiffness in Joint Right (M25.611)        Precautions: Left Reverse TSA: No internal rotation or extension  Allergies: Clonidine and Simvastatin   TREATMENT PLAN:  Effective Dates: 2020 TO 3/9/2020 (60 days). Frequency/Duration: 2 times a week for 60 Day(s)    NO IR or Extension past neutral for 6 weeks  NO excessive stretching, lifting or pushing off with Left Arm to avoid acromial stress fracture. PROM supine FE to 120 deg, ER to tolerance, pain free sub maximal deltoid isometrics and scapula for activation weeks 3-6 MEDICAL/REFERRING DIAGNOSIS:  Left shoulder pain [M25.512]   DATE OF ONSET: Patient reports progressive left shoulder pain after ORIF Humerus and reverse TSA left on 2019  REFERRING PHYSICIAN: Alia Boyd MD MD Orders: Evaluate and Treat   Return MD Appointment: 4 weeks       Pre-treatment Symptoms/Complaints:  Patient reports that MD wants progression of ROM and strength      Pain: Initial: Pain Intensity 1: 2  Pain Location 1: Shoulder  Pain Orientation 1: Left, Upper  Post Session:  0/10    Medications Last Reviewed:  2020  Updated Objective Findings:  Improved ROM    TREATMENT:   THERAPEUTIC EXERCISE: (43  minutes):  Exercises per grid below to improve mobility, strength, balance, and dynamic movement of back - generalized to improve functional bending, lifting, carrying, reaching, and overhead activites.   Required moderate visual, verbal and tactile cues to promote proper body posture, promote proper body mechanics and promote proper body breathing techniques. Progressed resistance, range, repetitions, and complexity of movement as indicated. Date:  2/11/2020 Date:  2-4-200 Date:  2-7-20   Activity/Exercise Parameters Parameters Parameters   UBE X 8 mins 4 fwd 4 Rev level 3  8 minutes forward 8 minutes level 3   Codmans 4 min in all directions  4 min 4 minutes   Supine AAROM flexion with right UE 20x wand 20x 2 x 10 standing   AROM elbow 3x10  1# X 30 rep 2lb wt.s   2 x 10 with heat   Tricep Extension 3x10 green 3x10 reps yellow tband 3x10 red    Bicep curl 3x10 3 lb wt.s  2 lbs 3 x 10 3x10 2 lbs   Pulley X 5 mins  X 5 mins 2 x 3 minutes   Supine Wand Flexion ER --- 20x 1 x 20   Isometric Deltoid  --- X 15 reps in flexion, abduction, & extension 10 x 5 sec   Scapula Retraction --- X 30 reps  X 30 with heat   Light ball roll on table 30x floor seated green ball  Seated blue ball on floor x 30 reps  Seated blue ball on floor x 30 reps    Wall ladder X 10 reps 5 sec hold  10 x 10 sec 10x 5sec   Row 3x10 reps green Red 2 x 15 3x10 red   Low Row 3x10 reps green Yellow 2 x 15 3x10 red    Isometric ER IR ---  10 x 10 sec 10x10 sec   Ceiling punch supine 2x10  2 x 10    AROM Scaption supine 2x10 1# 2 x 10 2x10   ER  IR 3x10 yellow     Bent over Flexion Abduction 2x10 each 1#             Patient also instructed in correct posture and precautions    MANUAL THERAPY: (10 minutes): Joint mobilization and Soft tissue mobilization was utilized and necessary because of the patient's restricted joint motion, loss of articular motion and restricted motion of soft tissue   PROM to left shoulder as per MD guidelines   Soft tissue mobilization seated     MODALITIES: (5 minutes): *  Hot Pack Therapy in order to provide analgesia. to left shoulder in sitting     HEP: As above; handouts given to patient for all exercises.     Treatment/Session Summary:    · Response to Treatment:  Progressed resistive exercises with emphasis on progressing ROM and reaching above shoulder level. · Baseline vitals (1/9/2020): BP:122/81 , HR: 88, SpO: 95  · Communication/Consultation:  None today  · Equipment provided today:  None today  · Recommendations/Intent for next treatment session: Next visit will focus on progression of ROM per physician guidlines.     Total Treatment Billable Duration:  53 minutes  PT Patient Time In/Time Out  Time In: 1430  Time Out: 301 [address],

## 2020-02-14 ENCOUNTER — HOSPITAL ENCOUNTER (OUTPATIENT)
Dept: PHYSICAL THERAPY | Age: 80
Discharge: HOME | End: 2020-02-14
Payer: MEDICARE

## 2020-02-14 PROCEDURE — 97140 MANUAL THERAPY 1/> REGIONS: CPT

## 2020-02-14 PROCEDURE — 97110 THERAPEUTIC EXERCISES: CPT

## 2020-02-14 NOTE — PROGRESS NOTES
Courtney Milton  : 1940  Primary: Asia Banda Medicare Advantage  Secondary:  Margaux Cunningham at [facility]  [address] [facility], [place], [address] [place] [address]  [phone]   [phone]      OUTPATIENT PHYSICAL THERAPY: Daily Treatment Note 2020  ICD-10: Treatment Diagnosis: Pain in left shoulder (M25.512)                Treatment Diagnosis 2: General Muscle Weakness (M62.81)                Treatment Diagnosis 3: Stiffness in Joint Right (M25.611)        Precautions: Left Reverse TSA: No internal rotation or extension  Allergies: Clonidine and Simvastatin   TREATMENT PLAN:  Effective Dates: 2020 TO 3/9/2020 (60 days). Frequency/Duration: 2 times a week for 60 Day(s)    NO IR or Extension past neutral for 6 weeks  NO excessive stretching, lifting or pushing off with Left Arm to avoid acromial stress fracture. PROM supine FE to 120 deg, ER to tolerance, pain free sub maximal deltoid isometrics and scapula for activation weeks 3-6 MEDICAL/REFERRING DIAGNOSIS:  Left shoulder pain [M25.512]   DATE OF ONSET: Patient reports progressive left shoulder pain after ORIF Humerus and reverse TSA left on 2019  REFERRING PHYSICIAN: Gayathri Encarnacion MD MD Orders: Evaluate and Treat   Return MD Appointment: 4 weeks       Pre-treatment Symptoms/Complaints:  Patient reports improving strength and using left UE more around the house. Lack of ROM main C/O      Pain: Initial: Pain Intensity 1: 2  Pain Location 1: Shoulder  Pain Orientation 1: Left  Post Session:  0/10    Medications Last Reviewed:  2020  Updated Objective Findings:  None today    TREATMENT:   THERAPEUTIC EXERCISE: (41  minutes):  Exercises per grid below to improve mobility, strength, balance, and dynamic movement of back - generalized to improve functional bending, lifting, carrying, reaching, and overhead activites.   Required moderate visual, verbal and tactile cues to promote proper body posture, promote proper body mechanics and promote proper body breathing techniques. Progressed resistance, range, repetitions, and complexity of movement as indicated. Date:  2/11/2020 Date:  2- Date:  2-7-20   Activity/Exercise Parameters Parameters Parameters   UBE X 8 mins 4 fwd 4 Rev level 3  8 minutes forward 8 minutes level 3   Codmans 4 min in all directions  4 min 4 minutes   Supine AAROM flexion with right UE 20x wand 20x 2 x 10 standing   AROM elbow 3x10  1#   2 x 10 with heat   Tricep Extension 3x10 green Green 3x10  3x10 red    Bicep curl 3x10 3 lb wt. s  4 lbs 3 x 10 3x10 2 lbs   Pulley X 5 mins  X 5 mins 2 x 3 minutes   Supine Wand Flexion ER ---  1 x 20   Isometric Deltoid  ---  10 x 5 sec   Scapula Retraction --- X 30 reps  X 30 with heat   Light ball roll on table 30x floor seated green ball  Seated blue ball on floor x 30 reps  Seated blue ball on floor x 30 reps    Wall ladder X 10 reps 5 sec hold  10 x 10 sec 10x 5sec   Row 3x10 reps green Blue 2 x 15 3x10 red   Low Row 3x10 reps green Green  2 x 15 3x10 red    Isometric ER IR ---   10x10 sec   Ceiling punch supine 2x10  2 x 10    AROM Scaption supine 2x10 1# 2 x 10 2x10   ER  IR 3x10 yellow Tan 2 x 10    Bent over Flexion Abduction 2x10 each 1# 2 x 10    AAROM with eccentric lowering   Seated scaption 2 x 10      Patient also instructed in correct posture and precautions    MANUAL THERAPY: (15 minutes): Joint mobilization and Soft tissue mobilization was utilized and necessary because of the patient's restricted joint motion, loss of articular motion and restricted motion of soft tissue   PROM to left shoulder as per MD guidelines   Soft tissue mobilization seated Not today    MODALITIES: (5 minutes): *  Hot Pack Therapy in order to provide analgesia. to left shoulder in sitting     HEP: As above; handouts given to patient for all exercises. Treatment/Session Summary:    · Response to Treatment:  Improved ROM after session.   · Baseline vitals (1/9/2020): BP:122/81 , HR: 88, SpO: 95  · Communication/Consultation:  None today  · Equipment provided today:  None today  · Recommendations/Intent for next treatment session: Next visit will focus on progression of ROM per physician guidlines.     Total Treatment Billable Duration:  56 minutes  PT Patient Time In/Time Out  Time In: 1430  Time Out: 726 [facility]

## 2020-02-18 ENCOUNTER — HOSPITAL ENCOUNTER (OUTPATIENT)
Dept: PHYSICAL THERAPY | Age: 80
Discharge: HOME | End: 2020-02-18
Payer: MEDICARE

## 2020-02-18 PROCEDURE — 97140 MANUAL THERAPY 1/> REGIONS: CPT

## 2020-02-18 PROCEDURE — 97110 THERAPEUTIC EXERCISES: CPT

## 2020-02-18 NOTE — PROGRESS NOTES
Kathrin Beach  : 1940  Primary: Awilda Cuencagustabo Dmitrienzojean pierre Medicare Advantage  Secondary:  [address] at [address], [place], [address] [place] [address]  [phone]   [phone]       OUTPATIENT PHYSICAL THERAPY:Progress Report 2020    ICD-10: Treatment Diagnosis: Pain in left shoulder (M25.512)                Treatment Diagnosis 2: General Muscle Weakness (M62.81)                Treatment Diagnosis 3: Stiffness in Joint Right (M25.611)      Precautions: Left Reverse TSA: No internal rotation or extension  Allergies: Clonidine and Simvastatin   TREATMENT PLAN:  Effective Dates: 2020 TO 3/9/2020 (60 days). Frequency/Duration: 2 times a week for 60 Day(s) MEDICAL/REFERRING DIAGNOSIS:  Left shoulder pain [M25.512]   DATE OF ONSET: Patient reports progressive left shoulder pain after ORIF Humerus and reverse TSA left on 2019  REFERRING PHYSICIAN: Jorje Roberts MD MD Orders: Evaluate and Treat   Return MD Appointment: 4 weeks     INITIAL ASSESSMENT:  Mr. Lenard Ortiz is a [de-identified] y.o. male presenting to physical therapy with complaints of left shoulder pain, stiffness and weakness after a Reverse TSA 2019. He reports having an ORIF of left humerus for fracture and feels that the hardware failed leading to progressive pain and loss of ROM. Patient is eager to decrease pain and improve overall mobility. Patient presents with increased pain, decreased strength, decreased ROM, decreased flexibility, decreased activity tolerance, and overall impaired functional mobility. Patient is a good candidate for skilled intervention with services to include manual therapy, modalities as needed, therapeutic exercises, and activity modification. PROGRESS NOTE (2020):  Patient has been seen for 12 sessions of physical therapy from 2020 to 2020. Patient reports feeling 60% better with pain and daily activity.  Patient remains limited secondary to weakness and decreased ROM. Patient will benefit from continued skilled physical therapy to address remaining goals and deficits. PROBLEM LIST (Impacting functional limitations):  1. Decreased Strength  2. Decreased ADL/Functional Activities  3. Decreased Ambulation Ability/Technique  4. Increased Pain  5. Decreased Activity Tolerance  6. Decreased Flexibility/Joint Mobility INTERVENTIONS PLANNED: (Treatment may consist of any combination of the following)  1. Cold  2. Electrical Stimulation  3. Gait Training  4. Heat  5. Home Exercise Program (HEP)  6. Manual Therapy  7. Neuromuscular Re-education/Strengthening  8. Range of Motion (ROM)  9. Therapeutic Activites  10. Therapeutic Exercise/Strengthening  11. Ultrasound (US)      Short-Term Functional Goals: Time Frame: 1/9/2020 to 2/6/2020        1. Patient demonstrates independence with home exercise program without verbal cueing provided by therapist. GOAL MET  2. Patient will report no more than 5/10 pain at rest in order to demonstrate improved self pain control and tolerance. GOAL MET  3. Patient will be educated in and demonstrate improved upright posture including sitting and standing. GOAL MET  4. Patient will be educated in and demonstrate proper squat lift technique in order to reduce stress on left shoulder, improve safety, and reduce risk of injury. GOAL MET    Discharge Goals: Time Frame: 1/9/2020 to 3/9/2020  7. Patient will improve pain to 1/10 for improved tolerance to sleeping greater than six hours. GOAL MET  1. Patient will improve left shoulder flexion ROM to 120 degrees for increased ability to perform tasks above shoulder level. ON GOING  2. Patient will improve left shoulder strength to 4 to 4+/5 for ability to work around the house and fish. ON GOING  3. Patient will improve  to Disabilities of the Arm, Shoulder and Hand (DASH) Questionnaire - Quick Version to 20/55 to demonstrate increased tolerance to activity.   ON GOING       GOALS: (Goals have been discussed and agreed upon with patient.)    Outcome Measure: Tool Used: Disabilities of the Arm, Shoulder and Hand (DASH) Questionnaire - Quick Version  Score:  Initial: 44/55   35/55 (Date: 1/28/2020 )   31/55(Date: 2/18/2020)   Interpretation of Score: The DASH is designed to measure the activities of daily living in person's with upper extremity dysfunction or pain. Each section is scored on a 1-5 scale, 5 representing the greatest disability. The scores of each section are added together for a total score of 55. Observation/Orthostatic Postural Assessment:          Rounded shoulders and increased protraction left scapula  Palpation:         Mild TTP at incision site  ROM:    AROM/PROM       Joint: Eval Date: 1/28/2020  Re-Assess Date: 1/28/2020 Re-Assess Date: 2/18/2020   ACTIVE ROM (standing) RIGHT LEFT LEFT LEFT   Shoulder Flexion 145 52 72 81   Shoulder Abduction 136 40 55 62   Shoulder Internal Rotation (Apley's) T12 45 by side 53 Left hip   Shoulder External Rotation (Apley's) C7 12 by side 45  To belt anterior Left Ear   Elbow ROM [facility]   PASSIVE ROM (supine)       Shoulder Flexion  94 115 120   Shoulder Abduction  68 75 81   Shoulder Internal Rotation  NT 45 53   Shoulder External Rotation  35 63 @ 45 [email or web address]                          Strength:    Joint: Eval Date: 1/28/2020  Re-Assess Date: 1/28/2020 Re-Assess Date: 2/18/2020    RIGHT LEFT LEFT LEFT in available ROM   Shoulder Flexion 5/5 3-/5 3-/5 3+/5   Shoulder Abduction  (C5) 4+/5 3-/5 3-/5 3+/5   Shoulder Internal Rotation 5/5 NT 3/5 4/5   Shoulder External Rotation 5/5 3/5 3/5 3+/5   Elbow Flexion  (C6) 5/5 4-/5 4/5 4/5   Elbow Extension (C7) 5/5 4/5 4/5 4/5                       Special Tests:  NT    Neurological Screen:    Radiating symptoms?  No  Functional Mobility: Patient limited to activity at waist to chest    Medical Necessity:   Patient is expected to demonstrate progress in strength, range of motion, and functional technique to decrease assistance required with daily activity, increase independence with overhead reching, and improve safety during lifting. Patient demonstrates good rehab potential due to higher previous functional level. Reason for Services/Other Comments:  Patient demonstrates capacity to improve ROM and strength which will increase independence, increase safety, and allow for return to previous activity. Patient continues to require skilled intervention due to modification of therapeutic interventions to increase complexity of exercises . Rehabilitation Potential For Stated Goals: Good  Regarding Du Escoto's therapy, I certify that the treatment plan above will be carried out by a therapist or under their direction.   Thank you for this referral,  Diane Salcido, PT   MSR

## 2020-02-18 NOTE — PROGRESS NOTES
Spring Odonnell  : 1940  Primary: [place] [employer] Medicare Advantage  Secondary:  [address] at [address], [address]  [phone]   [phone]      OUTPATIENT PHYSICAL THERAPY: Daily Treatment Note 2020  ICD-10: Treatment Diagnosis: Pain in left shoulder (M25.512)                Treatment Diagnosis 2: General Muscle Weakness (M62.81)                Treatment Diagnosis 3: Stiffness in Joint Right (M25.611)        Precautions: Left Reverse TSA: No internal rotation or extension  Allergies: Clonidine and Simvastatin   TREATMENT PLAN:  Effective Dates: 2020 TO 3/9/2020 (60 days). Frequency/Duration: 2 times a week for 60 Day(s)    NO excessive stretching, lifting or pushing off with Left Arm to avoid acromial stress fracture. 2/10/2020 New order: continue PT full motion full strength MEDICAL/REFERRING DIAGNOSIS:  Left shoulder pain [M25.512]   DATE OF ONSET: Patient reports progressive left shoulder pain after ORIF Humerus and reverse TSA left on 2019  REFERRING PHYSICIAN: Mukesh Barajas MD MD Orders: Evaluate and Treat   Return MD Appointment: 4 weeks       Pre-treatment Symptoms/Complaints:  Patient reports slowly improving      Pain: Initial: Pain Intensity 1: 1  Pain Location 1: Shoulder  Pain Orientation 1: Left  Post Session:  0/10    Medications Last Reviewed:  2020  Updated Objective Findings:  None today    TREATMENT:   THERAPEUTIC EXERCISE: (46  minutes):  Exercises per grid below to improve mobility, strength, balance, and dynamic movement of back - generalized to improve functional bending, lifting, carrying, reaching, and overhead activites. Required moderate visual, verbal and tactile cues to promote proper body posture, promote proper body mechanics and promote proper body breathing techniques. Progressed resistance, range, repetitions, and complexity of movement as indicated.      Date:  2020 Date:  2- Date:  2/18/20   Activity/Exercise Parameters Parameters Parameters   UBE X 8 mins 4 fwd 4 Rev level 3  8 minutes forward 8 minutes level 3   Codmans 4 min in all directions  4 min ---   Supine AAROM flexion with right UE 20x wand 20x 2 x 10 standing   AROM elbow 3x10  1#     Tricep Extension 3x10 green Green 3x10  3x10 blue   Bicep curl 3x10 3 lb wt. s  4 lbs 3 x 10 3x10 4 lbs   Pulley X 5 mins  X 5 mins X 5 minutes   Scapula Retraction --- X 30 reps  ---   Light ball roll on table 30x floor seated green ball  Seated blue ball on floor x 30 reps  Seated blue ball on floor x 30 reps    Wall ladder X 10 reps 5 sec hold  10 x 10 sec 10x 5sec   Row 3x10 reps green Blue 2 x 15 3x10 blue   Low Row 3x10 reps green Green  2 x 15 3x10 green   Ceiling punch supine 2x10  2 x 10    AROM Scaption supine 2x10 1# 2 x 10 ---   ER  IR 3x10 yellow Tan 2 x 10 3x10 yellow   Bent over Flexion Abduction 2x10 each 1# 2 x 10 2x10 each 1#   AAROM with eccentric lowering   Seated scaption 2 x 10 Seated scaption 2 x 10   T Band Flexion Abduction   2x10 yellow   Swiss ball wall press   2x10     Patient also instructed in correct posture and precautions    MANUAL THERAPY: (10 minutes): Joint mobilization and Soft tissue mobilization was utilized and necessary because of the patient's restricted joint motion, loss of articular motion and restricted motion of soft tissue   PROM to left shoulder as per MD guidelines   Soft tissue mobilization seated Not today    MODALITIES: (5 minutes): *  Hot Pack Therapy in order to provide analgesia. to left shoulder in sitting     HEP: As above; handouts given to patient for all exercises. Treatment/Session Summary:    · Response to Treatment:  Added T band flexion abduction ER IR to HEP, fair tolerance to increase.   · Baseline vitals (1/9/2020): BP:122/81 , HR: 88, SpO: 95  · Communication/Consultation:  None today  · Equipment provided today:  None today  · Recommendations/Intent for next treatment session: Next visit will focus on progression of ROM per physician guidlines.     Total Treatment Billable Duration:  56 minutes  PT Patient Time In/Time Out  Time In: 96 Yojana Gilbert PT

## 2020-02-21 ENCOUNTER — HOSPITAL ENCOUNTER (OUTPATIENT)
Dept: PHYSICAL THERAPY | Age: 80
Discharge: HOME | End: 2020-02-21
Payer: MEDICARE

## 2020-02-21 PROCEDURE — 97140 MANUAL THERAPY 1/> REGIONS: CPT

## 2020-02-21 PROCEDURE — 97110 THERAPEUTIC EXERCISES: CPT

## 2020-02-21 NOTE — PROGRESS NOTES
Ector Greer  : 1940  Primary: Mary Banda Medicare Advantage  Secondary:  [address] at [address], [address]  [phone]   [phone]      OUTPATIENT PHYSICAL THERAPY: Daily Treatment Note 2020  ICD-10: Treatment Diagnosis: Pain in left shoulder (M25.512)                Treatment Diagnosis 2: General Muscle Weakness (M62.81)                Treatment Diagnosis 3: Stiffness in Joint Right (M25.611)        Precautions: Left Reverse TSA: No internal rotation or extension  Allergies: Clonidine and Simvastatin   TREATMENT PLAN:  Effective Dates: 2020 TO 3/9/2020 (60 days). Frequency/Duration: 2 times a week for 60 Day(s)    NO excessive stretching, lifting or pushing off with Left Arm to avoid acromial stress fracture. 2/10/2020 New order: continue PT full motion full strength MEDICAL/REFERRING DIAGNOSIS:  Left shoulder pain [M25.512]   DATE OF ONSET: Patient reports progressive left shoulder pain after ORIF Humerus and reverse TSA left on 2019  REFERRING PHYSICIAN: Panchito Cho MD MD Orders: Evaluate and Treat   Return MD Appointment: 4 weeks       Pre-treatment Symptoms/Complaints:  Patient reports some improvement however frustrated he cant raise arm above head      Pain: Initial: Pain Intensity 1: 1  Pain Location 1: Shoulder  Pain Orientation 1: Left  Post Session:  0/10    Medications Last Reviewed:  2020  Updated Objective Findings:  None today    TREATMENT:   THERAPEUTIC EXERCISE: (45  minutes):  Exercises per grid below to improve mobility, strength, balance, and dynamic movement of back - generalized to improve functional bending, lifting, carrying, reaching, and overhead activites. Required moderate visual, verbal and tactile cues to promote proper body posture, promote proper body mechanics and promote proper body breathing techniques.   Progressed resistance, range, repetitions, and complexity of movement as indicated. Date:  2/21/2020 Date:  2- Date:  2/18/20   Activity/Exercise Parameters Parameters Parameters   UBE X 8 mins 4 fwd 4 Rev level 3  8 minutes forward 8 minutes level 3   Codmans 4 min in all directions  4 min ---   Supine AAROM flexion with right UE 20x wand 20x 2 x 10 standing   AROM elbow      Tricep Extension 3x10 green Green 3x10  3x10 blue   Bicep curl 3x10 4 lb wt. s  4 lbs 3 x 10 3x10 4 lbs   Pulley X 6 mins  X 5 mins X 5 minutes   Scapula Retraction --- X 30 reps  ---   Light ball roll on table 30x floor seated blue ball  Seated blue ball on floor x 30 reps  Seated blue ball on floor x 30 reps    Wall ladder X 10 reps 5 sec hold  10 x 10 sec 10x 5sec   Row 3x10 reps blue Blue 2 x 15 3x10 blue   Low Row 3x10 reps green Green  2 x 15 3x10 green   Ceiling punch supine 2 lbs 2x10  2 x 10    AROM Scaption supine 2x10 1# 2 x 10 ---   ER  IR 3x10 red  Tan 2 x 10 3x10 yellow   Bent over Flexion Abduction 2x10 each 1# 2 x 10 2x10 each 1#   AAROM with eccentric lowering  Seated 2 x 10  Wall 1 x 5 Seated scaption 2 x 10 Seated scaption 2 x 10   T Band Flexion Abduction   2x10 yellow   Swiss ball wall press   2x10   Push press bands Red 1 x 10  Yellow 2 x 10             Patient also instructed in correct posture and precautions    MANUAL THERAPY: (10 minutes): Joint mobilization and Soft tissue mobilization was utilized and necessary because of the patient's restricted joint motion, loss of articular motion and restricted motion of soft tissue   PROM to left shoulder as per MD guidelines   Soft tissue mobilization seated Not today    MODALITIES: (0 minutes): *  Hot Pack Therapy in order to provide analgesia. to left shoulder in sitting     HEP: As above; handouts given to patient for all exercises. Treatment/Session Summary:    · Response to Treatment:  Added T band flexion abduction ER IR to HEP, fair tolerance to increase.   · Baseline vitals (1/9/2020): BP:122/81 , HR: 88, SpO: 95  · Communication/Consultation:  None today  · Equipment provided today:  None today  · Recommendations/Intent for next treatment session: Next visit will focus on progression of ROM per physician guidlines.     Total Treatment Billable Duration:  55 minutes  PT Patient Time In/Time Out  Time In: 1423  Time Out: [facility] [place]

## 2020-02-25 ENCOUNTER — HOSPITAL ENCOUNTER (OUTPATIENT)
Dept: PHYSICAL THERAPY | Age: 80
Discharge: HOME | End: 2020-02-25
Payer: MEDICARE

## 2020-02-25 PROCEDURE — 97140 MANUAL THERAPY 1/> REGIONS: CPT

## 2020-02-25 PROCEDURE — 97110 THERAPEUTIC EXERCISES: CPT

## 2020-02-25 NOTE — PROGRESS NOTES
Corey Hernandez  : 1940  Primary: Ritesh Banda Medicare Advantage  Secondary:  [address] at [address], [address]  [phone]   [phone]      OUTPATIENT PHYSICAL THERAPY: Daily Treatment Note 2020  ICD-10: Treatment Diagnosis: Pain in left shoulder (M25.512)                Treatment Diagnosis 2: General Muscle Weakness (M62.81)                Treatment Diagnosis 3: Stiffness in Joint Right (M25.611)        Precautions: Left Reverse TSA: No internal rotation or extension  Allergies: Clonidine and Simvastatin   TREATMENT PLAN:  Effective Dates: 2020 TO 3/9/2020 (60 days). Frequency/Duration: 2 times a week for 60 Day(s)    NO excessive stretching, lifting or pushing off with Left Arm to avoid acromial stress fracture. 2/10/2020 New order: continue PT full motion full strength MEDICAL/REFERRING DIAGNOSIS:  Left shoulder pain [M25.512]   DATE OF ONSET: Patient reports progressive left shoulder pain after ORIF Humerus and reverse TSA left on 2019  REFERRING PHYSICIAN: Richie Recio MD MD Orders: Evaluate and Treat   Return MD Appointment: 4 weeks       Pre-treatment Symptoms/Complaints:  Patient reports steady improvement. Increased soreness due to increased activities. Pain: Initial: Pain Intensity 1: 2  Pain Location 1: Shoulder  Pain Orientation 1: Left  Post Session:  1/10    Medications Last Reviewed:  2020  Updated Objective Findings:  None today    TREATMENT:   THERAPEUTIC EXERCISE: (30  minutes):  Exercises per grid below to improve mobility, strength, balance, and dynamic movement of back - generalized to improve functional bending, lifting, carrying, reaching, and overhead activites. Required moderate visual, verbal and tactile cues to promote proper body posture, promote proper body mechanics and promote proper body breathing techniques.   Progressed resistance, range, repetitions, and complexity of movement as indicated. Date:  2/21/2020 Date:  2- Date:  2/18/20   Activity/Exercise Parameters Parameters Parameters   UBE X 8 mins 4 fwd 4 Rev level 3  4/4 level 5 8 minutes level 3   Codmans 4 min in all directions  2 min ---   Supine AAROM flexion with right UE 20x wand  2 x 10 standing   AROM elbow      Tricep Extension 3x10 green  3x10 blue   Bicep curl 3x10 4 lb wt. s   3x10 4 lbs   Pulley X 6 mins  6 minutes X 5 minutes   Scapula Retraction --- 2 x 10 ---   Light ball roll on table 30x floor seated blue ball  Blue ball floor x 20 Seated blue ball on floor x 30 reps    Wall ladder X 10 reps 5 sec hold  10 x 5 sec 10x 5sec   Row 3x10 reps blue Blue 3 x 10 3x10 blue   Low Row 3x10 reps green Green 3 x 10 3x10 green   Ceiling punch supine 2 lbs 2x10      AROM Scaption supine 2x10 1# Sitting 2 x 5  ---   ER  IR 3x10 red   3x10 yellow   Bent over Flexion Abduction 2x10 each 1#  2x10 each 1#   AAROM with eccentric lowering  Seated 2 x 10  Wall 1 x 5 Seated 3 x 5 Seated scaption 2 x 10   T Band Flexion Abduction   2x10 yellow   Swiss ball wall press   2x10   Push press bands Red 1 x 10  Yellow 2 x 10     Ball/wall  1 x 10      Patient also instructed in correct posture and precautions    MANUAL THERAPY: (13 minutes): Joint mobilization and Soft tissue mobilization was utilized and necessary because of the patient's restricted joint motion, loss of articular motion and restricted motion of soft tissue   PROM to left shoulder as per MD guidelines   Soft tissue mobilization seated to gross shoulder    MODALITIES: (10 minutes): *  Hot Pack Therapy in order to provide analgesia. to left shoulder in sitting     HEP: As above; handouts given to patient for all exercises.     Treatment/Session Summary:    · Response to Treatment:  Improved ability to hold arm above shoulder after AAROM  · Baseline vitals (1/9/2020): BP:122/81 , HR: 88, SpO: 95  · Communication/Consultation:  None today  · Equipment provided today:  None today  · Recommendations/Intent for next treatment session: Next visit will focus on progression of ROM per physician guidlines.     Total Treatment Billable Duration:  43 minutes  PT Patient Time In/Time Out  Time In: 1432  Time Out: 1530  Elaine Macias PTA

## 2020-02-28 ENCOUNTER — HOSPITAL ENCOUNTER (OUTPATIENT)
Dept: PHYSICAL THERAPY | Age: 80
Discharge: HOME | End: 2020-02-28
Payer: MEDICARE

## 2020-02-28 PROCEDURE — 97140 MANUAL THERAPY 1/> REGIONS: CPT

## 2020-02-28 PROCEDURE — 97110 THERAPEUTIC EXERCISES: CPT

## 2020-02-28 NOTE — PROGRESS NOTES
Isamar Heart  : 1940  Primary: Duncan Banad Medicare Advantage  Secondary:  [address] at [address], [place], [address] [place] [address]  [phone]   [phone]      OUTPATIENT PHYSICAL THERAPY: Daily Treatment Note 2020  ICD-10: Treatment Diagnosis: Pain in left shoulder (M25.512)                Treatment Diagnosis 2: General Muscle Weakness (M62.81)                Treatment Diagnosis 3: Stiffness in Joint Right (M25.611)        Precautions: Left Reverse TSA: No internal rotation or extension  Allergies: Clonidine and Simvastatin   TREATMENT PLAN:  Effective Dates: 2020 TO 3/9/2020 (60 days). Frequency/Duration: 2 times a week for 60 Day(s)    NO excessive stretching, lifting or pushing off with Left Arm to avoid acromial stress fracture. 2/10/2020 New order: continue PT full motion full strength MEDICAL/REFERRING DIAGNOSIS:  Left shoulder pain [M25.512]   DATE OF ONSET: Patient reports progressive left shoulder pain after ORIF Humerus and reverse TSA left on 2019  REFERRING PHYSICIAN: Marvin Mcelroy MD MD Orders: Evaluate and Treat   Return MD Appointment: 4 weeks       Pre-treatment Symptoms/Complaints:  Patient reports no pain today. Pt frustrated he cant raise arm above shoulder level      Pain: Initial: Pain Intensity 1: 0  Post Session:  0/10    Medications Last Reviewed:  2020  Updated Objective Findings:  Noted improved ROM and functional strength below shoulder level    TREATMENT:   THERAPEUTIC EXERCISE: (40  minutes):  Exercises per grid below to improve mobility, strength, balance, and dynamic movement of back - generalized to improve functional bending, lifting, carrying, reaching, and overhead activites. Required moderate visual, verbal and tactile cues to promote proper body posture, promote proper body mechanics and promote proper body breathing techniques.   Progressed resistance, range, repetitions, and complexity of movement as indicated. Date:  2/21/2020 Date:  2- Date:  2/28/20   Activity/Exercise Parameters Parameters Parameters   UBE X 8 mins 4 fwd 4 Rev level 3  4/4 level 5 8 minutes level 3   Codmans 4 min in all directions  2 min ---   Supine AAROM flexion with right UE 20x wand  2 x 10 reclined position   AROM elbow   2 x 10   Tricep Extension 3x10 green  3x10 blue   Bicep curl 3x10 4 lb wt. s   3x10 4 lbs   Pulley X 6 mins  6 minutes X 5 minutes   Scapula Retraction --- 2 x 10 ---   Light ball roll on table 30x floor seated blue ball  Blue ball floor x 20 Seated blue ball on floor x 30 reps    Wall ladder X 10 reps 5 sec hold  10 x 5 sec 10x 5sec   Row 3x10 reps blue Blue 3 x 10 2 x 15 blue   Low Row 3x10 reps green Green 3 x 10 2 x 15 green   Ceiling punch supine 2 lbs 2x10   Semi reclined 2 x 10   AROM Scaption supine 2x10 1# Sitting 2 x 5  Semi reclined 2 x 10  1 x 5   ER  IR 3x10 red   3x15 yellow   Bent over Flexion Abduction 2x10 each 1#  2x10 each 1#   AAROM with eccentric lowering  Seated 2 x 10  Wall 1 x 5 Seated 3 x 5 Semi reclined 2 x 10   T Band Flexion Abduction   2x10 yellow   Swiss ball wall press   2x10   Push press bands Red 1 x 10  Yellow 2 x 10  Yellow 2 x 10   Ball/wall  1 x 10 1 x 15     Patient also instructed in correct posture and precautions    MANUAL THERAPY: (13 minutes): Joint mobilization and Soft tissue mobilization was utilized and necessary because of the patient's restricted joint motion, loss of articular motion and restricted motion of soft tissue   PROM to left shoulder as per MD guidelines   Soft tissue mobilization seated to gross shoulder    MODALITIES: (0 minutes): *  Hot Pack Therapy in order to provide analgesia. to left shoulder in sitting     HEP: As above; handouts given to patient for all exercises.     Treatment/Session Summary:    · Response to Treatment: Improved ROM  · Baseline vitals (1/9/2020): BP:122/81 , HR: 88, SpO: 95  · Communication/Consultation:  None today  · Equipment provided today:  None today  · Recommendations/Intent for next treatment session: Next visit will focus on progression of ROM per physician guidlines.     Total Treatment Billable Duration:  53 minutes  PT Patient Time In/Time Out  Time In: 1430  Time Out: 1530  Marcio Bennett, ROXI

## 2020-03-03 ENCOUNTER — HOSPITAL ENCOUNTER (OUTPATIENT)
Dept: PHYSICAL THERAPY | Age: 80
Discharge: HOME | End: 2020-03-03
Payer: MEDICARE

## 2020-03-03 PROCEDURE — 97110 THERAPEUTIC EXERCISES: CPT

## 2020-03-03 PROCEDURE — 97140 MANUAL THERAPY 1/> REGIONS: CPT

## 2020-03-03 NOTE — PROGRESS NOTES
Isamar Heart  : 1940  Primary: Duncan Banda Medicare Advantage  Secondary:  [address] at [address], [address]  [phone]   [phone]      OUTPATIENT PHYSICAL THERAPY: Daily Treatment Note 3/3/2020  ICD-10: Treatment Diagnosis: Pain in left shoulder (M25.512)                Treatment Diagnosis 2: General Muscle Weakness (M62.81)                Treatment Diagnosis 3: Stiffness in Joint Right (M25.611)        Precautions: Left Reverse TSA: No internal rotation or extension  Allergies: Clonidine and Simvastatin   TREATMENT PLAN:  Effective Dates: 2020 TO 3/9/2020 (60 days). Frequency/Duration: 2 times a week for 60 Day(s)    NO excessive stretching, lifting or pushing off with Left Arm to avoid acromial stress fracture. 2/10/2020 New order: continue PT full motion full strength MEDICAL/REFERRING DIAGNOSIS:  Left shoulder pain [M25.512]   DATE OF ONSET: Patient reports progressive left shoulder pain after ORIF Humerus and reverse TSA left on 2019  REFERRING PHYSICIAN: Marvin Mcelroy MD MD Orders: Evaluate and Treat   Return MD Appointment: 4 weeks       Pre-treatment Symptoms/Complaints:  Patient reports no pain today. Pt frustrated he cant raise arm above shoulder level      Pain: Initial: Pain Intensity 1: 0  Pain Location 1: Shoulder  Pain Orientation 1: Left  Post Session:  0/10    Medications Last Reviewed:  3/3/2020  Updated Objective Findings:  Noted improved ROM and functional strength below shoulder level    TREATMENT:   THERAPEUTIC EXERCISE: (47 minutes):  Exercises per grid below to improve mobility, strength, balance, and dynamic movement of back - generalized to improve functional bending, lifting, carrying, reaching, and overhead activites. Required moderate visual, verbal and tactile cues to promote proper body posture, promote proper body mechanics and promote proper body breathing techniques. Progressed resistance, range, repetitions, and complexity of movement as indicated. Date:  3/3/2020 Date:  2- Date:  2/28/20   Activity/Exercise Parameters Parameters Parameters   UBE X 8 mins 4 fwd 4 Rev level 5 4/4 level 5 8 minutes level 3   Codmans 4 min in all directions  2 min ---   Supine AAROM flexion with right UE 20x wand  2 x 10 reclined position   Tricep Extension 3x10 blue  3x10 blue   Bicep curl 3x10 4 lb wt. s   3x10 4 lbs   Pulley X 5 mins  6 minutes X 5 minutes   Light ball roll on table --- Blue ball floor x 20 Seated blue ball on floor x 30 reps    Wall ladder X 10 reps 5 sec hold  10 x 5 sec 10x 5sec   Row 3x10 reps blue Blue 3 x 10 2 x 15 blue   Low Row 3x10 reps blue Green 3 x 10 2 x 15 green   Ceiling punch supine ---  Semi reclined 2 x 10   AROM Scaption supine --- Sitting 2 x 5  Semi reclined 2 x 10  1 x 5   ER  IR 3x10 red   3x15 yellow   Bent over Flexion Abduction 2x10 each 2#  2x10 each 1#   AAROM with eccentric lowering  Seated reclined 2 x 10 Seated 3 x 5 Semi reclined 2 x 10   T Band Flexion Abduction 3x10 yellow  2x10 yellow   Swiss ball wall press   2x10   Push press bands Red 3x 10  Yellow 2 x 10   Ball/wall 2x10 yellow 1 x 10 1 x 15     Patient also instructed in correct posture and precautions    MANUAL THERAPY: (8 minutes): Joint mobilization and Soft tissue mobilization was utilized and necessary because of the patient's restricted joint motion, loss of articular motion and restricted motion of soft tissue   PROM to left shoulder as per MD guidelines   Soft tissue mobilization seated to gross shoulder    MODALITIES: (0 minutes):      Not today     HEP: As above; handouts given to patient for all exercises. Treatment/Session Summary:    · Response to Treatment: Patient was challenged with increase but without pain.   · Baseline vitals (1/9/2020): BP:122/81 , HR: 88, SpO: 95  · Communication/Consultation:  None today  · Equipment provided today:  None today  · Recommendations/Intent for next treatment session: Next visit will focus on progression of ROM per physician guidlines.     Total Treatment Billable Duration:  55 minutes  PT Patient Time In/Time Out  Time In: 1430  Time Out: 301 [address],

## 2020-03-03 NOTE — THERAPY RECERTIFICATION
Mary Jane Romero  : 1940  Primary: Janet Resendezus Rizvienzojean pierre Medicare Advantage  Secondary:  Angeles Quirogaia at [address], [address] [place] [address]  [phone]   [phone]       OUTPATIENT PHYSICAL THERAPY:Progress Report 3/3/2020    ICD-10: Treatment Diagnosis: Pain in left shoulder (M25.512)                Treatment Diagnosis 2: General Muscle Weakness (M62.81)                Treatment Diagnosis 3: Stiffness in Joint Right (M25.611)      Precautions: Left Reverse TSA: No internal rotation or extension  Allergies: Clonidine and Simvastatin   TREATMENT PLAN:  Effective Dates: 3/9/2020 TO 2020 (60 days). Frequency/Duration: 2 times a week for 60 Day(s) MEDICAL/REFERRING DIAGNOSIS:  Left shoulder pain [M25.512]   DATE OF ONSET: Patient reports progressive left shoulder pain after ORIF Humerus and reverse TSA left on 2019  REFERRING PHYSICIAN: Freda Izaguirre MD MD Orders: Evaluate and Treat   Return MD Appointment: 4 weeks     INITIAL ASSESSMENT:  Mr. Doreen Nathan is a [de-identified] y.o. male presenting to physical therapy with complaints of left shoulder pain, stiffness and weakness after a Reverse TSA 2019. He reports having an ORIF of left humerus for fracture and feels that the hardware failed leading to progressive pain and loss of ROM. Patient is eager to decrease pain and improve overall mobility. Patient presents with increased pain, decreased strength, decreased ROM, decreased flexibility, decreased activity tolerance, and overall impaired functional mobility. Patient is a good candidate for skilled intervention with services to include manual therapy, modalities as needed, therapeutic exercises, and activity modification. PROGRESS NOTE (3/3/2020):  Patient has been seen for 16 sessions of physical therapy from 2020 to 3/3/2020. Patient reports feeling 65 to 70% better with pain and daily activity.  Patient remains limited secondary to weakness and decreased ROM, unable to raise left upper extremity to shoulder level. Patient will benefit from continued skilled physical therapy to address remaining goals and deficits. PROBLEM LIST (Impacting functional limitations):  1. Decreased Strength  2. Decreased ADL/Functional Activities  3. Decreased Ambulation Ability/Technique  4. Increased Pain  5. Decreased Activity Tolerance  6. Decreased Flexibility/Joint Mobility INTERVENTIONS PLANNED: (Treatment may consist of any combination of the following)  1. Cold  2. Electrical Stimulation  3. Gait Training  4. Heat  5. Home Exercise Program (HEP)  6. Manual Therapy  7. Neuromuscular Re-education/Strengthening  8. Range of Motion (ROM)  9. Therapeutic Activites  10. Therapeutic Exercise/Strengthening  11. Ultrasound (US)      Short-Term Functional Goals: Time Frame: 1/9/2020 to 2/6/2020        1. Patient demonstrates independence with home exercise program without verbal cueing provided by therapist. GOAL MET  2. Patient will report no more than 5/10 pain at rest in order to demonstrate improved self pain control and tolerance. GOAL MET  3. Patient will be educated in and demonstrate improved upright posture including sitting and standing. GOAL MET  4. Patient will be educated in and demonstrate proper squat lift technique in order to reduce stress on left shoulder, improve safety, and reduce risk of injury. GOAL MET    Discharge Goals: Time Frame: 1/9/2020 to 3/9/2020  7. Patient will improve pain to 1/10 for improved tolerance to sleeping greater than six hours. GOAL MET  1. Patient will improve left shoulder flexion ROM to 120 degrees for increased ability to perform tasks above shoulder level. ON GOING  2. Patient will improve left shoulder strength to 4 to 4+/5 for ability to work around the house and fish. ON GOING  3.  Patient will improve  to Disabilities of the Arm, Shoulder and Hand (DASH) Questionnaire - Quick Version to 20/55 to demonstrate increased tolerance to activity. ON GOING       GOALS: (Goals have been discussed and agreed upon with patient.)    Outcome Measure: Tool Used: Disabilities of the Arm, Shoulder and Hand (DASH) Questionnaire - Quick Version  Score:  Initial: 44/55   35/55 (Date: 1/28/2020 )   31/55(Date: 2/18/2020)  30/55(Date: 2/18/2020)   Interpretation of Score: The DASH is designed to measure the activities of daily living in person's with upper extremity dysfunction or pain. Each section is scored on a 1-5 scale, 5 representing the greatest disability. The scores of each section are added together for a total score of 55.       Observation/Orthostatic Postural Assessment:          Rounded shoulders and increased protraction left scapula  Palpation:         Mild TTP at incision site  ROM:    AROM/PROM        Joint: Eval Date: 1/28/2020  Re-Assess Date: 1/28/2020 Re-Assess Date: 2/18/2020 Re-Assess Date: 3/3/2020   ACTIVE ROM (standing) RIGHT LEFT LEFT LEFT LEFT   Shoulder Flexion 145 52 72 81 80 increased fatigue on test date   Shoulder Abduction 136 40 55 62 65   Shoulder Internal Rotation (Apley's) T12 45 by side 53 Left hip Left Hip   Shoulder External Rotation (Apley's) C7 12 by side 45  To belt anterior Left Ear Occiput    Elbow ROM [facility]   PASSIVE ROM (supine)        Shoulder Flexion  94 115 120 122   Shoulder Abduction  68 75 81 90   Shoulder Internal Rotation  NT 45 53 55   Shoulder External Rotation  35 63 @ 45 Tim@ALCOHOOT 78@60                             Strength:    Joint: Eval Date: 1/28/2020  Re-Assess Date: 1/28/2020 Re-Assess Date: 2/18/2020 Re-Assess Date: 3/3/2020    RIGHT LEFT LEFT LEFT in available ROM LEFT in available ROM   Shoulder Flexion 5/5 3-/5 3-/5 3+/5 3+/5   Shoulder Abduction  (C5) 4+/5 3-/5 3-/5 3+/5 3+/5   Shoulder Internal Rotation 5/5 NT 3/5 4/5 4/5   Shoulder External Rotation 5/5 3/5 3/5 3+/5 3+/5   Elbow Flexion  (C6) 5/5 4-/5 4/5 4/5 4+/5   Elbow Extension (C7) 5/5 4/5 4/5 4/5 4+/5                         Special Tests:  NT    Neurological Screen:    Radiating symptoms? No  Functional Mobility: Patient limited to activity at waist to chest    Medical Necessity:   Patient is expected to demonstrate progress in strength, range of motion, and functional technique to decrease assistance required with daily activity, increase independence with overhead reching, and improve safety during lifting. Patient demonstrates good rehab potential due to higher previous functional level. Reason for Services/Other Comments:  Patient demonstrates capacity to improve ROM and strength which will increase independence, increase safety, and allow for return to previous activity. Patient continues to require skilled intervention due to modification of therapeutic interventions to increase complexity of exercises . Rehabilitation Potential For Stated Goals: Good  Regarding Cat Escoto's therapy, I certify that the treatment plan above will be carried out by a therapist or under their direction. Thank you for this referral,  Navjot Woodard PT   MSR    Regarding Cat Escoto's therapy, I certify that the treatment plan above will be carried out by a therapist or under their direction.   Thank you for this referral,  Navjot Woodard PT     Referring Physician Signature: Ras Miles MD _______________________________ Date _____________

## 2020-03-06 ENCOUNTER — HOSPITAL ENCOUNTER (OUTPATIENT)
Dept: PHYSICAL THERAPY | Age: 80
Discharge: HOME | End: 2020-03-06
Payer: MEDICARE

## 2020-03-06 PROCEDURE — 97110 THERAPEUTIC EXERCISES: CPT

## 2020-03-06 PROCEDURE — 97140 MANUAL THERAPY 1/> REGIONS: CPT

## 2020-03-06 NOTE — PROGRESS NOTES
Kanu King  : 1940  Primary: Honorio Banda Medicare Advantage  Secondary:  [address] at [facility]  [address] [facility], [place], [address] [place] [address]  [phone]   [phone]      OUTPATIENT PHYSICAL THERAPY: Daily Treatment Note 3/6/2020  ICD-10: Treatment Diagnosis: Pain in left shoulder (M25.512)                Treatment Diagnosis 2: General Muscle Weakness (M62.81)                Treatment Diagnosis 3: Stiffness in Joint Right (M25.611)        Precautions: Left Reverse TSA: No internal rotation or extension  Allergies: Clonidine and Simvastatin   TREATMENT PLAN:  Effective Dates: 2020 TO 3/9/2020 (60 days). Frequency/Duration: 2 times a week for 60 Day(s)    NO excessive stretching, lifting or pushing off with Left Arm to avoid acromial stress fracture. 2/10/2020 New order: continue PT full motion full strength MEDICAL/REFERRING DIAGNOSIS:  Left shoulder pain [M25.512]   DATE OF ONSET: Patient reports progressive left shoulder pain after ORIF Humerus and reverse TSA left on 2019  REFERRING PHYSICIAN: Mohsen Robledo MD MD Orders: Evaluate and Treat   Return MD Appointment: 4 weeks       Pre-treatment Symptoms/Complaints:  Patient reports no pain today. Pain: Initial: Pain Intensity 1: 0  Post Session:  0/10    Medications Last Reviewed:  3/6/2020  Updated Objective Findings:  None today    TREATMENT:   THERAPEUTIC EXERCISE: (44 minutes):  Exercises per grid below to improve mobility, strength, balance, and dynamic movement of back - generalized to improve functional bending, lifting, carrying, reaching, and overhead activites. Required moderate visual, verbal and tactile cues to promote proper body posture, promote proper body mechanics and promote proper body breathing techniques. Progressed resistance, range, repetitions, and complexity of movement as indicated.      Date:  3/3/2020 Date:  3-6-20 Date:  20   Activity/Exercise Parameters Parameters Parameters   UBE X 8 mins 4 fwd 4 Rev level 5 4/4 level 5 8 minutes level 3   Codmans 4 min in all directions  2 min ---   Supine AAROM flexion with right UE 20x wand  2 x 10 reclined position   Tricep Extension 3x10 blue Blue 3 x 12 3x10 blue   Bicep curl 3x10 4 lb wt. s  4 lbs 2 x 10  5 lbs 1 x 10 3x10 4 lbs   Radha X 5 mins  5 minutes X 5 minutes   Light ball roll on table ---  Seated blue ball on floor x 30 reps    Wall ladder X 10 reps 5 sec hold  10 x 5 sec 10x 5sec   Row 3x10 reps blue Blue 3 x 10 2 x 15 blue   Low Row 3x10 reps blue Green 3 x 10 2 x 15 green   Ceiling punch supine --- Semi reclined 2 x 10  1 lb 2 x 10  Wand 2 x 10 Semi reclined 2 x 10   AROM Scaption supine --- Sitting 2 x 10    Semi reclined 2 x 10  1 x 5   ER  IR 3x10 red  IR red 3 x 10  ER yellow 3 x 10 3x15 yellow   Bent over Flexion Abduction 2x10 each 2# 2 lbs 2 x 10 2x10 each 1#   AAROM with eccentric lowering  Seated reclined 2 x 10 Seated 3 x 5 Semi reclined 2 x 10   T Band Flexion Abduction 3x10 yellow  2x10 yellow   Swiss ball wall press   2x10   Push press bands Red 3x 10 Yellow 3 x 10 Yellow 2 x 10   Ball/wall 2x10 yellow 2 x 15 1 x 15     Patient also instructed in correct posture and precautions    MANUAL THERAPY: (10 minutes): Joint mobilization and Soft tissue mobilization was utilized and necessary because of the patient's restricted joint motion, loss of articular motion and restricted motion of soft tissue   PROM to left shoulder as per MD guidelines   Soft tissue mobilization seated to gross shoulder Not today    MODALITIES: (0 minutes):      Not today     HEP: As above; handouts given to patient for all exercises. Treatment/Session Summary:    · Response to Treatment: fatigued after session.   · Baseline vitals (1/9/2020): BP:122/81 , HR: 88, SpO: 95  · Communication/Consultation:  None today  · Equipment provided today:  None today  · Recommendations/Intent for next treatment session: Next visit will focus on progression of ROM per physician guidlines.     Total Treatment Billable Duration:  54 minutes  PT Patient Time In/Time Out  Time In: 1426  Time Out: 209 [address]

## 2020-03-10 ENCOUNTER — HOSPITAL ENCOUNTER (OUTPATIENT)
Dept: PHYSICAL THERAPY | Age: 80
Discharge: HOME | End: 2020-03-10
Payer: MEDICARE

## 2020-03-10 PROCEDURE — 97110 THERAPEUTIC EXERCISES: CPT

## 2020-03-10 PROCEDURE — 97140 MANUAL THERAPY 1/> REGIONS: CPT

## 2020-03-10 NOTE — PROGRESS NOTES
Sydni Powers  : 1940  Primary: Silvina Banda Medicare Advantage  Secondary:  [address] at [facility]  [address] [place] [address], [place], [address] [place] [address]  [phone]   [phone]      OUTPATIENT PHYSICAL THERAPY: Daily Treatment Note 3/10/2020  ICD-10: Treatment Diagnosis: Pain in left shoulder (M25.512)                Treatment Diagnosis 2: General Muscle Weakness (M62.81)                Treatment Diagnosis 3: Stiffness in Joint Right (M25.611)        Precautions: Left Reverse TSA: No internal rotation or extension  Allergies: Clonidine and Simvastatin   TREATMENT PLAN:  Effective Dates: 3/9/2020 TO 2020 (60 days). Frequency/Duration: 2 times a week for 60 Day(s)    NO excessive stretching, lifting or pushing off with Left Arm to avoid acromial stress fracture. 2/10/2020 New order: continue PT full motion full strength MEDICAL/REFERRING DIAGNOSIS:  Left shoulder pain [M25.512]   DATE OF ONSET: Patient reports progressive left shoulder pain after ORIF Humerus and reverse TSA left on 2019  REFERRING PHYSICIAN: Shamika Reed MD MD Orders: Evaluate and Treat   Return MD Appointment: 4 weeks       Pre-treatment Symptoms/Complaints:  Patient reports slowly getting a little stronger, can lift coffee cup. Pain: Initial: Pain Intensity 1: 0  Pain Location 1: Shoulder  Pain Orientation 1: Left  Post Session:  0/10    Medications Last Reviewed:  3/10/2020  Updated Objective Findings:  None today    TREATMENT:   THERAPEUTIC EXERCISE: (46 minutes):  Exercises per grid below to improve mobility, strength, balance, and dynamic movement of back - generalized to improve functional bending, lifting, carrying, reaching, and overhead activites. Required moderate visual, verbal and tactile cues to promote proper body posture, promote proper body mechanics and promote proper body breathing techniques.   Progressed resistance, range, repetitions, and complexity of movement as indicated. Date:  3/3/2020 Date:  3-6-20 Date:  3/10/20   Activity/Exercise Parameters Parameters Parameters   UBE X 8 mins 4 fwd 4 Rev level 5 4/4 level 5 8 minutes level 4 4/4   Codmans 4 min in all directions  2 min ---   Supine AAROM flexion with right UE 20x wand  2 x 10 reclined position 30deg   Tricep Extension 3x10 blue Blue 3 x 12 3x10 blue   Bicep curl 3x10 4 lb wt. s  4 lbs 2 x 10  5 lbs 1 x 10 3x10 4 lbs   Radha X 5 mins  5 minutes X 5 minutes   Light ball roll on table ---  Seated blue ball on floor x 30 reps    Wall ladder X 10 reps 5 sec hold  10 x 5 sec 10x 5sec   Row 3x10 reps blue Blue 3 x 10 2 x 15 blue   Low Row 3x10 reps blue Green 3 x 10 2 x 15 green   Ceiling punch supine --- Semi reclined 2 x 10  1 lb 2 x 10  Wand 2 x 10 Semi reclined 3 x 10 1 lb  15deg   AROM Scaption supine --- Sitting 2 x 10    Semi reclined 2 x 10 1 lb 15deg     ER  IR 3x10 red  IR red 3 x 10  ER yellow 3 x 10 IR green 3 x 10  ER yellow 3 x 12   Bent over Flexion Abduction 2x10 each 2# 2 lbs 2 x 10 2x10 each 1#   AAROM with eccentric lowering  Seated reclined 2 x 10 Seated 3 x 5 Semi reclined 2 x 10 30deg   T Band Flexion Abduction 3x10 yellow  2x10 yellow   Swiss ball wall press   2x10   Push press bands Red 3x 10 Yellow 3 x 10 Yellow 2 x 10   Ball/wall 2x10 yellow 2 x 15 1 x 15     Patient also instructed in correct posture and precautions    MANUAL THERAPY: (8 minutes): Joint mobilization and Soft tissue mobilization was utilized and necessary because of the patient's restricted joint motion, loss of articular motion and restricted motion of soft tissue   PROM to left shoulder as per MD guidelines   Soft tissue mobilization seated to gross shoulder Not today    MODALITIES: (0 minutes):      Not today     HEP: As above; handouts given to patient for all exercises. Treatment/Session Summary:    · Response to Treatment: Patient slowly improving with gravity lessoned positions  .   · Baseline vitals (1/9/2020): BP:122/81 , HR: 88, SpO: 95  · Communication/Consultation:  None today  · Equipment provided today:  None today  · Recommendations/Intent for next treatment session: Next visit will focus on progression of ROM per physician guidlines.     Total Treatment Billable Duration:  54 minutes  PT Patient Time In/Time Out  Time In: 1426  Time Out: 301 [address],

## 2020-03-13 ENCOUNTER — HOSPITAL ENCOUNTER (OUTPATIENT)
Dept: PHYSICAL THERAPY | Age: 80
Discharge: HOME | End: 2020-03-13
Payer: MEDICARE

## 2020-03-13 PROCEDURE — 97140 MANUAL THERAPY 1/> REGIONS: CPT

## 2020-03-13 PROCEDURE — 97110 THERAPEUTIC EXERCISES: CPT

## 2020-03-13 NOTE — PROGRESS NOTES
Mary Jane Romero  : 1940  Primary: [place] Brandonpriscila Banda Medicare Advantage  Secondary:  Angeles Slim at [address], [address] [place] [address]  [phone]   [phone]      OUTPATIENT PHYSICAL THERAPY: Daily Treatment Note 3/13/2020  ICD-10: Treatment Diagnosis: Pain in left shoulder (M25.512)                Treatment Diagnosis 2: General Muscle Weakness (M62.81)                Treatment Diagnosis 3: Stiffness in Joint Right (M25.611)        Precautions: Left Reverse TSA: No internal rotation or extension  Allergies: Clonidine and Simvastatin   TREATMENT PLAN:  Effective Dates: 3/9/2020 TO 2020 (60 days). Frequency/Duration: 2 times a week for 60 Day(s)    NO excessive stretching, lifting or pushing off with Left Arm to avoid acromial stress fracture. 2/10/2020 New order: continue PT full motion full strength MEDICAL/REFERRING DIAGNOSIS:  Left shoulder pain [M25.512]   DATE OF ONSET: Patient reports progressive left shoulder pain after ORIF Humerus and reverse TSA left on 2019  REFERRING PHYSICIAN: Freda Izaguirre MD MD Orders: Evaluate and Treat   Return MD Appointment: 4 weeks       Pre-treatment Symptoms/Complaints:  Patient reported able to lift LUE higher. Pain: Initial: Pain Intensity 1: 0  Pain Location 1: Shoulder  Pain Orientation 1: Left  Post Session:  0/10    Medications Last Reviewed:  3/13/2020  Updated Objective Findings:  Improved shoulder flexion   TREATMENT:   THERAPEUTIC EXERCISE: (45 minutes):  Exercises per grid below to improve mobility, strength, balance, and dynamic movement of back - generalized to improve functional bending, lifting, carrying, reaching, and overhead activites. Required moderate visual, verbal and tactile cues to promote proper body posture, promote proper body mechanics and promote proper body breathing techniques. Progressed resistance, range, repetitions, and complexity of movement as indicated. Date:  3/13/2020 Date:  3-6-20 Date:  3/10/20   Activity/Exercise Parameters Parameters Parameters   UBE X 8 mins 4 fwd 4 Rev level 4 4/4 level 5 8 minutes level 4 4/4   Codmans 4 min in all directions  2 min ---   Supine AAROM flexion with right UE 20x wand  2 x 10 reclined position 30deg   Tricep Extension 3x10 blue Blue 3 x 12 3x10 blue   Bicep curl 3x10 4 lb wt. s  4 lbs 2 x 10  5 lbs 1 x 10 3x10 4 lbs   Radha X 5 mins  5 minutes X 5 minutes   Light ball roll on table Seated blue ball on floor x 4 mins   Seated blue ball on floor x 30 reps    Wall ladder Level 20 x 10 reps   10 x 5 sec 10x 5sec   Row 3x10 reps blue Blue 3 x 10 2 x 15 blue   Low Row 3x10 reps blue Green 3 x 10 2 x 15 green   Ceiling punch supine Supine with wand x 20 reps  Semi reclined 2 x 10  1 lb 2 x 10  Wand 2 x 10 Semi reclined 3 x 10 1 lb  15deg   AROM Scaption supine Supine x 20  Sitting 2 x 10    Semi reclined 2 x 10 1 lb 15deg     ER  IR 3x10 red  IR red 3 x 10  ER yellow 3 x 10 IR green 3 x 10  ER yellow 3 x 12   Bent over Flexion Abduction 2x10 each 2# 2 lbs 2 x 10 2x10 each 1#   AAROM with eccentric lowering  Seated reclined 2 x 10 Seated 3 x 5 Semi reclined 2 x 10 30deg   T Band Flexion Abduction 3x10 yellow  2x10 yellow   Swiss ball wall press 2x10   2x10   Push press bands ------- Yellow 3 x 10 Yellow 2 x 10   Ball/wall 2x10 wash cloth up & down & side to side  2 x 15 1 x 15     Patient also instructed in correct posture and precautions    MANUAL THERAPY: (15 minutes): Joint mobilization and Soft tissue mobilization was utilized and necessary because of the patient's restricted joint motion, loss of articular motion and restricted motion of soft tissue   PROM to left shoulder as per MD guidelines   Gentle distraction and oscillation     MODALITIES: (0 minutes):      Not today     HEP: As above; handouts given to patient for all exercises.     Treatment/Session Summary:    · Response to Treatment: Patient continues to make slow improvements with range of motion and strength  · Baseline vitals (1/9/2020): BP:122/81 , HR: 88, SpO: 95  · Communication/Consultation:  None today  · Equipment provided today:  None today  · Recommendations/Intent for next treatment session: Next visit will focus on progression of ROM per physician guidlines.     Total Treatment Billable Duration:  60 minutes  PT Patient Time In/Time Out  Time In: 1430  Time Out: [place], [place]

## 2020-03-16 ENCOUNTER — APPOINTMENT (OUTPATIENT)
Dept: PHYSICAL THERAPY | Age: 80
End: 2020-03-16
Payer: MEDICARE

## 2020-03-17 ENCOUNTER — HOSPITAL ENCOUNTER (OUTPATIENT)
Dept: PHYSICAL THERAPY | Age: 80
Discharge: HOME | End: 2020-03-17
Payer: MEDICARE

## 2020-03-17 PROCEDURE — 97110 THERAPEUTIC EXERCISES: CPT

## 2020-03-17 NOTE — PROGRESS NOTES
Efren   : 1940  Primary: Jean Banda Medicare Advantage  Secondary:  [address] at [address], [address]  [phone]   [phone]      OUTPATIENT PHYSICAL THERAPY: Daily Treatment Note 3/17/2020  ICD-10: Treatment Diagnosis: Pain in left shoulder (M25.512)                Treatment Diagnosis 2: General Muscle Weakness (M62.81)                Treatment Diagnosis 3: Stiffness in Joint Right (M25.611)        Precautions: Left Reverse TSA: No internal rotation or extension  Allergies: Clonidine and Simvastatin   TREATMENT PLAN:  Effective Dates: 3/9/2020 TO 2020 (60 days). Frequency/Duration: 2 times a week for 60 Day(s)    NO excessive stretching, lifting or pushing off with Left Arm to avoid acromial stress fracture. 2/10/2020 New order: continue PT full motion full strength MEDICAL/REFERRING DIAGNOSIS:  Left shoulder pain [M25.512]   DATE OF ONSET: Patient reports progressive left shoulder pain after ORIF Humerus and reverse TSA left on 2019  REFERRING PHYSICIAN: Neo Goyal MD MD Orders: Evaluate and Treat   Return MD Appointment: 4 weeks       Pre-treatment Symptoms/Complaints:  Patient reports some improvement however frustrated he cant reach above shoulder level      Pain: Initial: Pain Intensity 1: 0  Post Session:  0/10    Medications Last Reviewed:  3/17/2020  Updated Objective Findings:  Improved functional strength in available range   TREATMENT:   THERAPEUTIC EXERCISE: (54 minutes):  Exercises per grid below to improve mobility, strength, balance, and dynamic movement of back - generalized to improve functional bending, lifting, carrying, reaching, and overhead activites. Required moderate visual, verbal and tactile cues to promote proper body posture, promote proper body mechanics and promote proper body breathing techniques.   Progressed resistance, range, repetitions, and complexity of movement as indicated. Date:  3/13/2020 Date:  3-17-20 Date:  3/10/20   Activity/Exercise Parameters Parameters Parameters   UBE X 8 mins 4 fwd 4 Rev level 4 4/4 level 5 8 minutes level 4 4/4   Codmans 4 min in all directions  2 min ---   Supine AAROM flexion with right UE 20x wand X 20 2 x 10 reclined position 30deg   Tricep Extension 3x10 blue Blue 3 x 10 3x10 blue   Bicep curl 3x10 4 lb wt. s  4 lbs 2 x 10  5 lbs 1 x 10 3x10 4 lbs   Radha X 5 mins  6 minutes X 5 minutes   Light ball roll on table Seated blue ball on floor x 4 mins  Seated blue x 15 Seated blue ball on floor x 30 reps    Wall ladder Level 20 x 10 reps   10 x 5 sec 10x 5sec   Row 3x10 reps blue Blue 3 x 10 2 x 15 blue   Low Row 3x10 reps blue Green 3 x 10 2 x 15 green   Ceiling punch supine Supine with wand x 20 reps  Semi reclined 2 x 10  1 lb 2 x 10   Semi reclined 3 x 10 1 lb  15deg   AROM Scaption supine Supine x 20  Sitting 2 x 10    Semi reclined 2 x 10 1 lb 15deg     ER  IR 3x10 red  IR red x 20  ER yellow x 20 IR green 3 x 10  ER yellow 3 x 12   Bent over Flexion Abduction 2x10 each 2#  2x10 each 1#   AAROM with eccentric lowering  Seated reclined 2 x 10 Seated 3 x 5 Semi reclined 2 x 10 30deg   T Band Flexion Abduction 3x10 yellow Yellow 2 x 10 2x10 yellow   Swiss ball wall press 2x10   2x10   Push press bands ------- Yellow 3 x 10 Yellow 2 x 10   Ball/wall 2x10 wash cloth up & down & side to side  2 x 15 1 x 15   Internal rotation stretch  Strap x 5      Patient also instructed in correct posture and precautions    MANUAL THERAPY: (15 minutes): Joint mobilization and Soft tissue mobilization was utilized and necessary because of the patient's restricted joint motion, loss of articular motion and restricted motion of soft tissue   PROM to left shoulder as per MD guidelines   Gentle distraction and oscillation     MODALITIES: (0 minutes):      Not today     HEP: As above; handouts given to patient for all exercises.     Treatment/Session Summary: · Response to Treatment: difficulty with internal rotation stretch  · Baseline vitals (1/9/2020): BP:122/81 , HR: 88, SpO: 95  · Communication/Consultation:  None today  · Equipment provided today:  None today  · Recommendations/Intent for next treatment session: Next visit will focus on progression of ROM per physician guidlines.     Total Treatment Billable Duration:  54 minutes  PT Patient Time In/Time Out  Time In: 1430  Time Out: 1529  Jeniffer Gracia, PTA

## 2020-03-18 NOTE — PROGRESS NOTES
Patient has been notified that all appointments are cancelled effective 3/19/2020 until at least 4/1/2020 due to COVID-19 state mandates.     SEAN VernonT

## 2020-03-24 ENCOUNTER — APPOINTMENT (OUTPATIENT)
Dept: PHYSICAL THERAPY | Age: 80
End: 2020-03-24
Payer: MEDICARE

## 2020-03-24 ENCOUNTER — HOSPITAL ENCOUNTER (OUTPATIENT)
Dept: PHYSICAL THERAPY | Age: 80
Discharge: HOME | End: 2020-03-24
Payer: MEDICARE

## 2020-03-24 PROCEDURE — 97110 THERAPEUTIC EXERCISES: CPT

## 2020-03-24 NOTE — PROGRESS NOTES
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 Frequency/Duration: 2 times a week for 60 Day(s)    NO excessive stretching, lifting or pushing off with Left Arm to avoid acromial stress fracture. 2/10/2020 New order: continue PT full motion full strength MEDICAL/REFERRING DIAGNOSIS:  Left shoulder pain [M25.512]   DATE OF ONSET: Patient reports progressive left shoulder pain after ORIF Humerus and reverse TSA left on 2019  REFERRING PHYSICIAN: Rolo Jefferson MD MD Orders: Evaluate and Treat   Return MD Appointment: 4 weeks       Pre-treatment Symptoms/Complaints:  Patient reports increased soreness from activity, very slow progression, still weak      Pain: Initial: Pain Intensity 1: 2  Pain Location 1: Shoulder  Pain Orientation 1: Left  Pain Intervention(s) 1: Exercise  Post Session:  0/10    Medications Last Reviewed:  3/24/2020  Updated Objective Findings:  Improved functional strength in available range   TREATMENT:   THERAPEUTIC EXERCISE: (55 minutes):  Exercises per grid below to improve mobility, strength, balance, and dynamic movement of back - generalized to improve functional bending, lifting, carrying, reaching, and overhead activites.   Required moderate visual, verbal and tactile cues to promote proper body posture, promote proper body mechanics and promote proper body breathing techniques. Progressed resistance, range, repetitions, and complexity of movement as indicated. Date:  3/13/2020 Date:  3-17-20 Date:  3/24/20   Activity/Exercise Parameters Parameters Parameters   UBE X 8 mins 4 fwd 4 Rev level 4 4/4 level 5 8 minutes level 4 4/4   Codmans 4 min in all directions  2 min ---   Supine AAROM flexion with right UE 20x wand X 20 2 x 10 reclined position 30deg   Tricep Extension 3x10 blue Blue 3 x 10 3x10 black   Bicep curl 3x10 4 lb wt. s  4 lbs 2 x 10  5 lbs 1 x 10 3x10 5 lbs   Radha X 5 mins  6 minutes X 5 minutes   Light ball roll on table Seated blue ball on floor x 4 mins  Seated blue x 15 Seated blue ball on floor x 30 reps    Wall ladder Level 20 x 10 reps   10 x 5 sec 10x 5sec   Row 3x10 reps blue Blue 3 x 10 3x10 Black   Low Row 3x10 reps blue Green 3 x 10 3x10 Blue   Ceiling punch supine Supine with wand x 20 reps  Semi reclined 2 x 10  1 lb 2 x 10   Semi reclined 3 x 10 1 lb  15deg   AROM Scaption supine Supine x 20  Sitting 2 x 10    Semi reclined 3 x 10 1 lb 15deg     ER  IR 3x10 red  IR red x 20  ER yellow x 20 IR green 3 x 10  ER yellow 3 x 12   Bent over Flexion Abduction 2x10 each 2#  2x10 each 2#   AAROM with eccentric lowering  Seated reclined 2 x 10 Seated 3 x 5 Semi reclined 2 x 10 30deg   T Band Flexion Abduction 3x10 yellow Yellow 2 x 10 3x10 yellow   Swiss ball wall press 2x10      Push press bands ------- Yellow 3 x 10 red 3 x 10   Ball/wall 2x10 wash cloth up & down & side to side  2 x 15 2 x 10 left wall roll   Internal rotation stretch  Strap x 5 Strap x 5     Patient also instructed in correct posture and precautions    MANUAL THERAPY: (0 minutes): Joint mobilization and Soft tissue mobilization was utilized and necessary because of the patient's restricted joint motion, loss of articular motion and restricted motion of soft tissue   PROM to left shoulder as per MD guidelines  NOT TODAY   Gentle distraction and oscillation NOT TODAY    MODALITIES: (0 minutes):      Not today     HEP: As above; handouts given to patient for all exercises. Treatment/Session Summary:    · Response to Treatment: Patient slowly improving with strength, remains limited to below shoulder level. · Baseline vitals (1/9/2020): BP:122/81 , HR: 88, SpO: 95  · Communication/Consultation:  None today  · Equipment provided today:  None today  · Recommendations/Intent for next treatment session: Next visit will focus on progression of ROM per physician guidlines.     Total Treatment Billable Duration:  55 minutes  PT Patient Time In/Time Out  Time In: 3173  Time Out: [address]ester, [place]

## 2020-03-26 ENCOUNTER — APPOINTMENT (OUTPATIENT)
Dept: PHYSICAL THERAPY | Age: 80
End: 2020-03-26
Payer: MEDICARE

## 2020-03-26 ENCOUNTER — HOSPITAL ENCOUNTER (OUTPATIENT)
Dept: PHYSICAL THERAPY | Age: 80
Discharge: HOME | End: 2020-03-26
Payer: MEDICARE

## 2020-03-26 PROCEDURE — 97110 THERAPEUTIC EXERCISES: CPT

## 2020-03-26 PROCEDURE — 97140 MANUAL THERAPY 1/> REGIONS: CPT

## 2020-03-26 NOTE — PROGRESS NOTES
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 Frequency/Duration: 2 times a week for 60 Day(s)    NO excessive stretching, lifting or pushing off with Left Arm to avoid acromial stress fracture. 2/10/2020 New order: continue PT full motion full strength MEDICAL/REFERRING DIAGNOSIS:  Left shoulder pain [M25.512]   DATE OF ONSET: Patient reports progressive left shoulder pain after ORIF Humerus and reverse TSA left on 2019  REFERRING PHYSICIAN: Shamika Reed MD MD Orders: Evaluate and Treat   Return MD Appointment: 4 weeks       Pre-treatment Symptoms/Complaints:  Patient reports a little less soreness. Pain: Initial: Pain Intensity 1: 1  Pain Location 1: Shoulder  Pain Orientation 1: Left  Pain Intervention(s) 1: Exercise  Post Session:  0/10    Medications Last Reviewed:  3/26/2020  Updated Objective Findings:  Improved functional strength in available range   TREATMENT:   THERAPEUTIC EXERCISE: (47 minutes):  Exercises per grid below to improve mobility, strength, balance, and dynamic movement of back - generalized to improve functional bending, lifting, carrying, reaching, and overhead activites. Required moderate visual, verbal and tactile cues to promote proper body posture, promote proper body mechanics and promote proper body breathing techniques.   Progressed resistance, range, repetitions, and complexity of movement as indicated. Date:  3/26/2020 Date:  3-17-20 Date:  3/24/20   Activity/Exercise Parameters Parameters Parameters   UBE X 8 mins 4 fwd 4 Rev level 4 4/4 level 5 8 minutes level 4 4/4   Codmans --- 2 min ---   Supine AAROM flexion with right UE 20x wand reclined position 45deg X 20 2 x 10 reclined position 30deg   Tricep Extension  Blue 3 x 10 3x10 black   Bicep curl 3x10 5 lb wt. s  4 lbs 2 x 10  5 lbs 1 x 10 3x10 5 lbs   Radha X 3 mins  6 minutes X 5 minutes   Light ball roll on table  Seated blue x 15 Seated blue ball on floor x 30 reps    Wall ladder  10x 5sec 10 x 5 sec 10x 5sec   Row 3x10 reps blue Blue 3 x 10 3x10 Black   Low Row 3x10 reps blue Green 3 x 10 3x10 Blue   Ceiling punch supine Semi reclined 2 x 10  45deg Semi reclined 2 x 10  1 lb 2 x 10   Semi reclined 3 x 10 1 lb  15deg   AROM Scaption supine Supine x 30   25 deg Sitting 2 x 10    Semi reclined 3 x 10 1 lb 15deg     ER  IR IR green 3 x 10  ER yellow 3 x 12 IR red x 20  ER yellow x 20 IR green 3 x 10  ER yellow 3 x 12   Bent over Flexion Abduction   2x10 each 2#   AAROM with eccentric lowering  Semi reclined 2 x 10 45deg Seated 3 x 5 Semi reclined 2 x 10 30deg   T Band Flexion Abduction 3x10 yellow Yellow 2 x 10 3x10 yellow   Swiss ball wall press      Push press bands red 3 x 10 Yellow 3 x 10 red 3 x 10   Ball/wall 2 x 10 left wall roll 2 x 15 2 x 10 left wall roll   Internal rotation stretch Strap x 5 Strap x 5 Strap x 5     Patient also instructed in correct posture and precautions    MANUAL THERAPY: ( 8 minutes): Joint mobilization and Soft tissue mobilization was utilized and necessary because of the patient's restricted joint motion, loss of articular motion and restricted motion of soft tissue   PROM to left shoulder as per MD guidelines     Gentle distraction and oscillation NOT TODAY    MODALITIES: (0 minutes):      Not today     HEP: As above; handouts given to patient for all exercises. Treatment/Session Summary:    · Response to Treatment: Patient had some improvement with AROM with gravity lessoned   · Baseline vitals (1/9/2020): BP:122/81 , HR: 88, SpO: 95  · Communication/Consultation:  None today  · Equipment provided today:  None today  · Recommendations/Intent for next treatment session: Next visit will focus on progression of ROM per physician guidlines.     Total Treatment Billable Duration:  55 minutes  PT Patient Time In/Time Out  Time In: 1430  Time Out: [facility],[address], PT

## 2020-03-31 ENCOUNTER — APPOINTMENT (OUTPATIENT)
Dept: PHYSICAL THERAPY | Age: 80
End: 2020-03-31
Payer: MEDICARE

## 2020-03-31 ENCOUNTER — HOSPITAL ENCOUNTER (OUTPATIENT)
Dept: PHYSICAL THERAPY | Age: 80
Discharge: HOME | End: 2020-03-31
Payer: MEDICARE

## 2020-03-31 PROCEDURE — 97140 MANUAL THERAPY 1/> REGIONS: CPT

## 2020-03-31 PROCEDURE — 97110 THERAPEUTIC EXERCISES: CPT

## 2020-03-31 NOTE — PROGRESS NOTES
Will Free  : 1940  Primary: Robert Trujillo [employer] Advantage  Secondary:  [address] at [address], [address]  [phone]   [phone]      OUTPATIENT PHYSICAL THERAPY: Daily Treatment Note 3/31/2020  ICD-10: Treatment Diagnosis: Pain in left shoulder (M25.512)                Treatment Diagnosis 2: General Muscle Weakness (M62.81)                Treatment Diagnosis 3: Stiffness in Joint Right (M25.611)        Precautions: Left Reverse TSA: No internal rotation or extension  Allergies: Clonidine and Simvastatin   TREATMENT PLAN:  Effective Dates: 3/9/2020 TO 2020 (60 days). Frequency/Duration: 2 times a week for 60 Day(s)    NO excessive stretching, lifting or pushing off with Left Arm to avoid acromial stress fracture. 2/10/2020 New order: continue PT full motion full strength MEDICAL/REFERRING DIAGNOSIS:  Left shoulder pain [M25.512]   DATE OF ONSET: Patient reports progressive left shoulder pain after ORIF Humerus and reverse TSA left on 2019  REFERRING PHYSICIAN: Ailin Landrum.MD MCKINNEY Orders: Evaluate and Treat   Return MD Appointment: 4 weeks       Pre-treatment Symptoms/Complaints:  Patient reports doing better this week. Pain: Initial: Pain Intensity 1: 0  Pain Orientation 1: Left  Pain Intervention(s) 1: Exercise  Post Session:  0/10    Medications Last Reviewed:  3/31/2020  Updated Objective Findings:  Improved functional strength in available range   TREATMENT:   THERAPEUTIC EXERCISE: (46 minutes):  Exercises per grid below to improve mobility, strength, balance, and dynamic movement of back - generalized to improve functional bending, lifting, carrying, reaching, and overhead activites. Required mild visual, verbal and tactile cues to promote proper body posture, promote proper body mechanics and promote proper body breathing techniques.   Progressed resistance, range, repetitions, and complexity of movement as indicated. Date:  3/26/2020 Date:  3/31/2020 Date:  3/24/20   Activity/Exercise Parameters Parameters Parameters   UBE X 8 mins 4 fwd 4 Rev level 4 4/4 level 5 8 minutes level 4 4/4   Codmans --- --- ---   Supine AAROM flexion with right UE 20x wand reclined position 45deg 20x wand reclined position 45deg 2 x 10 reclined position 30deg   Tricep Extension  Black 3 x 10 3x10 black   Bicep curl 3x10 5 lb wt. s  5 lbs 3 x 10 3x10 5 lbs   Pulley X 4 mins  4 minutes X 5 minutes   Light ball roll on table   Seated blue ball on floor x 30 reps    Wall ladder  10x 5sec 10 x 5 sec 10x 5sec   Row 3x10 reps blue Black 3 x 10 3x10 Black   Low Row 3x10 reps blue Black 3 x 10 3x10 Blue   Ceiling punch supine Semi reclined 2 x 10  45deg Semi reclined 2 x 10  45deg   Semi reclined 3 x 10 1 lb  15deg   AROM Scaption supine Supine x 30   25 deg 25deg 3x10    Semi reclined 3 x 10 1 lb 15deg     ER  IR IR green 3 x 10  ER yellow 3 x 12 IR blue 3x10  ER red x 20 IR green 3 x 10  ER yellow 3 x 12   Bent over Flexion Abduction   2x10 each 2#   AAROM with eccentric lowering  Semi reclined 2 x 10 45deg Seated 4x 5 Semi reclined 2 x 10 30deg   T Band Flexion Abduction 3x10 yellow Red 2 x 10 3x10 yellow   Swiss ball wall press      Push press bands red 3 x 10 Seated green 3 x 10 red 3 x 10   Ball/wall 2 x 10 left wall roll 3x10 left wall 2 x 10 left wall roll   Internal rotation stretch Strap x 5 Strap x 5 Strap x 5     Patient also instructed in correct posture and precautions    MANUAL THERAPY: ( 8 minutes): Joint mobilization and Soft tissue mobilization was utilized and necessary because of the patient's restricted joint motion, loss of articular motion and restricted motion of soft tissue   PROM to left shoulder as per MD guidelines     Gentle distraction and oscillation NOT TODAY    MODALITIES: (0 minutes):      Not today     HEP: As above; handouts given to patient for all exercises.     Treatment/Session Summary: · Response to Treatment: Patient continuing to show improvement with strength, able to complete 3x10 left ball roll on wall. · Baseline vitals (1/9/2020): BP:122/81 , HR: 88, SpO: 95  · Communication/Consultation:  None today  · Equipment provided today:  None today  · Recommendations/Intent for next treatment session: Next visit will focus on progression of ROM per physician guidlines.     Total Treatment Billable Duration:  54 minutes  PT Patient Time In/Time Out  Time In: 1430  Time Out: [address]acho PT

## 2020-04-02 ENCOUNTER — HOSPITAL ENCOUNTER (OUTPATIENT)
Dept: PHYSICAL THERAPY | Age: 80
Discharge: HOME | End: 2020-04-02
Payer: MEDICARE

## 2020-04-02 PROCEDURE — 97110 THERAPEUTIC EXERCISES: CPT

## 2020-04-02 PROCEDURE — 97140 MANUAL THERAPY 1/> REGIONS: CPT

## 2020-04-02 NOTE — PROGRESS NOTES
Woodrow Mon  : 1940  Primary: Michelle Banda Medicare Advantage  Secondary:  [address] at [address], [address]  [phone]   [phone]      OUTPATIENT PHYSICAL THERAPY: Daily Treatment Note 2020  ICD-10: Treatment Diagnosis: Pain in left shoulder (M25.512)                Treatment Diagnosis 2: General Muscle Weakness (M62.81)                Treatment Diagnosis 3: Stiffness in Joint Right (M25.611)        Precautions: Left Reverse TSA: No internal rotation or extension  Allergies: Clonidine and Simvastatin   TREATMENT PLAN:  Effective Dates: 3/9/2020 TO 2020 (60 days). Frequency/Duration: 2 times a week for 60 Day(s)    NO excessive stretching, lifting or pushing off with Left Arm to avoid acromial stress fracture. 2/10/2020 New order: continue PT full motion full strength MEDICAL/REFERRING DIAGNOSIS:  Left shoulder pain [M25.512]   DATE OF ONSET: Patient reports progressive left shoulder pain after ORIF Humerus and reverse TSA left on 2019  REFERRING PHYSICIAN: Carol Boogie MD MD Orders: Evaluate and Treat   Return MD Appointment: 4 weeks       Pre-treatment Symptoms/Complaints:  Patient reports being able to lift left arm higher      Pain: Initial: Pain Intensity 1: 0  Pain Orientation 1: Left  Pain Intervention(s) 1: Exercise  Post Session:  0/10    Medications Last Reviewed:  2020  Updated Objective Findings:  AROM Left forward elevation   TREATMENT:   THERAPEUTIC EXERCISE: (46 minutes):  Exercises per grid below to improve mobility, strength, balance, and dynamic movement of back - generalized to improve functional bending, lifting, carrying, reaching, and overhead activites. Required mild visual, verbal and tactile cues to promote proper body posture, promote proper body mechanics and promote proper body breathing techniques.   Progressed resistance, range, repetitions, and complexity of movement as indicated. Date:  3/26/2020 Date:  3/31/2020 Date:  4/2/20   Activity/Exercise Parameters Parameters Parameters   UBE X 8 mins 4 fwd 4 Rev level 4 4/4 level 5 8 minutes level 6 4/4   Codmans --- --- ---   Supine AAROM flexion with right UE 20x wand reclined position 45deg 20x wand reclined position 45deg 2 x 10 reclined position 30deg   Tricep Extension  Black 3 x 10 3x12 black   Bicep curl 3x10 5 lb wt. s  5 lbs 3 x 10 3x10 6 lbs   Pulley X 4 mins  4 minutes X 4 minutes   Wall ladder  10x 5sec 10 x 5 sec 10x 5sec   Row 3x10 reps blue Black 3 x 10 3x10 Black   Low Row 3x10 reps blue Black 3 x 10 3x10 Black   Ceiling punch supine Semi reclined 2 x 10  45deg Semi reclined 2 x 10  45deg   ---   AROM Scaption supine Supine x 30   25 deg 25deg 3x10    25deg 3x10     ER  IR IR green 3 x 10  ER yellow 3 x 12 IR blue 3x10  ER red x 20 IR blue 3 x 10  ER Red 2x10   Bent over Flexion Abduction      AAROM with eccentric lowering  Semi reclined 2 x 10 45deg Seated 4x 5 Seated 4x 5   T Band Flexion Abduction 3x10 yellow Red 2 x 10 2x10 red   Swiss ball wall press      Push press bands red 3 x 10 Seated green 3 x 10 Seated green 3 x 10   Ball/wall 2 x 10 left wall roll 3x10 left wall 3x10 left wall   Internal rotation stretch Strap x 5 Strap x 5 Strap x 5     Patient also instructed in correct posture and precautions    MANUAL THERAPY: ( 8 minutes): Joint mobilization and Soft tissue mobilization was utilized and necessary because of the patient's restricted joint motion, loss of articular motion and restricted motion of soft tissue   PROM to left shoulder as per MD guidelines     Gentle distraction and oscillation NOT TODAY    MODALITIES: (0 minutes):      Not today     HEP: As above; handouts given to patient for all exercises. Treatment/Session Summary:    · Response to Treatment: Patient was challenged with increase, no c/o pain.   · Baseline vitals (1/9/2020): BP:122/81 , HR: 88, SpO: 95  · Communication/Consultation: None today  · Equipment provided today:  None today  · Recommendations/Intent for next treatment session: Next visit will focus on progression of ROM per physician guidlines.     Total Treatment Billable Duration:  54 minutes  PT Patient Time In/Time Out  Time In: 7194  Time Out: 280 [facility], PT

## 2020-04-07 ENCOUNTER — HOSPITAL ENCOUNTER (OUTPATIENT)
Dept: PHYSICAL THERAPY | Age: 80
Discharge: HOME | End: 2020-04-07
Payer: MEDICARE

## 2020-04-07 PROCEDURE — 97140 MANUAL THERAPY 1/> REGIONS: CPT

## 2020-04-07 PROCEDURE — 97110 THERAPEUTIC EXERCISES: CPT

## 2020-04-07 NOTE — PROGRESS NOTES
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 Frequency/Duration: 2 times a week for 60 Day(s)    NO excessive stretching, lifting or pushing off with Left Arm to avoid acromial stress fracture. 2/10/2020 New order: continue PT full motion full strength MEDICAL/REFERRING DIAGNOSIS:  Left shoulder pain [M25.512]   DATE OF ONSET: Patient reports progressive left shoulder pain after ORIF Humerus and reverse TSA left on 2019  REFERRING PHYSICIAN: Lauren Thomas MD MD Orders: Evaluate and Treat   Return MD Appointment: 4 weeks       Pre-treatment Symptoms/Complaints:  Patient reports no pain at rest and can sleep okay unless he rolls over on to L shoulder -main difficulty is inability to raise L arm above shoulder height but he can brush teeth with L hand       Pain: Initial: Pain Intensity 1: 0  Pain Orientation 1: Left  Pain Intervention(s) 1: Exercise, Cold pack  Post Session:  0/10    Medications Last Reviewed:  2020  Updated Objective Findings:  AROM Left forward elevation   TREATMENT:   THERAPEUTIC EXERCISE: (45 minutes):  Exercises per grid below to improve mobility, strength, balance, and dynamic movement of back - generalized to improve functional bending, lifting, carrying, reaching, and overhead activites.   Required mild visual, verbal and tactile cues to promote proper body posture, promote proper body mechanics and promote proper body breathing techniques. Progressed resistance, range, repetitions, and complexity of movement as indicated.      Date:  4-7-20 Date:  3/31/2020 Date:  4/2/20   Activity/Exercise Parameters Parameters Parameters   UBE  4/4 level 5 8 minutes level 6 4/4   Codmans --- --- ---   Supine AAROM flexion with right UE 2 x 10 and 2 x 10 with wand and 3 lbs  20x wand reclined position 45deg 2 x 10 reclined position 30deg   Tricep Extension  Black 3 x 10 3x12 black   Bicep curl X 20 with blue band 5 lbs 3 x 10 3x10 6 lbs   Pulley 3 minutes in flexion/3 minutes in scaption 4 minutes X 4 minutes   Wall ladder  x 8 with 5 second hold  10 x 5 sec 10x 5sec   Row X 20 reps with green band Black 3 x 10 3x10 Black   Low Row X 20 reps with green band Black 3 x 10 3x10 Black   Ceiling punch supine Semi reclined 2 x 10 with wand and 3 lb weight  Semi reclined 2 x 10  45deg   ---   AROM Scaption supine X 10 with no weight, x 10 with 1, 1.5, and 2 lb weights  25deg 3x10    25deg 3x10     ER  IR IR green 2 x 10  ER yellow 2 x10 IR blue 3x10  ER red x 20 IR blue 3 x 10  ER Red 2x10   Bent over Flexion Abduction      AAROM with eccentric lowering  3 x 10 while standing -therapist assist to get past 90 degrees Seated 4x 5 Seated 4x 5   T Band Flexion Abduction 2 x 20 with  Yellow band Red 2 x 10 2x10 red   Swiss ball wall press      Push press bands  Seated green 3 x 10 Seated green 3 x 10   Ball/wall Wall pushups x 20 3x10 left wall 3x10 left wall   Ext rot stretch X 5 with cane     Internal rotation stretch  Strap x 5 Strap x 5     Patient also instructed in correct posture and precautions    MANUAL THERAPY: ( 12 minutes): Joint mobilization and Soft tissue mobilization was utilized and necessary because of the patient's restricted joint motion, loss of articular motion and restricted motion of soft tissue   PROM to left shoulder after soft tissue mobilization to L deltoid /hardened skin area while supine as per MD guidelines     Gentle distraction and oscillation NOT TODAY    MODALITIES: (8 minutes):      Not today     Cold pack to L shoulder while seated for inflammation after exercises x 8 minutes    HEP: As above; handouts given to patient for all exercises. Treatment/Session Summary:    · Response to Treatment: Patient was pushed with L shoulder anterior deltoid/flexion work in supine and standing /with therapist assist in standing -including theraband work in flexion   · Baseline vitals (1/9/2020): BP:122/81 , HR: 88, SpO: 95  · Communication/Consultation:  None today  · Equipment provided today:  None today  · Recommendations/Intent for next treatment session: Next visit will focus on progression of ROM/strengthening as tolerated  per physician guidlines.     Total Treatment Billable Duration:  57 minutes  PT Patient Time In/Time Out  Time In: 4595  Time Out: Nickolas Madrid PT

## 2020-04-09 ENCOUNTER — APPOINTMENT (OUTPATIENT)
Dept: PHYSICAL THERAPY | Age: 80
End: 2020-04-09
Payer: MEDICARE

## 2020-04-14 ENCOUNTER — APPOINTMENT (OUTPATIENT)
Dept: PHYSICAL THERAPY | Age: 80
End: 2020-04-14
Payer: MEDICARE

## 2020-04-15 ENCOUNTER — HOSPITAL ENCOUNTER (OUTPATIENT)
Dept: PHYSICAL THERAPY | Age: 80
Discharge: HOME | End: 2020-04-15
Payer: MEDICARE

## 2020-04-15 PROCEDURE — 97110 THERAPEUTIC EXERCISES: CPT

## 2020-04-15 PROCEDURE — 97140 MANUAL THERAPY 1/> REGIONS: CPT

## 2020-04-15 NOTE — PROGRESS NOTES
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 Frequency/Duration: 2 times a week for 60 Day(s)    NO excessive stretching, lifting or pushing off with Left Arm to avoid acromial stress fracture.     2/10/2020 New order: continue PT full motion full strength MEDICAL/REFERRING DIAGNOSIS:  Left shoulder pain [M25.512]   DATE OF ONSET: Patient reports progressive left shoulder pain after ORIF Humerus and reverse TSA left on 2019  REFERRING PHYSICIAN: Carole Almeida MD MD Orders: Evaluate and Treat   Return MD Appointment: 4 weeks       Pre-treatment Symptoms/Complaints:  Patient reports no pain at rest in shoulder but noticed increased R foot pain and he is concerned he may have over done some things with his old back injury  -main difficulty is inability to raise L arm above shoulder height but he can brush teeth with L hand       Pain: Initial: Pain Intensity 1: 0(no pain at rest )  Pain Location 1: Shoulder  Pain Orientation 1: Left  Pain Intervention(s) 1: Exercise, Cold pack  Post Session:  0/10    Medications Last Reviewed:  4/15/2020  Updated Objective Findings:  AROM Left forward elevation   TREATMENT:   THERAPEUTIC EXERCISE: (40 minutes):  Exercises per grid below to improve mobility, strength, balance, and dynamic movement of back - generalized to improve functional bending, lifting, carrying, reaching, and overhead activites. Required mild visual, verbal and tactile cues to promote proper body posture, promote proper body mechanics and promote proper body breathing techniques. Progressed resistance, range, repetitions, and complexity of movement as indicated.      Date:  4-7-20 Date:  4-15-20 Date:  4/2/20   Activity/Exercise Parameters Parameters Parameters   UBE  5 minutes forward/5 minutes backward at level 6 8 minutes level 6 4/4   Codmans --- --- ---   Supine AAROM flexion with right UE 2 x 10 and 2 x 10 with wand and 3 lbs   2 x 10 reclined position 30deg   Tricep Extension   3x12 black   Bicep curl X 20 with blue band X 20 with yellow band 3x10 6 lbs   Radha 3 minutes in flexion/3 minutes in scaption 3 minutes in flexion/3 minutes in scaption X 4 minutes   Wall ladder  x 8 with 5 second hold   10x 5sec   Row X 20 reps with green band 2 x 10 with green band 3x10 Black   Low Row X 20 reps with green band 2 x 10 with green band 3x10 Black   Ceiling punch supine Semi reclined 2 x 10 with wand and 3 lb weight   ---   AROM Scaption supine X 10 with no weight, x 10 with 1, 1.5, and 2 lb weights   25deg 3x10     ER  IR IR green 2 x 10  ER yellow 2 x10 IR- x 20 with green band  ER x 20 with green band IR blue 3 x 10  ER Red 2x10   Bent over Flexion Abduction      AAROM with eccentric lowering  3 x 10 while standing -therapist assist to get past 90 degrees 4 x 10 while standing -therapist assist Seated 4x 5   T Band Flexion Abduction 2 x 20 with  Yellow band 2 x 20 with yellow band in flexion 2x10 red         Swiss ball wall press      Push press bands   Seated green 3 x 10   Ball/wall Wall pushups x 20 Wall pushups x 20 3x10 left wall   Ext rot stretch X 5 with cane     Internal rotation stretch   Strap x 5     Patient also instructed in correct posture and precautions    MANUAL THERAPY: ( 15 minutes): Joint mobilization and Soft tissue mobilization was utilized and necessary because of the patient's restricted joint motion, loss of articular motion and restricted motion of soft tissue   PROM to left shoulder after soft tissue mobilization to L deltoid /hardened skin area while seated as per MD guidelines     Gentle distraction and oscillation NOT TODAY    MODALITIES: (8 minutes):      Not today     Cold pack to L shoulder while seated for inflammation after exercises x 8 minutes    HEP: As above; handouts given to patient for all exercises. Treatment/Session Summary:    · Response to Treatment: Patient was pushed with L shoulder anterior deltoid/flexion work in  standing /with therapist assist  -including theraband work in flexion -pushing standing shoulder flexion/poatient can perform supine shoulder flexion  · Baseline vitals (1/9/2020): BP:122/81 , HR: 88, SpO: 95  · Communication/Consultation:  None today  · Equipment provided today:  None today  · Recommendations/Intent for next treatment session: Next visit will focus on progression of ROM/strengthening/over head work  as tolerated  per physician guidlines.     Total Treatment Billable Duration:  55 minutes  PT Patient Time In/Time Out  Time In: 1325  Time Out: 1015 Jaleel Champagne, PT

## 2020-04-16 ENCOUNTER — APPOINTMENT (OUTPATIENT)
Dept: PHYSICAL THERAPY | Age: 80
End: 2020-04-16
Payer: MEDICARE

## 2020-04-21 ENCOUNTER — HOSPITAL ENCOUNTER (OUTPATIENT)
Dept: PHYSICAL THERAPY | Age: 80
Discharge: HOME | End: 2020-04-21
Payer: MEDICARE

## 2020-04-21 PROCEDURE — 97140 MANUAL THERAPY 1/> REGIONS: CPT

## 2020-04-21 PROCEDURE — 97110 THERAPEUTIC EXERCISES: CPT

## 2020-04-21 NOTE — PROGRESS NOTES
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 Frequency/Duration: 2 times a week for 60 Day(s)    NO excessive stretching, lifting or pushing off with Left Arm to avoid acromial stress fracture. 2/10/2020 New order: continue PT full motion full strength MEDICAL/REFERRING DIAGNOSIS:  Left shoulder pain [M25.512]   DATE OF ONSET: Patient reports progressive left shoulder pain after ORIF Humerus and reverse TSA left on 2019  REFERRING PHYSICIAN: Fracisco Gallardo MD MD Orders: Evaluate and Treat   Return MD Appointment: 4 weeks       Pre-treatment Symptoms/Complaints:  Patient reports increased soreness this week secondary to increased activity driving riding . Pain: Initial: Pain Intensity 1: 1  Pain Location 1: Shoulder  Pain Orientation 1: Left  Pain Intervention(s) 1: Exercise  Post Session:  0/10    Medications Last Reviewed:  2020  Updated Objective Findings:  AROM Left forward elevation   TREATMENT:   THERAPEUTIC EXERCISE: (43 minutes):  Exercises per grid below to improve mobility, strength, balance, and dynamic movement of back - generalized to improve functional bending, lifting, carrying, reaching, and overhead activites.   Required mild visual, verbal and tactile cues to promote proper body posture, promote proper body mechanics and promote proper body breathing techniques. Progressed resistance, range, repetitions, and complexity of movement as indicated.      Date:  4-7-20 Date:  4-15-20 Date:  4/21/20   Activity/Exercise Parameters Parameters Parameters   UBE  5 minutes forward/5 minutes backward at level 6 8 minutes level 6 4/4   Codmans --- --- ---   Supine AAROM flexion with right UE 2 x 10 and 2 x 10 with wand and 3 lbs      Tricep Extension   3x12 black   Bicep curl X 20 with blue band X 20 with yellow band 3x10 6 lbs   Radha 3 minutes in flexion/3 minutes in scaption 3 minutes in flexion/3 minutes in scaption X 4 minutes   Wall ladder  x 8 with 5 second hold   10x 5sec   Row X 20 reps with green band 2 x 10 with green band 3x10 Black   Low Row X 20 reps with green band 2 x 10 with green band 3x10 Black   Ceiling punch supine Semi reclined 2 x 10 with wand and 3 lb weight   ---   AROM Scaption supine X 10 with no weight, x 10 with 1, 1.5, and 2 lb weights   30deg 3x10     ER  IR IR green 2 x 10  ER yellow 2 x10 IR- x 20 with green band  ER x 20 with green band IR blue 3 x 10  ER Red 2x10   AAROM with eccentric lowering  3 x 10 while standing -therapist assist to get past 90 degrees 4 x 10 while standing -therapist assist Seated 4x 5 1#   T Band Flexion Abduction 2 x 20 with  Yellow band 2 x 20 with yellow band in flexion 2x10 red   Flexion Abduction   2x10 2#    Swiss ball wall press      Push press bands   Stand green 3 x 10   Ball/wall Wall pushups x 20 Wall pushups x 20 3x10 left wall   Ext rot stretch X 5 with cane     Internal rotation stretch   Strap x 5     Patient also instructed in correct posture and precautions    MANUAL THERAPY: ( 10 minutes): Joint mobilization and Soft tissue mobilization was utilized and necessary because of the patient's restricted joint motion, loss of articular motion and restricted motion of soft tissue   PROM to left shoulder after soft tissue mobilization to L deltoid /hardened skin area while seated as per MD guidelines  Gentle distraction and oscillation NOT TODAY    MODALITIES: (8 minutes):      Not today     Cold pack to L shoulder while seated for inflammation after exercises x 8 minutes    HEP: As above; handouts given to patient for all exercises. Treatment/Session Summary:    · Response to Treatment: Patient was limited secondary to reported soreness, tenderness at palatable nodule  mid humerus remains. · Baseline vitals (1/9/2020): BP:122/81 , HR: 88, SpO: 95  · Communication/Consultation:  None today  · Equipment provided today:  None today  · Recommendations/Intent for next treatment session: Next visit will focus on progression of ROM/strengthening/over head work  as tolerated  per physician guidlines.     Total Treatment Billable Duration:  53 minutes  PT Patient Time In/Time Out  Time In: 4497  Time Out: [address] [facility], PT

## 2020-04-22 NOTE — PROGRESS NOTES
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 Frequency/Duration: 2 times a week for 60 Day(s) MEDICAL/REFERRING DIAGNOSIS:  Left shoulder pain [M25.512]   DATE OF ONSET: Patient reports progressive left shoulder pain after ORIF Humerus and reverse TSA left on 2019  REFERRING PHYSICIAN: Christopher Lundy MD MD Orders: Evaluate and Treat   Return MD Appointment: 4 weeks     INITIAL ASSESSMENT:  Mr. Leticia Alvarado is a [de-identified] y.o. male presenting to physical therapy with complaints of left shoulder pain, stiffness and weakness after a Reverse TSA 2019. He reports having an ORIF of left humerus for fracture and feels that the hardware failed leading to progressive pain and loss of ROM. Patient is eager to decrease pain and improve overall mobility. Patient presents with increased pain, decreased strength, decreased ROM, decreased flexibility, decreased activity tolerance, and overall impaired functional mobility. Patient is a good candidate for skilled intervention with services to include manual therapy, modalities as needed, therapeutic exercises, and activity modification. PROGRESS NOTE (2020):  Patient has been seen for 26 sessions of physical therapy from 2020 to 2020. Patient reports feeling 75% better with pain and daily activity.  Patient remains limited secondary to weakness and decreased ROM, unable to raise left upper extremity to shoulder level. Patient will benefit from continued skilled physical therapy to address remaining goals and deficits. PROBLEM LIST (Impacting functional limitations):  1. Decreased Strength  2. Decreased ADL/Functional Activities  3. Decreased Ambulation Ability/Technique  4. Increased Pain  5. Decreased Activity Tolerance  6. Decreased Flexibility/Joint Mobility INTERVENTIONS PLANNED: (Treatment may consist of any combination of the following)  1. Cold  2. Electrical Stimulation  3. Gait Training  4. Heat  5. Home Exercise Program (HEP)  6. Manual Therapy  7. Neuromuscular Re-education/Strengthening  8. Range of Motion (ROM)  9. Therapeutic Activites  10. Therapeutic Exercise/Strengthening  11. Ultrasound (US)      Short-Term Functional Goals: Time Frame: 1/9/2020 to 2/6/2020        1. Patient demonstrates independence with home exercise program without verbal cueing provided by therapist. GOAL MET  2. Patient will report no more than 5/10 pain at rest in order to demonstrate improved self pain control and tolerance. GOAL MET  3. Patient will be educated in and demonstrate improved upright posture including sitting and standing. GOAL MET  4. Patient will be educated in and demonstrate proper squat lift technique in order to reduce stress on left shoulder, improve safety, and reduce risk of injury. GOAL MET    Discharge Goals: Time Frame: 1/9/2020 to 3/9/2020  7. Patient will improve pain to 1/10 for improved tolerance to sleeping greater than six hours. GOAL MET  1. Patient will improve left shoulder flexion ROM to 120 degrees for increased ability to perform tasks above shoulder level. ON GOING  2. Patient will improve left shoulder strength to 4 to 4+/5 for ability to work around the house and fish. ON GOING  3.  Patient will improve  to Disabilities of the Arm, Shoulder and Hand (DASH) Questionnaire - Quick Version to 20/55 to demonstrate increased tolerance to activity. ON GOING       GOALS: (Goals have been discussed and agreed upon with patient.)    Outcome Measure: Tool Used: Disabilities of the Arm, Shoulder and Hand (DASH) Questionnaire - Quick Version  Score:  Initial: 44/55   35/55 (Date: 1/28/2020 )   31/55(Date: 2/18/2020)  30/55(Date: 2/18/2020)   Interpretation of Score: The DASH is designed to measure the activities of daily living in person's with upper extremity dysfunction or pain. Each section is scored on a 1-5 scale, 5 representing the greatest disability. The scores of each section are added together for a total score of 55.       Observation/Orthostatic Postural Assessment:          Rounded shoulders and increased protraction left scapula  Palpation:         Mild TTP at incision site  ROM:    AROM/PROM         Joint: Eval Date: 1/28/2020  Re-Assess Date: 1/28/2020 Re-Assess Date: 2/18/2020 Re-Assess Date: 3/3/2020 Re-Assess Date: 4/7/2020   ACTIVE ROM (standing) RIGHT LEFT LEFT LEFT LEFT LEFT   Shoulder Flexion 145 52 72 81 80 increased fatigue on test date 84   Shoulder Abduction 136 40 55 62 65 68   Shoulder Internal Rotation (Apley's) T12 45 by side 53 Left hip Left Hip Left Hip   Shoulder External Rotation (Apley's) C7 12 by side 45  To belt anterior Left Ear Occiput  Occiput    Elbow ROM [facility]   PASSIVE ROM (supine)         Shoulder Flexion  94 115 120 122 135   Shoulder Abduction  68 75 81 90 98   Shoulder Internal Rotation  NT 45 53 55 58   Shoulder External Rotation  35 63 @ 45 [email or web address] 78@60 83@60                                Strength:    Joint: Eval Date: 1/28/2020  Re-Assess Date: 1/28/2020 Re-Assess Date: 2/18/2020 Re-Assess Date: 3/3/2020 Re-Assess Date: 4/7/2020    RIGHT LEFT LEFT LEFT in available ROM LEFT in available ROM LEFT in available ROM   Shoulder Flexion 5/5 3-/5 3-/5 3+/5 3+/5 3+/5   Shoulder Abduction  (C5) 4+/5 3-/5 3-/5 3+/5 3+/5 3+/5   Shoulder Internal Rotation 5/5 NT 3/5 4/5 4/5 4+/5   Shoulder External Rotation 5/5 3/5 3/5 3+/5 3+/5 3+/5   Elbow Flexion  (C6) 5/5 4-/5 4/5 4/5 4+/5 4+/5   Elbow Extension (C7) 5/5 4/5 4/5 4/5 4+/5 4+/5                           Special Tests:  NT    Neurological Screen:    Radiating symptoms? No  Functional Mobility: Patient limited to activity at waist to chest    Medical Necessity:   Patient is expected to demonstrate progress in strength, range of motion, and functional technique to decrease assistance required with daily activity, increase independence with overhead reching, and improve safety during lifting. Patient demonstrates good rehab potential due to higher previous functional level. Reason for Services/Other Comments:  Patient demonstrates capacity to improve ROM and strength which will increase independence, increase safety, and allow for return to previous activity. Patient continues to require skilled intervention due to modification of therapeutic interventions to increase complexity of exercises . Rehabilitation Potential For Stated Goals: Good  Regarding Michele Escoto's therapy, I certify that the treatment plan above will be carried out by a therapist or under their direction. Thank you for this referral,  Adry Espinoza PT   MSR    Regarding Michele Escoto's therapy, I certify that the treatment plan above will be carried out by a therapist or under their direction.   Thank you for this referral,  Adry Espinoza PT MSR

## 2020-04-23 ENCOUNTER — APPOINTMENT (OUTPATIENT)
Dept: PHYSICAL THERAPY | Age: 80
End: 2020-04-23
Payer: MEDICARE

## 2020-04-28 ENCOUNTER — HOSPITAL ENCOUNTER (OUTPATIENT)
Dept: PHYSICAL THERAPY | Age: 80
Discharge: HOME | End: 2020-04-28
Payer: MEDICARE

## 2020-04-28 PROCEDURE — 97110 THERAPEUTIC EXERCISES: CPT

## 2020-04-28 NOTE — PROGRESS NOTES
Edis Piedra  : 1940  Primary: Aryan Banda Medicare Advantage  Secondary:  [address] at [address], [address]  [phone]   [phone]      OUTPATIENT PHYSICAL THERAPY: Daily Treatment Note 2020  ICD-10: Treatment Diagnosis: Pain in left shoulder (M25.512)                Treatment Diagnosis 2: General Muscle Weakness (M62.81)                Treatment Diagnosis 3: Stiffness in Joint Right (M25.611)        Precautions: Left Reverse TSA: No internal rotation or extension  Allergies: Clonidine and Simvastatin   TREATMENT PLAN:  Effective Dates: 3/9/2020 TO 2020 (60 days). Frequency/Duration: 2 times a week for 60 Day(s)    NO excessive stretching, lifting or pushing off with Left Arm to avoid acromial stress fracture. 2/10/2020 New order: continue PT full motion full strength MEDICAL/REFERRING DIAGNOSIS:  Left shoulder pain [M25.512]   DATE OF ONSET: Patient reports progressive left shoulder pain after ORIF Humerus and reverse TSA left on 2019  REFERRING PHYSICIAN: Shahana Tidwell MD MD Orders: Evaluate and Treat   Return MD Appointment: 4 weeks       Pre-treatment Symptoms/Complaints:  Patient reports no change with activity above shoulder level, \" I can do anything I want below shoulder level\". Pain: Initial: Pain Intensity 1: 0  Pain Location 1: Shoulder  Pain Orientation 1: Left  Pain Intervention(s) 1: Exercise  Post Session:  0/10    Medications Last Reviewed:  2020  Updated Objective Findings:  See Discharge summary   TREATMENT:   THERAPEUTIC EXERCISE: (53 minutes):  Exercises per grid below to improve mobility, strength, balance, and dynamic movement of back - generalized to improve functional bending, lifting, carrying, reaching, and overhead activites.   Required mild visual, verbal and tactile cues to promote proper body posture, promote proper body mechanics and promote proper body breathing techniques. Progressed resistance, range, repetitions, and complexity of movement as indicated. Date:  4/28/2020 Date:  4-15-20 Date:  4/21/20   Activity/Exercise Parameters Parameters Parameters   UBE 8 minutes level 6 4/4 5 minutes forward/5 minutes backward at level 6 8 minutes level 6 4/4   Codmans --- --- ---   Supine AAROM flexion with right UE 2 x 10 and 2 x 10 with wand and 3 lbs      Tricep Extension   3x12 black   Bicep curl x30 with blue band X 20 with yellow band 3x10 6 lbs   Radha 3 minutes in flexion/3 minutes in scaption 3 minutes in flexion/3 minutes in scaption X 4 minutes   Wall ladder  x 10 with 5 second hold   10x 5sec   Row X 30 reps with blue band 2 x 10 with green band 3x10 Black   Low Row X 30 reps with blue band 2 x 10 with green band 3x10 Black   AROM Scaption supine X 10 with no weight, x 10 with 1, 1.5, and 2 lb weights   30deg 3x10     ER  IR IR green 2 x 10  ER red 2 x10 IR- x 20 with green band  ER x 20 with green band IR blue 3 x 10  ER Red 2x10   AAROM with eccentric lowering  Seated 4x 5 1# 4 x 10 while standing -therapist assist Seated 4x 5 1#   T Band Flexion Abduction  2 x 20 with yellow band in flexion 2x10 red   Flexion Abduction 2x10 2#   2x10 2#    Swiss ball wall press x30     Push press bands Stand blue 3 x 10  Stand green 3 x 10   Ball/wall 3x10 left wall Wall pushups x 20 3x10 left wall   Ext rot stretch x5 wall     Internal rotation stretch Strap x 5  Strap x 5     Patient also instructed in correct posture and precautions    MANUAL THERAPY: (0 minutes): Joint mobilization and Soft tissue mobilization was utilized and necessary because of the patient's restricted joint motion, loss of articular motion and restricted motion of soft tissue    MODALITIES: (0 minutes):      Not today    HEP: As above; handouts given to patient for all exercises. Treatment/Session Summary:    · Response to Treatment: See Discharge summary.   · Baseline vitals (1/9/2020): BP:122/81 , HR: 88, SpO: 95  · Communication/Consultation:  Reviewed HEP  · Equipment provided today:  New bands  · Recommendations/Intent for next treatment session: Next visit will focus on progression of ROM/strengthening/over head work  as tolerated  per physician guidlines.     Total Treatment Billable Duration:  53 minutes  PT Patient Time In/Time Out  Time In: 1227  Time Out: 1113 Po Rodriguez, PT

## 2020-04-28 NOTE — THERAPY DISCHARGE
Bettina Boone  : 1940  Primary: Annita Saldivarjean pierre Medicare Advantage  Secondary:  [address] at [address], [address] [place] [address]  [phone]   [phone]       OUTPATIENT PHYSICAL [facility] 2020    ICD-10: Treatment Diagnosis: Pain in left shoulder (M25.512)                Treatment Diagnosis 2: General Muscle Weakness (M62.81)                Treatment Diagnosis 3: Stiffness in Joint Right (M25.611)      Precautions: Left Reverse TSA: No internal rotation or extension  Allergies: Clonidine and Simvastatin   TREATMENT PLAN:  Effective Dates: 3/9/2020 TO 2020 (60 days). Frequency/Duration: Discharge MEDICAL/REFERRING DIAGNOSIS:  Left shoulder pain [M25.512]   DATE OF ONSET: Patient reports progressive left shoulder pain after ORIF Humerus and reverse TSA left on 2019  REFERRING PHYSICIAN: Fracisco Gallardo MD MD Orders: Evaluate and Treat   Return MD Appointment: 4 weeks     INITIAL ASSESSMENT:  Mr. Mandy Griffith is a [de-identified] y.o. male presenting to physical therapy with complaints of left shoulder pain, stiffness and weakness after a Reverse TSA 2019. He reports having an ORIF of left humerus for fracture and feels that the hardware failed leading to progressive pain and loss of ROM. Patient is eager to decrease pain and improve overall mobility. Patient presents with increased pain, decreased strength, decreased ROM, decreased flexibility, decreased activity tolerance, and overall impaired functional mobility. Patient is a good candidate for skilled intervention with services to include manual therapy, modalities as needed, therapeutic exercises, and activity modification. DISCHARGE NOTE (2020):  Bettina Boone has been seen in physical therapy from 2020 to 2020 for 28 visits. Patient reports feeling 75% better with pain and daily activity.  Patient remains limited secondary to weakness and decreased ROM, unable to raise left upper extremity to shoulder level. Treatment has been discontinued at this time due to limited progress over the last 4 weeks and the patient wanting to continue with HEP only. The below goals were met prior to discharge. Some goals were not met due to limited progression of strength. Thank you for this referral.            PROBLEM LIST (Impacting functional limitations):  1. Decreased Strength  2. Decreased ADL/Functional Activities  3. Decreased Ambulation Ability/Technique  4. Increased Pain  5. Decreased Activity Tolerance  6. Decreased Flexibility/Joint Mobility INTERVENTIONS PLANNED: (Treatment may consist of any combination of the following)  1. Cold  2. Electrical Stimulation  3. Gait Training  4. Heat  5. Home Exercise Program (HEP)  6. Manual Therapy  7. Neuromuscular Re-education/Strengthening  8. Range of Motion (ROM)  9. Therapeutic Activites  10. Therapeutic Exercise/Strengthening  11. Ultrasound (US)      Short-Term Functional Goals: Time Frame: 1/9/2020 to 2/6/2020        1. Patient demonstrates independence with home exercise program without verbal cueing provided by therapist. GOAL MET  2. Patient will report no more than 5/10 pain at rest in order to demonstrate improved self pain control and tolerance. GOAL MET  3. Patient will be educated in and demonstrate improved upright posture including sitting and standing. GOAL MET  4. Patient will be educated in and demonstrate proper squat lift technique in order to reduce stress on left shoulder, improve safety, and reduce risk of injury. GOAL MET    Discharge Goals: Time Frame: 1/9/2020 to 3/9/2020  7. Patient will improve pain to 1/10 for improved tolerance to sleeping greater than six hours. GOAL MET  1. Patient will improve left shoulder flexion ROM to 120 degrees for increased ability to perform tasks above shoulder level. NOT MET  2.  Patient will improve left shoulder strength to 4 to 4+/5 for ability to work around the house and fish. NOT MET  3. Patient will improve  to Disabilities of the Arm, Shoulder and Hand (DASH) Questionnaire - Quick Version to 20/55 to demonstrate increased tolerance to activity. NOT MET       GOALS: (Goals have been discussed and agreed upon with patient.)    Outcome Measure: Tool Used: Disabilities of the Arm, Shoulder and Hand (DASH) Questionnaire - Quick Version  Score:  Initial: 44/55   35/55 (Date: 1/28/2020 )   31/55(Date: 2/18/2020)  30/55(Date: 3/3/2020)  31/55(Date: 4/28/2020)   Interpretation of Score: The DASH is designed to measure the activities of daily living in person's with upper extremity dysfunction or pain. Each section is scored on a 1-5 scale, 5 representing the greatest disability. The scores of each section are added together for a total score of 55.       Observation/Orthostatic Postural Assessment:          Rounded shoulders and increased protraction left scapula  Palpation:         Mild TTP at incision site  ROM:    AROM/PROM          Joint: Eval Date: 1/28/2020  Re-Assess Date: 1/28/2020 Re-Assess Date: 2/18/2020 Re-Assess Date: 3/3/2020 Re-Assess Date: 4/7/2020 Re-Assess Date: 4/28/2020   ACTIVE ROM (standing) RIGHT LEFT LEFT LEFT LEFT LEFT LEFT   Shoulder Flexion 145 52 72 81 80 increased fatigue on test date 84 85   Shoulder Abduction 136 40 55 62 65 68 72   Shoulder Internal Rotation (Apley's) T12 45 by side 53 Left hip Left Hip Left Hip Sacrum   Shoulder External Rotation (Apley's) C7 12 by side 45  To belt anterior Left Ear Occiput  Occiput  C2   Elbow ROM [facility]   PASSIVE ROM (supine)          Shoulder Flexion  94 115 120 122 135 135   Shoulder Abduction  68 75 81 90 98 104   Shoulder Internal Rotation  NT 45 53 55 58 60   Shoulder External Rotation  35 63 @ 45 [email or web address] 78@60 83@60 85@60                                   Strength:    Joint: Eval Date: 1/28/2020  Re-Assess Date: 1/28/2020 Re-Assess Date: 2/18/2020 Re-Assess Date: 3/3/2020 Re-Assess Date: 4/7/2020 Re-Assess Date: 4/28/2020    RIGHT LEFT LEFT LEFT in available ROM LEFT in available ROM LEFT in available ROM LEFT in available ROM   Shoulder Flexion 5/5 3-/5 3-/5 3+/5 3+/5 3+/5 3+/5   Shoulder Abduction  (C5) 4+/5 3-/5 3-/5 3+/5 3+/5 3+/5 4-/5   Shoulder Internal Rotation 5/5 NT 3/5 4/5 4/5 4+/5 4+/5   Shoulder External Rotation 5/5 3/5 3/5 3+/5 3+/5 3+/5 3+/5   Elbow Flexion  (C6) 5/5 4-/5 4/5 4/5 4+/5 4+/5 5/5   Elbow Extension (C7) 5/5 4/5 4/5 4/5 4+/5 4+/5 5/5                             Special Tests:  NT    Neurological Screen:    Radiating symptoms? No  Functional Mobility: Patient limited to activity below shoulder level      Reason for Services/Other Comments:   Reena Walters has been seen in physical therapy from 1/9/2020 to 4/28/2020 for 28 visits. Patient reports feeling 75% better with pain and daily activity. Patient remains limited secondary to weakness and decreased ROM, unable to raise left upper extremity to shoulder level. Treatment has been discontinued at this time due to limited progress over the last 4 weeks and the patient wanting to continue with HEP only. The below goals were met prior to discharge. Some goals were not met due to  Some goals were not met due to limited progression of strength. Thank you for this referral.       Regarding Radha Escoto's therapy, I certify that the treatment plan above will be carried out by a therapist or under their direction.   Thank you for this referral,  Jesica Montano, PT   MSR

## 2020-04-30 ENCOUNTER — APPOINTMENT (OUTPATIENT)
Dept: PHYSICAL THERAPY | Age: 80
End: 2020-04-30
Payer: MEDICARE

## 2021-01-18 PROBLEM — E11.9 TYPE 2 DIABETES MELLITUS WITHOUT COMPLICATION, WITHOUT LONG-TERM CURRENT USE OF INSULIN (HCC): Status: ACTIVE | Noted: 2018-03-13

## 2021-09-28 PROBLEM — E11.22 TYPE 2 DIABETES MELLITUS WITH CHRONIC KIDNEY DISEASE (HCC): Status: ACTIVE | Noted: 2018-03-13

## 2022-03-07 NOTE — PROGRESS NOTES
Problem: Mobility Impaired (Adult and Pediatric) Goal: *Acute Goals and Plan of Care (Insert Text) LTG: 
(1.)Mr. Margaret Fernandez will move from supine to sit and sit to supine , scoot up and down and roll side to side in bed with MODIFIED INDEPENDENCE within 7 treatment day(s). (2.)Mr. Margaret Fernandez will transfer from bed to chair and chair to bed with SUPERVISION using the least restrictive device within 7 treatment day(s). (3.)Mr. Margaret Fernandez will ambulate with STAND BY ASSIST for >in=712 feet with the least restrictive device, appropriate gait pattern and good dynamic standing balance within 7 treatment day(s). (4.))Mr. Margaret Fernandez will be able to don / doff lumbar corset brace independently within 7 treatment days to prepare for upcoming surgery. ________________________________________________________________________________________________ PHYSICAL THERAPY: Daily Note, Treatment Day: 1st, AM 9/9/2018 INPATIENT: Hospital Day: 3 [place] / Product Type: Medicare /  
  
NAME/AGE/GENDER: Christal Ahumada is a 65 y.o. 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 
L5-S1 ALIF-ATTEMPTED (N/A) 2 Days Post-Op ICD-10: Treatment Diagnosis:  
 · Generalized Muscle Weakness (M62.81) · Difficulty in walking, Not elsewhere classified (R26.2) · Low Back Pain (M54.5) Precaution/Allergies: 
Clonidine and Simvastatin ASSESSMENT:  
 
Mr. Margaret Fernandez presents supine and happy to get up and walk with me. He log rolled out of bed without my help and stood up. He seemed a little off balance so he used the walker and walked the entire floor 2 times without any issues. It felt good for him to get up and walk and he was perfectly safe with the walker.   He is having his surgery tomorrow morning so will Pt has CKD-3. Sees Dr Kevin Calloway for that. - Creat better at 1.5 from 1.8.   Medications were adjusted in the hospital. discharge and await new orders post op. This section established at most recent assessment PROBLEM LIST (Impairments causing functional limitations): 1. Decreased Strength 2. Decreased ADL/Functional Activities 3. Decreased Transfer Abilities 4. Decreased Ambulation Ability/Technique 5. Decreased Balance 6. Increased Pain 7. Decreased Activity Tolerance 8. Decreased Flexibility/Joint Mobility 9. Decreased Knowledge of Precautions 10. Decreased [place] with Home Exercise Program 
 INTERVENTIONS PLANNED: (Benefits and precautions of physical therapy have been discussed with the patient.) 1. Balance Exercise 2. Bed Mobility 3. Family Education 4. Gait Training 5. Home Exercise Program (HEP) 6. Therapeutic Activites 7. Therapeutic Exercise/Strengthening 8. Transfer Training 9. Group Therapy TREATMENT PLAN: Frequency/Duration: daily for duration of hospital stay and then BID once pt has had fusion Rehabilitation Potential For Stated Goals: Good RECOMMENDED REHABILITATION/EQUIPMENT: (at time of discharge pending progress): Due to the probability of continued deficits (see above) this patient will likely need continued skilled physical therapy after discharge. Equipment:  
? None at this time HISTORY:  
History of Present Injury/Illness (Reason for Referral): 
Per chart: He has been following with our PA. He is 65 and he has a grade 2 L5-S1 spondylolisthesis with severe foraminal stenosis bilaterally, but he really only has right leg pain, and it is a classic L5 distribution. Recent MRI scan of 08/03/2018 shows severe foraminal stenosis. No central stenosis. He still has a reasonable disc space, but it is a significant spondylolisthesis. He has been receiving L5 nerve blocks and they help temporarily. The last one was about 2 weeks ago and it is wearing off and he is having severe pain.   Apparently, he was screaming initially at the visit today at the  until they could get his pain under better control. He says he always hurts at night when he tries to sleep. It hurts to stand and walk. He has improvement sitting and flexing. He also says if he is sitting and he elevates his right foot up on a step or something, his pain goes away. I printed out x-rays and the MRI scan and showed he and his wife the problem, and I told him with this degree of listhesis, I think the best way to treat him would actually be an anteroposterior fusion. I went through his medical history and he has some high blood pressure, but no other significant medical problems, although, he has been told he has some kidney disease, but they are not doing any treatment or anything special for it. Past Medical History/Comorbidities:  
Mr. Pb Henderson  has a past medical history of Arthritis; Calculus of kidney (12/23/2013); Chronic kidney disease; Chronic kidney disease, stage III (moderate) (12/23/2013); Fracture of coccyx ([address] (1/14/2016); Gout, unspecified (12/23/2013); History of left shoulder fracture (1/14/2016); Hyperlipidemia (1/14/2016); Microscopic hematuria (12/23/2013); Sciatica of right side (1/14/2016); Unspecified essential hypertension (12/23/2013); and Urinary tract infection, site not specified (12/23/2013). Mr. Pb Henderson  has a past surgical history that includes hx lithotripsy; pr total hip arthroplasty (Left); and hx shoulder replacement (Left, 12/2014). Social History/Living Environment:  
Home Environment: Private residence # Steps to Enter: 1 Rails to Enter: No 
Wheelchair Ramp: Yes One/Two Story Residence: One story Living Alone: No 
Support Systems: Spouse/Significant Other/Partner Patient Expects to be Discharged to[de-identified] Private residence Current DME Used/Available at Home: Raised toilet seat, Cane, straight Tub or Shower Type: Tub/Shower combination Prior Level of Function/Work/Activity: Pt was independent with all functional mobility and gait without assistive device. Has been limited in overall function x 2  Years secondary to back pain. Tries to work as an  some. Drives. Wife works 4 hours/day and therefore pt will be alone during that time. Personal Factors:   
      Sex:  male Age:  65 y.o. Number of Personal Factors/Comorbidities that affect the Plan of Care: 1-2: MODERATE COMPLEXITY EXAMINATION:  
Most Recent Physical Functioning:  
Gross Assessment: 
  
         
  
Posture: 
  
Balance: 
  Bed Mobility: 
Supine to Sit: Stand-by assistance (flat bed using railing) Wheelchair Mobility: 
  
Transfers: 
Sit to Stand: Stand-by assistance;Contact guard assistance Stand to Sit: Stand-by assistance;Contact guard assistance Gait: 
  
Gait Abnormalities: Antalgic Distance (ft): 1000 Feet (ft) Assistive Device: Walker, rolling Ambulation - Level of Assistance: Supervision Body Structures Involved: 1. Joints 2. Muscles Body Functions Affected: 1. Neuromusculoskeletal 
2. Movement Related Activities and Participation Affected: 1. General Tasks and Demands 2. Mobility Number of elements that affect the Plan of Care: 4+: HIGH COMPLEXITY CLINICAL PRESENTATION:  
Presentation: Stable and uncomplicated: LOW COMPLEXITY CLINICAL DECISION MAKING:  
[employer] [facility] 6 Clicks Basic Mobility Inpatient Short Form How much difficulty does the patient currently have. .. Unable A Lot A Little None 1. Turning over in bed (including adjusting bedclothes, sheets and blankets)? [] 1   [] 2   [x] 3   [] 4  
2. Sitting down on and standing up from a chair with arms ( e.g., wheelchair, bedside commode, etc.)   [] 1   [] 2   [x] 3   [] 4  
3. Moving from lying on back to sitting on the side of the bed? [] 1   [] 2   [x] 3   [] 4 How much help from another person does the patient currently need. .. Total A Lot A Little None 4. Moving to and from a bed to a chair (including a wheelchair)? [] 1   [] 2   [x] 3   [] 4  
5. Need to walk in hospital room? [] 1   [] 2   [x] 3   [] 4  
6. Climbing 3-5 steps with a railing? [] 1   [] 2   [x] 3   [] 4  
© 2007, Trustees of [address] [place] [address] [facility], under license to [employer]. All rights reserved Score:  Initial: 18 Most Recent: X (Date: -- ) Interpretation of Tool:  Represents activities that are increasingly more difficult (i.e. Bed mobility, Transfers, Gait). Score 24 23 22-20 19-15 14-10 9-7 6 Modifier CH CI CJ CK CL CM CN   
 
? Mobility - Walking and Moving Around:  
  - CURRENT STATUS: CK - 40%-59% impaired, limited or restricted  - GOAL STATUS: CK - 40%-59% impaired, limited or restricted  - D/C STATUS:  ---------------To be determined--------------- [place] / [place] PART A [place] B / Product Type: Medicare /   
 
Medical Necessity:    
· Patient demonstrates good rehab potential due to higher previous functional level. Reason for Services/Other Comments: 
· Patient continues to require present interventions due to patient's inability to function at baseline. Use of outcome tool(s) and clinical judgement create a POC that gives a: Questionable prediction of patient's progress: MODERATE COMPLEXITY  
  
 
 
 
TREATMENT:  
(In addition to Assessment/Re-Assessment sessions the following treatments were rendered) Pre-treatment Symptoms/Complaints: \"That felt good. \" 
Pain: Initial:  
Pain Intensity 1: 2  Post Session:  unchanged Therapeutic Activity: (    25 minutes): Therapeutic activities including Bed transfers, Chair transfers, Ambulation on level ground to improve mobility, strength and balance. Required use of the walker to assist with balance and safety. Braces/Orthotics/Lines/Etc:  
· IV 
· none Treatment/Session Assessment:   
· Response to Treatment:  Tolerated well. · Interdisciplinary Collaboration: o Physical Therapy Assistant 
o Registered Nurse · After treatment position/precautions:  
o Up in chair 
o Bed/Chair-wheels locked 
o Bed in low position 
o Call light within reach 
o RN notified 
o Family at bedside · Compliance with Program/Exercises: good. · Recommendations/Intent for next treatment session:  Will await post op orders and discharge for now Total Treatment Duration: PT Patient Time In/Time Out Time In: 1055 Time Out: 1120 Osmani Mendez, PTA

## 2022-03-18 PROBLEM — M19.112 OSTEOARTHRITIS OF BOTH SHOULDERS DUE TO ROTATOR CUFF INJURY: Status: ACTIVE | Noted: 2019-12-12

## 2022-03-18 PROBLEM — S46.001S OSTEOARTHRITIS OF BOTH SHOULDERS DUE TO ROTATOR CUFF INJURY: Status: ACTIVE | Noted: 2019-12-12

## 2022-03-18 PROBLEM — S46.002S OSTEOARTHRITIS OF BOTH SHOULDERS DUE TO ROTATOR CUFF INJURY: Status: ACTIVE | Noted: 2019-12-12

## 2022-03-18 PROBLEM — M19.112 POST-TRAUMATIC OSTEOARTHRITIS OF LEFT SHOULDER: Status: ACTIVE | Noted: 2019-12-07

## 2022-03-18 PROBLEM — N18.30 CKD (CHRONIC KIDNEY DISEASE) STAGE 3, GFR 30-59 ML/MIN (HCC): Status: ACTIVE | Noted: 2018-03-13

## 2022-03-18 PROBLEM — M19.111 OSTEOARTHRITIS OF BOTH SHOULDERS DUE TO ROTATOR CUFF INJURY: Status: ACTIVE | Noted: 2019-12-12

## 2022-03-18 PROBLEM — E79.0 HYPERURICEMIA: Status: ACTIVE | Noted: 2018-06-22

## 2022-03-19 PROBLEM — T84.84XA PAINFUL ORTHOPAEDIC HARDWARE (HCC): Status: ACTIVE | Noted: 2019-12-07

## 2022-03-19 PROBLEM — M48.07 SPINAL STENOSIS OF LUMBOSACRAL REGION: Status: ACTIVE | Noted: 2018-09-07

## 2022-03-19 PROBLEM — M48.00 SPINAL STENOSIS: Status: ACTIVE | Noted: 2018-09-07

## 2022-03-19 PROBLEM — M19.112: Status: ACTIVE | Noted: 2019-12-12

## 2022-03-19 PROBLEM — Z87.442 HISTORY OF RENAL STONE: Status: ACTIVE | Noted: 2018-03-13

## 2022-03-19 PROBLEM — E11.22 TYPE 2 DIABETES MELLITUS WITH CHRONIC KIDNEY DISEASE (HCC): Status: ACTIVE | Noted: 2018-03-13

## 2022-03-20 PROBLEM — M10.9 GOUT: Status: ACTIVE | Noted: 2018-03-13

## 2022-03-20 PROBLEM — M43.17 SPONDYLOLISTHESIS AT L5-S1 LEVEL: Status: ACTIVE | Noted: 2018-09-07

## 2022-03-20 PROBLEM — I12.9 BENIGN HYPERTENSIVE KIDNEY DISEASE WITH CHRONIC KIDNEY DISEASE: Status: ACTIVE | Noted: 2018-03-13

## 2022-03-20 PROBLEM — E78.2 MIXED HYPERLIPIDEMIA: Status: ACTIVE | Noted: 2018-03-13

## 2022-04-15 ENCOUNTER — DOCUMENTATION ONLY (OUTPATIENT)
Dept: CASE MANAGEMENT | Age: 82
End: 2022-04-15

## 2022-04-26 NOTE — ACP (ADVANCE CARE PLANNING)
Advance Care Planning   Ambulatory ACP Specialist Patient Outreach    Date:  4/15/2022    ACP Specialist:  Giovanna Kaminski LMSW    Outreach call to patient in follow-up to ACP Specialist referral from:    [x] PCP  [] Provider   [] Ambulatory Care Management [] Other     For:                  [x] Advance Directive Assistance              [] Complete Portable DNR order              [] Complete POST/MOST              [] Code Status Discussion             [] Discuss Goals of Care             [x] Early ACP Decision-Making              [] Other (Specify)    Date Referral Received:4/15/22    Today's Outreach:  [x] First   [] Second  [] Third       Third outreach made by: [] Phone  [] Email / mail    [] [employer]     Intervention:  [x] Spoke with Patient   [] Left VM requesting return call      Outcome: Late entry for 4/15:  SW called and spoke with pt who said that he would like to have his wife call the SW back. SW will wait to hear from pt's wife, if SW doesn't hear in two weeks SW will follow up. Next Step:   [] ACP scheduled conversation  [x] Outreach again in two weeks               [] Email / Mail ACP Info Sheets  [] Email / Mail Advance Directive   [] Closing referral.  Routing closure to referring provider/staff and to ACP Specialist . [] Closure letter mailed to patient with invitation to contact ACP Specialist if / when ready.   Thank you for this referral.

## 2022-04-27 ENCOUNTER — DOCUMENTATION ONLY (OUTPATIENT)
Dept: CASE MANAGEMENT | Age: 82
End: 2022-04-27

## 2022-04-27 NOTE — ACP (ADVANCE CARE PLANNING)
Advance Care Planning   Ambulatory ACP Specialist Patient Outreach    Date:  4/27/2022    ACP Specialist:  Otoniel Arguello LMSW    Outreach call to patient in follow-up to ACP Specialist referral from:    [x] PCP  [] Provider   [] Ambulatory Care Management [] Other     For:                  [x] Advance Directive Assistance              [] Complete Portable DNR order              [] Complete POST/MOST              [] Code Status Discussion             [] Discuss Goals of Care             [x] Early ACP Decision-Making              [] Other (Specify)    Date Referral Received:4/15/22    Today's Outreach:  [] First   [x] Second  [] Third       Third outreach made by: [] Phone  [] Email / mail    [] [email or web address]     Intervention:  [] Spoke with Patient   [] Left VM requesting return call      Outcome: SW called to try and follow up with pt, as SW never heard from pt's wife. However, when SW called no one answered and SW was unable to leave a message. SW will attempt another outreach next week       Next Step:   [] ACP scheduled conversation  [x] Outreach again in one week               [] Email / Mail ACP Info Sheets  [] Email / Mail Advance Directive   [] Closing referral.  Routing closure to referring provider/staff and to [employer]. [] Closure letter mailed to patient with invitation to contact ACP Specialist if / when ready.   Thank you for this referral.

## 2022-05-05 ENCOUNTER — DOCUMENTATION ONLY (OUTPATIENT)
Dept: CASE MANAGEMENT | Age: 82
End: 2022-05-05

## 2022-05-05 NOTE — ACP (ADVANCE CARE PLANNING)
Advance Care Planning   Ambulatory ACP Specialist Patient Outreach    Date:  5/5/2022    ACP Specialist:  Yoni Torres LMSW    Outreach call to patient in follow-up to ACP Specialist referral from:    [x] PCP  [] Provider   [] Ambulatory Care Management [] Other     For:                  [x] Advance Directive Assistance              [] Complete Portable DNR order              [] Complete POST/MOST              [] Code Status Discussion             [] Discuss Goals of Care             [x] Early ACP Decision-Making              [] Other (Specify)    Date Referral Received:4/15/22    Today's Outreach:  [] First   [] Second  [x] Third       Third outreach made by: [x] Phone  [] Email / mail    [] [employer]     Intervention:  [] Spoke with Patient   [x] Left VM requesting return call      Outcome: SW called to try and touch base with pt again. However, SW got pt's voice mail and had to leave a message. If SW doesn't hear from pt in the next week SW will move to close the ACP referral.       Next Step:   [] ACP scheduled conversation  [x] Outreach again in one week               [] Email / Mail ACP Info Sheets  [] Email / Mail Advance Directive   [] Closing referral.  Routing closure to referring provider/staff and to ACP Specialist . [] Closure letter mailed to patient with invitation to contact ACP Specialist if / when ready.   Thank you for this referral.

## 2022-05-12 ENCOUNTER — DOCUMENTATION ONLY (OUTPATIENT)
Dept: CASE MANAGEMENT | Age: 82
End: 2022-05-12

## 2022-05-12 NOTE — ACP (ADVANCE CARE PLANNING)
Advance Care Planning   Ambulatory ACP Specialist Patient Outreach    Date:  5/12/2022    ACP Specialist:  Ant Cespedes LMSW    Outreach call to patient in follow-up to ACP Specialist referral from:    [x] PCP  [] Provider   [] Ambulatory Care Management [] Other     For:                  [x] Advance Directive Assistance              [] Complete Portable DNR order              [] Complete POST/MOST              [] Code Status Discussion             [] Discuss Goals of Care             [x] Early ACP Decision-Making              [] Other (Specify)    Date Referral Received:4/15/22    Today's Outreach:  [] First   [] Second  [x] Fourth        Third outreach made by: [] Phone  [] Email / mail    [] [employer]     Intervention:  [] Spoke with Patient   [] Left VM requesting return call      Outcome: SW has been unable to get in touch with pt since initial outreach. Pt has SW's contact if he wants to reach out in the future. Will close referral at this time. Next Step:   [] ACP scheduled conversation  [] Outreach again in one week               [] Email / Mail ACP Info Sheets  [] Email / Mail Advance Directive   [x] Closing referral.  Routing closure to referring provider/staff and to ACP Specialist . [] Closure letter mailed to patient with invitation to contact ACP Specialist if / when ready.   Thank you for this referral.

## 2022-09-13 ENCOUNTER — OFFICE VISIT (OUTPATIENT)
Dept: FAMILY MEDICINE CLINIC | Facility: CLINIC | Age: 82
End: 2022-09-13
Payer: MEDICARE

## 2022-09-13 VITALS
HEIGHT: 66 IN | BODY MASS INDEX: 26.61 KG/M2 | OXYGEN SATURATION: 96 % | WEIGHT: 165.6 LBS | DIASTOLIC BLOOD PRESSURE: 78 MMHG | HEART RATE: 77 BPM | TEMPERATURE: 98.1 F | SYSTOLIC BLOOD PRESSURE: 132 MMHG

## 2022-09-13 DIAGNOSIS — I10 ESSENTIAL HYPERTENSION: ICD-10-CM

## 2022-09-13 DIAGNOSIS — E78.5 HYPERLIPIDEMIA, UNSPECIFIED HYPERLIPIDEMIA TYPE: ICD-10-CM

## 2022-09-13 DIAGNOSIS — I10 ESSENTIAL HYPERTENSION: Primary | ICD-10-CM

## 2022-09-13 DIAGNOSIS — E11.22 TYPE 2 DIABETES MELLITUS WITH STAGE 3A CHRONIC KIDNEY DISEASE, WITHOUT LONG-TERM CURRENT USE OF INSULIN (HCC): ICD-10-CM

## 2022-09-13 DIAGNOSIS — N18.31 STAGE 3A CHRONIC KIDNEY DISEASE (HCC): ICD-10-CM

## 2022-09-13 DIAGNOSIS — N18.31 TYPE 2 DIABETES MELLITUS WITH STAGE 3A CHRONIC KIDNEY DISEASE, WITHOUT LONG-TERM CURRENT USE OF INSULIN (HCC): ICD-10-CM

## 2022-09-13 PROBLEM — I12.9 BENIGN HYPERTENSIVE KIDNEY DISEASE WITH CHRONIC KIDNEY DISEASE: Status: RESOLVED | Noted: 2018-03-13 | Resolved: 2022-09-13

## 2022-09-13 PROCEDURE — 1123F ACP DISCUSS/DSCN MKR DOCD: CPT | Performed by: STUDENT IN AN ORGANIZED HEALTH CARE EDUCATION/TRAINING PROGRAM

## 2022-09-13 PROCEDURE — 99214 OFFICE O/P EST MOD 30 MIN: CPT | Performed by: STUDENT IN AN ORGANIZED HEALTH CARE EDUCATION/TRAINING PROGRAM

## 2022-09-13 PROCEDURE — 3044F HG A1C LEVEL LT 7.0%: CPT | Performed by: STUDENT IN AN ORGANIZED HEALTH CARE EDUCATION/TRAINING PROGRAM

## 2022-09-13 RX ORDER — BENAZEPRIL HYDROCHLORIDE 20 MG/1
20 TABLET ORAL DAILY
Qty: 90 TABLET | Refills: 3 | Status: SHIPPED | OUTPATIENT
Start: 2022-09-13

## 2022-09-13 RX ORDER — BENAZEPRIL HYDROCHLORIDE 20 MG/1
20 TABLET ORAL 2 TIMES DAILY
Qty: 90 TABLET | Refills: 3 | Status: SHIPPED | OUTPATIENT
Start: 2022-09-13 | End: 2022-09-13

## 2022-09-13 SDOH — ECONOMIC STABILITY: FOOD INSECURITY: WITHIN THE PAST 12 MONTHS, THE FOOD YOU BOUGHT JUST DIDN'T LAST AND YOU DIDN'T HAVE MONEY TO GET MORE.: NEVER TRUE

## 2022-09-13 SDOH — ECONOMIC STABILITY: FOOD INSECURITY: WITHIN THE PAST 12 MONTHS, YOU WORRIED THAT YOUR FOOD WOULD RUN OUT BEFORE YOU GOT MONEY TO BUY MORE.: NEVER TRUE

## 2022-09-13 ASSESSMENT — ANXIETY QUESTIONNAIRES
4. TROUBLE RELAXING: 0
3. WORRYING TOO MUCH ABOUT DIFFERENT THINGS: 0
6. BECOMING EASILY ANNOYED OR IRRITABLE: 0
1. FEELING NERVOUS, ANXIOUS, OR ON EDGE: 0
5. BEING SO RESTLESS THAT IT IS HARD TO SIT STILL: 0
7. FEELING AFRAID AS IF SOMETHING AWFUL MIGHT HAPPEN: 0
2. NOT BEING ABLE TO STOP OR CONTROL WORRYING: 0
GAD7 TOTAL SCORE: 0

## 2022-09-13 ASSESSMENT — PATIENT HEALTH QUESTIONNAIRE - PHQ9
SUM OF ALL RESPONSES TO PHQ QUESTIONS 1-9: 0
SUM OF ALL RESPONSES TO PHQ9 QUESTIONS 1 & 2: 0
SUM OF ALL RESPONSES TO PHQ QUESTIONS 1-9: 0
1. LITTLE INTEREST OR PLEASURE IN DOING THINGS: 0
SUM OF ALL RESPONSES TO PHQ QUESTIONS 1-9: 0
SUM OF ALL RESPONSES TO PHQ QUESTIONS 1-9: 0
2. FEELING DOWN, DEPRESSED OR HOPELESS: 0

## 2022-09-13 ASSESSMENT — SOCIAL DETERMINANTS OF HEALTH (SDOH): HOW HARD IS IT FOR YOU TO PAY FOR THE VERY BASICS LIKE FOOD, HOUSING, MEDICAL CARE, AND HEATING?: NOT HARD AT ALL

## 2022-09-13 NOTE — PROGRESS NOTES
[facility]  Christine Meredith  Phone [phone]  Fax:  [phone]    Patient: Jim Britt  YOB: 1940  Patient Age 79 y.o. Patient sex: male  Medical Record:  [id number]  Visit Date: 09/13/22    [facility] Practice Clinic Note     Chief Complaint   Patient presents with    New Patient       History of Present Illness:  79-year-old white male history of hypertension, diabetes controlled with lifestyle modifications, CKD3a, hyperlipidemia presents for routine follow-up. This is the first time I am seeing this patient. Patient has no complaints or concerns today. We will check routine labs today. States he wakes up 3 times at night to urinate. Does not strain to urinate during the day and feels like he is emptying his bladder completely. Instructed to not drink fluids 2 hours before bedtime. We will reevaluate symptoms and follow-up with labs at that time. Alonso any chest pain, shortness of breath, abdominal pain, fever, chills, or any other symptoms at this time. Allergies:   Allergies   Allergen Reactions    Clonidine Other (See Comments)     Lethargy, Patient denies allergy    Simvastatin Other (See Comments)     Noted in office visit note, 5/26/2015,  Patient denies allergy       Current Medications:   Medications marked \"taking\" at this time:    Current Outpatient Medications:     benazepril (LOTENSIN) 20 MG tablet, Take 1 tablet by mouth 2 times daily, Disp: 90 tablet, Rfl: 3    Current Problem List:   Patient Active Problem List   Diagnosis    Post-traumatic osteoarthritis of left shoulder    CKD (chronic kidney disease) stage 3, GFR 30-59 ml/min ([facility])    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    Hyperuricemia    History of renal stone    Type 2 diabetes mellitus with chronic kidney disease ([facility] [address]    Spinal stenosis of lumbosacral region    Family history of malignant neoplasm of prostate    Spinal stenosis Post-traumatic arthrosis of left shoulder    Essential hypertension    Painful orthopaedic hardware ([address]    Spondylolisthesis at L5-S1 level    Mixed hyperlipidemia    Gout    History of left shoulder fracture       Social History:   Social History     Tobacco Use    Smoking status: Never    Smokeless tobacco: Never   Substance Use Topics    Alcohol use: Not Currently     Alcohol/week: 0.0 standard drinks       Family History:   Family History   Problem Relation Age of Onset    Cancer Mother         breast    No Known Problems Paternal Grandfather     No Known Problems Maternal Grandmother     No Known Problems Paternal Grandmother     No Known Problems Maternal Grandfather     Heart Disease Father     Cancer Father         unsure       Surgical History:  Past Surgical History:   Procedure Laterality Date    LITHOTRIPSY      LUMBAR FUSION  09/07/2018    L5-S1 fusion and L5-S1 laminectomy with hardware    TOTAL HIP ARTHROPLASTY Left     TOTAL SHOULDER ARTHROPLASTY Left 12/2014    left shoulder       ROS  Review of Systems   All other systems reviewed and are negative. Visit Vitals  /78   Pulse 77   Temp 98.1 °F (36.7 °C)   Ht 5' 6\" (1.676 m)   Wt 165 lb 9.6 oz (75.1 kg)   SpO2 96%   BMI 26.73 kg/m²       Physical Exam  Physical Exam  Constitutional:       General: He is not in acute distress. Cardiovascular:      Rate and Rhythm: Normal rate and regular rhythm. Heart sounds: No murmur heard. Pulmonary:      Breath sounds: Normal breath sounds. No wheezing. Abdominal:      Palpations: Abdomen is soft. Tenderness: There is no abdominal tenderness. Musculoskeletal:         General: No signs of injury. Skin:     General: Skin is dry. Neurological:      Mental Status: He is alert. Mental status is at baseline. ASSESSMENT & PLAN  Emma Castillo was seen today for new patient.     Diagnoses and all orders for this visit:    Essential hypertension  -     benazepril (LOTENSIN) 20 MG tablet; Take 1 tablet by mouth daily  -     Comprehensive Metabolic Panel; Future  - Stable. /78 today. Labs pending. Continue benazepril for blood pressure and renal protection. Type 2 diabetes mellitus with stage 3a chronic kidney disease, without long-term current use of insulin ([facility])  -     Hemoglobin A1C; Future  Lab Results   Component Value Date    LABA1C 6.0 (H) 03/25/2022     -Controlled with lifestyle modifications. A1c pending today. Stage 3a chronic kidney disease ([facility] [address]  - Stable. Follows with nephrology. CMP today. Hyperlipidemia, unspecified hyperlipidemia type  -     Lipid Panel; Future  -Discussed statin use. Patient states he had sore muscles previously with statin and does not want to start one at this time. I have reviewed the patient's past medical history, social history and family history and vitals. We have discussed treatment plan and follow up and given patient instructions. Patient's questions are answered and we will follow up as indicated. On this date 9/13/2022 I have spent 30 minutes reviewing previous notes, test results and face to face with the patient discussing the diagnosis and importance of compliance with the treatment plan as well as documenting on the day of the visit.     Carol Rodriguez, DO

## 2022-09-14 LAB
ALBUMIN SERPL-MCNC: 4.2 G/DL (ref 3.2–4.6)
ALBUMIN/GLOB SERPL: 1.4 {RATIO} (ref 1.2–3.5)
ALP SERPL-CCNC: 74 U/L (ref 50–136)
ALT SERPL-CCNC: 24 U/L (ref 12–65)
ANION GAP SERPL CALC-SCNC: 8 MMOL/L (ref 4–13)
AST SERPL-CCNC: 22 U/L (ref 15–37)
BILIRUB SERPL-MCNC: 1 MG/DL (ref 0.2–1.1)
BUN SERPL-MCNC: 27 MG/DL (ref 8–23)
CALCIUM SERPL-MCNC: 9.7 MG/DL (ref 8.3–10.4)
CHLORIDE SERPL-SCNC: 107 MMOL/L (ref 101–110)
CHOLEST SERPL-MCNC: 214 MG/DL
CO2 SERPL-SCNC: 24 MMOL/L (ref 21–32)
CREAT SERPL-MCNC: 1.5 MG/DL (ref 0.8–1.5)
EST. AVERAGE GLUCOSE BLD GHB EST-MCNC: 114 MG/DL
GLOBULIN SER CALC-MCNC: 2.9 G/DL (ref 2.3–3.5)
GLUCOSE SERPL-MCNC: 142 MG/DL (ref 65–100)
HBA1C MFR BLD: 5.6 % (ref 4.8–5.6)
HDLC SERPL-MCNC: 71 MG/DL (ref 40–60)
HDLC SERPL: 3 {RATIO}
LDLC SERPL CALC-MCNC: 118.6 MG/DL
POTASSIUM SERPL-SCNC: 4 MMOL/L (ref 3.5–5.1)
PROT SERPL-MCNC: 7.1 G/DL (ref 6.3–8.2)
SODIUM SERPL-SCNC: 139 MMOL/L (ref 138–145)
TRIGL SERPL-MCNC: 122 MG/DL (ref 35–150)
VLDLC SERPL CALC-MCNC: 24.4 MG/DL (ref 6–23)

## 2022-10-10 ENCOUNTER — TELEPHONE (OUTPATIENT)
Dept: FAMILY MEDICINE CLINIC | Facility: CLINIC | Age: 82
End: 2022-10-10

## 2022-10-10 NOTE — TELEPHONE ENCOUNTER
Progress Notes by Pablito Solis DO at 1/8/2018  8:30 AM     Author: Pablito Solis DO Service: -- Author Type: Physician    Filed: 1/8/2018  9:07 AM Encounter Date: 1/8/2018 Status: Signed    : Pablito Solis DO (Physician)           Click to link to [facility]     [facility] HEART CARE NOTE      Assessment/Recommendations   Assessment:    1. Coronary artery disease status post mid RCA drug eluting stent (Synergy 3.5 x 16m) with moderate diffuse CAD in LM, LAD, LCX.   No cardiac symptoms at this time.    2. Hyperlipidemia, LDL:80  3. Essential Hypertension   4. History of sleep apnea.      Plan:  1. Continue plavix 75 mg daily for two years total.     2. Continue atorvastatin to 80mg daily.   3. Continue ASA 81 mg daily, lifelong.  4. Continue amlodipine, HCTZ and lisinopril daily.     Follow in December 2018.          History of Present Illness    Mr. Martell Patel is a 68 y.o. male with coronary artery disease s/p mid RCA stent, hypertension, hyperlipemia, former smoker who presents in cardiology clinic for CAD.     Patient originally presented in consultation with anginal chest pain.  Given this he underwent stress testing which demonstrated high risk features for coronary events.   Coronary angiogram demonstrated significant mid right coronary lesion which he underwent stent placement with a synergy 3.5 x 16 mm drug-eluting stent.  Since undergoing coronary intervention he has had no further dyspnea on exertion or anginal chest pain.  He remains compliant with his medications.  He was switched to plavix last year.      Patient denies recent anginal chest pain, dyspnea with exertion, palpitations, syncope, edema, orthopnea, PND or cough.  Patient denies any active or recent bleeding issues with OCTAVIA.        Coronary Angiogram:   12/16/2016  Dominance: Right/   /Left Main    ? LM lesion, 35% stenosed. Not the culprit lesion. The lesion is located proximal to the  Pt wife calling stating that pt dizzy. She states that he has been taking the Benazepril 20 mg BID. Advised her the most recent rx states QD. Advised her that this could be reason for dizziness and I would send a message to Dr Rhys Gage. She has not checked his blood pressure. major branch and eccentric.      Left Anterior Descending    ? Prox LAD lesion, 35% stenosed.      Left Circumflex    ? Ost Cx lesion, 40% stenosed. Not the culprit lesion. The lesion is high risk and located at the ostium.      Right Coronary Artery    ? Mid RCA lesion, 95% stenosed. Culprit lesion. The lesion is not high risk.    ? PCI: The guidewire crossed lesion. The pre-interventional distal flow is normal (LEONIDES 3). A STENT SYNERGY MR [id number] drug eluting stent was successfully placed. The strut is apposed. Post-stent angioplasty was performed using a CATH EMERGE NC MR US 3.50MM X [id number] supply. Maximum pressure: 22 jeane. The post-interventional distal flow is normal (LEONIDES 3). The intervention was successful. No complications occurred at this lesion.   ? There is no residual stenosis post intervention.       ? Mid RCA to Dist RCA lesion, 40% stenosed. Not the culprit lesion. The lesion is diffuse.            ECHO (personnaly reviewed): 11/23/2016  Summary  1. Normal left ventricular size and systolic function. Left ventricular  ejection fraction is visually estimated to be 60%. Normal left ventricular  wall thickness. E/e' is < 8, suggesting normal LV filling pressures .  2. Normal right ventricular size and systolic function.  3. Mildly enlarged left atrium.     Physical Examination Review of Systems   Vitals:    01/08/18 0837   BP: 130/62   Pulse: 100   Resp: 16     Body mass index is 25.7 kg/(m^2).  Wt Readings from Last 3 Encounters:   01/08/18 169 lb (76.7 kg)   02/14/17 186 lb 1.6 oz (84.4 kg)   12/23/16 186 lb (84.4 kg)       General Appearance:   no distress, normal body habitus   ENT/Mouth: membranes moist, no oral lesions or bleeding gums.      EYES:  no scleral icterus, normal conjunctivae   Neck: no carotid bruits or thyromegaly   Chest/Lungs:   lungs are clear to auscultation, no rales or wheezing, no sternal scar, equal chest wall expansion    Cardiovascular:   Regular. Normal  "first and second heart sounds with no murmurs, rubs, or gallops; the carotid, radial and posterior tibial pulses are intact, Jugular venous pressure normal, no edema bilaterally (Unchanged).    Abdomen:  no organomegaly, masses, bruits, or tenderness; bowel sounds are present   Extremities: no cyanosis or clubbing   Skin: no xanthelasma, warm.    Neurologic: normal gait, normal  bilateral, no tremors     Psychiatric: alert and oriented x3, calm     General: Weight Loss  Eyes: WNL  Ears/Nose/Throat: WNL  Lungs: WNL  Heart: WNL  Stomach: Diarrhea  Bladder: WNL  Muscle/Joints: Joint Pain  Skin: WNL  Nervous System: WNL  Mental Health: Depression     Blood: WNL     Medical History  Surgical History Family History Social History   Past Medical History:   Diagnosis Date   ? Ataxia    ? Chest pain    ? CIDP (chronic inflammatory demyelinating polyneuropathy)     Remnant is that his sense of balance is impaired, particularly at night.   ? Coronary artery disease    ? HLD (hyperlipidemia)    ? HTN (hypertension)    ? Hyperlipidemia    ? Hypertension    ? Sleep apnea     Past Surgical History:   Procedure Laterality Date   ? APPENDECTOMY     ? CARDIAC CATHETERIZATION  12/06/2016   ? CARDIAC CATHETERIZATION N/A 12/6/2016    Procedure: Coronary Angiogram;  Surgeon: Duane Gandhi MD;  Location: [facility] Cath Lab;  Service:    ? CORONARY STENT PLACEMENT  12/06/2016    [place] to RCA.   ? REVISION AMPUTATION OF FINGER      Top of right pointer finger - cut on    ? THROAT SURGERY      \"To remove some polyps in my throat.  No, they weren't cancer.\"   ? TONSILLECTOMY      Family History   Problem Relation Age of Onset   ? Heart attack Brother    ? Coronary artery disease Brother     Social History     Social History   ? Marital status:      Spouse name: N/A   ? Number of children: N/A   ? Years of education: N/A     Occupational History   ? Not on file.     Social History Main Topics   ? Smoking status: " Current Every Day Smoker     Packs/day: 1.50     Years: 50.00   ? Smokeless tobacco: Never Used   ? Alcohol use No      Comment: No alcohol since 1978   ? Drug use: No   ? Sexual activity: Not on file     Other Topics Concern   ? Not on file     Social History Narrative          Medications  Allergies   Current Outpatient Prescriptions   Medication Sig Dispense Refill   ? amLODIPine (NORVASC) 10 MG tablet Take 10 mg by mouth daily.     ? aspirin 81 MG EC tablet Take 81 mg by mouth daily.     ? atorvastatin (LIPITOR) 80 MG tablet Take 1 tablet (80 mg total) by mouth bedtime. 90 tablet 3   ? clopidogrel (PLAVIX) 75 mg tablet Take 1 tablet (75 mg total) by mouth daily. 31 tablet 11   ? fluticasone (FLONASE) 50 mcg/actuation nasal spray Apply 1-2 sprays into each nostril as needed.      ? hydroCHLOROthiazide (HYDRODIURIL) 50 MG tablet Take 50 mg by mouth daily.     ? lisinopril (PRINIVIL,ZESTRIL) 40 MG tablet Take 40 mg by mouth bedtime.     ? nitroglycerin (NITROSTAT) 0.4 MG SL tablet Place 1 tablet (0.4 mg total) under the tongue once as needed for chest pain. 90 tablet 12   ? potassium chloride SA (K-DUR,KLOR-CON) 20 MEQ tablet Take 40 mEq by mouth every morning.       No current facility-administered medications for this visit.       No Known Allergies      Lab Results    Chemistry/lipid CBC Cardiac Enzymes/BNP/TSH/INR   Lab Results   Component Value Date    CHOL 137 06/02/2017    HDL 45 06/02/2017    LDLCALC 81 06/02/2017    TRIG 56 06/02/2017    CREATININE 1.04 06/02/2017    BUN 20 06/02/2017    K 3.5 06/02/2017     06/02/2017     06/02/2017    CO2 24 06/02/2017    Lab Results   Component Value Date    WBC 6.7 12/06/2016    HGB 16.3 12/06/2016    HCT 46.0 12/06/2016    MCV 87 12/06/2016     12/06/2016    Lab Results   Component Value Date    CKMB 2 12/06/2016    TROPONINI <0.01 11/23/2016    BNP 10 11/23/2016    INR 0.98 11/23/2016

## 2022-10-10 NOTE — TELEPHONE ENCOUNTER
Pt wife checked blood pressure and it is 146/92 pulse 73. Per Dr Jim Bui if dizziness continues to go to urgent care.

## 2022-10-12 ENCOUNTER — TELEPHONE (OUTPATIENT)
Dept: FAMILY MEDICINE CLINIC | Facility: CLINIC | Age: 82
End: 2022-10-12

## 2022-10-12 NOTE — TELEPHONE ENCOUNTER
Patients wife called and stated he has dropped back to 1 tablet Lotensin and the dizziness has stopped but his bp has gone up. The last time she checked it was 163/99. Per Dr. Todd Contreras he can start taking 30mg daily and see if that will help.  Verbalized understanding

## 2022-12-13 ENCOUNTER — TELEPHONE (OUTPATIENT)
Dept: FAMILY MEDICINE CLINIC | Facility: CLINIC | Age: 82
End: 2022-12-13

## 2022-12-13 ENCOUNTER — OFFICE VISIT (OUTPATIENT)
Dept: FAMILY MEDICINE CLINIC | Facility: CLINIC | Age: 82
End: 2022-12-13
Payer: MEDICARE

## 2022-12-13 VITALS
OXYGEN SATURATION: 98 % | BODY MASS INDEX: 27.97 KG/M2 | DIASTOLIC BLOOD PRESSURE: 82 MMHG | SYSTOLIC BLOOD PRESSURE: 140 MMHG | WEIGHT: 174 LBS | TEMPERATURE: 98.2 F | HEIGHT: 66 IN | HEART RATE: 65 BPM

## 2022-12-13 DIAGNOSIS — R73.9 ELEVATED BLOOD SUGAR: ICD-10-CM

## 2022-12-13 DIAGNOSIS — E78.2 MIXED HYPERLIPIDEMIA: ICD-10-CM

## 2022-12-13 DIAGNOSIS — R42 DIZZY: ICD-10-CM

## 2022-12-13 DIAGNOSIS — N18.31 STAGE 3A CHRONIC KIDNEY DISEASE (HCC): ICD-10-CM

## 2022-12-13 DIAGNOSIS — I10 ESSENTIAL HYPERTENSION: Primary | ICD-10-CM

## 2022-12-13 DIAGNOSIS — N40.1 BPH ASSOCIATED WITH NOCTURIA: ICD-10-CM

## 2022-12-13 DIAGNOSIS — I95.1 ORTHOSTATIC HYPOTENSION: ICD-10-CM

## 2022-12-13 DIAGNOSIS — R35.1 BPH ASSOCIATED WITH NOCTURIA: ICD-10-CM

## 2022-12-13 LAB
ANION GAP SERPL CALC-SCNC: 3 MMOL/L (ref 2–11)
BASOPHILS # BLD: 0.1 K/UL (ref 0–0.2)
BASOPHILS NFR BLD: 1 % (ref 0–2)
BUN SERPL-MCNC: 25 MG/DL (ref 8–23)
CALCIUM SERPL-MCNC: 9.3 MG/DL (ref 8.3–10.4)
CHLORIDE SERPL-SCNC: 107 MMOL/L (ref 101–110)
CHOLEST SERPL-MCNC: 227 MG/DL
CO2 SERPL-SCNC: 30 MMOL/L (ref 21–32)
CREAT SERPL-MCNC: 1.5 MG/DL (ref 0.8–1.5)
DIFFERENTIAL METHOD BLD: ABNORMAL
EOSINOPHIL # BLD: 0.1 K/UL (ref 0–0.8)
EOSINOPHIL NFR BLD: 1 % (ref 0.5–7.8)
ERYTHROCYTE [DISTWIDTH] IN BLOOD BY AUTOMATED COUNT: 13.2 % (ref 11.9–14.6)
GLUCOSE SERPL-MCNC: 104 MG/DL (ref 65–100)
HCT VFR BLD AUTO: 44.9 % (ref 41.1–50.3)
HDLC SERPL-MCNC: 69 MG/DL (ref 40–60)
HDLC SERPL: 3.3 {RATIO}
HGB BLD-MCNC: 14.8 G/DL (ref 13.6–17.2)
IMM GRANULOCYTES # BLD AUTO: 0 K/UL (ref 0–0.5)
IMM GRANULOCYTES NFR BLD AUTO: 0 % (ref 0–5)
LDLC SERPL CALC-MCNC: 138 MG/DL
LYMPHOCYTES # BLD: 2.6 K/UL (ref 0.5–4.6)
LYMPHOCYTES NFR BLD: 35 % (ref 13–44)
MCH RBC QN AUTO: 33.5 PG (ref 26.1–32.9)
MCHC RBC AUTO-ENTMCNC: 33 G/DL (ref 31.4–35)
MCV RBC AUTO: 101.6 FL (ref 82–102)
MONOCYTES # BLD: 0.6 K/UL (ref 0.1–1.3)
MONOCYTES NFR BLD: 8 % (ref 4–12)
NEUTS SEG # BLD: 3.9 K/UL (ref 1.7–8.2)
NEUTS SEG NFR BLD: 55 % (ref 43–78)
NRBC # BLD: 0 K/UL (ref 0–0.2)
PLATELET # BLD AUTO: 215 K/UL (ref 150–450)
PMV BLD AUTO: 11.2 FL (ref 9.4–12.3)
POTASSIUM SERPL-SCNC: 4.2 MMOL/L (ref 3.5–5.1)
RBC # BLD AUTO: 4.42 M/UL (ref 4.23–5.6)
SODIUM SERPL-SCNC: 140 MMOL/L (ref 133–143)
TRIGL SERPL-MCNC: 100 MG/DL (ref 35–150)
VLDLC SERPL CALC-MCNC: 20 MG/DL (ref 6–23)
WBC # BLD AUTO: 7.3 K/UL (ref 4.3–11.1)

## 2022-12-13 PROCEDURE — 3074F SYST BP LT 130 MM HG: CPT | Performed by: STUDENT IN AN ORGANIZED HEALTH CARE EDUCATION/TRAINING PROGRAM

## 2022-12-13 PROCEDURE — 1123F ACP DISCUSS/DSCN MKR DOCD: CPT | Performed by: STUDENT IN AN ORGANIZED HEALTH CARE EDUCATION/TRAINING PROGRAM

## 2022-12-13 PROCEDURE — 3078F DIAST BP <80 MM HG: CPT | Performed by: STUDENT IN AN ORGANIZED HEALTH CARE EDUCATION/TRAINING PROGRAM

## 2022-12-13 PROCEDURE — 99214 OFFICE O/P EST MOD 30 MIN: CPT | Performed by: STUDENT IN AN ORGANIZED HEALTH CARE EDUCATION/TRAINING PROGRAM

## 2022-12-13 RX ORDER — TAMSULOSIN HYDROCHLORIDE 0.4 MG/1
0.4 CAPSULE ORAL DAILY
Qty: 90 CAPSULE | Refills: 3 | Status: SHIPPED | OUTPATIENT
Start: 2022-12-13

## 2022-12-13 ASSESSMENT — PATIENT HEALTH QUESTIONNAIRE - PHQ9
SUM OF ALL RESPONSES TO PHQ QUESTIONS 1-9: 0
SUM OF ALL RESPONSES TO PHQ QUESTIONS 1-9: 0
2. FEELING DOWN, DEPRESSED OR HOPELESS: 0
SUM OF ALL RESPONSES TO PHQ QUESTIONS 1-9: 0
1. LITTLE INTEREST OR PLEASURE IN DOING THINGS: 0
SUM OF ALL RESPONSES TO PHQ QUESTIONS 1-9: 0
SUM OF ALL RESPONSES TO PHQ9 QUESTIONS 1 & 2: 0

## 2022-12-13 ASSESSMENT — ANXIETY QUESTIONNAIRES
3. WORRYING TOO MUCH ABOUT DIFFERENT THINGS: 0
5. BEING SO RESTLESS THAT IT IS HARD TO SIT STILL: 0
4. TROUBLE RELAXING: 0
GAD7 TOTAL SCORE: 0
1. FEELING NERVOUS, ANXIOUS, OR ON EDGE: 0
6. BECOMING EASILY ANNOYED OR IRRITABLE: 0
2. NOT BEING ABLE TO STOP OR CONTROL WORRYING: 0
7. FEELING AFRAID AS IF SOMETHING AWFUL MIGHT HAPPEN: 0

## 2022-12-13 NOTE — PROGRESS NOTES
[facility]  Christine Mandel  Phone [phone]  Fax:  [phone]    Patient: Jad Arambula  YOB: 1940  Patient Age 79 y.o. Patient sex: male  Medical Record:  [id number]  Visit Date: 12/13/22    [facility] Practice Clinic Note     Chief Complaint   Patient presents with    Dizziness     Worse when he stands up       History of Present Illness:  39-year-old white male history of hypertension, diabetes controlled with lifestyle modifications, CKD3a, hyperlipidemia presents for routine follow-up. Having symptoms of orthostatic hypotension. Gets dizzy when he stands up quick or gets up from lying down. Advised to get up slowly and wait before moving. Compression socks may also help. Endorsing getting up 3-4 times per night to urinate. Starting tamsulosin today. Also checking routine labs today. Patient has difficulty getting up out of a chair. Requesting referral to physical therapy. Will place today. Routine follow-up in 3 months with labs at that time or sooner if needed. Allergies:   Allergies   Allergen Reactions    Clonidine Other (See Comments)     Lethargy, Patient denies allergy    Simvastatin Other (See Comments)     Noted in office visit note, 5/26/2015,  Patient denies allergy       Current Medications:   Medications marked \"taking\" at this time:    Current Outpatient Medications:     tamsulosin (FLOMAX) 0.4 MG capsule, Take 1 capsule by mouth daily, Disp: 90 capsule, Rfl: 3    benazepril (LOTENSIN) 20 MG tablet, Take 1 tablet by mouth daily, Disp: 90 tablet, Rfl: 3    Current Problem List:   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    Hyperuricemia    History of renal stone    Type 2 diabetes mellitus with chronic kidney disease ([facility] [address]    Spinal stenosis of lumbosacral region Family history of malignant neoplasm of prostate    Spinal stenosis    Post-traumatic arthrosis of left shoulder    Essential hypertension    Painful orthopaedic hardware ([address]    Spondylolisthesis at L5-S1 level    Mixed hyperlipidemia    Gout    History of left shoulder fracture       Social History:   Social History     Tobacco Use    Smoking status: Never    Smokeless tobacco: Never   Substance Use Topics    Alcohol use: Not Currently     Alcohol/week: 0.0 standard drinks       Family History:   Family History   Problem Relation Age of Onset    Cancer Mother         breast    No Known Problems Paternal Grandfather     No Known Problems Maternal Grandmother     No Known Problems Paternal Grandmother     No Known Problems Maternal Grandfather     Heart Disease Father     Cancer Father         unsure       Surgical History:  Past Surgical History:   Procedure Laterality Date    LITHOTRIPSY      LUMBAR FUSION  09/07/2018    L5-S1 fusion and L5-S1 laminectomy with hardware    SHOULDER ARTHROPLASTY Left 12/2014    left shoulder    TOTAL HIP ARTHROPLASTY Left        ROS  Review of Systems   All other systems reviewed and are negative. Visit Vitals  BP (!) 140/82   Pulse 65   Temp 98.2 °F (36.8 °C)   Ht 5' 6\" (1.676 m)   Wt 174 lb (78.9 kg)   SpO2 98%   BMI 28.08 kg/m²       Physical Exam  Physical Exam  Constitutional:       General: He is not in acute distress. Cardiovascular:      Rate and Rhythm: Regular rhythm. Heart sounds: No murmur heard. Pulmonary:      Breath sounds: Normal breath sounds. No wheezing. Abdominal:      Palpations: Abdomen is soft. Tenderness: There is no abdominal tenderness. Musculoskeletal:         General: No signs of injury. Skin:     General: Skin is dry. Neurological:      Mental Status: He is alert. Mental status is at baseline. ASSESSMENT & PLAN  Jeanne Cohn was seen today for new patient.     Diagnoses and all orders for this visit:    Essential hypertension  - Controlled. /82 today. BMP pending. Having symptoms of orthostatic hypotension when he gets up too fast.  Continue to monitor. Orthostatic hypotension    Elevated blood sugar  - A1c pending      Stage 3a chronic kidney disease (HCC)  - Stable. Follows with nephrology. BMP today. Hyperlipidemia, unspecified hyperlipidemia type    -Discussed statin use. Patient states he had sore muscles previously with statin and does not want to start one at this time. Lipid panel pending. Lab Results   Component Value Date    CHOL 214 (H) 09/13/2022    CHOL 219 (H) 03/25/2022    CHOL 222 (H) 09/21/2021     Lab Results   Component Value Date    TRIG 122 09/13/2022    TRIG 68 03/25/2022    TRIG 119 09/21/2021     Lab Results   Component Value Date    HDL 71 (H) 09/13/2022    HDL 68 03/25/2022    HDL 71 09/21/2021     Lab Results   Component Value Date    LDLCALC 118.6 (H) 09/13/2022    LDLCALC 139 (H) 03/25/2022    LDLCALC 130 (H) 09/21/2021       BPH associated with nocturia  - PSA pending  - Start tamsulosin 0.4 mg nightly    I have reviewed the patient's past medical history, social history and family history and vitals. We have discussed treatment plan and follow up and given patient instructions. Patient's questions are answered and we will follow up as indicated. On this date 9/13/2022 I have spent 30 minutes reviewing previous notes, test results and face to face with the patient discussing the diagnosis and importance of compliance with the treatment plan as well as documenting on the day of the visit.     Renato Nance DO

## 2022-12-14 DIAGNOSIS — R29.898 LEG WEAKNESS, BILATERAL: Primary | ICD-10-CM

## 2022-12-14 LAB
EST. AVERAGE GLUCOSE BLD GHB EST-MCNC: 123 MG/DL
HBA1C MFR BLD: 5.9 % (ref 4.8–5.6)
PSA SERPL-MCNC: 0.8 NG/ML

## 2023-01-04 ENCOUNTER — HOSPITAL ENCOUNTER (OUTPATIENT)
Dept: PHYSICAL THERAPY | Age: 83
Setting detail: RECURRING SERIES
Discharge: HOME | End: 2023-01-07
Payer: MEDICARE

## 2023-01-04 PROCEDURE — 97161 PT EVAL LOW COMPLEX 20 MIN: CPT

## 2023-01-04 ASSESSMENT — PAIN SCALES - GENERAL: PAINLEVEL_OUTOF10: 0

## 2023-01-04 NOTE — PROGRESS NOTES
Karin Cox  : 1940  Primary: [facility] (Medicare Managed)  Secondary:  [address] @ [address] [place] [address]  Phone: [phone]  Fax: [phone] Plan Frequency: 1-2 x/wk for 60 days    Plan of Care/Certification Expiration Date: 23      PT Visit Info:  Plan Frequency: 1-2 x/wk for 60 days  Plan of Care/Certification Expiration Date: 23     Visit Count:  1   OUTPATIENT PHYSICAL THERAPY:OP NOTE TYPE: Treatment Note 2023       Episode  }Appt Desk             Treatment Diagnosis:  Leg weakness, bilateral [R29.898]  Medical/Referring Diagnosis:  Leg weakness, bilateral [R29.898]  Referring Physician:  Shelli Shrestha DO MD Orders:  PT Eval and Treat    Date of Onset:  No data recorded   Allergies:   Clonidine and Simvastatin  Restrictions/Precautions:  Restrictions/Precautions: None  No data recorded     Interventions Planned (Treatment may consist of any combination of the following):    Current Treatment Recommendations: Strengthening; ROM; Balance training; Functional mobility training; Manual; Home exercise program; Modalities     Subjective Comments:   See initial evaluation  Initial:}    0/10Post Session:        (None reported)/10  Medications Last Reviewed:  2023  Updated Objective Findings:  See evaluation note from today  Treatment   THERAPEUTIC EXERCISE: (5 minutes):    Exercises per grid below to improve mobility. Required moderate visual and verbal cues to  instruct in exercises . Progressed complexity of movement as indicated. Date:  1/3/23 Date:   Date:     Activity/Exercise Parameters Parameters Parameters   LTR x10     Pelvic tilt 1x10     Seated hamstring stretch 30 sec B x 1                                 Treatment/Session Summary:    Treatment Assessment:  patient presents with stiffness and weakness in LE.  Very tight hamstrings contribute to his difficulty in getting up from a low chair. Communication/Consultation:   Note to MD for signature  Equipment provided today:  None  Recommendations/Intent for next treatment session: Next visit will focus on progression of flexibility exercises.     Total Treatment Billable Duration:  5 min minutes  Time In: 2170  Time Out: 7745    Nolia Nose, PT       Charge Capture  }Post Session Pain  PT Visit Info  [employer] Portal  MD Guidelines  Scanned Media  Benefits  MyChart    Future Appointments   Date Time Provider Galindo Huff   3/29/2023  2:00 PM Eren Smoker, DO [facility]   9/13/2023  2:00 PM Eren Smoker, DO [facility]

## 2023-01-04 NOTE — PLAN OF CARE
Daniella Azul  : 1940  Primary: [employer] [facility] (Medicare Managed)  Secondary:  [address] @ [address] [facility] [place] [address] [address]  Phone: [phone]  Fax: [phone] Plan Frequency: 1-2 x/wk for 60 days  Plan of Care/Certification Expiration Date: 23    PT Visit Info:    Plan Frequency: 1-2 x/wk for 60 days  Plan of Care/Certification Expiration Date: 23    Visit Count:  1                OUTPATIENT PHYSICAL THERAPY:             OP NOTE TYPE: Initial Assessment 2023               Episode  Appt Desk         Treatment Diagnosis:  Leg weakness, bilateral [R29.898]  Medical/Referring Diagnosis:  Leg weakness, bilateral [R29.898]  Referring Physician:  Jeff Adams DO MD Orders:  PT Eval and Treat    Return MD Appt:  3 months  Date of Onset:       Allergies:  Clonidine and Simvastatin  Restrictions/Precautions:    Restrictions/Precautions: None      Medications Last Reviewed:  2023     SUBJECTIVE   History of Injury/Illness (Reason for Referral):  Patient reports gradual onset of difficulty getting up. Patient Stated Goal(s):  \"None stated\"  Initial:     0/10 Post Session:      (None reported)/10  Past Medical History/Comorbidities:   Mr. Opal Zelaya  has a past medical history of Arthritis, Calculus of kidney, Chronic kidney disease, Chronic kidney disease, stage III (moderate) ([address] Fracture of coccyx ([address] Gout, Gout, unspecified, History of left shoulder fracture, Hyperlipidemia, Hypertension, Microscopic hematuria, Sciatica of right side, Sleep apnea, Unspecified essential hypertension, and Urinary tract infection, site not specified. Mr. Opal Zelaya  has a past surgical history that includes lumbar fusion (2018); Total shoulder arthroplasty (Left, 2014); total hip arthroplasty (Left); and Lithotripsy.   Social History/Living Environment:   Lives With: Spouse  Type of Home: House  Home Layout: One level  Home Access: Stairs to enter without rails  Entrance Stairs - Number of Steps: 2     Prior Level of Function/Work/Activity:   Prior level of function: Independent  Occupation: Retired           Learning:   Does the patient/guardian have any barriers to learning?: No barriers  Will there be a co-learner?: No  What is the preferred language of the patient/guardian?: English  Is an  required?: No  How does the patient/guardian prefer to learn new concepts?: Demonstration     Fall Risk Scale: [place] Total Score: 35  [place] Fall Risk: Medium (25-44)           OBJECTIVE     Light touch intact in B LEs  Stiff in all joints of B LEs, especially hips. L hip flex 3+/5. Otherwise B hips and knees grossly 4- to 4/5. Very tight in B hamstrings. SLS of <3 seconds on each leg  ASSESSMENT   Initial Assessment:  patient is very stiff in Les and hamstring tightness contributes to his difficulty in getting up to stand from a chair. Though he notes several falls, none have been inside. All reported falls have been out side near his chickens or near the fish pond. Problem List: (Impacting functional limitations): Body Structures, Functions, Activity Limitations Requiring Skilled Therapeutic Intervention: Decreased functional mobility ; Decreased ROM;  Decreased strength     Therapy Prognosis:   Therapy Prognosis: Fair     Initial Assessment Complexity:   Decision Making: Low Complexity    PLAN   Effective Dates: 1/4/23 TO Plan of Care/Certification Expiration Date: 03/05/23   Frequency/Duration: Plan Frequency: 1-2 x/wk for 60 days   Interventions Planned (Treatment may consist of any combination of the following):    Current Treatment Recommendations: Strengthening; ROM; Balance training; Functional mobility training; Manual; Home exercise program; Modalities     Goals: (Goals have been discussed and agreed upon with patient.)  Short-Term Functional Goals: Time Frame: 30 days  Patient to be independent with HEP  Patient to perfoprm SLS of 5 seconds on each leg to demo improved balance  Discharge Goals: Time Frame: 60 days  Patient to perform 5 reps of sit to  10 seconds  Patient to perform SLS of 7 seconds each leg         Outcome Measure: Tool Used: Lower Extremity Functional Scale (LEFS)  Score:  Initial: 28/80 Most Recent: X/80 (Date: -- )   Interpretation of Score: 20 questions each scored on a 5 point scale with 0 representing \"extreme difficulty or unable to perform\" and 4 representing \"no difficulty\". The lower the score, the greater the functional disability. 80/80 represents no disability. Minimal detectable change is 9 points. Medical Necessity:   > Patient demonstrates fair rehab potential due to higher previous functional level. Reason For Services/Other Comments:  > Patient continues to require skilled intervention due to need to return to higher level of function. Total Duration:  33 minutes  Time In: 1642  Time Out: 0904    Regarding Joy Lucio Hall's therapy, I certify that the treatment plan above will be carried out by a therapist or under their direction.   Thank you for this referral,  Caroline Moses, PT     Referring Physician Signature: Franklin Pelletier DO _______________________________ Date _____________        Post Session Pain  Charge Capture  PT Visit Info MD Hector Hermosillo

## 2023-01-06 ENCOUNTER — HOSPITAL ENCOUNTER (OUTPATIENT)
Dept: PHYSICAL THERAPY | Age: 83
Setting detail: RECURRING SERIES
Discharge: HOME | End: 2023-01-09
Payer: MEDICARE

## 2023-01-06 ENCOUNTER — TELEPHONE (OUTPATIENT)
Dept: FAMILY MEDICINE CLINIC | Facility: CLINIC | Age: 83
End: 2023-01-06

## 2023-01-06 PROCEDURE — 97110 THERAPEUTIC EXERCISES: CPT

## 2023-01-06 ASSESSMENT — PAIN SCALES - GENERAL: PAINLEVEL_OUTOF10: 0

## 2023-01-06 NOTE — PROGRESS NOTES
Olivia Shira  : 1940  Primary: Fito Hall Ppo (Medicare Managed)  Secondary:  [address] @ [address] [place] [address]  Phone: [phone]  Fax: [phone] Plan Frequency: 1-2 x/wk for 60 days    Plan of Care/Certification Expiration Date: 23      PT Visit Info:  Plan Frequency: 1-2 x/wk for 60 days  Plan of Care/Certification Expiration Date: 23     Visit Count:  2   OUTPATIENT PHYSICAL THERAPY:OP NOTE TYPE: Treatment Note 2023       Episode  }Appt Desk             Treatment Diagnosis:  Leg weakness, bilateral [R29.898]  Medical/Referring Diagnosis:  Leg weakness, bilateral [R29.898]  Referring Physician:  Dian Peterson DO MD Orders:  PT Eval and Treat    Date of Onset:  No data recorded   Allergies:   Clonidine and Simvastatin  Restrictions/Precautions:  Restrictions/Precautions: None  No data recorded     Interventions Planned (Treatment may consist of any combination of the following):    Current Treatment Recommendations: Strengthening; ROM; Balance training; Functional mobility training; Manual; Home exercise program; Modalities     Subjective Comments:   Pt. Reported his last fall was about a week ago. Pt. Reported he forgot to take his blood pressure meds today  Initial:}    0/10Post Session:       1/10  Medications Last Reviewed:  2023  Updated Objective Findings:   Pt.'s blood pressure 162 /101 pulse 61  Treatment   THERAPEUTIC EXERCISE: (55 minutes):    Exercises per grid below to improve mobility. Required moderate visual and verbal cues to  instruct in exercises . Progressed complexity of movement as indicated.    Date:  1/3/23 Date:  23 Date:     Activity/Exercise Parameters Parameters Parameters   LTR x10 X 10 reps on green physio ball     Pelvic tilt 1x10 X 20 reps 5 sec hold     Seated hamstring stretch 30 sec B x 1 --------supine 4x30 sec hold with strap    Nu step   Level x 6 mins     Seated LAQ's   X 20 reps seated BLE's     Hamstring stretches   4x30 sec hold each with strap    Bridging   X 10 reps supine    Lower extremity flexion & extension  Supine on green physio ball x 20 reps     Seated hip flexion   X 20 reps                          Treatment/Session Summary:    Treatment Assessment:discussed with patient and his wife about blood pressure meds and balance. Recommend patient using a cane for safety. Communication/Consultation:   Note to MD for signature  Equipment provided today:  None  Recommendations/Intent for next treatment session: Next visit will focus on progression of flexibility exercises.     Total Treatment Billable Duration: 55 mins  Time In: 0900  Time Out: 1000    JOSÉ MANUEL JERRY PTA       Charge Capture  }Post Session Pain  PT Visit Info  [employer] Portal  MD Guidelines  Scanned Media  Benefits  MyChart    Future Appointments   Date Time Provider Galindo Huff   1/9/2023  2:30 PM Ulysses Havers, PTA [facility]   1/12/2023  1:30 PM Freddy Shipman, PT [facility]   1/17/2023  2:30 PM Freddy Shipman, PT [facility]   1/19/2023  2:30 PM Ulysses Havers, PTA [facility]   1/24/2023  2:30 PM Ulysses Havers, PTA [facility]   1/27/2023  2:30 PM Ulysses Havers, PTA [facility]   2/1/2023  2:30 PM Freddy Shipman, PT [facility]   2/2/2023  2:30 PM Freddy Shipman, PT [facility]   3/29/2023  2:00 PM Shoshana Robertson, DO [facility]   9/13/2023  2:00 PM Shoshana Robertson, DO [facility]

## 2023-01-06 NOTE — TELEPHONE ENCOUNTER
Patients wife called and said patient went to therapy today and they would do do his therapy because his bp was 166/101. She states the bottom number has been running in the high 80's to 90's and she asked if she needed to change his bp medication.

## 2023-01-09 ENCOUNTER — HOSPITAL ENCOUNTER (OUTPATIENT)
Dept: PHYSICAL THERAPY | Age: 83
Setting detail: RECURRING SERIES
End: 2023-01-09
Payer: MEDICARE

## 2023-01-12 ENCOUNTER — HOSPITAL ENCOUNTER (OUTPATIENT)
Dept: PHYSICAL THERAPY | Age: 83
Setting detail: RECURRING SERIES
Discharge: HOME | End: 2023-01-15
Payer: MEDICARE

## 2023-01-12 PROCEDURE — 97110 THERAPEUTIC EXERCISES: CPT

## 2023-01-12 ASSESSMENT — PAIN SCALES - GENERAL: PAINLEVEL_OUTOF10: 0

## 2023-01-12 NOTE — PROGRESS NOTES
Sabina Gab  : 1940  Primary: Lorna Kim Ppo (Medicare Managed)  Secondary:  [address] @ [address] [place] [address]  Phone: [phone]  Fax: [phone] Plan Frequency: 1-2 x/wk for 60 days    Plan of Care/Certification Expiration Date: 23      PT Visit Info:  Plan Frequency: 1-2 x/wk for 60 days  Plan of Care/Certification Expiration Date: 23     Visit Count:  3   OUTPATIENT PHYSICAL THERAPY:OP NOTE TYPE: Treatment Note 2023       Episode  }Appt Desk             Treatment Diagnosis:  Leg weakness, bilateral [R29.898]  Medical/Referring Diagnosis:  Leg weakness, bilateral [R29.898]  Referring Physician:  Aracelis Tyson DO MD Orders:  PT Eval and Treat    Date of Onset:  No data recorded   Allergies:   Clonidine and Simvastatin  Restrictions/Precautions:  Restrictions/Precautions: None  No data recorded     Interventions Planned (Treatment may consist of any combination of the following):    Current Treatment Recommendations: Strengthening; ROM; Balance training; Functional mobility training; Manual; Home exercise program; Modalities     Subjective Comments:   Patient  reports he spoke to MD and the doctor said that his blood pressure always fluctuates and \"not to worry about it\"  Initial:}    010Post Session:        (None reported)/10  Medications Last Reviewed:  2023  Updated Objective Findings:      Treatment   THERAPEUTIC EXERCISE: (53 minutes):    Exercises per grid below to improve mobility. Required moderate visual and verbal cues to  ensure correct performance . Progressed complexity of movement as indicated.    Date:  1/3/23 Date:  23 Date:  23   Activity/Exercise Parameters Parameters Parameters   LTR x10 X 10 reps on green physio ball  2x10   Pelvic tilt 1x10 X 20 reps 5 sec hold  5 secs x 10   Seated hamstring stretch 30 sec B x 1 --------supine 4x30 sec hold with strap 30 sec B x 3   Nu step   Level x 6 mins  -   Seated LAQ's   X 20 reps seated BLE's  1# B 2x15   Hamstring stretches   4x30 sec hold each with strap Passive x 15 min   Bridging   X 10 reps supine 2x10   Lower extremity flexion & extension  Supine on green physio ball x 20 reps  Supine, legs on ball 2x10   Seated hip flexion   X 20 reps 1# B 2x15   SKTC   B 1x10   Sit to stand   2x5             Treatment/Session Summary:    Treatment Assessment:d   patient is still very stiff but feels he is starting to improvve. Communication/Consultation:   None today  Equipment provided today:  None  Recommendations/Intent for next treatment session: Next visit will focus on progression of flexibility exercises.     Total Treatment Billable Duration: 53 mins  Time In: 2174  Time Out: 1429    Oumar Fuelling, PT       Charge Capture  }Post Session Pain  PT Visit Info  [employer] [facility] Portal  MD Guidelines  Scanned Media  Benefits  MyChart    Future Appointments   Date Time Provider Galindo Huff   1/17/2023  2:30 PM Oumar Fuelling, PT [facility]   1/19/2023  2:30 PM Umair Showers, PTA [facility]   1/24/2023  2:30 PM Umair Showers, PTA [facility]   1/27/2023  2:30 PM Umair Showers, PTA [facility]   2/1/2023  2:30 PM Oumar Fuelling, PT [facility]   2/2/2023  2:30 PM Oumar Fuelling, PT [facility]   3/29/2023  2:00 PM Keke Spicer DO [facility]   9/13/2023  2:00 PM Keke Spicer, DO [facility]

## 2023-01-17 ENCOUNTER — HOSPITAL ENCOUNTER (OUTPATIENT)
Dept: PHYSICAL THERAPY | Age: 83
Setting detail: RECURRING SERIES
Discharge: HOME | End: 2023-01-20
Payer: MEDICARE

## 2023-01-17 PROCEDURE — 97110 THERAPEUTIC EXERCISES: CPT

## 2023-01-17 ASSESSMENT — PAIN SCALES - GENERAL: PAINLEVEL_OUTOF10: 0

## 2023-01-17 NOTE — PROGRESS NOTES
Naun Milianjalen  : 1940  Primary: Carine Harden Ppo (Medicare Managed)  Secondary:  [address] @ [address] [place] [address]  Phone: [phone]  Fax: [phone] Plan Frequency: 1-2 x/wk for 60 days    Plan of Care/Certification Expiration Date: 23      PT Visit Info:  Plan Frequency: 1-2 x/wk for 60 days  Plan of Care/Certification Expiration Date: 23     Visit Count:  4   OUTPATIENT PHYSICAL THERAPY:OP NOTE TYPE: Treatment Note 2023       Episode  }Appt Desk             Treatment Diagnosis:  Leg weakness, bilateral [R29.898]  Medical/Referring Diagnosis:  Leg weakness, bilateral [R29.898]  Referring Physician:  Papo Lowe DO MD Orders:  PT Eval and Treat    Date of Onset:  No data recorded   Allergies:   Clonidine and Simvastatin  Restrictions/Precautions:  Restrictions/Precautions: None  No data recorded     Interventions Planned (Treatment may consist of any combination of the following):    Current Treatment Recommendations: Strengthening; ROM; Balance training; Functional mobility training; Manual; Home exercise program; Modalities     Subjective Comments:  Patient with no new complaints. Initial:}    010Post Session:       010  Medications Last Reviewed:  2023  Updated Objective Findings:      Treatment   THERAPEUTIC EXERCISE: (53 minutes):    Exercises per grid below to improve mobility. Required moderate visual and verbal cues to  ensure correct performance . Progressed complexity of movement as indicated.    Date:  23 Date:  23 Date  23   Activity/Exercise Parameters Parameters    LTR X 10 reps on green physio ball  2x10 2x10   Pelvic tilt X 20 reps 5 sec hold  5 secs x 10 5 sec 2x10   Seated hamstring stretch --------supine 4x30 sec hold with strap 30 sec B x 3 -   Nu step  Level x 6 mins  - -   Seated LAQ's  X 20 reps seated BLE's  1# B 2x15 1.5# 2x15   Hamstring stretches  4x30 sec hold each with strap Passive x 15 min Passive   30 sec x 4 ea   Bridging  X 10 reps supine 2x10 2x10   Lower extremity flexion & extension Supine on green physio ball x 20 reps  Supine, legs on ball 2x10 Supine, legs on ball 2x10   Seated hip flexion  X 20 reps 1# B 2x15 1.5# B 2x15   SKTC  B 1x10 B 1x10   Sit to stand      ground                            foam  2x5 2x5  2x5   Seated knee flex with band   Green B 2x15   Stand EO/EC   yes       Treatment/Session Summary:    Treatment Assessment:d   patient still stiff. Did better with balance than expected. Communication/Consultation:   None today  Equipment provided today:  None  Recommendations/Intent for next treatment session: Next visit will focus on progression of flexibility exercises.     Total Treatment Billable Duration: 53 mins  Time In: 0733  Time Out: 1527    Guerline Roach, PT       Charge Capture  }Post Session Pain  PT Visit Info  [employer] Portal  MD Guidelines  Scanned Media  Benefits  MyChart    Future Appointments   Date Time Provider Galindo Huff   1/19/2023  2:30 PM Caroline Beauchamp [facility]   1/24/2023  2:30 PM Felicitas Mcdonnell [facility]   1/27/2023  2:30 PM Felicitas Mcdonnell [facility]   2/1/2023  2:30 PM Guerline Roach PT [facility]   2/2/2023  2:30 PM Guerline Roach PT [facility]   3/29/2023  2:00 PM Sarahi Goncalves DO [facility]   9/13/2023  2:00 PM Sarahi Goncalves, DO [facility]

## 2023-01-19 ENCOUNTER — HOSPITAL ENCOUNTER (OUTPATIENT)
Dept: PHYSICAL THERAPY | Age: 83
Setting detail: RECURRING SERIES
Discharge: HOME | End: 2023-01-22
Payer: MEDICARE

## 2023-01-19 PROCEDURE — 97110 THERAPEUTIC EXERCISES: CPT

## 2023-01-19 ASSESSMENT — PAIN SCALES - GENERAL: PAINLEVEL_OUTOF10: 0

## 2023-01-19 NOTE — PROGRESS NOTES
Alessandro Valencia  : 1940  Primary: Jany Ramirez Ppo (Medicare Managed)  Secondary:  [address] @ [address] [facility] [address]  Phone: [phone]  Fax: [phone] Plan Frequency: 1-2 x/wk for 60 days    Plan of Care/Certification Expiration Date: 23      PT Visit Info:  Plan Frequency: 1-2 x/wk for 60 days  Plan of Care/Certification Expiration Date: 23     Visit Count:  5   OUTPATIENT PHYSICAL THERAPY:OP NOTE TYPE: Treatment Note 2023       Episode  }Appt Desk             Treatment Diagnosis:  Leg weakness, bilateral [R29.898]  Medical/Referring Diagnosis:  Leg weakness, bilateral [R29.898]  Referring Physician:  Peter Jaramillo DO MD Orders:  PT Eval and Treat    Date of Onset:  No data recorded   Allergies:   Clonidine and Simvastatin  Restrictions/Precautions:  Restrictions/Precautions: None  No data recorded     Interventions Planned (Treatment may consist of any combination of the following):    Current Treatment Recommendations: Strengthening; ROM; Balance training; Functional mobility training; Manual; Home exercise program; Modalities     Subjective Comments:  Pt. reported no pain just dizziness Pt.'s wife discussed patients blood pressure concerns and reported the first opening his doctor had was a week from now. Initial:}    0/10Post Session:       0/10  Medications Last Reviewed:  2023  Updated Objective Findings:   /104 pulse 65  Post visit 178/110 pulse 75 in supine   Treatment   THERAPEUTIC EXERCISE: (53 minutes):    Exercises per grid below to improve mobility. Required moderate visual and verbal cues to  ensure correct performance . Progressed complexity of movement as indicated.    Date:  23 Date:  23 Date  23   Activity/Exercise Parameters Parameters    LTR X 10 reps on green physio ball  2x10 2x10   Pelvic tilt X 20 reps 5 sec hold  5 secs x 10 5 sec 2x10   Seated hamstring stretch 4x30 sec hold with strap 30 sec B x 3 -   Nu step  Level 1 x 10  mins  - -   Seated LAQ's  X 20 reps seated BLE's  1# B 2x15 1.5# 2x15   Hamstring stretches  4x30 sec hold each with strap Passive x 15 min Passive   30 sec x 4 ea   Bridging  X 10 reps supine 2x10 2x10   Lower extremity flexion & extension Supine on green physio ball x 20 reps  Supine, legs on ball 2x10 Supine, legs on ball 2x10   Seated hip flexion  X 20 reps 1# B 2x15 1.5# B 2x15   SKTC 4x30 sec hold  B 1x10 B 1x10   Sit to stand      ground                            foam  2x5 2x5  2x5   Seated knee flex with band   Green B 2x15   Stand EO/EC   yes       Treatment/Session Summary:    Treatment Assessment: Pt. Was compliant with all exercises and reported feeling better after session     Communication/Consultation:   None today  Equipment provided today:  None  Recommendations/Intent for next treatment session: Next visit will focus on progression of flexibility exercises.     Total Treatment Billable Duration: 53 mins  Time In: 1430  Time Out: CONCETTA Cobb [address] ROSALINO, PTA       Charge Capture  }Post Session Pain  PT Visit Info  [employer] Portal  MD Guidelines  Scanned Media  Benefits  MyChart    Future Appointments   Date Time Provider Galindo Huff   1/24/2023  2:30 PM [facility] [place] [facility]   1/26[facility]2023  [facility]   1/27/2023  2:00 PM Funmi Powell, DO [facility]   2/1/2023  2:30 PM Corie Castrejon PT [facility]   2/2/2023  2:30 PM Corie Castrejon PT [facility]   3/29/2023  2:00 PM Funmi Powell, DO [facility]   9/13/2023  2:00 PM Funmi Powell, DO [facility]

## 2023-01-24 ENCOUNTER — HOSPITAL ENCOUNTER (OUTPATIENT)
Dept: PHYSICAL THERAPY | Age: 83
Setting detail: RECURRING SERIES
Discharge: HOME | End: 2023-01-27
Payer: MEDICARE

## 2023-01-24 PROCEDURE — 97110 THERAPEUTIC EXERCISES: CPT

## 2023-01-24 ASSESSMENT — PAIN SCALES - GENERAL: PAINLEVEL_OUTOF10: 0

## 2023-01-24 NOTE — PROGRESS NOTES
Katelyn Schwab  : 1940  Primary: Srikanth Sanders Ppo (Medicare Managed)  Secondary:  [address] @ [address] [place] [address]  Phone: [phone]  Fax: [phone] Plan Frequency: 1-2 x/wk for 60 days    Plan of Care/Certification Expiration Date: 23      PT Visit Info:  Plan Frequency: 1-2 x/wk for 60 days  Plan of Care/Certification Expiration Date: 23     Visit Count:  6   OUTPATIENT PHYSICAL THERAPY:OP NOTE TYPE: Treatment Note 2023       Episode  }Appt Desk             Treatment Diagnosis:  Leg weakness, bilateral [R29.898]  Medical/Referring Diagnosis:  Leg weakness, bilateral [R29.898]  Referring Physician:  Flo Carpenter DO MD Orders:  PT Eval and Treat    Date of Onset:  No data recorded   Allergies:   Clonidine and Simvastatin  Restrictions/Precautions:  Restrictions/Precautions: None  No data recorded     Interventions Planned (Treatment may consist of any combination of the following):    Current Treatment Recommendations: Strengthening; ROM; Balance training; Functional mobility training; Manual; Home exercise program; Modalities     Subjective Comments:    Pt. Reported minimal dizziness and feels better since his blood pressure is down. Initial:}    0/10Post Session:        /10  Medications Last Reviewed:  2023  Updated Objective Findings:   Pt.'s  /93 pulse 68     Treatment   THERAPEUTIC EXERCISE: (53 minutes):    Exercises per grid below to improve mobility. Required moderate visual and verbal cues to  ensure correct performance . Progressed complexity of movement as indicated.    Date:  23 Date:  23 Date  23   Activity/Exercise Parameters Parameters Parameters   LTR X 10 reps on green physio ball  2x10 2x10 reps supine with green ball    Standing hip flexion   X 20 reps BLE's     Standing hamstring curls  X 20 reps x 30 reps     Standing hip abduction   2x10 reps each LE           Pelvic tilt X 20 reps 5 sec hold  5 secs x 20 5 sec 2x10   Seated hamstring stretch 4x30 sec hold with strap Supine 4x30 sec  hold each with strap  Supine 4x30 sec hold each with strap   Nu step  Level 1 x 10  mins  Level 1 x 10 mins -   Seated LAQ's  X 20 reps seated BLE's  X 20 reps BLE's  1.5# 2x15   Bridging  X 10 reps supine 2x10 2x10   Lower extremity flexion & extension Supine on green physio ball x 20 reps  Supine, legs on ball 2x10 Supine, legs on ball 2x10   Seated hip flexion  X 20 reps 1# B 2x15 1.5# B 2x15   SKTC 4x30 sec hold  4x30 sec hold each  B 1x10   Sit to stand      ground                            foam  2x5 2x5  2x5   Seated knee flex with band  ------ Katerina Pinna B 2x15   Stand EO/EC  ----------- yes       Treatment/Session Summary:    Treatment Assessment: Pt. Was compliant with all exercises and reported feeling better after session     Communication/Consultation:   None today  Equipment provided today:  None  Recommendations/Intent for next treatment session: Next visit will focus on progression of flexibility exercises.     Total Treatment Billable Duration: 53 mins  Time In: 1430  Time Out: CONCETTA Cobb [address] BUNCH, PTA       Charge Capture  }Post Session Pain  PT Visit Info  [employer] Portal  MD Guidelines  Scanned Media  Benefits  MyChart    Future Appointments   Date Time Provider Galindo Huff   1/26/2023  8:00 PM [place] Elzbieta, [facility]   1/27/2023  2:00 PM William Maynard, DO [facility]   2/1/2023  2:30 PM Caroline Moses, PT [facility]   2/2/2023  2:30 PM Judjocy Moses, PT [facility]   2/6/2023  2:30 PM Caroline Moses, PT [facility]   2/8/2023  2:30 PM Judjocy Moses, PT [facility]   2/14/2023  2:30 PM Charisma Mishra, PT [facility]   2/16/2023  2:30 PM [place], PTA [facility]   2/20/2023  2:30 PM [place], PTA [facility]   2/22/2023  2:30 PM Charisma Mishra, PT [facility]   2/27/2023  2:30 PM MAU [place] O 3/1/2023  2:30 PM Charisma Mishra, [facility]   3/29/2023  2:00 PM William Maynard, DO [facility]   9/13/2023  2:00 PM William Maynard, DO [facility]

## 2023-01-24 NOTE — PROGRESS NOTES
Sharyne Apley  : 1940  Primary: Tabitha Mott Ppo (Medicare Managed)  Secondary:  [address] @ [address] [place] [address]  Phone: [phone]  Fax: [phone] Plan Frequency: 1-2 x/wk for 60 days    Plan of Care/Certification Expiration Date: 23      PT Visit Info:  Plan Frequency: 1-2 x/wk for 60 days  Plan of Care/Certification Expiration Date: 23     Visit Count:  6   OUTPATIENT PHYSICAL THERAPY:OP NOTE TYPE: Treatment Note 2023       Episode  }Appt Desk             Treatment Diagnosis:  Leg weakness, bilateral [R29.898]  Medical/Referring Diagnosis:  Leg weakness, bilateral [R29.898]  Referring Physician:  Roxi Coello DO MD Orders:  PT Eval and Treat    Date of Onset:  No data recorded   Allergies:   Clonidine and Simvastatin  Restrictions/Precautions:  Restrictions/Precautions: None  No data recorded     Interventions Planned (Treatment may consist of any combination of the following):    Current Treatment Recommendations: Strengthening; ROM; Balance training; Functional mobility training; Manual; Home exercise program; Modalities     Subjective Comments:    Pt. Reported minimal dizziness and feels better since his blood pressure is down. Initial:}    0/10Post Session:        /10  Medications Last Reviewed:  2023  Updated Objective Findings:   Pt.'s  /93 pulse 68     Treatment   THERAPEUTIC EXERCISE: (53 minutes):    Exercises per grid below to improve mobility. Required moderate visual and verbal cues to  ensure correct performance . Progressed complexity of movement as indicated.    Date:  23 Date:  23 Date  23   Activity/Exercise Parameters Parameters Parameters   LTR X 10 reps on green physio ball  2x10 2x10 reps supine with green ball    Pelvic tilt X 20 reps 5 sec hold  5 secs x 10 5 sec 2x10   Seated hamstring stretch 4x30 sec hold with strap Supine 4x30 sec hold each with strap  Supine 4x30 sec hold each with strap   Nu step  Level 1 x 10  mins  Level 1 x 10 mins -   Seated LAQ's  X 20 reps seated BLE's  X 20 reps BLE's  1.5# 2x15   Bridging  X 10 reps supine 2x10 2x10   Lower extremity flexion & extension Supine on green physio ball x 20 reps  Supine, legs on ball 2x10 Supine, legs on ball 2x10   Seated hip flexion  X 20 reps 1# B 2x15 1.5# B 2x15   SKTC 4x30 sec hold  B 1x10 B 1x10   Sit to stand      ground                            foam  2x5 2x5  2x5   Seated knee flex with band   Green B 2x15   Stand EO/EC   yes       Treatment/Session Summary:    Treatment Assessment: Pt. Was compliant with all exercises and reported feeling better after session     Communication/Consultation:   None today  Equipment provided today:  None  Recommendations/Intent for next treatment session: Next visit will focus on progression of flexibility exercises.     Total Treatment Billable Duration: 53 mins  Time In: 1430  Time Out: 1662    JOSÉ MANUEL JERRY PTA       Charge Capture  }Post Session Pain  PT Visit Info  [employer] Portal  MD Guidelines  Scanned Media  Benefits  [employer]    Future Appointments   Date Time Provider Galindo Huff   1/26/2023  8:00 PM Jovani Fernandez, MAU [facility]   1/27/2023  2:00 PM Eren Smoker, DO [facility]   2/1/2023  2:30 PM Nolia Nose, PT [facility]   2/2/2023  2:30 PM Nolia Nose, PT [facility]   2/6/2023  2:30 PM Nolia Nose, PT [facility]   2/8/2023  2:30 PM Nolia Nose, PT [facility]   2/14/2023  2:30 PM Mauricio Freitas, PT [facility]   2/16/2023  2:30 PM Jovani Fernandez, PTA [facility]   2/20/2023  2:30 PM Jovani Fernandez, PTA [facility]   2/22/2023  2:30 PM Mauricio Freitas, PT [facility]   2/27/2023  2:30 PM Jovani Fernandez, PTA [facility]   3/1/2023  2:30 PM Mauricio Freitas, PT [facility]   3/29/2023  2:00 PM Eern Smoker, DO [facility]   9/13/2023  2:00 PM Eren Smoker, DO [facility]

## 2023-01-26 ENCOUNTER — HOSPITAL ENCOUNTER (OUTPATIENT)
Dept: PHYSICAL THERAPY | Age: 83
Setting detail: RECURRING SERIES
End: 2023-01-26
Payer: MEDICARE

## 2023-01-27 ENCOUNTER — APPOINTMENT (OUTPATIENT)
Dept: PHYSICAL THERAPY | Age: 83
End: 2023-01-27
Payer: MEDICARE

## 2023-01-27 ENCOUNTER — OFFICE VISIT (OUTPATIENT)
Dept: FAMILY MEDICINE CLINIC | Facility: CLINIC | Age: 83
End: 2023-01-27
Payer: MEDICARE

## 2023-01-27 VITALS
HEIGHT: 66 IN | WEIGHT: 173 LBS | TEMPERATURE: 98.3 F | OXYGEN SATURATION: 96 % | BODY MASS INDEX: 27.8 KG/M2 | HEART RATE: 81 BPM | DIASTOLIC BLOOD PRESSURE: 80 MMHG | SYSTOLIC BLOOD PRESSURE: 142 MMHG

## 2023-01-27 DIAGNOSIS — R35.1 BPH ASSOCIATED WITH NOCTURIA: ICD-10-CM

## 2023-01-27 DIAGNOSIS — I10 ESSENTIAL HYPERTENSION: Primary | ICD-10-CM

## 2023-01-27 DIAGNOSIS — E78.2 MIXED HYPERLIPIDEMIA: ICD-10-CM

## 2023-01-27 DIAGNOSIS — N18.31 STAGE 3A CHRONIC KIDNEY DISEASE (HCC): ICD-10-CM

## 2023-01-27 DIAGNOSIS — N40.1 BPH ASSOCIATED WITH NOCTURIA: ICD-10-CM

## 2023-01-27 PROBLEM — E11.22 TYPE 2 DIABETES MELLITUS WITH CHRONIC KIDNEY DISEASE (HCC): Status: RESOLVED | Noted: 2018-03-13 | Resolved: 2023-01-27

## 2023-01-27 PROCEDURE — 3079F DIAST BP 80-89 MM HG: CPT | Performed by: STUDENT IN AN ORGANIZED HEALTH CARE EDUCATION/TRAINING PROGRAM

## 2023-01-27 PROCEDURE — 99214 OFFICE O/P EST MOD 30 MIN: CPT | Performed by: STUDENT IN AN ORGANIZED HEALTH CARE EDUCATION/TRAINING PROGRAM

## 2023-01-27 PROCEDURE — 1123F ACP DISCUSS/DSCN MKR DOCD: CPT | Performed by: STUDENT IN AN ORGANIZED HEALTH CARE EDUCATION/TRAINING PROGRAM

## 2023-01-27 PROCEDURE — 3077F SYST BP >= 140 MM HG: CPT | Performed by: STUDENT IN AN ORGANIZED HEALTH CARE EDUCATION/TRAINING PROGRAM

## 2023-01-27 RX ORDER — TAMSULOSIN HYDROCHLORIDE 0.4 MG/1
0.4 CAPSULE ORAL DAILY
Qty: 90 CAPSULE | Refills: 3 | Status: SHIPPED | OUTPATIENT
Start: 2023-01-27

## 2023-01-27 RX ORDER — BENAZEPRIL HYDROCHLORIDE 10 MG/1
10 TABLET ORAL DAILY
Qty: 30 TABLET | Refills: 0 | Status: SHIPPED | OUTPATIENT
Start: 2023-01-27

## 2023-01-27 RX ORDER — BENAZEPRIL HYDROCHLORIDE 20 MG/1
20 TABLET ORAL DAILY
Qty: 90 TABLET | Refills: 3 | Status: SHIPPED | OUTPATIENT
Start: 2023-01-27

## 2023-01-27 ASSESSMENT — PATIENT HEALTH QUESTIONNAIRE - PHQ9
SUM OF ALL RESPONSES TO PHQ QUESTIONS 1-9: 0
SUM OF ALL RESPONSES TO PHQ9 QUESTIONS 1 & 2: 0
SUM OF ALL RESPONSES TO PHQ QUESTIONS 1-9: 0
2. FEELING DOWN, DEPRESSED OR HOPELESS: 0
1. LITTLE INTEREST OR PLEASURE IN DOING THINGS: 0
SUM OF ALL RESPONSES TO PHQ QUESTIONS 1-9: 0
SUM OF ALL RESPONSES TO PHQ QUESTIONS 1-9: 0

## 2023-01-27 ASSESSMENT — ANXIETY QUESTIONNAIRES
6. BECOMING EASILY ANNOYED OR IRRITABLE: 0
1. FEELING NERVOUS, ANXIOUS, OR ON EDGE: 0
7. FEELING AFRAID AS IF SOMETHING AWFUL MIGHT HAPPEN: 0
5. BEING SO RESTLESS THAT IT IS HARD TO SIT STILL: 0
3. WORRYING TOO MUCH ABOUT DIFFERENT THINGS: 0
4. TROUBLE RELAXING: 0
2. NOT BEING ABLE TO STOP OR CONTROL WORRYING: 0
GAD7 TOTAL SCORE: 0

## 2023-01-27 NOTE — PROGRESS NOTES
[facility]  Christine Meredith  Phone [phone]  Fax:  [phone]    Patient: Yamel Martinez  YOB: 1940  Patient Age 79 y.o. Patient sex: male  Medical Record:  [id number]  Visit Date: 01/27/23    [facility] Practice Clinic Note     Chief Complaint   Patient presents with    Follow-up       History of Present Illness:  41-year-old white male history of hypertension, diabetes controlled with lifestyle modifications, CKD3a, hyperlipidemia presents for routine follow-up. Patient did not start tamsulosin for BPH last visit. We will reorder today. BP elevated at home 10/100s. Patient's wife has been giving him amlodipine which causes him leg swelling. BP today 142/80. HCTZ previously discontinued due to history of gout. Will increase dose of Benazapril today. No other questions or concerns at this time. Alonso any chest pain, shortness of breath, abdominal pain, fever, chills, or any other symptoms at this time. Labs at next visit. Going to call in 2 weeks with blood pressure log and adjust medication as needed. Allergies:   Allergies   Allergen Reactions    Clonidine Other (See Comments)     Lethargy, Patient denies allergy    Simvastatin Other (See Comments)     Noted in office visit note, 5/26/2015,  Patient denies allergy       Current Medications:   Medications marked \"taking\" at this time:    Current Outpatient Medications:     tamsulosin (FLOMAX) 0.4 MG capsule, Take 1 capsule by mouth daily, Disp: 90 capsule, Rfl: 3    benazepril (LOTENSIN) 20 MG tablet, Take 1 tablet by mouth daily, Disp: 90 tablet, Rfl: 3    Current Problem List:   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    Hyperuricemia    History of renal stone    Spinal stenosis of lumbosacral region    Family history of malignant neoplasm of prostate    Spinal stenosis    Post-traumatic arthrosis of left shoulder    Essential hypertension    Painful orthopaedic hardware ([address]    Spondylolisthesis at L5-S1 level    Mixed hyperlipidemia    Gout    History of left shoulder fracture    BPH associated with nocturia       Social History:   Social History     Tobacco Use    Smoking status: Never    Smokeless tobacco: Never   Substance Use Topics    Alcohol use: Not Currently     Alcohol/week: 0.0 standard drinks       Family History:   Family History   Problem Relation Age of Onset    Cancer Mother         breast    No Known Problems Paternal Grandfather     No Known Problems Maternal Grandmother     No Known Problems Paternal Grandmother     No Known Problems Maternal Grandfather     Heart Disease Father     Cancer Father         unsure       Surgical History:  Past Surgical History:   Procedure Laterality Date    LITHOTRIPSY      LUMBAR FUSION  09/07/2018    L5-S1 fusion and L5-S1 laminectomy with hardware    SHOULDER ARTHROPLASTY Left 12/2014    left shoulder    TOTAL HIP ARTHROPLASTY Left        ROS  Review of Systems   All other systems reviewed and are negative. Visit Vitals  BP (!) 142/80   Pulse 81   Temp 98.3 °F (36.8 °C)   Ht 5' 6\" (1.676 m)   Wt 173 lb (78.5 kg)   SpO2 96%   BMI 27.92 kg/m²         Physical Exam  Physical Exam  Constitutional:       General: He is not in acute distress. Cardiovascular:      Rate and Rhythm: Regular rhythm. Heart sounds: No murmur heard. Pulmonary:      Breath sounds: Normal breath sounds. No wheezing. Abdominal:      Palpations: Abdomen is soft. Tenderness: There is no abdominal tenderness. Musculoskeletal:         General: No signs of injury. Skin:     General: Skin is dry. Neurological:      Mental Status: He is alert. Mental status is at baseline. ASSESSMENT & PLAN  Alejandra Magallon was seen today for new patient.     Diagnoses and all orders for this visit:    Essential hypertension  - /82 today. Has been running 160s/100s at home. Was taking his wife's amlodipine 5 mg which was causing leg swelling however lowered BP. Will increase bp med today. - Increase benazepril 30 mg daily      Stage 3a chronic kidney disease ([address]  - Follows with nephrology. Continue to monitor. Avoid nephrotoxic agents. Hyperlipidemia, unspecified hyperlipidemia type  -Discussed statin use. Patient states he had sore muscles previously with statin and does not want to start one at this time. Lab Results   Component Value Date    CHOL 227 (H) 12/13/2022    CHOL 214 (H) 09/13/2022    CHOL 219 (H) 03/25/2022     Lab Results   Component Value Date    TRIG 100 12/13/2022    TRIG 122 09/13/2022    TRIG 68 03/25/2022     Lab Results   Component Value Date    HDL 69 (H) 12/13/2022    HDL 71 (H) 09/13/2022    HDL 68 03/25/2022     Lab Results   Component Value Date    LDLCALC 138 (H) 12/13/2022    LDLCALC 118.6 (H) 09/13/2022    LDLCALC 139 (H) 03/25/2022       BPH associated with nocturia  - Start tamsulosin 0.4 mg nightly      I have reviewed the patient's past medical history, social history and family history and vitals. We have discussed treatment plan and follow up and given patient instructions. Patient's questions are answered and we will follow up as indicated.         Awilda Moss,

## 2023-02-01 ENCOUNTER — HOSPITAL ENCOUNTER (OUTPATIENT)
Dept: PHYSICAL THERAPY | Age: 83
Setting detail: RECURRING SERIES
Discharge: HOME | End: 2023-02-04
Payer: MEDICARE

## 2023-02-01 PROCEDURE — 97110 THERAPEUTIC EXERCISES: CPT

## 2023-02-01 ASSESSMENT — PAIN SCALES - GENERAL: PAINLEVEL_OUTOF10: 0

## 2023-02-01 NOTE — PROGRESS NOTES
Kirsten Harrell  : 1940  Primary: Phu Dasilva Ppo (Medicare Managed)  Secondary:  [address] @ [address] [place] [address]  Phone: [phone]  Fax: [phone] Plan Frequency: 1-2 x/wk for 60 days    Plan of Care/Certification Expiration Date: 23      PT Visit Info:  Plan Frequency: 1-2 x/wk for 60 days  Plan of Care/Certification Expiration Date: 23     Visit Count:  7   OUTPATIENT PHYSICAL THERAPY:OP NOTE TYPE: Treatment Note 2023       Episode  }Appt Desk             Treatment Diagnosis:  Leg weakness, bilateral [R29.898]  Medical/Referring Diagnosis:  Leg weakness, bilateral [R29.898]  Referring Physician:  Catherine Martinez DO MD Orders:  PT Eval and Treat    Date of Onset:  No data recorded   Allergies:   Clonidine and Simvastatin  Restrictions/Precautions:  Restrictions/Precautions: None  No data recorded     Interventions Planned (Treatment may consist of any combination of the following):    Current Treatment Recommendations: Strengthening; ROM; Balance training; Functional mobility training; Manual; Home exercise program; Modalities     Subjective Comments:  patient reports he usually feels good when he wakes up until he takes his blood pressure medicine, then he feels dizzy. Initial:}    0/10Post Session:       0/10  Medications Last Reviewed:  2023  Updated Objective Findings:   None    Treatment   THERAPEUTIC EXERCISE: (53 minutes):    Exercises per grid below to improve mobility. Required moderate visual and verbal cues to  ensure correct performance . Progressed     as indicated.    Date:  23 Date:  23 Date  23   Activity/Exercise Parameters Parameters    LTR X 10 reps on green physio ball  2x10 2x10   Standing hip flexion   X 20 reps BLE's  -   Standing hamstring curls  X 20 reps x 30 reps  -   Standing hip abduction   2x10 reps each LE  B 2x10   Pelvic tilt X 20 reps 5 sec hold  5 secs x 20 1x10   Seated hamstring stretch 4x30 sec hold with strap Supine 4x30 sec  hold each with strap  PROM supine B 3x30 sec   Nu step  Level 1 x 10  mins  Level 1 x 10 mins L1 x 10 min   Seated LAQ's  X 20 reps seated BLE's  X 20 reps BLE's  1# B 2x15   Bridging  X 10 reps supine 2x10 1x10   Lower extremity flexion & extension Supine on green physio ball x 20 reps  Supine, legs on ball 2x10 Supine, legs on ball 2x10   Seated hip flexion  X 20 reps 1# B 2x15 1# B 2x15   SKTC 4x30 sec hold  4x30 sec hold each  R 2x10  L - held due to discomfort   Sit to stand      ground                            foam  2x5 2x5  2x15   Seated knee flex with band  ------ John Cristian B 2x15   Stand EO/EC  -----------        Treatment/Session Summary:    Treatment Assessment:    Continues to be stiff, but seems to be slowly improving. He works hard at exercises. Communication/Consultation:   None today  Equipment provided today:  None  Recommendations/Intent for next treatment session: Next visit will focus on progression of flexibility exercises.     Total Treatment Billable Duration: 53 mins  Time In: 5689  Time Out: 1522    Pop Laws, PT       Charge Capture  }Post Session Pain  PT Visit Info  Med[employer] Portal  MD Guidelines  Scanned Media  Benefits  [employer]    Future Appointments   Date Time Provider Galindo Huff   2/2/2023  2:30 PM Pop Papa, PT [facility]   2/6/2023  2:30 PM Pop Papa, PT [facility]   2/8/2023  2:30 PM Pop Papa, PT [facility]   2/14/2023  2:30 PM Esteban Sanes, PT [facility]   2/16/2023  2:30 PM Kingsbury Huh, PTA [facility]   2/20/2023  2:30 PM Renee Huh, PTA [facility]   2/22/2023  2:30 PM Esetban Sanes, PT [facility]   2/27/2023  2:30 PM Renee Huh, PTA [facility]   3/1/2023  2:30 PM Esteban Sanes, PT [facility]   3/29/2023  2:00 PM Tad Rist, DO [facility]   9/13/2023  2:00 PM Tad Rist, DO [facility]

## 2023-02-02 ENCOUNTER — HOSPITAL ENCOUNTER (OUTPATIENT)
Dept: PHYSICAL THERAPY | Age: 83
Setting detail: RECURRING SERIES
Discharge: HOME | End: 2023-02-05
Payer: MEDICARE

## 2023-02-02 PROCEDURE — 97110 THERAPEUTIC EXERCISES: CPT

## 2023-02-02 ASSESSMENT — PAIN SCALES - GENERAL: PAINLEVEL_OUTOF10: 0

## 2023-02-02 NOTE — PROGRESS NOTES
Kenji Raphaelr  : 1940  Primary: Debbie Rodriguez Ppo (Medicare Managed)  Secondary:  [address] @ [address] [place] [address]  Phone: [phone]  Fax: [phone] Plan Frequency: 1-2 x/wk for 60 days    Plan of Care/Certification Expiration Date: 23      PT Visit Info:  Plan Frequency: 1-2 x/wk for 60 days  Plan of Care/Certification Expiration Date: 23     Visit Count:  8   OUTPATIENT PHYSICAL THERAPY:OP NOTE TYPE: Treatment Note 2023       Episode  }Appt Desk             Treatment Diagnosis:  Leg weakness, bilateral [R29.898]  Medical/Referring Diagnosis:  Leg weakness, bilateral [R29.898]  Referring Physician:  Nova Rubinstein, DO MD Orders:  PT Eval and Treat    Date of Onset:  No data recorded   Allergies:   Clonidine and Simvastatin  Restrictions/Precautions:  Restrictions/Precautions: None  No data recorded     Interventions Planned (Treatment may consist of any combination of the following):    Current Treatment Recommendations: Strengthening; ROM; Balance training; Functional mobility training; Manual; Home exercise program; Modalities     Subjective Comments:  patient reports that L hip is not as sore today. He feels okay. Initial:}    010Post Session:       010  Medications Last Reviewed:  2023  Updated Objective Findings:   None    Treatment   THERAPEUTIC EXERCISE: (45 minutes):    Exercises per grid below to improve mobility. Required moderate visual and verbal cues to  ensure correct performance . Progressed complexity of movement as indicated.    Date:  23 Date  23 Date  23   Activity/Exercise Parameters     LTR 2x10 2x10 -   Standing hip flexion  X 20 reps BLE's  - -   Standing hamstring curls X 20 reps x 30 reps  - -   Standing hip abduction  2x10 reps each LE  B 2x10 -   Pelvic tilt 5 secs x 20 1x10 -   Seated hamstring stretch Supine 4x30 sec  hold each with strap  PROM supine B 3x30 sec PROM supine  B 5 x 30 sec   Nu step  Level 1 x 10 mins L1 x 10 min L1 x 10 min   Seated LAQ's  X 20 reps BLE's  1# B 2x15 -   Bridging  2x10 1x10 2x10   Lower extremity flexion & extension Supine, legs on ball 2x10 Supine, legs on ball 2x10 Supine, legs on ball 2x10   Seated hip flexion  1# B 2x15 1# B 2x15 -   SKTC 4x30 sec hold each  R 2x10  L - held due to discomfort -   Sit to stand      ground                            foam 2x5 2x5  2x5 -  3x5   Seated knee flex with band ------ Gwenlyn Amass B 2x15 -   Stand EO/EC     ground                             foam -----------  X ~ 1   2 x ~1 min       Treatment/Session Summary:    Treatment Assessment:    Worked more on balance and stretching today as patient had just been here yesterday. Communication/Consultation:   None today  Equipment provided today:  None  Recommendations/Intent for next treatment session: Next visit will focus on progression of flexibility exercises and balance activity.     Total Treatment Billable Duration: 45 mins  Time In: 1430  Time Out: 1515    Derick Richmond, PT       Charge Capture  }Post Session Pain  PT Visit Info  [employer] Portal  MD Guidelines  Scanned Media  Benefits  MyChart    Future Appointments   Date Time Provider Galindo Huff   2/6/2023  2:30 PM Derick Richmond, PT [facility]   2/8/2023  2:30 PM Derick Richmond, PT [facility]   2/14/2023  2:30 PM Luisana Lopez, PT [facility]   2/16/2023  2:30 PM [place] Can, PTA [facility]   2/20/2023  2:30 PM Irene Can, PTA [facility]   2/22/2023  2:30 PM Luisana Lopez, PT [facility]   2/27/2023  2:30 PM [place] Can, PTA [facility]   3/1/2023  2:30 PM Luisana Lopez, PT [facility]   3/29/2023  2:00 PM Valencia Bran, DO [facility]   9/13/2023  2:00 PM Valencia Bran, DO [facility]

## 2023-02-06 ENCOUNTER — HOSPITAL ENCOUNTER (OUTPATIENT)
Dept: PHYSICAL THERAPY | Age: 83
Setting detail: RECURRING SERIES
Discharge: HOME | End: 2023-02-09
Payer: MEDICARE

## 2023-02-06 PROCEDURE — 97110 THERAPEUTIC EXERCISES: CPT

## 2023-02-06 ASSESSMENT — PAIN SCALES - GENERAL: PAINLEVEL_OUTOF10: 0

## 2023-02-06 NOTE — PROGRESS NOTES
Kenji Esquivel  : 1940  Primary: Debbie Rodriguez Ppo (Medicare Managed)  Secondary:  [address] @ [address] [place] [id number]  Phone: [phone]  Fax: [phone] Plan Frequency: 1-2 x/wk for 60 days  Plan of Care/Certification Expiration Date: 23      PT Visit Info:    Plan Frequency: 1-2 x/wk for 60 days  Plan of Care/Certification Expiration Date: 23      Visit Count:  9                OUTPATIENT PHYSICAL THERAPY:             OP NOTE TYPE: Progress Report 2023               Episode  Appt Desk         Treatment Diagnosis:  Leg weakness, bilateral [R29.898]  Medical/Referring Diagnosis:  Leg weakness, bilateral [R29.898]  Referring Physician:  Nova Rubinstein, DO MD Orders:  PT Eval and Treat    Return MD Appt:  3 months  Date of Onset:       Allergies:  Clonidine and Simvastatin  Restrictions/Precautions:    Restrictions/Precautions: None      Medications Last Reviewed:  2023     SUBJECTIVE   History of Injury/Illness (Reason for Referral):  Patient reports gradual onset of difficulty getting up. Patient Stated Goal(s):  \"None stated\"  Initial:     0/10 Post Session:      (None reported)/10  Past Medical History/Comorbidities:   Mr. Lea Anand  has a past medical history of Arthritis, Calculus of kidney, Chronic kidney disease, Chronic kidney disease, stage III (moderate) ([address] Fracture of coccyx ([address] Gout, Gout, unspecified, History of left shoulder fracture, Hyperlipidemia, Hypertension, Microscopic hematuria, Sciatica of right side, Sleep apnea, Unspecified essential hypertension, and Urinary tract infection, site not specified. Mr. Lea Anand  has a past surgical history that includes lumbar fusion (2018); Total shoulder arthroplasty (Left, 2014); total hip arthroplasty (Left); and Lithotripsy.   Social History/Living Environment:   Lives With: Spouse  Type of Home: House  Home Layout: One level  Home Access: Stairs to enter without rails  Entrance Stairs - Number of Steps: 2     Prior Level of Function/Work/Activity:   Prior level of function: Independent  Occupation: Retired           Learning:   Does the patient/guardian have any barriers to learning?: No barriers  Will there be a co-learner?: No  What is the preferred language of the patient/guardian?: English  Is an  required?: No  How does the patient/guardian prefer to learn new concepts?: Demonstration     Fall Risk Scale:   [place] Total Score: 35  [place] Fall Risk: Medium (25-44)           OBJECTIVE   Data from evaluation:   Light touch intact in B LEs  Stiff in all joints of B LEs, especially hips.  L hip flex 3+/5. Otherwise B hips and knees grossly 4- to 4/5.  Very tight in B hamstrings.   SLS of <3 seconds on each leg    2/6/23 SLS of 3 to 4 seconds on each leg.   ASSESSMENT   Initial Assessment:  patient is very stiff in Les and hamstring tightness contributes to his difficulty in getting up to stand from a chair. Though he notes several falls, none have been inside. All reported falls have been out side near his chickens or near the fish pond.   Assessment on 2/6/23 - Patient is making some progress, but it is slow. He remains somewhat stiff, but his gait seems to be a little better. LEFS indicates modest improvement.       Problem List: (Impacting functional limitations):    Body Structures, Functions, Activity Limitations Requiring Skilled Therapeutic Intervention: Decreased functional mobility ; Decreased ROM; Decreased strength     Therapy Prognosis:   Therapy Prognosis: Fair     Initial Assessment Complexity:   Decision Making: Low Complexity    PLAN   Effective Dates: 1/4/23 TO Plan of Care/Certification Expiration Date: 03/05/23     Frequency/Duration: Plan Frequency: 1-2 x/wk for 60 days     Interventions Planned (Treatment may consist of any combination of the following):    Current Treatment Recommendations:  Strengthening; ROM; Balance training; Functional mobility training; Manual; Home exercise program; Modalities     Goals: (Goals have been discussed and agreed upon with patient.)  Short-Term Functional Goals: Time Frame: 30 days  Patient to be independent with HEP - MET  Patient to perfoprm SLS of 5 seconds on each leg to demo improved balance - partially MET (2/6/23 - now 3 to 4 seconds each leg)  Discharge Goals: Time Frame: 60 days - all in progress. Patient to perform 5 reps of sit to  10 seconds  Patient to perform SLS of 7 seconds each leg         Outcome Measure: Tool Used: Lower Extremity Functional Scale (LEFS)  Score:  Initial: 28/80 Most Recent: 35/80 (Date: 2/6/23)   Interpretation of Score: 20 questions each scored on a 5 point scale with 0 representing \"extreme difficulty or unable to perform\" and 4 representing \"no difficulty\". The lower the score, the greater the functional disability. 80/80 represents no disability. Minimal detectable change is 9 points. Medical Necessity:   > Patient demonstrates fair rehab potential due to higher previous functional level. Reason For Services/Other Comments:  > Patient continues to require skilled intervention due to need to return to higher level of function.              Post Session Pain  Charge Capture  PT Visit Info MD Guidelines  Jaimee

## 2023-02-06 NOTE — PROGRESS NOTES
Los Napoles  : 1940  Primary: Tish Mcnamara Ppo (Medicare Managed)  Secondary:  [address] @ [address] [place] [address]  Phone: [phone]  Fax: [phone] Plan Frequency: 1-2 x/wk for 60 days    Plan of Care/Certification Expiration Date: 23      PT Visit Info:  Plan Frequency: 1-2 x/wk for 60 days  Plan of Care/Certification Expiration Date: 23     Visit Count:  9   OUTPATIENT PHYSICAL THERAPY:OP NOTE TYPE: Treatment Note 2023       Episode  }Appt Desk             Treatment Diagnosis:  Leg weakness, bilateral [R29.898]  Medical/Referring Diagnosis:  Leg weakness, bilateral [R29.898]  Referring Physician:  Benjamin Leos DO MD Orders:  PT Eval and Treat    Date of Onset:  No data recorded   Allergies:   Clonidine and Simvastatin  Restrictions/Precautions:  Restrictions/Precautions: None  No data recorded     Interventions Planned (Treatment may consist of any combination of the following):    Current Treatment Recommendations: Strengthening; ROM; Balance training; Functional mobility training; Manual; Home exercise program; Modalities     Subjective Comments:  patient reports he was a little dizzy when he first got up today, but wife indicates that his blood pressure was fine. Initial:}    0/10Post Session:        (None reported)/10  Medications Last Reviewed:  2023  Updated Objective Findings:   None    Treatment   THERAPEUTIC EXERCISE: (45 minutes):    Exercises per grid below to improve mobility. Required moderate visual and verbal cues to  ensure correct performance . Progressed complexity of movement as indicated.    Date  23 Date  23 Date  23   Activity/Exercise      LTR 2x10 - 2x10   Standing hip flexion  - -    Standing hamstring curls - -    Standing hip abduction  B 2x10 -    Pelvic tilt 1x10 - 2x10   Seated hamstring stretch PROM supine B 3x30 sec PROM supine  B 5 x 30 sec PROM supine  B 3 x 30 sec   Nu step  L1 x 10 min L1 x 10 min -   Seated LAQ's  1# B 2x15 - 2# B 2x15   Bridging  1x10 2x10 2x10   Lower extremity flexion & extension Supine, legs on ball 2x10 Supine, legs on ball 2x10 Supine, legs on ball 2x10   Seated hip flexion  1# B 2x15 - 2# B 2x15   SKTC R 2x10  L - held due to discomfort - -   Sit to stand      ground                            foam 2x5  2x5 -  3x5 -  2x5   Seated knee flex with band Green B 2x15 - Green B 2x15   Stand EO/EC     ground                             foam  X ~ 1   2 x ~1 min -   SLS practice   B yes       Treatment/Session Summary:    Treatment Assessment:    Patient does all exercises as asked. See Progress Report for full assessment of progress. Communication/Consultation:   None today  Equipment provided today:  None  Recommendations/Intent for next treatment session: Next visit will focus on progression of flexibility exercises and balance activity.     Total Treatment Billable Duration: 45 mins  Time In: 1430  Time Out: 1515    Shay Lubin, PT       Charge Capture  }Post Session Pain  PT Visit Info  [employer] Portal  MD Guidelines  Scanned Media  Benefits  MyChart    Future Appointments   Date Time Provider Galindo Huff   2/8/2023  2:30 PM Shay Lubin, PT [facility]   2/14/2023  2:30 PM Wong Peterson, PT [facility]   2/16/2023  2:30 PM Kadie Lewis PTA [facility]   2/20/2023  2:30 PM Kadie Lewis PTA [facility]   2/22/2023  2:30 PM Wong Peterson, PT [facility]   2/27/2023  2:30 PM Kadie Lewis PTA [facility]   3/1/2023  2:30 PM Wong Peterson, PT [facility]   3/29/2023  2:00 PM Sushila Emms, DO [facility]   9/13/2023  2:00 PM Sushila Emms, DO [facility]

## 2023-02-08 ENCOUNTER — HOSPITAL ENCOUNTER (OUTPATIENT)
Dept: PHYSICAL THERAPY | Age: 83
Setting detail: RECURRING SERIES
Discharge: HOME | End: 2023-02-11
Payer: MEDICARE

## 2023-02-08 PROCEDURE — 97110 THERAPEUTIC EXERCISES: CPT

## 2023-02-08 ASSESSMENT — PAIN SCALES - GENERAL: PAINLEVEL_OUTOF10: 0

## 2023-02-08 NOTE — PROGRESS NOTES
Judit Zarco  : 1940  Primary: Beckie Mccann Ppo (Medicare Managed)  Secondary:  [address] @ [address] [place] [address]  Phone: [phone]  Fax: [phone] Plan Frequency: 1-2 x/wk for 60 days    Plan of Care/Certification Expiration Date: 23      PT Visit Info:  Plan Frequency: 1-2 x/wk for 60 days  Plan of Care/Certification Expiration Date: 23     Visit Count:  10   OUTPATIENT PHYSICAL THERAPY:OP NOTE TYPE: Treatment Note 2023       Episode  }Appt Desk             Treatment Diagnosis:  Leg weakness, bilateral [R29.898]  Medical/Referring Diagnosis:  Leg weakness, bilateral [R29.898]  Referring Physician:  Ozzy Galindo DO MD Orders:  PT Eval and Treat    Date of Onset:  No data recorded   Allergies:   Clonidine and Simvastatin  Restrictions/Precautions:  Restrictions/Precautions: None  No data recorded     Interventions Planned (Treatment may consist of any combination of the following):    Current Treatment Recommendations: Strengthening; ROM; Balance training; Functional mobility training; Manual; Home exercise program; Modalities     Subjective Comments:  patient reports he is doing about the same. No significant changes. Initial:}    0/10Post Session:       0/10  Medications Last Reviewed:  2023  Updated Objective Findings:   None    Treatment   THERAPEUTIC EXERCISE: (39 minutes):    Exercises per grid below to improve mobility. Required minimal visual and verbal cues to  ensure correct performance . Progressed complexity of movement as indicated.    Date  23 Date  23 Date  23   Activity/Exercise      LTR - 2x10 2x12   Standing hip flexion  -  -   Standing hamstring curls -  -   Standing hip abduction  -  B 2x10   Pelvic tilt - 2x10 2x10   hamstring stretch PROM supine  B 5 x 30 sec PROM supine  B 3 x 30 sec PROM supine  B 3 x 30 sec   Nu step  L1 x 10 min - -   Seated LAQ's  - 2# B 2x15 2# B 2x15   Bridging  2x10 2x10 2x10   Lower extremity flexion & extension Supine, legs on ball 2x10 Supine, legs on ball 2x10 Supine, legs on ball 2x10   Seated hip flexion  - 2# B 2x15 2# B 2x15   SKTC - - -   Sit to stand      ground                            foam -  3x5 -  2x5 -  3x5   Seated knee flex with band - Green B 2x15 Green B 2x15   Stand EO/EC     ground                             foam X ~ 1   2 x ~1 min - -   SLS practice  B yes B yes   Standing heel raises   2x10       Treatment/Session Summary:    Treatment Assessment:    No significant changes today. Patient is still somewhat stiff but balance seems to be improving. Communication/Consultation:   None today  Equipment provided today:  None  Recommendations/Intent for next treatment session: Next visit will focus on progression of flexibility exercises and balance activity.     Total Treatment Billable Duration: 39 mins  Time In: 3479  Time Out: 1512    Erickson Galicia PT       Charge Capture  }Post Session Pain  PT Visit Info  [facility] Portal  MD Guidelines  Scanned Media  Benefits  MyChart    Future Appointments   Date Time Provider Galindo Huff   2/14/2023  2:30 PM Brian Henderson, PT [facility]   2/16/2023  2:30 PM Gavino Camacho [facility]   2/20/2023  2:30 PM Gavino Camacho [facility]   2/22/2023  2:30 PM Brian Henderson, PT [facility]   2/27/2023  2:30 PM Gavino Camacho PTA [facility]   3/1/2023  2:30 PM Brian Henderson, PT [facility]   3/29/2023  2:00 PM Jacey Night, DO [facility]   9/13/2023  2:00 PM Jacey Night, DO [facility]

## 2023-02-14 ENCOUNTER — HOSPITAL ENCOUNTER (OUTPATIENT)
Dept: PHYSICAL THERAPY | Age: 83
Setting detail: RECURRING SERIES
Discharge: HOME | End: 2023-02-17
Payer: MEDICARE

## 2023-02-14 PROCEDURE — 97110 THERAPEUTIC EXERCISES: CPT

## 2023-02-14 ASSESSMENT — PAIN SCALES - GENERAL: PAINLEVEL_OUTOF10: 2

## 2023-02-14 NOTE — PROGRESS NOTES
Timbo Antonio  : 1940  Primary: Cassidy Jorge Ppo (Medicare Managed)  Secondary:  [address] @ [address] [place] [address]  Phone: [phone]  Fax: [phone] Plan Frequency: 1-2 x/wk for 60 days    Plan of Care/Certification Expiration Date: 23      PT Visit Info:  Plan Frequency: 1-2 x/wk for 60 days  Plan of Care/Certification Expiration Date: 23     Visit Count:  11   OUTPATIENT PHYSICAL THERAPY:OP NOTE TYPE: Treatment Note 2023       Episode  }Appt Desk             Treatment Diagnosis:  Leg weakness, bilateral [R29.898]  Medical/Referring Diagnosis:  Leg weakness, bilateral [R29.898]  Referring Physician:  Jazmine Aguilar DO MD Orders:  PT Eval and Treat    Date of Onset:  No data recorded   Allergies:   Clonidine and Simvastatin  Restrictions/Precautions:  Restrictions/Precautions: None  No data recorded     Interventions Planned (Treatment may consist of any combination of the following):    Current Treatment Recommendations: Strengthening; ROM; Balance training; Functional mobility training; Manual; Home exercise program; Modalities     Subjective Comments:Pt. Reported some dizziness today       Initial:}    2/10Post Session:       0/10  Medications Last Reviewed:  2023  Updated Objective Findings:   better balance today  /104 pulse 82  Treatment   THERAPEUTIC EXERCISE: (55 minutes):    Exercises per grid below to improve mobility. Required minimal visual and verbal cues to  ensure correct performance . Progressed complexity of movement as indicated. Date  23 Date  23 Date  23   Activity/Exercise      Rocker board  AP & lateral static and dynamic x 5 mins total inside // bars     Leg press  100 lbs x 30 reps      Long arc quads  X 20 reps 2.5 lb wt. s            LTR 2x10 reps supine green ball 2x10 2x12   Standing hip flexion  X 20 reps at bar BLE's 2.5 lb wt. s   - Standing hamstring curls X 20 reps at bar BLE's 2.5 lb wt. s   -   Standing hip abduction  2x15 reps BLE's 2.5 lb wt.s   B 2x10   Pelvic tilt 2x10  2x10 2x10   hamstring stretch 4x30 sec hold BLE's with strap  PROM supine  B 3 x 30 sec PROM supine  B 3 x 30 sec   Nu step  L1 x 10 min - -   Sit to stand      ground                            foam -  3x5 -  2x5 -  3x5   Seated knee flex with band - Green B 2x15 Green B 2x15   Stand EO/EC     ground                             foam X ~ 1   2 x ~1 min - -   SLS practice Bilaterally LE' x 3 mins total in intervals B yes B yes   Standing heel raises 2x10  2x10       Treatment/Session Summary:    Treatment Assessment:  Pt. Demonstrated improved balance and stamina with exercises and balance activities      Communication/Consultation:   None today  Equipment provided today:  None  Recommendations/Intent for next treatment session: Next visit will focus on progression of flexibility exercises and balance activity.     Total Treatment Billable Duration: 55  mins  Time In: 1430  Time Out: CONCETTA Cobb [address] BUN, [facility]       Charge Capture  }Post Session Pain  PT Visit Info  [employer] Portal  MD Guidelines  Scanned Media  Benefits  MyChart    Future Appointments   Date Time Provider Galindo Huff   2/16/2023  2:30 PM Sarah Diehl [facility] [place]   2/20/2023  [facility]   2/22/2023  2:30 PM Silvia Litten, PT [facility]   2/27/2023  2:30 PM Sarah Diehl PTA [facility]   3/1/2023  2:30 PM Silvia Litten, PT [facility]   3/29/2023  2:00 PM Carol Serene, DO [facility]   9/13/2023  2:00 PM Carol Serene, DO [facility]

## 2023-02-16 ENCOUNTER — HOSPITAL ENCOUNTER (OUTPATIENT)
Dept: PHYSICAL THERAPY | Age: 83
Setting detail: RECURRING SERIES
Discharge: HOME | End: 2023-02-19
Payer: MEDICARE

## 2023-02-16 PROCEDURE — 97110 THERAPEUTIC EXERCISES: CPT

## 2023-02-16 ASSESSMENT — PAIN SCALES - GENERAL: PAINLEVEL_OUTOF10: 0

## 2023-02-16 NOTE — PROGRESS NOTES
Mo Carter  : 1940  Primary: Kacey Nguyễn Ppo (Medicare Managed)  Secondary:  [address] @ [address] [place] [address]  Phone: [phone]  Fax: [phone] Plan Frequency: 1-2 x/wk for 60 days    Plan of Care/Certification Expiration Date: 23      PT Visit Info:  Plan Frequency: 1-2 x/wk for 60 days  Plan of Care/Certification Expiration Date: 23     Visit Count:  12   OUTPATIENT PHYSICAL THERAPY:OP NOTE TYPE: Treatment Note 2023       Episode  }Appt Desk             Treatment Diagnosis:  Leg weakness, bilateral [R29.898]  Medical/Referring Diagnosis:  Leg weakness, bilateral [R29.898]  Referring Physician:  Marivel Martínez DO MD Orders:  PT Eval and Treat    Date of Onset:  No data recorded   Allergies:   Clonidine and Simvastatin  Restrictions/Precautions:  Restrictions/Precautions: None  No data recorded     Interventions Planned (Treatment may consist of any combination of the following):    Current Treatment Recommendations: Strengthening; ROM; Balance training; Functional mobility training; Manual; Home exercise program; Modalities     Subjective Comments:Patient reports to therapy with no complaints of pain and is feeling better than his last session. Initial:}    0/10Post Session:       0/10  Medications Last Reviewed:  2023  Updated Objective Findings:   /90      Treatment   THERAPEUTIC EXERCISE: (53 minutes):    Exercises per grid below to improve mobility. Required minimal visual and verbal cues to  ensure correct performance . Progressed complexity of movement as indicated. Date  23 Date  23 Date  23   Activity/Exercise      Rocker board  AP & lateral static and dynamic x 5 mins total inside // bars --    Leg press  100 lbs x 30 reps  100 lbs 2x10  118 lbs 1x10    Long arc quads  X 20 reps 2.5 lb wt. s  --          LTR 2x10 reps supine green ball 2x10  Supine, green ball 2x12   Standing hip flexion  X 20 reps at bar BLE's 2.5 lb wt. s  10x each side -   Standing hamstring curls X 20 reps at bar BLE's 2.5 lb wt. s  10x each side -   Standing hip abduction  2x15 reps BLE's 2.5 lb wt. s  10x each side B 2x10   Pelvic tilt 2x10  --- 2x10   hamstring stretch 4x30 sec hold BLE's with strap  PROM supine  B 3 x 30 sec PROM supine  B 3 x 30 sec   Nu step  L1 x 10 min 5 minutes: level 3 -   Sit to stand      ground                            foam -  3x5 2x5  2x5 -  3x5   Seated knee flex with band - Green B 2x15 Green B 2x15   Stand EO/EC     ground                             foam X ~ 1   2 x ~1 min - -   SLS practice Bilaterally LE' x 3 mins total in intervals Bilaterally LE' x 3 mins total in intervals, inside parallel bars B yes   Standing heel raises 2x10 2x10 2x10       Treatment/Session Summary:    Treatment Assessment:   Patient presents with BLE weakness but more so on the L. Patient requires UE support to stand from chair but able to sit without support when cued. Patient able to perform leg press with increase in resistance. Communication/Consultation:   None today  Equipment provided today:  None  Recommendations/Intent for next treatment session: Next visit will focus on advancements to more challenging activities to include progressing strength, functional mobility, pain tolerance, and ROM as tolerated.       Total Treatment Billable Duration:  53 mins  Time In: 3140  Time Out: 629 [facility], PT       Charge Capture  }Post Session Pain  PT Visit [facility] Portal  MD Guidelines  Scanned Media  Benefits  MyChart    Future Appointments   Date Time Provider Galindo Huff   2/20/2023  2:30 PM Caroline Smith [facility]   2/22/2023  2:30 PM Festus Jenny, PT [facility]   2/27/2023  2:30 PM Keyshawn Morel PTA [facility]   3/1/2023  2:30 PM [facility]   3/29/2023  2:00 PM Fabiola Riser, DO [facility]   9/13/2023  2:00 PM Yael Chew DO Dilshad [facility]

## 2023-02-20 ENCOUNTER — HOSPITAL ENCOUNTER (OUTPATIENT)
Dept: PHYSICAL THERAPY | Age: 83
Setting detail: RECURRING SERIES
Discharge: HOME | End: 2023-02-23
Payer: MEDICARE

## 2023-02-20 ENCOUNTER — TELEPHONE (OUTPATIENT)
Dept: FAMILY MEDICINE CLINIC | Facility: CLINIC | Age: 83
End: 2023-02-20

## 2023-02-20 PROCEDURE — 97110 THERAPEUTIC EXERCISES: CPT

## 2023-02-20 ASSESSMENT — PAIN SCALES - GENERAL: PAINLEVEL_OUTOF10: 0

## 2023-02-20 NOTE — PROGRESS NOTES
I am accessing Mr. Hall's chart as a part of our department's internal chart auditing process. I certify that Mr. Dennis Chan is, or was, a patient in our department.   Thank you,  Tiffanie Demarco, PT  2/20/2023

## 2023-02-20 NOTE — TELEPHONE ENCOUNTER
Pt wife calling stating that the Benazapril 10 in am and 20 in pm is not helping. His readings at home are 188/110. He denies any chest pains or SOB. His only complaint is a headache. Advised wife that Marilyn Ply out of town and will return tomorrow. Told her concern of a stroke or heart attack with that continued reading. Advised to for today to go to urgent care. Will let Dr Guillermina Wagner know. Wife agreed to carry.

## 2023-02-20 NOTE — PROGRESS NOTES
Hawk Escudero  : 1940  Primary: Kyle [place] Ppo (Medicare Managed)  Secondary:  [address] @ [address] [place] [address]  Phone: [phone]  Fax: [phone] Plan Frequency: 1-2 x/wk for 60 days    Plan of Care/Certification Expiration Date: 23      PT Visit Info:  Plan Frequency: 1-2 x/wk for 60 days  Plan of Care/Certification Expiration Date: 23     Visit Count:  13   OUTPATIENT PHYSICAL THERAPY:OP NOTE TYPE: Treatment Note 2023       Episode  }Appt Desk             Treatment Diagnosis:  Leg weakness, bilateral [R29.898]  Medical/Referring Diagnosis:  Leg weakness, bilateral [R29.898]  Referring Physician:  Samuel Tavares DO MD Orders:  PT Eval and Treat    Date of Onset:  No data recorded   Allergies:   Clonidine and Simvastatin  Restrictions/Precautions:  Restrictions/Precautions: None  No data recorded     Interventions Planned (Treatment may consist of any combination of the following):    Current Treatment Recommendations: Strengthening; ROM; Balance training; Functional mobility training; Manual; Home exercise program; Modalities     Subjective Comments:Patient reported no pain and doing well with balance and gait with no falls. Initial:}    0/10Post Session:       0/10  Medications Last Reviewed:  2023  Updated Objective Findings:   /104 pulse 77      Treatment   THERAPEUTIC EXERCISE: (53 minutes):    Exercises per grid below to improve mobility. Required minimal visual and verbal cues to  ensure correct performance . Progressed complexity of movement as indicated.    Date  23 Date  23 Date  23   Activity/Exercise      Up and down stairs   Bilateral hand rails x 5 reps    Rocker board  AP & lateral static and dynamic x 5 mins total inside // bars -- AP & lateral, static & dynamic inside // bars x 5 mins total    Leg press  100 lbs x 30 reps  100 lbs 2x10  118 lbs 1x10 125 lb.s x 30 reps    Long arc quads  X 20 reps 2.5 lb wt. s  -- X 20 reps 2.5 lb wt. s          LTR 2x10 reps supine green ball 2x10  Supine, green ball ------   Standing hip flexion  X 20 reps at bar BLE's 2.5 lb wt. s  10x each side X 20 reps BLE's 2.5 lb wt. s    Standing hamstring curls X 20 reps at bar BLE's 2.5 lb wt. s  10x each side X 20 reps BLE's 2 lb wt. s   Standing hip abduction  2x15 reps BLE's 2.5 lb wt. s  10x each side B 2x10   Pelvic tilt 2x10  --- 2x10   hamstring stretch 4x30 sec hold BLE's with strap  PROM supine  B 3 x 30 sec PROM supine  B 3 x 30 sec   Nu step  L1 x 10 min 5 minutes: level 3 X 10 mins level 4    Sit to stand      ground                            foam -  3x5 2x5  2x5 X 10 reps chair blue cushion   Seated knee flex with band - Green B 2x15 Green B 2x15   Stand EO/EC     ground                             foam X ~ 1   2 x ~1 min - X 3 mins    SLS practice Bilaterally LE' x 3 mins total in intervals Bilaterally LE' x 3 mins total in intervals, inside parallel bars X 5 mins total each LE inside // bars    Standing heel raises 2x10 2x10 2x10       Treatment/Session Summary:    Treatment Assessment:   Pt. Was compliant with all exercises and had improved balance today. Communication/Consultation:   None today  Equipment provided today:  None  Recommendations/Intent for next treatment session: Next visit will focus on advancements to more challenging activities to include progressing strength, functional mobility, pain tolerance, and ROM as tolerated.       Total Treatment Billable Duration:  53 mins  Time In: 1430  Time Out: 1530    JOSÉ MANUEL JERRY PTA       Charge Capture  }Post Session Pain  PT Visit Info  [employer] Portal  MD Guidelines  Scanned Media  Benefits  MyChart    Future Appointments   Date Time Provider Galindo Huff   2/22/2023  2:30 PM Claudia Don, PT [facility]   2/27/2023  2:30 PM Carolin Mark PTA [facility]   3/1/2023  2:30 PM Claudia Don, PT [facility]   3/29/2023  2:00 PM Viky Duval, DO [facility]   9/13/2023  2:00 PM Viky Duval, DO [facility]

## 2023-02-21 ENCOUNTER — TELEPHONE (OUTPATIENT)
Dept: FAMILY MEDICINE CLINIC | Facility: CLINIC | Age: 83
End: 2023-02-21

## 2023-02-21 NOTE — TELEPHONE ENCOUNTER
Pt wife calls for appt. Pt was advised to go to urgent care yesterday as bp was elevated. He did not go. She states that his bp came down. Appt has been scheduled in next available which is 3-8-2023 at 840. Pt wife wanted a Fri appt . Advised her I would send a message to see if he could work in on a Friday sooner. Advise.

## 2023-02-22 ENCOUNTER — HOSPITAL ENCOUNTER (OUTPATIENT)
Dept: PHYSICAL THERAPY | Age: 83
Setting detail: RECURRING SERIES
Discharge: HOME | End: 2023-02-25
Payer: MEDICARE

## 2023-02-22 PROCEDURE — 97110 THERAPEUTIC EXERCISES: CPT

## 2023-02-22 NOTE — PROGRESS NOTES
Kirsten Harrell  : 1940  Primary: Phu Dasilva Ppo (Medicare Managed)  Secondary:  [address] @ [address] [place] [address]  Phone: [phone]  Fax: [phone] Plan Frequency: 1-2 x/wk for 60 days    Plan of Care/Certification Expiration Date: 23      PT Visit Info:  Plan Frequency: 1-2 x/wk for 60 days  Plan of Care/Certification Expiration Date: 23     Visit Count:  14   OUTPATIENT PHYSICAL THERAPY:OP NOTE TYPE: Treatment Note 2023       Episode  }Appt Desk             Treatment Diagnosis:  Leg weakness, bilateral [R29.898]  Medical/Referring Diagnosis:  Leg weakness, bilateral [R29.898]  Referring Physician:  Catherine Martinez DO MD Orders:  PT Eval and Treat    Date of Onset:  No data recorded   Allergies:   Clonidine and Simvastatin  Restrictions/Precautions:  Restrictions/Precautions: None  No data recorded     Interventions Planned (Treatment may consist of any combination of the following):    Current Treatment Recommendations: Strengthening; ROM; Balance training; Functional mobility training; Manual; Home exercise program; Modalities     Subjective Comments:Patient reports that he has a headache. Otherwise no complaints upon arrival to PT. Initial:}    0/10Post Session:       0/10  Medications Last Reviewed:  2023  Updated Objective Findings:   /97 pulse 68      Treatment   THERAPEUTIC EXERCISE: (54 minutes):    Exercises per grid below to improve mobility. Required minimal visual and verbal cues to  ensure correct performance . Progressed complexity of movement as indicated.    Date  23 Date  23   Activity/Exercise     Up and down stairs Bilateral hand rails x 5 reps  Bilateral hand rails x 5 repetitions   Rocker board  AP & lateral, static & dynamic inside // bars x 5 mins total     Leg press  125 lb.s x 30 reps  Bilateral  125# 3 x 12    Long arc quads  X 20 reps 2.5 lb wt.s  SAQ  3# 3 x 10 B    LAQ  3# 3 x 10 B   Bridging  2 x 10    LTR ------ --   Standing hip flexion  X 20 reps BLE's 2.5 lb wt. s  --   Standing hamstring curls X 20 reps BLE's 2 lb wt. s --   Standing hip abduction  B 2x10 --   Pelvic tilt 2x10 X 20   hamstring stretch PROM supine  B 3 x 30 sec 3 x 20\" B   Nu step  X 10 mins level 4  --   Sit to stand      ground                            foam X 10 reps chair blue cushion 2 X 10 reps from chair with blue cushion   Seated knee flex with band Green B 2x15    Stand EO/EC     ground                             foam X 3 mins  Foam 2 x 1 minute, EO    Semitandem stance on ground, EO 2 x 1 minute ea   SLS practice X 5 mins total each LE inside // bars  At rail x 5 min total   Standing heel raises 2x10 2 x 15 B   Clamshells  2 x 10 Green B,  Side-lying       Treatment/Session Summary:    Treatment Assessment:   Patient did well with exercises but required some cueing for clamshells to perform with compensation. Did well with balance activities today. Communication/Consultation:   None today  Equipment provided today:  None  Recommendations/Intent for next treatment session: Next visit will focus on advancements to more challenging activities to include progressing strength, functional mobility, pain tolerance, and ROM as tolerated.       Total Treatment Billable Duration:  54 minutes  Time In: 1430  Time Out: 1525    Roanne Seats, PT       Charge Capture  }Post Session Pain  PT Visit Info  [employer] Portal  MD Guidelines  Scanned Media  Benefits  MyChart    Future Appointments   Date Time Provider Galindo Huff   2/27/2023  2:30 PM Jocelyne Reaves [facility]   3/1/2023  2:30 PM Citlali Perez PT [facility]   3/8/2023  8:40 AM Jolayne Henry, DO [facility]   3/29/2023  2:00 PM Jolayne Henry, DO [facility]   9/13/2023  2:00 PM Jolayne Henry, DO [facility]

## 2023-02-23 ASSESSMENT — PAIN SCALES - GENERAL: PAINLEVEL_OUTOF10: 0

## 2023-02-27 ENCOUNTER — HOSPITAL ENCOUNTER (OUTPATIENT)
Dept: PHYSICAL THERAPY | Age: 83
Setting detail: RECURRING SERIES
Discharge: HOME | End: 2023-03-02
Payer: MEDICARE

## 2023-02-27 PROCEDURE — 97110 THERAPEUTIC EXERCISES: CPT

## 2023-02-27 ASSESSMENT — PAIN SCALES - GENERAL: PAINLEVEL_OUTOF10: 3

## 2023-02-27 NOTE — PROGRESS NOTES
Cece Melendez  : 1940  Primary: Avelina Knight Ppo (Medicare Managed)  Secondary:  Alis Royal @ [address] [place] [address]  Phone: [phone]  Fax: [phone] Plan Frequency: 1-2 x/wk for 60 days    Plan of Care/Certification Expiration Date: 23      PT Visit Info:  Plan Frequency: 1-2 x/wk for 60 days  Plan of Care/Certification Expiration Date: 23     Visit Count:  15   OUTPATIENT PHYSICAL THERAPY:OP NOTE TYPE: Treatment Note 2023       Episode  }Appt Desk             Treatment Diagnosis:  Leg weakness, bilateral [R29.898]  Medical/Referring Diagnosis:  Leg weakness, bilateral [R29.898]  Referring Physician:  Ethan Blancas DO MD Orders:  PT Eval and Treat    Date of Onset:  No data recorded   Allergies:   Clonidine and Simvastatin  Restrictions/Precautions:  Restrictions/Precautions: None  No data recorded     Interventions Planned (Treatment may consist of any combination of the following):    Current Treatment Recommendations: Strengthening; ROM; Balance training; Functional mobility training; Manual; Home exercise program; Modalities     Subjective Comments:Patient continues to report having a headache       Initial:}    3/10Post Session:       010  Medications Last Reviewed:  2023  Updated Objective Findings:   /91 pulse 68      Treatment   THERAPEUTIC EXERCISE: (55 minutes):    Exercises per grid below to improve mobility. Required minimal visual and verbal cues to  ensure correct performance . Progressed complexity of movement as indicated.    Date  23 Date  23 Date:  23   Activity/Exercise      Up and down stairs Bilateral hand rails x 5 reps  Bilateral hand rails x 5 repetitions -----   Rocker board  AP & lateral, static & dynamic inside // bars x 5 mins total   -----   Leg press  125 lb.s x 30 reps  Bilateral  125# 3 x 12  ------   Long arc quads  X 20 reps 2.5 lb wt.s  SAQ  3# 3 x 10 B    LAQ  3# 3 x 10 B SAQ 3 # 3x10 reps BLE's    LAQ  3 lb wt. s 3x10 reps BLE's   Bridging  2 x 10  2x10 reps    LTR ------ -- On green ball x 20 reps supine    Standing hip flexion  X 20 reps BLE's 2.5 lb wt. s  -- X 20 reps BLE's 3 lb wt. s   Standing hamstring curls X 20 reps BLE's 2 lb wt. s -- X 20 reps BLE's 3 lb wt. s    Standing hip abduction  B 2x10 -- 2x10 reps BLE's 3lb wt. s   Pelvic tilt 2x10 X 20 X 20 reps    hamstring stretch PROM supine  B 3 x 30 sec 3 x 20\" B 4x30 sec hold strap BLE's    Nu step  X 10 mins level 4  -- X 10 mins level 4    Sit to stand      ground                            foam X 10 reps chair blue cushion 2 X 10 reps from chair with blue cushion 2x10 rep from chair with blue cushion   Seated knee flex with band Green B 2x15  -------   Stand EO/EC     ground                             foam X 3 mins  Foam 2 x 1 minute, EO    Semitandem stance on ground, EO 2 x 1 minute ea -------   SLS practice X 5 mins total each LE inside // bars  At rail x 5 min total ------   Standing heel raises 2x10 2 x 15 B X 25 reps BLE's at bar   [facility]  2 x 10 Green B,  Side-lying ----       Treatment/Session Summary:    Treatment Assessment:   Patient was compliant with all exercises and reported feeling better with no complaints of having a headache. Communication/Consultation:   None today  Equipment provided today:  None  Recommendations/Intent for next treatment session: Next visit will focus on advancements to more challenging activities to include progressing strength, functional mobility, pain tolerance, and ROM as tolerated.       Total Treatment Billable Duration:  55 minutes  Time In: 1430  Time Out: CONCETTA Landrum [address], PTA       Charge Capture  }Post Session Pain  PT Visit Info  [employer] Portal  MD Guidelines  Scanned Media  Benefits  MyChart    Future Appointments   Date Time Provider Galindo Huff   3/1/2023  2:30 PM Rosa Flannery, PT [facility]   3/8/2023 8:40 AM Mary Thao, DO [facility]   3/29/2023  2:00 PM Mary Thao, DO [facility]   9/13/2023  2:00 PM Mary Thao, DO [facility]

## 2023-03-01 ENCOUNTER — HOSPITAL ENCOUNTER (OUTPATIENT)
Dept: PHYSICAL THERAPY | Age: 83
Setting detail: RECURRING SERIES
Discharge: HOME | End: 2023-03-04
Payer: MEDICARE

## 2023-03-01 PROCEDURE — 97110 THERAPEUTIC EXERCISES: CPT

## 2023-03-01 NOTE — PROGRESS NOTES
Rachel Tatum  : 1940  Primary: [facility]y Trimble [facility] (Medicare Managed)  Secondary:  [address] @ [address] [place] [id number]  Phone: [phone]  Fax: [phone] Plan Frequency: 1-2 x/wk for 60 days    Plan of Care/Certification Expiration Date: 23      PT Visit Info:  Plan Frequency: 1-2 x/wk for 60 days  Plan of Care/Certification Expiration Date: 23     Visit Count:  16   OUTPATIENT PHYSICAL THERAPY:OP NOTE TYPE: Treatment Note 3/1/2023       Episode  }Appt Desk             Treatment Diagnosis:  Leg weakness, bilateral [R29.898]  Medical/Referring Diagnosis:  Leg weakness, bilateral [R29.898]  Referring Physician:  Cristel Fields DO MD Orders:  PT Eval and Treat    Date of Onset:  No data recorded   Allergies:   Clonidine and Simvastatin  Restrictions/Precautions:  Restrictions/Precautions: None  No data recorded     Interventions Planned (Treatment may consist of any combination of the following):    Current Treatment Recommendations: Strengthening; ROM; Balance training; Functional mobility training; Manual; Home exercise program; Modalities     Subjective Comments:     No headache upon arrival. Today is his final scheduled appointment. He would like to conclude PT. Has a follow-up appt with his doctor next week. Reports that his blood pressure medicine makes him feel light headed sometimes and he needs to have it changed. Initial:}    0Not rated/10Post Session:       0Not rated/10  Medications Last Reviewed:  3/1/2023  Updated Objective Findings:   /90 pulse 72 prior to exercises; 151/100 85 bpm after exercise; 137/91 75 bpm      Treatment   THERAPEUTIC EXERCISE: (55 minutes):    Exercises per grid below to improve mobility. Required minimal visual and verbal cues to  ensure correct performance . Progressed complexity of movement as indicated.    Date:  23 Date  3/1/23   Activity/Exercise     Up and down stairs -----    Rocker board  -----    Leg press  ------ Bilateral  125# 2 x 10   Long arc quads  SAQ 3 # 3x10 reps BLE's    LAQ  3 lb wt. s 3x10 reps BLE's SAQ  3# 3 x 10 B LE    Bridging 2x10 reps  3 x 10 Bilateral   LTR On green ball x 20 reps supine     Standing hip flexion  X 20 reps BLE's 3 lb wt. s X 15 B     Standing hamstring curls X 20 reps BLE's 3 lb wt. s  X 15 B   Standing hip abduction  2x10 reps BLE's 3lb wt. s    Pelvic tilt X 20 reps  3 x 10    hamstring stretch 4x30 sec hold strap BLE's     Nu step  X 10 mins level 4  -   Sit to stand      ground                            foam 2x10 rep from chair with blue cushion 2 x 10    Seated knee flex with band -------    Stand EO/EC     ground                             foam -------    SLS practice ------    Standing heel raises X 25 reps BLE's at bar X 25 reps at bar, B LE   Clamshells ---- Blue  2 x 10 ea LE   Step ups  6\" 2 x 10        Treatment/Session Summary:    Treatment Assessment:   Continues to require frequent cueing on some exercises. Overall though did well. Was able to perform sit to stands from low mat table without using hands to assist. Difficulty with performing pelvic tilts  Communication/Consultation:   None today  Equipment provided today:  None  Recommendations/Intent for next treatment session: Patient will be discharged following today's appointment.      Total Treatment Billable Duration:  55 minutes  Time In: 1430  Time Out: 1525    Mali Alonzo PT       Charge Capture  }Post Session Pain  PT Visit Info  [facility] Portal  MD Guidelines  Scanned Media  Benefits  MyChart    Future Appointments   Date Time Provider Galindo Huff   3/8/2023  8:40 AM Yuan Urenacal, DO [facility]   3/29/2023  2:00 PM Yuan Urenacal, DO [facility]   9/13/2023  2:00 PM Yuan Urenacal, DO [facility]

## 2023-03-01 NOTE — DISCHARGE SUMMARY
Mayra Harris  : 1940  Primary: Javy Yost [facility] (Medicare Managed)  Secondary:  [address] @ [address] [facility] [address]  Phone: [phone]  Fax: [phone] Plan Frequency: 1-2 x/wk for 60 days  Plan of Care/Certification Expiration Date: 23      PT Visit Info:    Plan Frequency: 1-2 x/wk for 60 days  Plan of Care/Certification Expiration Date: 23      Visit Count:  16                OUTPATIENT PHYSICAL THERAPY:             OP NOTE TYPE: Discharge Summary 3/1/2023               Episode  Appt Desk         Treatment Diagnosis:  Leg weakness, bilateral [R29.898]  Medical/Referring Diagnosis:  Leg weakness, bilateral [R29.898]  Referring Physician:  Jonnathan Romero DO MD Orders:  PT Eval and Treat    Return MD Appt:  3 months  Date of Onset:       Allergies:  Clonidine and Simvastatin  Restrictions/Precautions:    Restrictions/Precautions: None      Medications Last Reviewed:  3/1/2023     SUBJECTIVE   History of Injury/Illness (Reason for Referral):  Patient reports gradual onset of difficulty getting up. (Per evaluation note)  Patient Stated Goal(s):  \"None stated\"  Initial:     010 Post Session:     010  Past Medical History/Comorbidities:   Mr. Raffi Walters  has a past medical history of Arthritis, Calculus of kidney, Chronic kidney disease, Chronic kidney disease, stage III (moderate) ([address] Fracture of coccyx ([address] Gout, Gout, unspecified, History of left shoulder fracture, Hyperlipidemia, Hypertension, Microscopic hematuria, Sciatica of right side, Sleep apnea, Unspecified essential hypertension, and Urinary tract infection, site not specified. Mr. Raffi Walters  has a past surgical history that includes lumbar fusion (2018); Total shoulder arthroplasty (Left, 2014); total hip arthroplasty (Left); and Lithotripsy.   Social History/Living Environment:   Lives With: Spouse  Type of Home: [facility] Layout: One level  Home Access: Stairs to enter without rails  Entrance Stairs - Number of Steps: 2     Prior Level of Function/Work/Activity:   Prior level of function: Independent  Occupation: Retired           Learning:   Does the patient/guardian have any barriers to learning?: No barriers  Will there be a co-learner?: No  What is the preferred language of the patient/guardian?: English  Is an  required?: No  How does the patient/guardian prefer to learn new concepts?: Demonstration     Fall Risk Scale:   [place] Total Score: 35  [place] Fall Risk: Medium (25-44)           OBJECTIVE   Observation: Ambulates with reduced gait speed and wide base of support, but is able to perform independently. Ascends and descends stairs with reduced speed utilizing one handrail. Alternates between step-to and alternating steps.     Strength: Per observation, patient is grossly 4/5 for B LE strength. He is able to perform 10 sit to stands without using hands from low mat table.   ASSESSMENT   Initial Assessment:  patient is very stiff in Les and hamstring tightness contributes to his difficulty in getting up to stand from a chair. Though he notes several falls, none have been inside. All reported falls have been out side near his chickens or near the fish pond.   Assessment on 2/6/23 - Patient is making some progress, but it is slow. He remains somewhat stiff, but his gait seems to be a little better. LEFS indicates modest improvement.   Discharge Assessment (3/1/23)  Patient reports subjective improvement. He wishes for today to be his last PT appointment, as he is pleased with his progress. He did improve his score on LEFS. He is better able to perform transfers and ambulate than he was initially, but continues to struggle with higher level balance activities. He is ready to conclude PT at this time.    Problem List: (Impacting functional limitations):    Body Structures, Functions, Activity Limitations Requiring  Skilled Therapeutic Intervention: Decreased functional mobility ; Decreased ROM; Decreased strength     Therapy Prognosis:   Therapy Prognosis: Fair     Initial Assessment Complexity:   Decision Making: Low Complexity    PLAN   Effective Dates: 1/4/23 TO Plan of Care/Certification Expiration Date: 03/05/23     Frequency/Duration: Plan Frequency: 1-2 x/wk for 60 days     Interventions Planned (Treatment may consist of any combination of the following):    Current Treatment Recommendations: Strengthening; ROM; Balance training; Functional mobility training; Manual; Home exercise program; Modalities     Goals: (Goals have been discussed and agreed upon with patient.)  Short-Term Functional Goals  Patient to be independent with HEP - MET  Patient to perfoprm SLS of 5 seconds on each leg to demo improved balance - partially MET (2/6/23 - now 3 to 4 seconds each leg)  Discharge Goals:   Patient to perform 5 reps of sit to  10 seconds. MET 3/1/23  Patient to perform SLS of 7 seconds each leg. Not met 3/1/23         Outcome Measure: Tool Used: Lower Extremity Functional Scale (LEFS)  Score:  Initial: 28/80 Most Recent:   48/80 (3/1/23)  35/80 (Date: 2/6/23)   Interpretation of Score: 20 questions each scored on a 5 point scale with 0 representing \"extreme difficulty or unable to perform\" and 4 representing \"no difficulty\". The lower the score, the greater the functional disability. 80/80 represents no disability. Minimal detectable change is 9 points.        Post Session Pain  Charge Capture  PT Visit Info MD Guidelines  Lamharmicaela

## 2023-03-02 ASSESSMENT — PAIN SCALES - GENERAL: PAINLEVEL_OUTOF10: 0

## 2023-03-08 ENCOUNTER — OFFICE VISIT (OUTPATIENT)
Dept: FAMILY MEDICINE CLINIC | Facility: CLINIC | Age: 83
End: 2023-03-08
Payer: MEDICARE

## 2023-03-08 VITALS
BODY MASS INDEX: 28.28 KG/M2 | OXYGEN SATURATION: 95 % | SYSTOLIC BLOOD PRESSURE: 132 MMHG | HEART RATE: 80 BPM | DIASTOLIC BLOOD PRESSURE: 70 MMHG | HEIGHT: 66 IN | TEMPERATURE: 98 F | WEIGHT: 176 LBS

## 2023-03-08 DIAGNOSIS — E78.2 MIXED HYPERLIPIDEMIA: ICD-10-CM

## 2023-03-08 DIAGNOSIS — I10 ESSENTIAL HYPERTENSION: Primary | ICD-10-CM

## 2023-03-08 DIAGNOSIS — I10 ESSENTIAL HYPERTENSION: ICD-10-CM

## 2023-03-08 DIAGNOSIS — N40.1 BPH ASSOCIATED WITH NOCTURIA: ICD-10-CM

## 2023-03-08 DIAGNOSIS — R73.9 ELEVATED BLOOD SUGAR: ICD-10-CM

## 2023-03-08 DIAGNOSIS — N18.31 STAGE 3A CHRONIC KIDNEY DISEASE (HCC): ICD-10-CM

## 2023-03-08 DIAGNOSIS — R35.1 BPH ASSOCIATED WITH NOCTURIA: ICD-10-CM

## 2023-03-08 LAB
ALBUMIN SERPL-MCNC: 3.5 G/DL (ref 3.2–4.6)
ALBUMIN/GLOB SERPL: 1.1 (ref 0.4–1.6)
ALP SERPL-CCNC: 63 U/L (ref 50–136)
ALT SERPL-CCNC: 25 U/L (ref 12–65)
ANION GAP SERPL CALC-SCNC: 5 MMOL/L (ref 2–11)
AST SERPL-CCNC: 17 U/L (ref 15–37)
BASOPHILS # BLD: 0.1 K/UL (ref 0–0.2)
BASOPHILS NFR BLD: 1 % (ref 0–2)
BILIRUB SERPL-MCNC: 1 MG/DL (ref 0.2–1.1)
BUN SERPL-MCNC: 24 MG/DL (ref 8–23)
CALCIUM SERPL-MCNC: 9.2 MG/DL (ref 8.3–10.4)
CHLORIDE SERPL-SCNC: 108 MMOL/L (ref 101–110)
CHOLEST SERPL-MCNC: 214 MG/DL
CO2 SERPL-SCNC: 26 MMOL/L (ref 21–32)
CREAT SERPL-MCNC: 1.5 MG/DL (ref 0.8–1.5)
DIFFERENTIAL METHOD BLD: ABNORMAL
EOSINOPHIL # BLD: 0.1 K/UL (ref 0–0.8)
EOSINOPHIL NFR BLD: 2 % (ref 0.5–7.8)
ERYTHROCYTE [DISTWIDTH] IN BLOOD BY AUTOMATED COUNT: 12.7 % (ref 11.9–14.6)
EST. AVERAGE GLUCOSE BLD GHB EST-MCNC: 134 MG/DL
GLOBULIN SER CALC-MCNC: 3.2 G/DL (ref 2.8–4.5)
GLUCOSE SERPL-MCNC: 221 MG/DL (ref 65–100)
HBA1C MFR BLD: 6.3 % (ref 4.8–5.6)
HCT VFR BLD AUTO: 44.1 % (ref 41.1–50.3)
HDLC SERPL-MCNC: 76 MG/DL (ref 40–60)
HDLC SERPL: 2.8
HGB BLD-MCNC: 14.9 G/DL (ref 13.6–17.2)
IMM GRANULOCYTES # BLD AUTO: 0 K/UL (ref 0–0.5)
IMM GRANULOCYTES NFR BLD AUTO: 0 % (ref 0–5)
LDLC SERPL CALC-MCNC: 112.8 MG/DL
LYMPHOCYTES # BLD: 2.1 K/UL (ref 0.5–4.6)
LYMPHOCYTES NFR BLD: 30 % (ref 13–44)
MCH RBC QN AUTO: 33.9 PG (ref 26.1–32.9)
MCHC RBC AUTO-ENTMCNC: 33.8 G/DL (ref 31.4–35)
MCV RBC AUTO: 100.5 FL (ref 82–102)
MONOCYTES # BLD: 0.5 K/UL (ref 0.1–1.3)
MONOCYTES NFR BLD: 6 % (ref 4–12)
NEUTS SEG # BLD: 4.4 K/UL (ref 1.7–8.2)
NEUTS SEG NFR BLD: 61 % (ref 43–78)
NRBC # BLD: 0 K/UL (ref 0–0.2)
PLATELET # BLD AUTO: 195 K/UL (ref 150–450)
PMV BLD AUTO: 11.5 FL (ref 9.4–12.3)
POTASSIUM SERPL-SCNC: 4 MMOL/L (ref 3.5–5.1)
PROT SERPL-MCNC: 6.7 G/DL (ref 6.3–8.2)
PSA SERPL-MCNC: 0.7 NG/ML
RBC # BLD AUTO: 4.39 M/UL (ref 4.23–5.6)
SODIUM SERPL-SCNC: 139 MMOL/L (ref 133–143)
TRIGL SERPL-MCNC: 126 MG/DL (ref 35–150)
VLDLC SERPL CALC-MCNC: 25.2 MG/DL (ref 6–23)
WBC # BLD AUTO: 7.1 K/UL (ref 4.3–11.1)

## 2023-03-08 PROCEDURE — 3078F DIAST BP <80 MM HG: CPT | Performed by: STUDENT IN AN ORGANIZED HEALTH CARE EDUCATION/TRAINING PROGRAM

## 2023-03-08 PROCEDURE — 3075F SYST BP GE 130 - 139MM HG: CPT | Performed by: STUDENT IN AN ORGANIZED HEALTH CARE EDUCATION/TRAINING PROGRAM

## 2023-03-08 PROCEDURE — 1123F ACP DISCUSS/DSCN MKR DOCD: CPT | Performed by: STUDENT IN AN ORGANIZED HEALTH CARE EDUCATION/TRAINING PROGRAM

## 2023-03-08 PROCEDURE — 99214 OFFICE O/P EST MOD 30 MIN: CPT | Performed by: STUDENT IN AN ORGANIZED HEALTH CARE EDUCATION/TRAINING PROGRAM

## 2023-03-08 RX ORDER — AMLODIPINE BESYLATE 5 MG/1
5 TABLET ORAL DAILY
COMMUNITY
End: 2023-03-08

## 2023-03-08 RX ORDER — AMLODIPINE BESYLATE 5 MG/1
5 TABLET ORAL DAILY
Qty: 90 TABLET | Refills: 3 | Status: SHIPPED | OUTPATIENT
Start: 2023-03-08

## 2023-03-08 SDOH — ECONOMIC STABILITY: FOOD INSECURITY: WITHIN THE PAST 12 MONTHS, YOU WORRIED THAT YOUR FOOD WOULD RUN OUT BEFORE YOU GOT MONEY TO BUY MORE.: NEVER TRUE

## 2023-03-08 SDOH — ECONOMIC STABILITY: FOOD INSECURITY: WITHIN THE PAST 12 MONTHS, THE FOOD YOU BOUGHT JUST DIDN'T LAST AND YOU DIDN'T HAVE MONEY TO GET MORE.: NEVER TRUE

## 2023-03-08 SDOH — ECONOMIC STABILITY: HOUSING INSECURITY
IN THE LAST 12 MONTHS, WAS THERE A TIME WHEN YOU DID NOT HAVE A STEADY PLACE TO SLEEP OR SLEPT IN A SHELTER (INCLUDING NOW)?: NO

## 2023-03-08 SDOH — ECONOMIC STABILITY: INCOME INSECURITY: HOW HARD IS IT FOR YOU TO PAY FOR THE VERY BASICS LIKE FOOD, HOUSING, MEDICAL CARE, AND HEATING?: NOT HARD AT ALL

## 2023-03-08 ASSESSMENT — PATIENT HEALTH QUESTIONNAIRE - PHQ9
SUM OF ALL RESPONSES TO PHQ QUESTIONS 1-9: 0
2. FEELING DOWN, DEPRESSED OR HOPELESS: 0
SUM OF ALL RESPONSES TO PHQ9 QUESTIONS 1 & 2: 0
1. LITTLE INTEREST OR PLEASURE IN DOING THINGS: 0
SUM OF ALL RESPONSES TO PHQ QUESTIONS 1-9: 0

## 2023-03-08 ASSESSMENT — ANXIETY QUESTIONNAIRES
3. WORRYING TOO MUCH ABOUT DIFFERENT THINGS: 0
GAD7 TOTAL SCORE: 0
4. TROUBLE RELAXING: 0
1. FEELING NERVOUS, ANXIOUS, OR ON EDGE: 0
6. BECOMING EASILY ANNOYED OR IRRITABLE: 0
5. BEING SO RESTLESS THAT IT IS HARD TO SIT STILL: 0
2. NOT BEING ABLE TO STOP OR CONTROL WORRYING: 0
7. FEELING AFRAID AS IF SOMETHING AWFUL MIGHT HAPPEN: 0

## 2023-08-14 NOTE — PERIOP NOTES
TRANSFER - IN REPORT: 
 
Verbal report received from [facility] on Corey Hernandez  being received from room 714 for routine progression of care Report consisted of patients Situation, Background, Assessment and  
Recommendations(SBAR). Information from the following report(s) SBAR, Kardex and MAR was reviewed with the receiving nurse. Opportunity for questions and clarification was provided. Assessment completed upon patients arrival to unit and care assumed. Zelda Casanova presents today with request for a PI[employer] sports physical. Patient and parents deny any recent history of concussion or trauma. No acute or chronic medical conditions. Takes no medications on a daily basis. Denies any history of syncope, dizziness, chest pain, shortness of breath with exertion or exercise. No cardiac or murmur history. No family history of sudden cardiac death.

## 2023-09-13 ENCOUNTER — OFFICE VISIT (OUTPATIENT)
Dept: FAMILY MEDICINE CLINIC | Facility: CLINIC | Age: 83
End: 2023-09-13
Payer: MEDICARE

## 2023-09-13 VITALS
TEMPERATURE: 98.1 F | WEIGHT: 177 LBS | SYSTOLIC BLOOD PRESSURE: 136 MMHG | HEART RATE: 54 BPM | OXYGEN SATURATION: 96 % | HEIGHT: 66 IN | DIASTOLIC BLOOD PRESSURE: 86 MMHG | BODY MASS INDEX: 28.45 KG/M2

## 2023-09-13 DIAGNOSIS — Z23 NEED FOR VACCINATION: ICD-10-CM

## 2023-09-13 DIAGNOSIS — Z00.00 MEDICARE ANNUAL WELLNESS VISIT, SUBSEQUENT: Primary | ICD-10-CM

## 2023-09-13 DIAGNOSIS — E78.5 DYSLIPIDEMIA: ICD-10-CM

## 2023-09-13 DIAGNOSIS — I10 ESSENTIAL HYPERTENSION: ICD-10-CM

## 2023-09-13 DIAGNOSIS — E11.22 TYPE 2 DIABETES MELLITUS WITH STAGE 3A CHRONIC KIDNEY DISEASE, WITHOUT LONG-TERM CURRENT USE OF INSULIN (HCC): ICD-10-CM

## 2023-09-13 DIAGNOSIS — N18.31 TYPE 2 DIABETES MELLITUS WITH STAGE 3A CHRONIC KIDNEY DISEASE, WITHOUT LONG-TERM CURRENT USE OF INSULIN (HCC): ICD-10-CM

## 2023-09-13 PROCEDURE — 90694 VACC AIIV4 NO PRSRV 0.5ML IM: CPT | Performed by: PHYSICIAN ASSISTANT

## 2023-09-13 PROCEDURE — 1123F ACP DISCUSS/DSCN MKR DOCD: CPT | Performed by: PHYSICIAN ASSISTANT

## 2023-09-13 PROCEDURE — 3044F HG A1C LEVEL LT 7.0%: CPT | Performed by: PHYSICIAN ASSISTANT

## 2023-09-13 PROCEDURE — 3075F SYST BP GE 130 - 139MM HG: CPT | Performed by: PHYSICIAN ASSISTANT

## 2023-09-13 PROCEDURE — G0008 ADMIN INFLUENZA VIRUS VAC: HCPCS | Performed by: PHYSICIAN ASSISTANT

## 2023-09-13 PROCEDURE — 3079F DIAST BP 80-89 MM HG: CPT | Performed by: PHYSICIAN ASSISTANT

## 2023-09-13 PROCEDURE — G0439 PPPS, SUBSEQ VISIT: HCPCS | Performed by: PHYSICIAN ASSISTANT

## 2023-09-13 RX ORDER — AMLODIPINE BESYLATE 5 MG/1
5 TABLET ORAL DAILY
Qty: 90 TABLET | Refills: 3 | Status: SHIPPED | OUTPATIENT
Start: 2023-09-13

## 2023-09-13 ASSESSMENT — PATIENT HEALTH QUESTIONNAIRE - PHQ9
SUM OF ALL RESPONSES TO PHQ9 QUESTIONS 1 & 2: 0
2. FEELING DOWN, DEPRESSED OR HOPELESS: 0
SUM OF ALL RESPONSES TO PHQ QUESTIONS 1-9: 0
1. LITTLE INTEREST OR PLEASURE IN DOING THINGS: 0
SUM OF ALL RESPONSES TO PHQ QUESTIONS 1-9: 0

## 2023-09-13 ASSESSMENT — LIFESTYLE VARIABLES
HOW MANY STANDARD DRINKS CONTAINING ALCOHOL DO YOU HAVE ON A TYPICAL DAY: PATIENT DOES NOT DRINK
HOW OFTEN DO YOU HAVE A DRINK CONTAINING ALCOHOL: NEVER

## 2023-09-13 NOTE — PATIENT INSTRUCTIONS
Personalized Preventive Plan for Thania Tejada - 9/13/2023  Medicare offers a range of preventive health benefits. Some of the tests and screenings are paid in full while other may be subject to a deductible, co-insurance, and/or copay. Some of these benefits include a comprehensive review of your medical history including lifestyle, illnesses that may run in your family, and various assessments and screenings as appropriate. After reviewing your medical record and screening and assessments performed today your provider may have ordered immunizations, labs, imaging, and/or referrals for you. A list of these orders (if applicable) as well as your Preventive Care list are included within your After Visit Summary for your review. Other Preventive Recommendations:    A preventive eye exam performed by an eye specialist is recommended every 1-2 years to screen for glaucoma; cataracts, macular degeneration, and other eye disorders. A preventive dental visit is recommended every 6 months. Try to get at least 150 minutes of exercise per week or 10,000 steps per day on a pedometer . Order or download the FREE \"Exercise & Physical Activity: Your Everyday Guide\" from [employer]. Call 5-473.698.5641 or search [employer] online. You need 2814-1850 mg of calcium and 5442-8323 IU of vitamin D per day. It is possible to meet your calcium requirement with diet alone, but a vitamin D supplement is usually necessary to meet this goal.  When exposed to the sun, use a sunscreen that protects against both UVA and UVB radiation with an SPF of 30 or greater. Reapply every 2 to 3 hours or after sweating, drying off with a towel, or swimming. Always wear a seat belt when traveling in a car. Always wear a helmet when riding a bicycle or motorcycle.

## 2023-09-13 NOTE — PROGRESS NOTES
Medicare Annual Wellness Visit    Stephanie Richardson is here for Medicare AWV (Feeling fine)    Assessment & Plan   Medicare annual wellness visit, subsequent  Essential hypertension  -     amLODIPine (NORVASC) 5 MG tablet; Take 1 tablet by mouth daily, Disp-90 tablet, R-3Normal  -     Comprehensive Metabolic Panel; Future  -     CBC with Auto Differential; Future  -     TSH with Reflex; Future  Type 2 diabetes mellitus with stage 3a chronic kidney disease, without long-term current use of insulin (HCC)  -     Microalbumin / Creatinine Urine Ratio; Future  -     Hemoglobin A1C; Future  Need for vaccination  -     Influenza, FLUAD, (age 72 y+), IM, Preservative Free, 0.5 mL  Dyslipidemia  -     Lipid Panel; Future  -     Comprehensive Metabolic Panel; Future  BMI 28.0-28.9,adult  Recommendations for Preventive Services Due: see orders and patient instructions/AVS.  Recommended screening schedule for the next 5-10 years is provided to the patient in written form: see Patient Instructions/AVS.     Return in about 6 months (around 3/13/2024) for med refills, get fasting labs prior to appt. Subjective   The following acute and/or chronic problems were also addressed today:  Hypertension- stable. BP today in the office was 136/86. He reports compliance with medication. He denies CP, SOB, palpitations, lower extremity edema, hx of MI or CVA, tobacco use. He does endorse orthostatic dizziness but no syncopal episodes. He has CKD stage 3a, which is managed by nephrology.      Lab Results   Component Value Date    WBC 7.1 03/08/2023    HGB 14.9 03/08/2023    HCT 44.1 03/08/2023    .5 03/08/2023     03/08/2023     Lab Results   Component Value Date     03/08/2023    K 4.0 03/08/2023     03/08/2023    CO2 26 03/08/2023    BUN 24 (H) 03/08/2023    CREATININE 1.50 03/08/2023    GLUCOSE 221 (H) 03/08/2023    CALCIUM 9.2 03/08/2023    PROT 6.7 03/08/2023    LABALBU 3.5 03/08/2023    BILITOT 1.0

## 2024-02-28 ENCOUNTER — TELEPHONE (OUTPATIENT)
Dept: FAMILY MEDICINE CLINIC | Facility: CLINIC | Age: 84
End: 2024-02-28

## 2024-02-28 DIAGNOSIS — I10 ESSENTIAL HYPERTENSION: ICD-10-CM

## 2024-02-28 RX ORDER — BENAZEPRIL HYDROCHLORIDE 10 MG/1
10 TABLET ORAL DAILY
Qty: 90 TABLET | Refills: 1 | Status: SHIPPED | OUTPATIENT
Start: 2024-02-28

## 2024-02-28 NOTE — TELEPHONE ENCOUNTER
Patient needs refill sent to [facility] in [place], benazepril (LOTENSIN) 10 MG tablet

## 2024-03-06 ENCOUNTER — NURSE ONLY (OUTPATIENT)
Dept: FAMILY MEDICINE CLINIC | Facility: CLINIC | Age: 84
End: 2024-03-06

## 2024-03-06 DIAGNOSIS — E78.5 DYSLIPIDEMIA: ICD-10-CM

## 2024-03-06 DIAGNOSIS — E11.22 TYPE 2 DIABETES MELLITUS WITH STAGE 3A CHRONIC KIDNEY DISEASE, WITHOUT LONG-TERM CURRENT USE OF INSULIN (HCC): ICD-10-CM

## 2024-03-06 DIAGNOSIS — I10 ESSENTIAL HYPERTENSION: ICD-10-CM

## 2024-03-06 DIAGNOSIS — N18.31 TYPE 2 DIABETES MELLITUS WITH STAGE 3A CHRONIC KIDNEY DISEASE, WITHOUT LONG-TERM CURRENT USE OF INSULIN (HCC): ICD-10-CM

## 2024-03-06 LAB
ALBUMIN SERPL-MCNC: 3.9 G/DL (ref 3.2–4.6)
ALP SERPL-CCNC: 68 U/L (ref 50–136)
ALT SERPL-CCNC: 31 U/L (ref 12–65)
ANION GAP SERPL CALC-SCNC: 4 MMOL/L (ref 2–11)
AST SERPL-CCNC: 28 U/L (ref 15–37)
BASOPHILS # BLD: 0.1 K/UL (ref 0–0.2)
BASOPHILS NFR BLD: 1 % (ref 0–2)
BILIRUB SERPL-MCNC: 1 MG/DL (ref 0.2–1.1)
CALCIUM SERPL-MCNC: 9.6 MG/DL (ref 8.3–10.4)
CHLORIDE SERPL-SCNC: 105 MMOL/L (ref 103–113)
CHOLEST SERPL-MCNC: 226 MG/DL
CO2 SERPL-SCNC: 28 MMOL/L (ref 21–32)
CREAT SERPL-MCNC: 1.4 MG/DL (ref 0.8–1.5)
DIFFERENTIAL METHOD BLD: ABNORMAL
EOSINOPHIL # BLD: 0.1 K/UL (ref 0–0.8)
EOSINOPHIL NFR BLD: 2 % (ref 0.5–7.8)
ERYTHROCYTE [DISTWIDTH] IN BLOOD BY AUTOMATED COUNT: 12.8 % (ref 11.9–14.6)
GLOBULIN SER CALC-MCNC: 3.7 G/DL (ref 2.8–4.5)
HDLC SERPL-MCNC: 71 MG/DL (ref 40–60)
HDLC SERPL: 3.2
IMM GRANULOCYTES # BLD AUTO: 0 K/UL (ref 0–0.5)
IMM GRANULOCYTES NFR BLD AUTO: 0 % (ref 0–5)
LDLC SERPL CALC-MCNC: 137.4 MG/DL
LYMPHOCYTES # BLD: 2.4 K/UL (ref 0.5–4.6)
LYMPHOCYTES NFR BLD: 34 % (ref 13–44)
MCH RBC QN AUTO: 33.4 PG (ref 26.1–32.9)
MCHC RBC AUTO-ENTMCNC: 33.8 G/DL (ref 31.4–35)
MCV RBC AUTO: 98.9 FL (ref 82–102)
MONOCYTES # BLD: 0.6 K/UL (ref 0.1–1.3)
MONOCYTES NFR BLD: 8 % (ref 4–12)
NEUTS SEG # BLD: 3.9 K/UL (ref 1.7–8.2)
NEUTS SEG NFR BLD: 55 % (ref 43–78)
NRBC # BLD: 0 K/UL (ref 0–0.2)
PLATELET # BLD AUTO: 202 K/UL (ref 150–450)
PMV BLD AUTO: 11.3 FL (ref 9.4–12.3)
POTASSIUM SERPL-SCNC: 4 MMOL/L (ref 3.5–5.1)
PROT SERPL-MCNC: 7.6 G/DL (ref 6.3–8.2)
RBC # BLD AUTO: 4.37 M/UL (ref 4.23–5.6)
SODIUM SERPL-SCNC: 137 MMOL/L (ref 136–146)
T4 FREE SERPL-MCNC: 1 NG/DL (ref 0.78–1.46)
TRIGL SERPL-MCNC: 88 MG/DL (ref 35–150)
TSH W FREE THYROID IF ABNORMAL: 4.96 UIU/ML (ref 0.36–3.74)
WBC # BLD AUTO: 7.1 K/UL (ref 4.3–11.1)

## 2024-03-07 LAB — EST. AVERAGE GLUCOSE BLD GHB EST-MCNC: 140 MG/DL

## 2024-03-13 ENCOUNTER — OFFICE VISIT (OUTPATIENT)
Dept: FAMILY MEDICINE CLINIC | Facility: CLINIC | Age: 84
End: 2024-03-13
Payer: MEDICARE

## 2024-03-13 VITALS
BODY MASS INDEX: 28.95 KG/M2 | WEIGHT: 180.13 LBS | HEART RATE: 67 BPM | TEMPERATURE: 98 F | SYSTOLIC BLOOD PRESSURE: 134 MMHG | OXYGEN SATURATION: 96 % | DIASTOLIC BLOOD PRESSURE: 85 MMHG | HEIGHT: 66 IN

## 2024-03-13 DIAGNOSIS — E11.22 TYPE 2 DIABETES MELLITUS WITH STAGE 3A CHRONIC KIDNEY DISEASE, WITHOUT LONG-TERM CURRENT USE OF INSULIN (HCC): ICD-10-CM

## 2024-03-13 DIAGNOSIS — E78.2 MIXED HYPERLIPIDEMIA: ICD-10-CM

## 2024-03-13 DIAGNOSIS — N18.31 TYPE 2 DIABETES MELLITUS WITH STAGE 3A CHRONIC KIDNEY DISEASE, WITHOUT LONG-TERM CURRENT USE OF INSULIN (HCC): ICD-10-CM

## 2024-03-13 DIAGNOSIS — I10 ESSENTIAL HYPERTENSION: Primary | ICD-10-CM

## 2024-03-13 DIAGNOSIS — R42 ORTHOSTATIC DIZZINESS: ICD-10-CM

## 2024-03-13 PROCEDURE — 3074F SYST BP LT 130 MM HG: CPT | Performed by: PHYSICIAN ASSISTANT

## 2024-03-13 PROCEDURE — 1123F ACP DISCUSS/DSCN MKR DOCD: CPT | Performed by: PHYSICIAN ASSISTANT

## 2024-03-13 PROCEDURE — 3079F DIAST BP 80-89 MM HG: CPT | Performed by: PHYSICIAN ASSISTANT

## 2024-03-13 PROCEDURE — 3044F HG A1C LEVEL LT 7.0%: CPT | Performed by: PHYSICIAN ASSISTANT

## 2024-03-13 PROCEDURE — 99214 OFFICE O/P EST MOD 30 MIN: CPT | Performed by: PHYSICIAN ASSISTANT

## 2024-03-13 RX ORDER — AMLODIPINE BESYLATE 5 MG/1
2.5 TABLET ORAL DAILY
Qty: 90 TABLET | Refills: 3 | Status: SHIPPED
Start: 2024-03-13

## 2024-03-13 SDOH — ECONOMIC STABILITY: INCOME INSECURITY: HOW HARD IS IT FOR YOU TO PAY FOR THE VERY BASICS LIKE FOOD, HOUSING, MEDICAL CARE, AND HEATING?: NOT HARD AT ALL

## 2024-03-13 SDOH — ECONOMIC STABILITY: FOOD INSECURITY: WITHIN THE PAST 12 MONTHS, YOU WORRIED THAT YOUR FOOD WOULD RUN OUT BEFORE YOU GOT MONEY TO BUY MORE.: NEVER TRUE

## 2024-03-13 SDOH — ECONOMIC STABILITY: FOOD INSECURITY: WITHIN THE PAST 12 MONTHS, THE FOOD YOU BOUGHT JUST DIDN'T LAST AND YOU DIDN'T HAVE MONEY TO GET MORE.: NEVER TRUE

## 2024-03-13 ASSESSMENT — PATIENT HEALTH QUESTIONNAIRE - PHQ9
SUM OF ALL RESPONSES TO PHQ QUESTIONS 1-9: 0
SUM OF ALL RESPONSES TO PHQ QUESTIONS 1-9: 0
1. LITTLE INTEREST OR PLEASURE IN DOING THINGS: 0
SUM OF ALL RESPONSES TO PHQ QUESTIONS 1-9: 0
2. FEELING DOWN, DEPRESSED OR HOPELESS: 0
SUM OF ALL RESPONSES TO PHQ9 QUESTIONS 1 & 2: 0
SUM OF ALL RESPONSES TO PHQ QUESTIONS 1-9: 0

## 2024-03-13 ASSESSMENT — ENCOUNTER SYMPTOMS
GASTROINTESTINAL NEGATIVE: 1
RESPIRATORY NEGATIVE: 1

## 2024-03-13 NOTE — PROGRESS NOTES
Chief Complaint   Patient presents with    Follow-up     6 month, discuss test results, states he feels dizzy after taking BP med       HISTORY OF PRESENT ILLNESS:  Tobias Hall is a very pleasant 83 y.o. male seen for a follow up visit for several chronic medical problems, includin. Hypertension- stable. BP today in the office was 126/89. Home readings range from 120's-140/70's-90's. He denies CP, VALIENTE/SOB, palpitations, syncopal episodes, tobacco use, regular ETOH use, hx of MI or CVA. He has CKD stage 3a. He does endorse dizziness but has trouble describing it. He has fallen several times due to the dizziness- no syncopal episodes per wife. States it happens after taking benazepril. He also experiences foot and ankle swelling with amlodipine, which is bothersome    2. Diet controlled type 2 DM- stable. Does not check glucose at home. Denies neuropathy symptoms.     He is accompanied by his wife today, who helps with history.     Lab Results   Component Value Date    WBC 7.1 2024    HGB 14.6 2024    HCT 43.2 2024    MCV 98.9 2024     2024     Lab Results   Component Value Date     2024    K 4.0 2024     2024    CO2 28 2024    BUN 18 2024    CREATININE 1.40 2024    GLUCOSE 138 (H) 2024    CALCIUM 9.6 2024    PROT 7.6 2024    LABALBU 3.9 2024    BILITOT 1.0 2024    ALKPHOS 68 2024    AST 28 2024    ALT 31 2024    LABGLOM 50 (L) 2024    GFRAA 58 (L) 2022    AGRATIO 1.1 2024    GLOB 3.7 2024       Lab Results   Component Value Date    CHOL 226 (H) 2024    TRIG 88 2024    HDL 71 (H) 2024    LDLCALC 137.4 (H) 2024    VLDL 12 2022    CHOLHDLRATIO 3.2 2024     Hemoglobin A1C   Date Value Ref Range Status   2024 6.5 (H) 4.8 - 5.6 % Final       PAST MEDICAL HISTORY:  Current Outpatient Medications   Medication Sig

## 2024-04-30 ENCOUNTER — OFFICE VISIT (OUTPATIENT)
Dept: FAMILY MEDICINE CLINIC | Facility: CLINIC | Age: 84
End: 2024-04-30
Payer: MEDICARE

## 2024-04-30 VITALS
TEMPERATURE: 98.6 F | SYSTOLIC BLOOD PRESSURE: 148 MMHG | OXYGEN SATURATION: 95 % | DIASTOLIC BLOOD PRESSURE: 89 MMHG | HEART RATE: 73 BPM | HEIGHT: 66 IN | BODY MASS INDEX: 29.31 KG/M2 | WEIGHT: 182.38 LBS

## 2024-04-30 DIAGNOSIS — Z00.00 MEDICARE ANNUAL WELLNESS VISIT, SUBSEQUENT: Primary | ICD-10-CM

## 2024-04-30 DIAGNOSIS — I10 ESSENTIAL HYPERTENSION: ICD-10-CM

## 2024-04-30 DIAGNOSIS — E03.8 SUBCLINICAL HYPOTHYROIDISM: ICD-10-CM

## 2024-04-30 DIAGNOSIS — R42 ORTHOSTATIC DIZZINESS: ICD-10-CM

## 2024-04-30 PROCEDURE — G0439 PPPS, SUBSEQ VISIT: HCPCS | Performed by: PHYSICIAN ASSISTANT

## 2024-04-30 PROCEDURE — 3079F DIAST BP 80-89 MM HG: CPT | Performed by: PHYSICIAN ASSISTANT

## 2024-04-30 PROCEDURE — 3077F SYST BP >= 140 MM HG: CPT | Performed by: PHYSICIAN ASSISTANT

## 2024-04-30 PROCEDURE — 99213 OFFICE O/P EST LOW 20 MIN: CPT | Performed by: PHYSICIAN ASSISTANT

## 2024-04-30 PROCEDURE — 1123F ACP DISCUSS/DSCN MKR DOCD: CPT | Performed by: PHYSICIAN ASSISTANT

## 2024-04-30 ASSESSMENT — PATIENT HEALTH QUESTIONNAIRE - PHQ9
1. LITTLE INTEREST OR PLEASURE IN DOING THINGS: NOT AT ALL
SUM OF ALL RESPONSES TO PHQ QUESTIONS 1-9: 0
2. FEELING DOWN, DEPRESSED OR HOPELESS: NOT AT ALL
SUM OF ALL RESPONSES TO PHQ9 QUESTIONS 1 & 2: 0

## 2024-04-30 ASSESSMENT — LIFESTYLE VARIABLES
HOW OFTEN DO YOU HAVE A DRINK CONTAINING ALCOHOL: NEVER
HOW MANY STANDARD DRINKS CONTAINING ALCOHOL DO YOU HAVE ON A TYPICAL DAY: PATIENT DOES NOT DRINK

## 2024-04-30 NOTE — PROGRESS NOTES
Medicare Annual Wellness Visit    Tobias Hall is here for Medicare AWV (States he feels off balance & dizzy after taking BP meds)    Assessment & Plan   Medicare annual wellness visit, subsequent  Essential hypertension  Subclinical hypothyroidism  -     TSH with Reflex; Future  Orthostatic dizziness    D/C amlodipine since home readings are normal and see if dizziness further improves. His wife will continue to monitor BP and let me know readings. Will recheck TSH in 1 month and plan for BP check as well. Get up slowly from seated/supine positions.  Declines PT referral at this time to help work on balance and decrease fall risk.      Recommended screening schedule for the next 5-10 years is provided to the patient in written form: see Patient Instructions/AVS.     Return in 6 months (on 10/30/2024).     Subjective   The following acute and/or chronic problems were also addressed today:  Hypertension- stable. BP today in the office was 148/89. Home readings have been lower. He continues to complain of orthostatic dizziness and balance difficulty. Amlodipine was decreased from 5mg to 2.5mg several months ago. Wife feels like he complains less about dizziness now. He denies CP, VALIENTE/SOB, palpitations, lower extremity edema, syncopal episodes, tobacco use, regular ETOH use, hx of MI or CVA.  Has CKD stage 3a.     Lab Results   Component Value Date    WBC 7.1 03/06/2024    HGB 14.6 03/06/2024    HCT 43.2 03/06/2024    MCV 98.9 03/06/2024     03/06/2024     Lab Results   Component Value Date     03/06/2024    K 4.0 03/06/2024     03/06/2024    CO2 28 03/06/2024    BUN 18 03/06/2024    CREATININE 1.40 03/06/2024    GLUCOSE 138 (H) 03/06/2024    CALCIUM 9.6 03/06/2024    BILITOT 1.0 03/06/2024    ALKPHOS 68 03/06/2024    AST 28 03/06/2024    ALT 31 03/06/2024    LABGLOM 50 (L) 03/06/2024    GFRAA 58 (L) 09/13/2022    AGRATIO 1.6 03/25/2022    GLOB 3.7 03/06/2024       Lab Results   Component

## 2024-05-21 ENCOUNTER — APPOINTMENT (OUTPATIENT)
Dept: CT IMAGING | Age: 84
End: 2024-05-21
Payer: MEDICARE

## 2024-05-21 ENCOUNTER — APPOINTMENT (OUTPATIENT)
Dept: GENERAL RADIOLOGY | Age: 84
End: 2024-05-21
Payer: MEDICARE

## 2024-05-21 ENCOUNTER — HOSPITAL ENCOUNTER (EMERGENCY)
Age: 84
Discharge: HOME | End: 2024-05-21
Payer: MEDICARE

## 2024-05-21 VITALS
TEMPERATURE: 98 F | OXYGEN SATURATION: 96 % | HEART RATE: 81 BPM | RESPIRATION RATE: 18 BRPM | DIASTOLIC BLOOD PRESSURE: 97 MMHG | SYSTOLIC BLOOD PRESSURE: 145 MMHG

## 2024-05-21 DIAGNOSIS — R53.1 GENERALIZED WEAKNESS: ICD-10-CM
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
## 2024-05-21 LAB
ALBUMIN SERPL-MCNC: 3.9 G/DL (ref 3.2–4.6)
ALBUMIN/GLOB SERPL: 1.2 (ref 1–1.9)
ALP SERPL-CCNC: 61 U/L (ref 40–129)
ALT SERPL-CCNC: 27 U/L (ref 12–65)
ANION GAP SERPL CALC-SCNC: 12 MMOL/L (ref 9–18)
AST SERPL-CCNC: 29 U/L (ref 15–37)
BACTERIA URNS QL MICRO: 0 /HPF
BASOPHILS # BLD: 0.1 K/UL (ref 0–0.2)
BASOPHILS NFR BLD: 1 % (ref 0–2)
BILIRUB SERPL-MCNC: 1.3 MG/DL (ref 0–1.2)
BILIRUB UR QL: NEGATIVE
BUN SERPL-MCNC: 19 MG/DL (ref 8–23)
CALCIUM SERPL-MCNC: 9.6 MG/DL (ref 8.8–10.2)
CASTS URNS QL MICRO: NORMAL /LPF
CHLORIDE SERPL-SCNC: 102 MMOL/L (ref 98–107)
CO2 SERPL-SCNC: 26 MMOL/L (ref 20–28)
CREAT SERPL-MCNC: 1.66 MG/DL (ref 0.8–1.3)
CRYSTALS URNS QL MICRO: 0 /LPF
DIFFERENTIAL METHOD BLD: ABNORMAL
EOSINOPHIL # BLD: 0.1 K/UL (ref 0–0.8)
EOSINOPHIL NFR BLD: 1 % (ref 0.5–7.8)
EPI CELLS #/AREA URNS HPF: NORMAL /HPF
ERYTHROCYTE [DISTWIDTH] IN BLOOD BY AUTOMATED COUNT: 13 % (ref 11.9–14.6)
GLOBULIN SER CALC-MCNC: 3.2 G/DL (ref 2.3–3.5)
GLUCOSE SERPL-MCNC: 276 MG/DL (ref 70–99)
GLUCOSE UR QL STRIP.AUTO: 500 MG/DL
HCT VFR BLD AUTO: 42.9 % (ref 41.1–50.3)
HGB BLD-MCNC: 14.7 G/DL (ref 13.6–17.2)
IMM GRANULOCYTES # BLD AUTO: 0 K/UL (ref 0–0.5)
IMM GRANULOCYTES NFR BLD AUTO: 0 % (ref 0–5)
KETONES UR-MCNC: NEGATIVE MG/DL
LEUKOCYTE ESTERASE UR QL STRIP: NEGATIVE
LYMPHOCYTES # BLD: 2.5 K/UL (ref 0.5–4.6)
LYMPHOCYTES NFR BLD: 21 % (ref 13–44)
MCH RBC QN AUTO: 33.7 PG (ref 26.1–32.9)
MCHC RBC AUTO-ENTMCNC: 34.3 G/DL (ref 31.4–35)
MCV RBC AUTO: 98.4 FL (ref 82–102)
MONOCYTES # BLD: 1.3 K/UL (ref 0.1–1.3)
MONOCYTES NFR BLD: 11 % (ref 4–12)
MUCOUS THREADS URNS QL MICRO: NORMAL /LPF
NEUTS SEG # BLD: 8.1 K/UL (ref 1.7–8.2)
NEUTS SEG NFR BLD: 66 % (ref 43–78)
NITRITE UR QL: NEGATIVE
NRBC # BLD: 0 K/UL (ref 0–0.2)
OTHER OBSERVATIONS: NORMAL
PH UR: 6 (ref 5–9)
PLATELET # BLD AUTO: 197 K/UL (ref 150–450)
PMV BLD AUTO: 10.7 FL (ref 9.4–12.3)
POTASSIUM SERPL-SCNC: 4.1 MMOL/L (ref 3.5–5.1)
PROT SERPL-MCNC: 7.1 G/DL (ref 6.3–8.2)
PROT UR QL: 100 MG/DL
RBC # BLD AUTO: 4.36 M/UL (ref 4.23–5.6)
RBC # UR STRIP: ABNORMAL
RBC #/AREA URNS HPF: NORMAL /HPF
SERVICE CMNT-IMP: ABNORMAL
SODIUM SERPL-SCNC: 139 MMOL/L (ref 136–145)
SP GR UR: >1.03 (ref 1–1.02)
UROBILINOGEN UR QL: 1 EU/DL (ref 0.2–1)
WBC # BLD AUTO: 11.9 K/UL (ref 4.3–11.1)
WBC URNS QL MICRO: NORMAL /HPF

## 2024-05-21 PROCEDURE — 99284 EMERGENCY DEPT VISIT MOD MDM: CPT

## 2024-05-21 PROCEDURE — 70450 CT HEAD/BRAIN W/O DYE: CPT

## 2024-05-21 PROCEDURE — 73562 X-RAY EXAM OF KNEE 3: CPT

## 2024-05-21 PROCEDURE — 73502 X-RAY EXAM HIP UNI 2-3 VIEWS: CPT

## 2024-05-21 PROCEDURE — 85025 COMPLETE CBC W/AUTO DIFF WBC: CPT

## 2024-05-21 PROCEDURE — 81003 URINALYSIS AUTO W/O SCOPE: CPT

## 2024-05-21 PROCEDURE — 81001 URINALYSIS AUTO W/SCOPE: CPT

## 2024-05-21 PROCEDURE — 81015 MICROSCOPIC EXAM OF URINE: CPT

## 2024-05-21 PROCEDURE — 80053 COMPREHEN METABOLIC PANEL: CPT

## 2024-05-21 RX ORDER — HYDROCODONE BITARTRATE AND ACETAMINOPHEN 5; 325 MG/1; MG/1
1 TABLET ORAL EVERY 4 HOURS PRN
Qty: 18 TABLET | Refills: 0 | Status: SHIPPED | OUTPATIENT
Start: 2024-05-21 | End: 2024-05-21

## 2024-05-21 NOTE — DISCHARGE INSTR - COC
Continuity of Care Form    Patient Name: Tobias Hall   :  1940  MRN:  [id number]    Admit date:  2024  Discharge date:  ***    Code Status Order: No Order   Advance Directives:     Admitting Physician:  No admitting provider for patient encounter.  PCP: Norma Oshea PA-C    Discharging Nurse: ***  Discharging Hospital Unit/Room#: D03/D03  Discharging Unit Phone Number: ***    Emergency Contact:   Extended Emergency Contact Information  Primary Emergency Contact: Charmaine Hall  Address: [address] [place] [address] [facility]  Home Phone: [phone]  Work Phone: [phone]  Mobile Phone: [phone]  Relation: Spouse  Secondary Emergency Contact: Renée Sheikh  Mobile Phone: [phone]  Relation: Other    Past Surgical History:  Past Surgical History:   Procedure Laterality Date    LITHOTRIPSY      LUMBAR FUSION  2018    L5-S1 fusion and L5-S1 laminectomy with hardware    SHOULDER ARTHROPLASTY Left 2014    left shoulder    TOTAL HIP ARTHROPLASTY Left        Immunization History:   Immunization History   Administered Date(s) Administered    COVID-19, PFIZER PURPLE top, DILUTE for use, (age 12 y+), 30mcg/0.3mL 2021, 2021    Influenza, FLUAD, (age 65 y+), Adjuvanted, 0.5mL 2021, 2023    PPD Test 2019    Pneumococcal, PCV-13, PREVNAR 13, (age 6w+), IM, 0.5mL 2022    Pneumococcal, PPSV23, PNEUMOVAX 23, (age 2y+), SC/IM, 0.5mL 2014    TDaP, ADACEL (age 10y-64y), BOOSTRIX (age 10y+), IM, 0.5mL 2014       Active Problems:  Patient Active Problem List   Diagnosis Code    Post-traumatic osteoarthritis of left shoulder M19.112    Stage 3a chronic kidney disease (HCC) N18.31    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

## 2024-05-21 NOTE — DISCHARGE INSTRUCTIONS
No fractures were found.  Take the medications as needed for pain and discomfort.  Please follow-up with orthopedics if pain persists.  Please follow-up with your primary care physician.  Return here if you have worsening symptoms.  
c/o lower back pain x 2 weeks after lifting heavy

## 2024-05-21 NOTE — ED NOTES
Patient mobility status  with difficulty, uses a (does not use assistance device at home - see provider and RN note regarding discharge) . Provider aware     I have reviewed discharge instructions with the patient.  The patient verbalized understanding.    Patient left ED via Discharge Method: wheelchair to Home with Spouse.    Opportunity for questions and clarification provided.     Patient given 0 scripts.            Abbie Camargo, BAIRON  05/21/24 [id number]

## 2024-05-21 NOTE — ED PROVIDER NOTES
Emergency Department Provider Note       PCP: Norma Oshea PA-C   Age: 83 y.o.   Sex: male     DISPOSITION Decision To Discharge 05/21/2024 05:08:12 PM       ICD-10-CM    1. Ground-level fall  W18.30XA HYDROcodone-acetaminophen (LORCET) 5-325 MG per tablet      2. Generalized weakness  R53.1         Medical Decision Making     83 male presents here with complaint of left-sided hip and leg pain after a fall onto his right side a few days ago.  He is typically ambulatory however has had some difficulty the past couple days after the fall.  Presented here for evaluation.  X-rays obtained demonstrate no acute fractures.  Upon discharge, patient had some with nursing described as some mild confusion.  He had some confusion with where he was currently.  He was brought back into the emergency room and had a reevaluation.  We obtain lab work and head CT imaging.  This is all resulted without any acute findings.  I had a lengthy discussion with patient and patient's spouse about options.  I recommended my admission to the hospital for eventual placement into rehab however after discussion, patient and patient's spouse politely declined admission at this time.  They would instead prefer discharge home where they can contact the PCP to have rehab set up as it was already recommended to patient.  This was per wife.  I discussed the risks and benefits of them and again recommended admission however they again politely declined.  Patient discharged home into wife's care with instructions to contact PCP tomorrow morning to discuss rehab and to return here if worsening symptoms present    ED Course as of 05/21/24 1710   Tue May 21, 2024   1410 At time of discharge nurses noted patient to be more confused than he was at our initial exam.  He is having some confusion as to where he was.  Patient brought back in to triage where I examined him he did exhibit some confusion that was not present previously.  Will obtain lab work

## 2024-05-21 NOTE — ED NOTES
Provided ambulatory test on patient per provider. Patient typically walks slow but without assistance or assisted devices. Patient was able to walk with x2 assistance - patient still unsteady. Provider notified. This RN, BAIRON Anna, and provider present in the room with patient and family member. Patient was offered admission to help with strength and get patient to rehabilitation center. Patient and family denied admission and stated they wanted to go home. Patient and family member feel safe with this decision. Risks were discussed with patient and family about going home and fall risks. Patient and family member stayed they still wanted to go home and denied admission.      Abbie Camargo, BAIRON  05/21/24 [id number]

## 2024-05-21 NOTE — ED NOTES
Patient mobility status  with no difficulty. Provider aware     I have reviewed discharge instructions with the patient.  The patient verbalized understanding.    Patient left ED via Discharge Method: ambulatory to Home with  self .    Opportunity for questions and clarification provided.     Patient given 0 scripts.           Alicja Reza LPN  05/21/24 0109

## 2024-05-22 ENCOUNTER — OFFICE VISIT (OUTPATIENT)
Dept: FAMILY MEDICINE CLINIC | Facility: CLINIC | Age: 84
End: 2024-05-22
Payer: MEDICARE

## 2024-05-22 VITALS
OXYGEN SATURATION: 97 % | HEIGHT: 66 IN | HEART RATE: 88 BPM | TEMPERATURE: 97.9 F | DIASTOLIC BLOOD PRESSURE: 76 MMHG | SYSTOLIC BLOOD PRESSURE: 128 MMHG | WEIGHT: 179 LBS | BODY MASS INDEX: 28.77 KG/M2

## 2024-05-22 DIAGNOSIS — M48.00 SPINAL STENOSIS, UNSPECIFIED SPINAL REGION: ICD-10-CM

## 2024-05-22 DIAGNOSIS — R53.1 WEAKNESS: ICD-10-CM
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
## 2024-05-22 DIAGNOSIS — M43.17 SPONDYLOLISTHESIS AT L5-S1 LEVEL: ICD-10-CM

## 2024-05-22 PROCEDURE — 99213 OFFICE O/P EST LOW 20 MIN: CPT | Performed by: NURSE PRACTITIONER

## 2024-05-22 PROCEDURE — 3074F SYST BP LT 130 MM HG: CPT | Performed by: NURSE PRACTITIONER

## 2024-05-22 PROCEDURE — 1123F ACP DISCUSS/DSCN MKR DOCD: CPT | Performed by: NURSE PRACTITIONER

## 2024-05-22 PROCEDURE — 3078F DIAST BP <80 MM HG: CPT | Performed by: NURSE PRACTITIONER

## 2024-05-22 NOTE — PROGRESS NOTES
[facility]  [address] [place] [address]  Phone [phone]  Fax:  [phone]    Patient: Tobias Hall  YOB: 1940  Patient Age 83 y.o.  Patient sex: male  Medical Record:  [id number]  Visit Date: 05/24/24    [facility] Clinic Note     Chief Complaint   Patient presents with    Follow-up     ed       History of Present Illness:  Mr. Hall is a pleasant 83 year old male with a past medical history as noted below who presents for an ED follow up visit.  Was seen yesterday at ED for fall.  According to notes, \"presents here with complaint of left-sided hip and leg pain after a fall onto his right side a few days ago.  He is typically ambulatory however has had some difficulty the past couple days after the fall.  Presented here for evaluation.  X-rays obtained demonstrate no acute fractures.  Upon discharge, patient had some with nursing described as some mild confusion.  He had some confusion with where he was currently.  He was brought back into the emergency room and had a reevaluation.  We obtain lab work and head CT imaging.  This is all resulted without any acute findings.  I had a lengthy discussion with patient and patient's spouse about options.  I recommended my admission to the hospital for eventual placement into rehab however after discussion, patient and patient's spouse politely declined admission at this time.  They would instead prefer discharge home where they can contact the PCP to have rehab set up as it was already recommended to patient.  This was per wife.  I discussed the risks and benefits of them and again recommended admission however they again politely declined.  Patient discharged home into wife's care with instructions to contact PCP tomorrow morning to discuss rehab and to return here if worsening symptoms present.\"    Presents to office today requesting PT referral as previously recommended by PCP.  Continues to endorse left

## 2024-05-23 ENCOUNTER — HOME HEALTH ADMISSION (OUTPATIENT)
Dept: HOME HEALTH SERVICES | Facility: HOME HEALTH | Age: 84
End: 2024-05-23
Payer: MEDICARE

## 2024-05-24 ENCOUNTER — TELEPHONE (OUTPATIENT)
Dept: FAMILY MEDICINE CLINIC | Facility: CLINIC | Age: 84
End: 2024-05-24

## 2024-05-24 NOTE — TELEPHONE ENCOUNTER
Stephanie Landaverde  states she needs an addendum to your office notes to address the DX of spondylolisthesis so they can do the HH visits. She said the DX codes were changed but needs it in the office notes

## 2024-05-25 ENCOUNTER — HOME CARE VISIT (OUTPATIENT)
Dept: SCHEDULING | Facility: HOME HEALTH | Age: 84
End: 2024-05-25
Payer: MEDICARE

## 2024-05-25 VITALS
SYSTOLIC BLOOD PRESSURE: 124 MMHG | TEMPERATURE: 98.2 F | RESPIRATION RATE: 18 BRPM | HEART RATE: 74 BPM | DIASTOLIC BLOOD PRESSURE: 76 MMHG | OXYGEN SATURATION: 98 %

## 2024-05-25 PROCEDURE — G0151 HHCP-SERV OF PT,EA 15 MIN: HCPCS

## 2024-05-25 ASSESSMENT — ENCOUNTER SYMPTOMS
DYSPNEA ACTIVITY LEVEL: AFTER AMBULATING MORE THAN 20 FT
PAIN LOCATION - PAIN QUALITY: SORE

## 2024-05-29 ENCOUNTER — HOME CARE VISIT (OUTPATIENT)
Dept: SCHEDULING | Facility: HOME HEALTH | Age: 84
End: 2024-05-29
Payer: MEDICARE

## 2024-05-29 VITALS
HEART RATE: 78 BPM | TEMPERATURE: 97 F | RESPIRATION RATE: 16 BRPM | DIASTOLIC BLOOD PRESSURE: 92 MMHG | SYSTOLIC BLOOD PRESSURE: 148 MMHG | OXYGEN SATURATION: 96 %

## 2024-05-29 PROCEDURE — G0151 HHCP-SERV OF PT,EA 15 MIN: HCPCS

## 2024-05-29 ASSESSMENT — ENCOUNTER SYMPTOMS: PAIN LOCATION - PAIN QUALITY: ACHE

## 2024-05-31 ENCOUNTER — HOME CARE VISIT (OUTPATIENT)
Dept: SCHEDULING | Facility: HOME HEALTH | Age: 84
End: 2024-05-31
Payer: MEDICARE

## 2024-05-31 VITALS
HEART RATE: 70 BPM | RESPIRATION RATE: 15 BRPM | TEMPERATURE: 97.1 F | DIASTOLIC BLOOD PRESSURE: 80 MMHG | SYSTOLIC BLOOD PRESSURE: 130 MMHG | OXYGEN SATURATION: 97 %

## 2024-05-31 PROCEDURE — G0157 HHC PT ASSISTANT EA 15: HCPCS

## 2024-05-31 ASSESSMENT — ENCOUNTER SYMPTOMS: PAIN LOCATION - PAIN QUALITY: SORE

## 2024-06-03 ENCOUNTER — HOME CARE VISIT (OUTPATIENT)
Dept: SCHEDULING | Facility: HOME HEALTH | Age: 84
End: 2024-06-03
Payer: MEDICARE

## 2024-06-03 VITALS
OXYGEN SATURATION: 97 % | TEMPERATURE: 97 F | HEART RATE: 72 BPM | DIASTOLIC BLOOD PRESSURE: 88 MMHG | RESPIRATION RATE: 15 BRPM | SYSTOLIC BLOOD PRESSURE: 138 MMHG

## 2024-06-03 PROCEDURE — G0157 HHC PT ASSISTANT EA 15: HCPCS

## 2024-06-03 ASSESSMENT — ENCOUNTER SYMPTOMS: PAIN LOCATION - PAIN QUALITY: SORE  ACHES

## 2024-06-05 ENCOUNTER — HOME CARE VISIT (OUTPATIENT)
Dept: SCHEDULING | Facility: HOME HEALTH | Age: 84
End: 2024-06-05
Payer: MEDICARE

## 2024-06-05 VITALS
DIASTOLIC BLOOD PRESSURE: 90 MMHG | SYSTOLIC BLOOD PRESSURE: 154 MMHG | RESPIRATION RATE: 16 BRPM | HEART RATE: 88 BPM | TEMPERATURE: 98.3 F | OXYGEN SATURATION: 95 %

## 2024-06-05 PROCEDURE — G0157 HHC PT ASSISTANT EA 15: HCPCS

## 2024-06-05 ASSESSMENT — ENCOUNTER SYMPTOMS: PAIN LOCATION - PAIN QUALITY: THROBBING

## 2024-06-06 ENCOUNTER — NURSE ONLY (OUTPATIENT)
Dept: FAMILY MEDICINE CLINIC | Facility: CLINIC | Age: 84
End: 2024-06-06

## 2024-06-06 ENCOUNTER — TELEPHONE (OUTPATIENT)
Dept: FAMILY MEDICINE CLINIC | Facility: CLINIC | Age: 84
End: 2024-06-06

## 2024-06-06 VITALS — DIASTOLIC BLOOD PRESSURE: 84 MMHG | SYSTOLIC BLOOD PRESSURE: 146 MMHG

## 2024-06-06 DIAGNOSIS — E03.8 SUBCLINICAL HYPOTHYROIDISM: ICD-10-CM

## 2024-06-06 LAB — TSH W FREE THYROID IF ABNORMAL: 2.91 UIU/ML (ref 0.27–4.2)

## 2024-06-10 ENCOUNTER — HOME CARE VISIT (OUTPATIENT)
Dept: SCHEDULING | Facility: HOME HEALTH | Age: 84
End: 2024-06-10
Payer: MEDICARE

## 2024-06-10 VITALS
HEART RATE: 76 BPM | DIASTOLIC BLOOD PRESSURE: 94 MMHG | OXYGEN SATURATION: 94 % | SYSTOLIC BLOOD PRESSURE: 168 MMHG | RESPIRATION RATE: 16 BRPM | TEMPERATURE: 98.1 F

## 2024-06-10 PROCEDURE — G0157 HHC PT ASSISTANT EA 15: HCPCS

## 2024-06-10 ASSESSMENT — ENCOUNTER SYMPTOMS: PAIN LOCATION - PAIN QUALITY: ACHY, DULL

## 2024-06-12 ENCOUNTER — OFFICE VISIT (OUTPATIENT)
Dept: FAMILY MEDICINE CLINIC | Facility: CLINIC | Age: 84
End: 2024-06-12
Payer: MEDICARE

## 2024-06-12 ENCOUNTER — HOME CARE VISIT (OUTPATIENT)
Dept: SCHEDULING | Facility: HOME HEALTH | Age: 84
End: 2024-06-12
Payer: MEDICARE

## 2024-06-12 VITALS
WEIGHT: 177.25 LBS | OXYGEN SATURATION: 94 % | SYSTOLIC BLOOD PRESSURE: 148 MMHG | HEIGHT: 66 IN | BODY MASS INDEX: 28.49 KG/M2 | DIASTOLIC BLOOD PRESSURE: 78 MMHG | HEART RATE: 77 BPM | TEMPERATURE: 98 F

## 2024-06-12 VITALS
OXYGEN SATURATION: 94 % | TEMPERATURE: 98 F | SYSTOLIC BLOOD PRESSURE: 168 MMHG | RESPIRATION RATE: 16 BRPM | HEART RATE: 72 BPM | DIASTOLIC BLOOD PRESSURE: 84 MMHG

## 2024-06-12 DIAGNOSIS — Z98.890 S/P LAMINECTOMY: ICD-10-CM

## 2024-06-12 DIAGNOSIS — R01.1 SYSTOLIC MURMUR: ICD-10-CM

## 2024-06-12 DIAGNOSIS — E11.22 TYPE 2 DIABETES MELLITUS WITH STAGE 3A CHRONIC KIDNEY DISEASE, WITHOUT LONG-TERM CURRENT USE OF INSULIN (HCC): ICD-10-CM

## 2024-06-12 DIAGNOSIS — N18.31 TYPE 2 DIABETES MELLITUS WITH STAGE 3A CHRONIC KIDNEY DISEASE, WITHOUT LONG-TERM CURRENT USE OF INSULIN (HCC): ICD-10-CM

## 2024-06-12 DIAGNOSIS — M79.605 LEFT LEG PAIN: ICD-10-CM

## 2024-06-12 DIAGNOSIS — R35.1 NOCTURIA: ICD-10-CM

## 2024-06-12 DIAGNOSIS — M54.32 SCIATICA, LEFT SIDE: Primary | ICD-10-CM

## 2024-06-12 DIAGNOSIS — R01.1 HEART MURMUR: ICD-10-CM

## 2024-06-12 LAB
BILIRUBIN, URINE, POC: NEGATIVE
BLOOD URINE, POC: NEGATIVE
CREAT UR-MCNC: 178 MG/DL (ref 39–259)
GLUCOSE URINE, POC: NEGATIVE
KETONES, URINE, POC: NEGATIVE
LEUKOCYTE ESTERASE, URINE, POC: NEGATIVE
MICROALBUMIN UR-MCNC: 2.53 MG/DL (ref 0–20)
MICROALBUMIN/CREAT UR-RTO: 14 MG/G (ref 0–30)
NITRITE, URINE, POC: NEGATIVE
PH, URINE, POC: 6 (ref 4.6–8)
PROTEIN,URINE, POC: NORMAL
SPECIFIC GRAVITY, URINE, POC: 1.02 (ref 1–1.03)
URINALYSIS CLARITY, POC: CLEAR
URINALYSIS COLOR, POC: NORMAL
UROBILINOGEN, POC: NORMAL

## 2024-06-12 PROCEDURE — G0157 HHC PT ASSISTANT EA 15: HCPCS

## 2024-06-12 PROCEDURE — 3077F SYST BP >= 140 MM HG: CPT | Performed by: PHYSICIAN ASSISTANT

## 2024-06-12 PROCEDURE — 81003 URINALYSIS AUTO W/O SCOPE: CPT | Performed by: PHYSICIAN ASSISTANT

## 2024-06-12 PROCEDURE — 3078F DIAST BP <80 MM HG: CPT | Performed by: PHYSICIAN ASSISTANT

## 2024-06-12 PROCEDURE — 99214 OFFICE O/P EST MOD 30 MIN: CPT | Performed by: PHYSICIAN ASSISTANT

## 2024-06-12 PROCEDURE — 1123F ACP DISCUSS/DSCN MKR DOCD: CPT | Performed by: PHYSICIAN ASSISTANT

## 2024-06-12 RX ORDER — METHYLPREDNISOLONE 4 MG/1
TABLET ORAL
Qty: 1 KIT | Refills: 0 | Status: SHIPPED | OUTPATIENT
Start: 2024-06-12 | End: 2024-06-18

## 2024-06-12 ASSESSMENT — PATIENT HEALTH QUESTIONNAIRE - PHQ9
SUM OF ALL RESPONSES TO PHQ9 QUESTIONS 1 & 2: 0
SUM OF ALL RESPONSES TO PHQ QUESTIONS 1-9: 0
SUM OF ALL RESPONSES TO PHQ QUESTIONS 1-9: 0
2. FEELING DOWN, DEPRESSED OR HOPELESS: NOT AT ALL
SUM OF ALL RESPONSES TO PHQ QUESTIONS 1-9: 0
1. LITTLE INTEREST OR PLEASURE IN DOING THINGS: NOT AT ALL
SUM OF ALL RESPONSES TO PHQ QUESTIONS 1-9: 0

## 2024-06-12 NOTE — PROGRESS NOTES
Chief Complaint   Patient presents with    Leg Pain     Left x 1 week, pain goes down side of leg, bilateral ankle edema    Polyuria     Jose at night, no dysuria       HISTORY OF PRESENT ILLNESS:  Tobias Hall is a very pleasant 83 y.o. male who presents with a complaint of left leg pain that initially started several months ago after a fall but has worsened over the last week. The pain radiates all the way down his left leg. He denies associated paresthesias, buttock/back pain, b/b incontinence, fever/chills, weight loss, new rashes. He had lumbar spine surgery about 5 years ago (L5-S1 laminectomy). He has been taking tylenol w/o relief. Unable to take nsaids due to CKD.   Also complains of frequent urination and nocturia, which has been chronic but seems to be worsening. He denies dysuria, hematuria, urgency, incontinence, fever/chills, abd/flank pain. He is getting up about every hour to urinate per wife. No hx of UTI or prostate infections. Was given Rx for flomax in the past for BPH but never took it.     Lab Results   Component Value Date    PSA 0.7 03/08/2023     He is accompanied by his wife today, who provides most of the history due to his memory issues.     PAST MEDICAL HISTORY:   Current Outpatient Medications   Medication Sig    methylPREDNISolone (MEDROL DOSEPACK) 4 MG tablet Take by mouth.    acetaminophen (TYLENOL) 500 MG tablet Take 2 tablets by mouth every 6 hours as needed for Pain (moderate)    benazepril (LOTENSIN) 10 MG tablet Take 1 tablet by mouth daily     No current facility-administered medications for this visit.      Allergies   Allergen Reactions    Clonidine Other (See Comments)     Lethargy, Patient denies allergy    Simvastatin Other (See Comments)     Noted in office visit note, 5/26/2015,  Patient denies allergy      Past Medical History:   Diagnosis Date    Arthritis     Calculus of kidney 12/23/2013    Chronic kidney disease     Chronic kidney disease, stage III

## 2024-06-13 ENCOUNTER — TELEPHONE (OUTPATIENT)
Dept: FAMILY MEDICINE CLINIC | Facility: CLINIC | Age: 84
End: 2024-06-13

## 2024-06-17 ENCOUNTER — HOME CARE VISIT (OUTPATIENT)
Dept: SCHEDULING | Facility: HOME HEALTH | Age: 84
End: 2024-06-17
Payer: MEDICARE

## 2024-06-17 VITALS
DIASTOLIC BLOOD PRESSURE: 90 MMHG | OXYGEN SATURATION: 95 % | RESPIRATION RATE: 16 BRPM | SYSTOLIC BLOOD PRESSURE: 158 MMHG | TEMPERATURE: 97.6 F | HEART RATE: 68 BPM

## 2024-06-17 PROCEDURE — G0157 HHC PT ASSISTANT EA 15: HCPCS

## 2024-06-17 ASSESSMENT — ENCOUNTER SYMPTOMS: BACK PAIN: 0

## 2024-06-19 ENCOUNTER — HOME CARE VISIT (OUTPATIENT)
Dept: SCHEDULING | Facility: HOME HEALTH | Age: 84
End: 2024-06-19
Payer: MEDICARE

## 2024-06-19 VITALS
OXYGEN SATURATION: 98 % | TEMPERATURE: 98 F | RESPIRATION RATE: 16 BRPM | SYSTOLIC BLOOD PRESSURE: 158 MMHG | DIASTOLIC BLOOD PRESSURE: 88 MMHG | HEART RATE: 75 BPM

## 2024-06-19 PROCEDURE — G0151 HHCP-SERV OF PT,EA 15 MIN: HCPCS

## 2024-06-19 ASSESSMENT — ENCOUNTER SYMPTOMS
PAIN LOCATION - PAIN QUALITY: ACHE
CONTUSION: 1

## 2024-06-24 DIAGNOSIS — R26.89 BALANCE PROBLEM: Primary | ICD-10-CM

## 2024-06-26 ENCOUNTER — HOSPITAL ENCOUNTER (OUTPATIENT)
Dept: PHYSICAL THERAPY | Age: 84
Setting detail: RECURRING SERIES
Discharge: HOME | End: 2024-06-29
Payer: MEDICARE

## 2024-06-26 PROCEDURE — 97161 PT EVAL LOW COMPLEX 20 MIN: CPT

## 2024-06-26 PROCEDURE — 97110 THERAPEUTIC EXERCISES: CPT

## 2024-06-26 NOTE — THERAPY EVALUATION
Tobias Jamil Rafael  : 1940  Primary: [employer] Medicare-advantage Ppo (Medicare Managed)  Secondary:  [facility] [place]  [address]  [facility] [address]  Phone: [phone]  Fax: [phone] Plan Frequency: 2 visits per week for 6 weeks    Plan of Care/Certification Expiration Date: 24        Plan of Care/Certification Expiration Date:  Plan of Care/Certification Expiration Date: 24    Frequency/Duration: Plan Frequency: 2 visits per week for 6 weeks      Time In/Out:   Time In: 1005  Time Out: 1050      PT Visit Info:         Visit Count:  1                OUTPATIENT PHYSICAL THERAPY:             Initial Assessment 2024               Episode (Balance/gait deficits)         Treatment Diagnosis:     No data found  Medical/Referring Diagnosis:    Balance problem [R26.89]    Referring Physician:  Norma Oshea PA-C MD Orders:  PT Eval and Treat   Return MD Appt:    Date of Onset:      Allergies:  Clonidine and Simvastatin  Restrictions/Precautions:    None      Medications Last Reviewed:  2024     SUBJECTIVE   History of Injury/Illness (Reason for Referral):  Has had 2 falls in the past 6 months. Uses a walker 75% of the time for mobility. Reports that his mobility is getting better. Insidious onset. Reports that he can walk about 200 yards before he has to sit down. Wants his legs to get stronger in PT. Has a difficult time feeding the chickens at home and it is hard to walk out there and back (approx 75 yards each way). Denies being in any pain. Has no history of joint replacements. Injured his L knee in a fall and the lateral part of L leg but reports that has gotten better. Has been using the walker for about a week. States that he has fallen 3 times in the past year.  Patient Stated Goal(s):  \"Get up and down and walk better; balance\"  Initial Pain Level:      0/10   Post Session Pain Level:     0/10  Past Medical

## 2024-06-27 ASSESSMENT — PAIN SCALES - GENERAL: PAINLEVEL_OUTOF10: 0

## 2024-07-10 ENCOUNTER — HOSPITAL ENCOUNTER (OUTPATIENT)
Dept: PHYSICAL THERAPY | Age: 84
Setting detail: RECURRING SERIES
Discharge: HOME | End: 2024-07-13
Payer: MEDICARE

## 2024-07-10 PROCEDURE — 97110 THERAPEUTIC EXERCISES: CPT

## 2024-07-10 ASSESSMENT — PAIN SCALES - GENERAL: PAINLEVEL_OUTOF10: 0

## 2024-07-10 NOTE — PROGRESS NOTES
Tobias Hall  : 1940  Primary: [employer] Medicare-advantage Ppo (Medicare Managed)  Secondary:  [facility] [place]  [address]  [facility] [address]  Phone: [phone]  Fax: [phone] Plan Frequency: 2 visits per week for 6 weeks  Plan of Care/Certification Expiration Date: 24        Plan of Care/Certification Expiration Date:  Plan of Care/Certification Expiration Date: 24    Frequency/Duration: Plan Frequency: 2 visits per week for 6 weeks      Time In/Out:   Time In: 1430  Time Out: 1520      PT Visit Info:         Visit Count:  2    OUTPATIENT PHYSICAL THERAPY:   Treatment Note 7/10/2024       Episode  (Balance/gait deficits)               Treatment Diagnosis:    No data found  Medical/Referring Diagnosis:    Balance problem [R26.89]    Referring Physician:  Norma Oshea PA-C MD Orders:  PT Eval and Treat   Return MD Appt:  TBD   Date of Onset:  No data recorded   Allergies:   Clonidine and Simvastatin  Restrictions/Precautions:   None      Interventions Planned (Treatment may consist of any combination of the following):     See Assessment Note    Subjective Comments:   Patient reports that he has been tired all day - he works outdoors.  Initial Pain Level::     0/10  Post Session Pain Level:      0  /10  Medications Last Reviewed:  7/10/2024  Updated Objective Findings:   Pt reported lightheadedness after walking 1 lap in gym. Seated /104, HR 79 bpm after walking; after 2 minute rest, 158/104, HR 78 bpm. Lightheadness resolved with rest in supine and drink of water. Supine /104. All taken on L arm.  Treatment   THERAPEUTIC EXERCISE: (40 minutes):    Exercises per grid below to improve mobility, strength, and balance.  Required moderate verbal and manual cues to promote proper body posture and promote proper body mechanics.  Progressed repetitions and complexity of movement as indicated.   Date:  24

## 2024-07-12 ENCOUNTER — HOSPITAL ENCOUNTER (OUTPATIENT)
Dept: PHYSICAL THERAPY | Age: 84
Setting detail: RECURRING SERIES
Discharge: HOME | End: 2024-07-15
Payer: MEDICARE

## 2024-07-12 ENCOUNTER — NURSE ONLY (OUTPATIENT)
Dept: FAMILY MEDICINE CLINIC | Facility: CLINIC | Age: 84
End: 2024-07-12

## 2024-07-12 VITALS — DIASTOLIC BLOOD PRESSURE: 91 MMHG | SYSTOLIC BLOOD PRESSURE: 167 MMHG

## 2024-07-12 PROCEDURE — 97110 THERAPEUTIC EXERCISES: CPT

## 2024-07-12 ASSESSMENT — PAIN SCALES - GENERAL: PAINLEVEL_OUTOF10: 0

## 2024-07-12 NOTE — PROGRESS NOTES
Tobias Hall  : 1940  Primary: [employer] Medicare-advantage Ppo (Medicare Managed)  Secondary:  [facility] [place]  [address]  [facility] [address]  Phone: [phone]  Fax: [phone] Plan Frequency: 2 visits per week for 6 weeks  Plan of Care/Certification Expiration Date: 24        Plan of Care/Certification Expiration Date:  Plan of Care/Certification Expiration Date: 24    Frequency/Duration: Plan Frequency: 2 visits per week for 6 weeks      Time In/Out:   Time In: 1000  Time Out: 1040      PT Visit Info:         Visit Count:  3    OUTPATIENT PHYSICAL THERAPY:   Treatment Note 2024       Episode  (Balance/gait deficits)               Treatment Diagnosis:    No data found  Medical/Referring Diagnosis:    Balance problem [R26.89]    Referring Physician:  Norma Oshea PA-C MD Orders:  PT Eval and Treat   Return MD Appt:  TBD   Date of Onset:  No data recorded   Allergies:   Clonidine and Simvastatin  Restrictions/Precautions:   None      Interventions Planned (Treatment may consist of any combination of the following):     See Assessment Note    Subjective Comments:   Patient reported no complaints. Pt.'s wife stated Norma GONZALEZ had recently changed pt.'s blood pressure meds.   Initial Pain Level::     0/10  Post Session Pain Level:      0  /10  Medications Last Reviewed:  2024  Updated Objective Findings:   Pt. Reported no blood pressure issues initially but then reported dizziness after 3 mins on nustep. Blood pressure was taken 177/100; then after 2 mins taken again 170/108; then rested 3 mins taken again 181/104. Then DPT Nelson Taveras  took a manual blood pressure twice which was 170/120. Treatment ended and Pt.'s wife took him to his primary doctor.   Treatment   THERAPEUTIC EXERCISE: (30 minutes):    Exercises per grid below to improve mobility, strength, and balance.  Required moderate verbal and manual

## 2024-07-15 ENCOUNTER — OFFICE VISIT (OUTPATIENT)
Dept: FAMILY MEDICINE CLINIC | Facility: CLINIC | Age: 84
End: 2024-07-15
Payer: MEDICARE

## 2024-07-15 VITALS
SYSTOLIC BLOOD PRESSURE: 162 MMHG | WEIGHT: 179.13 LBS | TEMPERATURE: 98.2 F | OXYGEN SATURATION: 96 % | HEART RATE: 75 BPM | DIASTOLIC BLOOD PRESSURE: 95 MMHG | BODY MASS INDEX: 28.79 KG/M2 | HEIGHT: 66 IN

## 2024-07-15 DIAGNOSIS — R01.1 HEART MURMUR: ICD-10-CM

## 2024-07-15 DIAGNOSIS — Z91.81 AT HIGH RISK FOR FALLS: ICD-10-CM

## 2024-07-15 DIAGNOSIS — I10 ESSENTIAL HYPERTENSION: Primary | ICD-10-CM

## 2024-07-15 DIAGNOSIS — R42 ORTHOSTATIC DIZZINESS: ICD-10-CM

## 2024-07-15 DIAGNOSIS — R26.81 UNSTABLE GAIT: ICD-10-CM

## 2024-07-15 DIAGNOSIS — M54.32 SCIATICA, LEFT SIDE: ICD-10-CM

## 2024-07-15 PROCEDURE — 3080F DIAST BP >= 90 MM HG: CPT | Performed by: PHYSICIAN ASSISTANT

## 2024-07-15 PROCEDURE — 1123F ACP DISCUSS/DSCN MKR DOCD: CPT | Performed by: PHYSICIAN ASSISTANT

## 2024-07-15 PROCEDURE — 3077F SYST BP >= 140 MM HG: CPT | Performed by: PHYSICIAN ASSISTANT

## 2024-07-15 PROCEDURE — 99213 OFFICE O/P EST LOW 20 MIN: CPT | Performed by: PHYSICIAN ASSISTANT

## 2024-07-15 ASSESSMENT — PATIENT HEALTH QUESTIONNAIRE - PHQ9
1. LITTLE INTEREST OR PLEASURE IN DOING THINGS: NOT AT ALL
SUM OF ALL RESPONSES TO PHQ QUESTIONS 1-9: 0
SUM OF ALL RESPONSES TO PHQ9 QUESTIONS 1 & 2: 0
SUM OF ALL RESPONSES TO PHQ QUESTIONS 1-9: 0
SUM OF ALL RESPONSES TO PHQ QUESTIONS 1-9: 0
2. FEELING DOWN, DEPRESSED OR HOPELESS: NOT AT ALL
SUM OF ALL RESPONSES TO PHQ QUESTIONS 1-9: 0

## 2024-07-15 ASSESSMENT — ENCOUNTER SYMPTOMS
GASTROINTESTINAL NEGATIVE: 1
RESPIRATORY NEGATIVE: 1

## 2024-07-15 NOTE — PROGRESS NOTES
Chief Complaint   Patient presents with    Follow-up     Blood pressure, still elevated       HISTORY OF PRESENT ILLNESS:  Tobias Hall is a very pleasant 83 y.o. male seen for a follow up visit for hypertension after increasing benazeprill to 20mg daily last week for persistent hypertension. He was unable to participate in PT for sciatica as BP was elevated. BP today in the office was 162/95. Home readings range from 130's-170/80's-90's. He denies CP, VALIENTE/SOB, palpitations, syncopal episodes, tobacco use, regular ETOH use, hx of MI or CVA. He has CKD stage 3a. He does endorse dizziness but has trouble describing it. He has fallen several times due to the dizziness- no syncopal episodes per wife. He previously took amlodipine but was discontinued due to ankle swelling and dizziness. He is accompanied by his wife today. She would like him to get home PT for his sciatica.     PAST MEDICAL HISTORY:  Current Outpatient Medications   Medication Sig Dispense Refill    acetaminophen (TYLENOL) 500 MG tablet Take 2 tablets by mouth every 6 hours as needed for Pain (moderate)      benazepril (LOTENSIN) 10 MG tablet Take 1 tablet by mouth daily 90 tablet 1     No current facility-administered medications for this visit.     Allergies   Allergen Reactions    Clonidine Other (See Comments)     Lethargy, Patient denies allergy    Simvastatin Other (See Comments)     Noted in office visit note, 5/26/2015,  Patient denies allergy     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    Hyperuricemia    History of renal stone    Spinal stenosis of lumbosacral region    Family history of malignant neoplasm of prostate    Spinal stenosis    Post-traumatic arthrosis of left shoulder    Essential hypertension    Painful orthopaedic hardware (HCC)    Spondylolisthesis at L5-S1 level

## 2024-07-16 ENCOUNTER — HOME HEALTH ADMISSION (OUTPATIENT)
Dept: HOME HEALTH SERVICES | Facility: HOME HEALTH | Age: 84
End: 2024-07-16

## 2024-07-16 ENCOUNTER — HOSPITAL ENCOUNTER (OUTPATIENT)
Dept: PHYSICAL THERAPY | Age: 84
Setting detail: RECURRING SERIES
Discharge: HOME | End: 2024-07-19
Payer: MEDICARE

## 2024-07-16 DIAGNOSIS — M62.81 MUSCLE WEAKNESS (GENERALIZED): ICD-10-CM

## 2024-07-16 DIAGNOSIS — R26.2 DIFFICULTY IN WALKING, NOT ELSEWHERE CLASSIFIED: Primary | ICD-10-CM

## 2024-07-16 DIAGNOSIS — R26.89 BALANCE PROBLEM: ICD-10-CM

## 2024-07-16 PROCEDURE — 97110 THERAPEUTIC EXERCISES: CPT

## 2024-07-16 ASSESSMENT — PAIN SCALES - GENERAL: PAINLEVEL_OUTOF10: 0

## 2024-07-16 NOTE — PROGRESS NOTES
Tobias Hall  : 1940  Primary: [employer] Medicare-advantage Ppo (Medicare Managed)  Secondary:  [facility] [place]  [address]  [facility] [address]  Phone: [phone]  Fax: [phone] Plan Frequency: 2 visits per week for 6 weeks  Plan of Care/Certification Expiration Date: 24        Plan of Care/Certification Expiration Date:  Plan of Care/Certification Expiration Date: 24    Frequency/Duration: Plan Frequency: 2 visits per week for 6 weeks      Time In/Out:   Time In: 1434  Time Out: 1528      PT Visit Info:    Progress Note Counter: 4      Visit Count:  4    OUTPATIENT PHYSICAL THERAPY:   Treatment Note 2024       Episode  (Balance/gait deficits)               Treatment Diagnosis:    Difficulty in walking, not elsewhere classified  Balance problem  Muscle weakness (generalized)  Medical/Referring Diagnosis:    Balance problem [R26.89]    Referring Physician:  Norma Oshea PA-C MD Orders:  PT Eval and Treat   Return MD Appt:  TBNIALL   Date of Onset:  No data recorded   Allergies:   Clonidine and Simvastatin  Restrictions/Precautions:   None      Interventions Planned (Treatment may consist of any combination of the following):     See Assessment Note    Subjective Comments:   Patient stated that he felt dizzy this morning. Denies any pain upon arrival to PT.   Initial Pain Level::     0/10  Post Session Pain Level:      0 0/10  Medications Last Reviewed:  2024  Updated Objective Findings:  Seated, /97 in L arm; 166/103 after balance (seated, L arm), HR 81 bpm - after rest: 170/104, 79 bpm. Standin/103. Seated at end of PT: 172/101, 79 bpm.  Treatment   THERAPEUTIC EXERCISE: (54 minutes):    Exercises per grid below to improve mobility, strength, and balance.  Required moderate verbal and manual cues to promote proper body posture and promote proper body mechanics.  Progressed repetitions and complexity of

## 2024-07-18 ENCOUNTER — HOSPITAL ENCOUNTER (OUTPATIENT)
Dept: PHYSICAL THERAPY | Age: 84
Setting detail: RECURRING SERIES
Discharge: HOME | End: 2024-07-21
Payer: MEDICARE

## 2024-07-18 PROCEDURE — 97110 THERAPEUTIC EXERCISES: CPT

## 2024-07-18 NOTE — PROGRESS NOTES
Tobias Jamil Rafael  : 1940  Primary: [employer] Medicare-advantage Ppo (Medicare Managed)  Secondary:  [facility] [place]  [address]  [facility] [address]  Phone: [phone]  Fax: [phone] Plan Frequency: 2 visits per week for 6 weeks  Plan of Care/Certification Expiration Date: 24        Plan of Care/Certification Expiration Date:  Plan of Care/Certification Expiration Date: 24    Frequency/Duration: Plan Frequency: 2 visits per week for 6 weeks      Time In/Out:   Time In: 1100  Time Out: 1154      PT Visit Info:    Progress Note Counter: 5      Visit Count:  5    OUTPATIENT PHYSICAL THERAPY:   Treatment Note 2024       Episode  (Balance/gait deficits)               Treatment Diagnosis:    Difficulty in walking, not elsewhere classified  Balance problem  Muscle weakness (generalized)  Medical/Referring Diagnosis:    Balance problem [R26.89]    Referring Physician:  Norma Oshea PA-C MD Orders:  PT Eval and Treat   Return MD Appt:  TBD   Date of Onset:  No data recorded   Allergies:   Clonidine and Simvastatin  Restrictions/Precautions:   None      Interventions Planned (Treatment may consist of any combination of the following):     See Assessment Note    Subjective Comments:   Patient stated that he felt dizzy this morning. Denies any pain upon arrival to PT. States that he took BP medication prior to therapy.  Initial Pain Level::     0/10  Post Session Pain Level:      0 0/10  Medications Last Reviewed:  2024  Updated Objective Findings:  Seated, /95, 68 bpm  Treatment   THERAPEUTIC EXERCISE: (54 minutes):    Exercises per grid below to improve mobility, strength, and balance.  Required moderate verbal and manual cues to promote proper body posture and promote proper body mechanics.  Progressed repetitions and complexity of movement as indicated.   Date:  24 Date:  24 Date:  24   Activity/Exercise

## 2024-07-22 ENCOUNTER — HOSPITAL ENCOUNTER (OUTPATIENT)
Dept: PHYSICAL THERAPY | Age: 84
Setting detail: RECURRING SERIES
Discharge: HOME | End: 2024-07-25
Payer: MEDICARE

## 2024-07-22 PROCEDURE — 97110 THERAPEUTIC EXERCISES: CPT

## 2024-07-22 NOTE — PROGRESS NOTES
Tobias Jamil Rafael  : 1940  Primary: [employer] Medicare-advantage Ppo (Medicare Managed)  Secondary:  [facility] [place]  [address]  [facility] [address]  Phone: [phone]  Fax: [phone] Plan Frequency: 2 visits per week for 6 weeks  Plan of Care/Certification Expiration Date: 24        Plan of Care/Certification Expiration Date:  Plan of Care/Certification Expiration Date: 24    Frequency/Duration: Plan Frequency: 2 visits per week for 6 weeks      Time In/Out:   Time In: 1100  Time Out: 1200      PT Visit Info:    Progress Note Counter: 5      Visit Count:  6    OUTPATIENT PHYSICAL THERAPY:   Treatment Note 2024       Episode  (Balance/gait deficits)               Treatment Diagnosis:    Difficulty in walking, not elsewhere classified  Balance problem  Muscle weakness (generalized)  Medical/Referring Diagnosis:    Balance problem [R26.89]    Referring Physician:  Norma Oshea PA-C MD Orders:  PT Eval and Treat   Return MD Appt:  TBD   Date of Onset:  No data recorded   Allergies:   Clonidine and Simvastatin  Restrictions/Precautions:   None      Interventions Planned (Treatment may consist of any combination of the following):     See Assessment Note    Subjective Comments:   Patient reported he felt fine until he took his blood pressure meds.  Pt. Stated the made him dizzy   Initial Pain Level::     0/10  Post Session Pain Level:       0/10  Medications Last Reviewed:  2024  Updated Objective Findings:  Seated, /96, 64 bpm  THERAPEUTIC EXERCISE: (55 minutes):    Exercises per grid below to improve mobility, strength, and balance.  Required moderate verbal and manual cues to promote proper body posture and promote proper body mechanics.  Progressed repetitions and complexity of movement as indicated.   Date:  24 Date:  24 Date:  24   Activity/Exercise Parameters Parameters Parameters   Bridge  3 x 10

## 2024-07-25 ENCOUNTER — HOSPITAL ENCOUNTER (OUTPATIENT)
Dept: PHYSICAL THERAPY | Age: 84
Setting detail: RECURRING SERIES
Discharge: HOME | End: 2024-07-28
Payer: MEDICARE

## 2024-07-25 PROCEDURE — 97110 THERAPEUTIC EXERCISES: CPT

## 2024-07-25 ASSESSMENT — PAIN SCALES - GENERAL: PAINLEVEL_OUTOF10: 0

## 2024-07-25 NOTE — PROGRESS NOTES
Treatment Billable Duration:  55 mins  Time In: 1100  Time Out: 1200      JOSÉ MANUEL JERRY, PTA  7/25/2024      Charge Capture  Events  "[employer]" Portal  Appt Desk  Attendance Report     Future Appointments   Date Time Provider Department Center   7/30/2024  1:30 PM Anoop Lopez, PT [facility]   8/2/2024 11:00 AM Anoop Lopez, PT [facility]   8/5/2024 11:00 AM Anoop Lopez, PT [facility]   8/8/2024 11:00 AM Anoop Lopez, PT [facility]   8/12/2024 11:00 AM Norma Oshea PA-C [facility]

## 2024-07-30 ENCOUNTER — HOSPITAL ENCOUNTER (OUTPATIENT)
Dept: PHYSICAL THERAPY | Age: 84
Setting detail: RECURRING SERIES
Discharge: HOME | End: 2024-08-02
Payer: MEDICARE

## 2024-07-30 PROCEDURE — 97110 THERAPEUTIC EXERCISES: CPT

## 2024-07-30 NOTE — PROGRESS NOTES
Tobias Jamil Rafael  : 1940  Primary: [employer] Medicare-advantage Ppo (Medicare Managed)  Secondary:  [facility] [place]  [address]  [facility] [address]  Phone: [phone]  Fax: [phone] Plan Frequency: 2 visits per week for 6 weeks  Plan of Care/Certification Expiration Date: 24        Plan of Care/Certification Expiration Date:  Plan of Care/Certification Expiration Date: 24    Frequency/Duration: Plan Frequency: 2 visits per week for 6 weeks      Time In/Out:   Time In: 1330  Time Out: 1426      PT Visit Info:    Progress Note Counter: 8      Visit Count:  8    OUTPATIENT PHYSICAL THERAPY:   Treatment Note 2024       Episode  (Balance/gait deficits)               Treatment Diagnosis:    Difficulty in walking, not elsewhere classified  Balance problem  Muscle weakness (generalized)  Medical/Referring Diagnosis:    Balance problem [R26.89]    Referring Physician:  Norma Oshea PA-C MD Orders:  PT Eval and Treat   Return MD Appt:  TBD   Date of Onset:  No data recorded   Allergies:   Clonidine and Simvastatin  Restrictions/Precautions:   None      Interventions Planned (Treatment may consist of any combination of the following):     See Assessment Note    Subjective Comments:   Patient reported no pain but just a little dizzy.   Initial Pain Level::      /10  Post Session Pain Level:        /10  Medications Last Reviewed:  2024  Updated Objective Findings:  Seated, /100 , pulse  62 after Nu Step.  172/108, 80 bpm in sitting.       THERAPEUTIC EXERCISE: (56 minutes):    Exercises per grid below to improve mobility, strength, and balance.  Required moderate verbal and manual cues to promote proper body posture and promote proper body mechanics.  Progressed repetitions and complexity of movement as indicated.   Date:  24 Date:  24 Date:  24   Activity/Exercise Parameters Parameters Parameters   Bridge Supine

## 2024-07-31 ENCOUNTER — TELEPHONE (OUTPATIENT)
Dept: FAMILY MEDICINE CLINIC | Facility: CLINIC | Age: 84
End: 2024-07-31

## 2024-08-02 ENCOUNTER — HOSPITAL ENCOUNTER (OUTPATIENT)
Dept: PHYSICAL THERAPY | Age: 84
Setting detail: RECURRING SERIES
Discharge: HOME | End: 2024-08-05
Payer: MEDICARE

## 2024-08-02 DIAGNOSIS — M62.81 MUSCLE WEAKNESS (GENERALIZED): ICD-10-CM

## 2024-08-02 DIAGNOSIS — R26.89 BALANCE PROBLEM: Primary | ICD-10-CM

## 2024-08-02 DIAGNOSIS — R26.2 DIFFICULTY IN WALKING, NOT ELSEWHERE CLASSIFIED: ICD-10-CM

## 2024-08-02 PROCEDURE — 97110 THERAPEUTIC EXERCISES: CPT

## 2024-08-02 ASSESSMENT — PAIN SCALES - GENERAL: PAINLEVEL_OUTOF10: 0

## 2024-08-02 NOTE — PROGRESS NOTES
Tobias Hall  : 1940  Primary: [employer] Medicare-advantage Ppo (Medicare Managed)  Secondary:  [facility] [place]  [address]  [facility] [address]  Phone: [phone]  Fax: [phone] Plan Frequency: 2 visits per week for 6 weeks  Plan of Care/Certification Expiration Date: 24        Plan of Care/Certification Expiration Date:  Plan of Care/Certification Expiration Date: 24    Frequency/Duration: Plan Frequency: 2 visits per week for 6 weeks      Time In/Out:   Time In: 1100  Time Out: 1150      PT Visit Info:    Progress Note Counter: 9      Visit Count:  9    OUTPATIENT PHYSICAL THERAPY:   Treatment Note 2024       Episode  (Balance/gait deficits)               Treatment Diagnosis:    Difficulty in walking, not elsewhere classified  Balance problem  Muscle weakness (generalized)  Medical/Referring Diagnosis:    Balance problem [R26.89]    Referring Physician:  Norma Oshea PA-C MD Orders:  PT Eval and Treat   Return MD Appt:  TBD   Date of Onset:  No data recorded   Allergies:   Clonidine and Simvastatin  Restrictions/Precautions:   None      Interventions Planned (Treatment may consist of any combination of the following):     See Assessment Note    Subjective Comments:   Patient reported no pain but just a little dizzy.   Initial Pain Level::     0/10  Post Session Pain Level:       0/10  Medications Last Reviewed:  2024  Updated Objective Findings:  Seated, /91 pulse 65.      THERAPEUTIC EXERCISE: (45 minutes):    Exercises per grid below to improve mobility, strength, and balance.  Required moderate verbal and manual cues to promote proper body posture and promote proper body mechanics.  Progressed repetitions and complexity of movement as indicated.   Date:  24 Date:  24 Date:  24   Activity/Exercise Parameters Parameters Parameters   Bridge Supine 3x10  Hook lying  3 x 10  Supine 3x10 reps    Long

## 2024-08-05 ENCOUNTER — HOSPITAL ENCOUNTER (OUTPATIENT)
Dept: PHYSICAL THERAPY | Age: 84
Setting detail: RECURRING SERIES
Discharge: HOME | End: 2024-08-08
Payer: MEDICARE

## 2024-08-05 PROCEDURE — 97110 THERAPEUTIC EXERCISES: CPT

## 2024-08-05 ASSESSMENT — PAIN SCALES - GENERAL: PAINLEVEL_OUTOF10: 0

## 2024-08-05 NOTE — PROGRESS NOTES
Tobias Hall  : 1940  Primary: [employer] Medicare-advantage Ppo (Medicare Managed)  Secondary:  [facility] [place]  [address]  [facility] [address]  Phone: [phone]  Fax: [phone] Plan Frequency: 2 visits per week for 6 weeks  Plan of Care/Certification Expiration Date: 24        Plan of Care/Certification Expiration Date:  Plan of Care/Certification Expiration Date: 24    Frequency/Duration: Plan Frequency: 2 visits per week for 6 weeks      Time In/Out:   Time In: 1100  Time Out: 1150      PT Visit Info:    Progress Note Counter: 9      Visit Count:  10    OUTPATIENT PHYSICAL THERAPY:   Treatment Note 2024       Episode  (Balance/gait deficits)               Treatment Diagnosis:    Difficulty in walking, not elsewhere classified  Balance problem  Muscle weakness (generalized)  Medical/Referring Diagnosis:    Balance problem [R26.89]    Referring Physician:  Norma Oshea PA-C MD Orders:  PT Eval and Treat   Return MD Appt:  TBD   Date of Onset:  No data recorded   Allergies:   Clonidine and Simvastatin  Restrictions/Precautions:   None      Interventions Planned (Treatment may consist of any combination of the following):     See Assessment Note    Subjective Comments:   Patient reports no pain today but states that he is a little dizzy upon arrival to PT. Reports that he took his medication prior to PT today.  Initial Pain Level::     0/10  Post Session Pain Level:        /10  Medications Last Reviewed:  2024  Updated Objective Findings:  Seated BP, L arm: 160/91, 75 bpm; 154/100 70 bpm after long arc quads.      THERAPEUTIC EXERCISE: (50 minutes):    Exercises per grid below to improve mobility, strength, and balance.  Required moderate verbal and manual cues to promote proper body posture and promote proper body mechanics.  Progressed repetitions and complexity of movement as indicated.   Date:  24 Date:  24

## 2024-08-06 ENCOUNTER — TELEPHONE (OUTPATIENT)
Dept: FAMILY MEDICINE CLINIC | Facility: CLINIC | Age: 84
End: 2024-08-06

## 2024-08-06 DIAGNOSIS — I10 ESSENTIAL HYPERTENSION: Primary | ICD-10-CM

## 2024-08-06 RX ORDER — BENAZEPRIL HYDROCHLORIDE 20 MG/1
TABLET ORAL
Qty: 135 TABLET | Refills: 0 | Status: SHIPPED | OUTPATIENT
Start: 2024-08-06

## 2024-08-08 ENCOUNTER — HOSPITAL ENCOUNTER (OUTPATIENT)
Dept: PHYSICAL THERAPY | Age: 84
Setting detail: RECURRING SERIES
Discharge: HOME | End: 2024-08-11
Payer: MEDICARE

## 2024-08-08 PROCEDURE — 97110 THERAPEUTIC EXERCISES: CPT

## 2024-08-08 NOTE — PROGRESS NOTES
Tobias Jamil Rafael  : 1940  Primary: [employer] Medicare-advantage Ppo (Medicare Managed)  Secondary:  [facility] [place]  [address]  [facility] [address]  Phone: [phone]  Fax: [phone] Plan Frequency: 2 visits per week for 6 weeks  Plan of Care/Certification Expiration Date: 24        Plan of Care/Certification Expiration Date:  Plan of Care/Certification Expiration Date: 24    Frequency/Duration: Plan Frequency: 2 visits per week for 6 weeks      Time In/Out:   Time In: 1100  Time Out: 1155      PT Visit Info:    Progress Note Counter: 2      Visit Count:  11    OUTPATIENT PHYSICAL THERAPY:   Treatment Note 2024       Episode  (Balance/gait deficits)               Treatment Diagnosis:    Difficulty in walking, not elsewhere classified  Balance problem  Muscle weakness (generalized)  Medical/Referring Diagnosis:    Balance problem [R26.89]    Referring Physician:  Norma Oshea PA-C MD Orders:  PT Eval and Treat   Return MD Appt:  TBD   Date of Onset:  No data recorded   Allergies:   Clonidine and Simvastatin  Restrictions/Precautions:   None      Interventions Planned (Treatment may consist of any combination of the following):     See Assessment Note    Subjective Comments:   Patient reports feeling dizzy upon arrival to PT. States that he did not want to use the walker today.   Initial Pain Level::      /10  Post Session Pain Level:        /10  Medications Last Reviewed:  2024  Updated Objective Findings:  Seated BP, L arm: 172/99, 69 bpm upon arrival to PT. 178/103, 71 bpm at conclusion of PT.      THERAPEUTIC EXERCISE: (55 minutes):    Exercises per grid below to improve mobility, strength, and balance.  Required moderate verbal and manual cues to promote proper body posture and promote proper body mechanics.  Progressed repetitions and complexity of movement as indicated.   Date:  24 Date:  24 Date:  24

## 2024-08-12 ENCOUNTER — OFFICE VISIT (OUTPATIENT)
Dept: FAMILY MEDICINE CLINIC | Facility: CLINIC | Age: 84
End: 2024-08-12
Payer: MEDICARE

## 2024-08-12 VITALS
HEART RATE: 89 BPM | WEIGHT: 182.5 LBS | TEMPERATURE: 98.4 F | SYSTOLIC BLOOD PRESSURE: 157 MMHG | DIASTOLIC BLOOD PRESSURE: 92 MMHG | OXYGEN SATURATION: 95 % | BODY MASS INDEX: 29.33 KG/M2 | HEIGHT: 66 IN

## 2024-08-12 DIAGNOSIS — I10 ESSENTIAL HYPERTENSION: Primary | ICD-10-CM

## 2024-08-12 DIAGNOSIS — R01.1 HEART MURMUR: ICD-10-CM

## 2024-08-12 DIAGNOSIS — R60.0 BILATERAL LOWER EXTREMITY EDEMA: ICD-10-CM

## 2024-08-12 PROCEDURE — 3080F DIAST BP >= 90 MM HG: CPT | Performed by: PHYSICIAN ASSISTANT

## 2024-08-12 PROCEDURE — 1123F ACP DISCUSS/DSCN MKR DOCD: CPT | Performed by: PHYSICIAN ASSISTANT

## 2024-08-12 PROCEDURE — 3077F SYST BP >= 140 MM HG: CPT | Performed by: PHYSICIAN ASSISTANT

## 2024-08-12 PROCEDURE — 99213 OFFICE O/P EST LOW 20 MIN: CPT | Performed by: PHYSICIAN ASSISTANT

## 2024-08-12 RX ORDER — CHLORTHALIDONE 25 MG/1
25 TABLET ORAL DAILY
Qty: 30 TABLET | Refills: 0 | Status: SHIPPED | OUTPATIENT
Start: 2024-08-12

## 2024-08-12 ASSESSMENT — PATIENT HEALTH QUESTIONNAIRE - PHQ9
SUM OF ALL RESPONSES TO PHQ QUESTIONS 1-9: 0
2. FEELING DOWN, DEPRESSED OR HOPELESS: NOT AT ALL
SUM OF ALL RESPONSES TO PHQ QUESTIONS 1-9: 0
SUM OF ALL RESPONSES TO PHQ9 QUESTIONS 1 & 2: 0
1. LITTLE INTEREST OR PLEASURE IN DOING THINGS: NOT AT ALL

## 2024-08-12 ASSESSMENT — ENCOUNTER SYMPTOMS
GASTROINTESTINAL NEGATIVE: 1
RESPIRATORY NEGATIVE: 1

## 2024-08-12 NOTE — PROGRESS NOTES
Chief Complaint   Patient presents with    Follow-up     4 weeks,BP still high at home       HISTORY OF PRESENT ILLNESS:  Tobias Hall is a very pleasant 83 y.o. male seen for a follow up visit for hypertension after increasing benazeprill to 30mg daily last week for persistent hypertension.  BP today in the office was 157/92. Home readings are similar. He denies CP, VALIENTE/SOB, palpitations, syncopal episodes, tobacco use, regular ETOH use, hx of MI or CVA. He has CKD stage 3a. He does endorse dizziness but has trouble describing it. He has fallen several times due to the dizziness- no syncopal episodes per wife- receiving PT to help with balance. He previously took amlodipine for hypertension but was discontinued due to ankle swelling and dizziness. He continues to have lower extremity edema. He is accompanied by his wife today.     PAST MEDICAL HISTORY:  Current Outpatient Medications   Medication Sig Dispense Refill    chlorthalidone (HYGROTON) 25 MG tablet Take 1 tablet by mouth daily 30 tablet 0    benazepril (LOTENSIN) 20 MG tablet Take 1.5 tabs daily 135 tablet 0    acetaminophen (TYLENOL) 500 MG tablet Take 2 tablets by mouth every 6 hours as needed for Pain (moderate)       No current facility-administered medications for this visit.     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

## 2024-08-13 ENCOUNTER — HOSPITAL ENCOUNTER (OUTPATIENT)
Dept: PHYSICAL THERAPY | Age: 84
Setting detail: RECURRING SERIES
Discharge: HOME | End: 2024-08-16
Payer: MEDICARE

## 2024-08-13 PROCEDURE — 97110 THERAPEUTIC EXERCISES: CPT

## 2024-08-13 ASSESSMENT — PAIN SCALES - GENERAL: PAINLEVEL_OUTOF10: 0

## 2024-08-13 NOTE — PROGRESS NOTES
Tobias Jamil Rafael  : 1940  Primary: [employer] Medicare-advantage Ppo (Medicare Managed)  Secondary:  [facility] [place]  [address]  [facility] [address]  Phone: [phone]  Fax: [phone] Plan Frequency: 2 visits per week for 6 weeks  Plan of Care/Certification Expiration Date: 24        Plan of Care/Certification Expiration Date:  Plan of Care/Certification Expiration Date: 24    Frequency/Duration: Plan Frequency: 2 visits per week for 6 weeks      Time In/Out:   Time In: 1430  Time Out: 1530      PT Visit Info:    Progress Note Counter: 3      Visit Count:  12    OUTPATIENT PHYSICAL THERAPY:   Treatment Note 2024       Episode  (Balance/gait deficits)               Treatment Diagnosis:    Difficulty in walking, not elsewhere classified  Balance problem  Muscle weakness (generalized)  Medical/Referring Diagnosis:    Balance problem [R26.89]    Referring Physician:  Norma Oshea PA-C MD Orders:  PT Eval and Treat   Return MD Appt:  TBD   Date of Onset:  No data recorded   Allergies:   Clonidine and Simvastatin  Restrictions/Precautions:   None      Interventions Planned (Treatment may consist of any combination of the following):     See Assessment Note    Subjective Comments:   Patient continues to report some dizziness   Initial Pain Level::     0/10  Post Session Pain Level:       0/10  Medications Last Reviewed:  2024  Updated Objective Findings:  Seated BP, L arm: 169/112, 84 bpm upon arrival to PT.      THERAPEUTIC EXERCISE: (55 minutes):    Exercises per grid below to improve mobility, strength, and balance.  Required moderate verbal and manual cues to promote proper body posture and promote proper body mechanics.  Progressed repetitions and complexity of movement as indicated.   Date:  24 Date:  24 Date:  24   Activity/Exercise Parameters Parameters Parameters   Bridge 3 x 10 in hook lying 3 x 10 in hook

## 2024-08-16 ENCOUNTER — HOSPITAL ENCOUNTER (OUTPATIENT)
Dept: PHYSICAL THERAPY | Age: 84
Setting detail: RECURRING SERIES
Discharge: HOME | End: 2024-08-19
Payer: MEDICARE

## 2024-08-16 PROCEDURE — 97110 THERAPEUTIC EXERCISES: CPT

## 2024-08-16 ASSESSMENT — PAIN SCALES - GENERAL: PAINLEVEL_OUTOF10: 0

## 2024-08-16 NOTE — PROGRESS NOTES
Tobias Jamil Rafael  : 1940  Primary: [employer] Medicare-advantage Ppo (Medicare Managed)  Secondary:  [facility] [place]  [address]  [facility] [address]  Phone: [phone]  Fax: [phone] Plan Frequency: 2 visits per week for 6 weeks  Plan of Care/Certification Expiration Date: 24        Plan of Care/Certification Expiration Date:  Plan of Care/Certification Expiration Date: 24    Frequency/Duration: Plan Frequency: 2 visits per week for 6 weeks      Time In/Out:   Time In: 1330  Time Out: 1430      PT Visit Info:    Progress Note Counter: 4      Visit Count:  13    OUTPATIENT PHYSICAL THERAPY:   Treatment Note 2024       Episode  (Balance/gait deficits)               Treatment Diagnosis:    Difficulty in walking, not elsewhere classified  Balance problem  Muscle weakness (generalized)  Medical/Referring Diagnosis:    Balance problem [R26.89]    Referring Physician:  Norma Oshea PA-C MD Orders:  PT Eval and Treat   Return MD Appt:  TBD   Date of Onset:  No data recorded   Allergies:   Clonidine and Simvastatin  Restrictions/Precautions:   None      Interventions Planned (Treatment may consist of any combination of the following):     See Assessment Note    Subjective Comments:   Patient continues to report some dizziness   Initial Pain Level::     0/10  Post Session Pain Level:       0/10  Medications Last Reviewed:  2024  Updated Objective Findings:  Seated BP, L arm: 169/112, 84 bpm upon arrival to PT. BP at the end of session 168/101 pulse 78.       THERAPEUTIC EXERCISE: (55 minutes):    Exercises per grid below to improve mobility, strength, and balance.  Required moderate verbal and manual cues to promote proper body posture and promote proper body mechanics.  Progressed repetitions and complexity of movement as indicated.   Date:  24 Date:  24 Date:  24   Activity/Exercise Parameters Parameters Parameters

## 2024-08-19 ENCOUNTER — HOSPITAL ENCOUNTER (OUTPATIENT)
Dept: PHYSICAL THERAPY | Age: 84
Setting detail: RECURRING SERIES
Discharge: HOME | End: 2024-08-22
Payer: MEDICARE

## 2024-08-19 PROCEDURE — 97110 THERAPEUTIC EXERCISES: CPT

## 2024-08-19 ASSESSMENT — PAIN SCALES - GENERAL: PAINLEVEL_OUTOF10: 0

## 2024-08-19 NOTE — PROGRESS NOTES
Tobias Jamil Rafael  : 1940  Primary: [employer] Medicare-advantage Ppo (Medicare Managed)  Secondary:  [facility] [place]  [address]  [facility] [address]  Phone: [phone]  Fax: [phone] Plan Frequency: 2 visits per week for 6 weeks  Plan of Care/Certification Expiration Date: 24        Plan of Care/Certification Expiration Date:  Plan of Care/Certification Expiration Date: 24    Frequency/Duration: Plan Frequency: 2 visits per week for 6 weeks      Time In/Out:   Time In: 1100  Time Out: 1200      PT Visit Info:    Progress Note Counter: 5      Visit Count:  14    OUTPATIENT PHYSICAL THERAPY:   Treatment Note 2024       Episode  (Balance/gait deficits)               Treatment Diagnosis:    Difficulty in walking, not elsewhere classified  Balance problem  Muscle weakness (generalized)  Medical/Referring Diagnosis:    Balance problem [R26.89]    Referring Physician:  Norma Oshea PA-C MD Orders:  PT Eval and Treat   Return MD Appt:  TBD   Date of Onset:  No data recorded   Allergies:   Clonidine and Simvastatin  Restrictions/Precautions:   None      Interventions Planned (Treatment may consist of any combination of the following):     See Assessment Note    Subjective Comments:   Patient reported feeling pretty good this morning.   Initial Pain Level::     0/10  Post Session Pain Level:       0/10  Medications Last Reviewed:  2024  Updated Objective Findings:  Seated BP, L arm: 169/112, 84 bpm upon arrival to PT. BP at the end of session 168/101 pulse 78.       THERAPEUTIC EXERCISE: (55 minutes):    Exercises per grid below to improve mobility, strength, and balance.  Required moderate verbal and manual cues to promote proper body posture and promote proper body mechanics.  Progressed repetitions and complexity of movement as indicated.   Date:  24 Date:  24 Date:  24   Activity/Exercise Parameters Parameters

## 2024-08-22 ENCOUNTER — HOSPITAL ENCOUNTER (OUTPATIENT)
Dept: PHYSICAL THERAPY | Age: 84
Setting detail: RECURRING SERIES
Discharge: HOME | End: 2024-08-25
Payer: MEDICARE

## 2024-08-22 PROCEDURE — 97110 THERAPEUTIC EXERCISES: CPT

## 2024-08-22 ASSESSMENT — PAIN SCALES - GENERAL: PAINLEVEL_OUTOF10: 0

## 2024-08-22 NOTE — PROGRESS NOTES
Tobias Jamil Rafael  : 1940  Primary: [employer] Medicare-advantage Ppo (Medicare Managed)  Secondary:  [facility] [place]  [address]  [facility] [address]  Phone: [phone]  Fax: [phone] Plan Frequency: 2 visits per week for 6 weeks  Plan of Care/Certification Expiration Date: 24        Plan of Care/Certification Expiration Date:  Plan of Care/Certification Expiration Date: 24    Frequency/Duration: Plan Frequency: 2 visits per week for 6 weeks      Time In/Out:   Time In: 1100  Time Out: 1200      PT Visit Info:    Progress Note Counter: 5      Visit Count:  15    OUTPATIENT PHYSICAL THERAPY:   Treatment Note 2024       Episode  (Balance/gait deficits)               Treatment Diagnosis:    Difficulty in walking, not elsewhere classified  Balance problem  Muscle weakness (generalized)  Medical/Referring Diagnosis:    Balance problem [R26.89]    Referring Physician:  Norma Oshea PA-C MD Orders:  PT Eval and Treat   Return MD Appt:  TBD   Date of Onset:  No data recorded   Allergies:   Clonidine and Simvastatin  Restrictions/Precautions:   None      Interventions Planned (Treatment may consist of any combination of the following):     See Assessment Note    Subjective Comments:   Patient reported feeling pretty good this morning.   Initial Pain Level::     0/10  Post Session Pain Level:       0/10  Medications Last Reviewed:  2024  Updated Objective Findings:  Seated BP, L arm:  84 bpm upon arrival to PT. BP at the end of session 168/101 pulse 78.       THERAPEUTIC EXERCISE: (55 minutes):    Exercises per grid below to improve mobility, strength, and balance.  Required moderate verbal and manual cues to promote proper body posture and promote proper body mechanics.  Progressed repetitions and complexity of movement as indicated.   Date:  24 Date:  24 Date:  24   Activity/Exercise Parameters Parameters Parameters

## 2024-08-26 ENCOUNTER — HOSPITAL ENCOUNTER (OUTPATIENT)
Dept: PHYSICAL THERAPY | Age: 84
Setting detail: RECURRING SERIES
Discharge: HOME | End: 2024-08-29
Payer: MEDICARE

## 2024-08-26 PROCEDURE — 97110 THERAPEUTIC EXERCISES: CPT

## 2024-08-26 ASSESSMENT — PAIN SCALES - GENERAL: PAINLEVEL_OUTOF10: 0

## 2024-08-26 NOTE — PROGRESS NOTES
Tobias Jamil Rafael  : 1940  Primary: [employer] Medicare-advantage Ppo (Medicare Managed)  Secondary:  [facility] [place]  [address]  [facility] [address]  Phone: [phone]  Fax: [phone] Plan Frequency: 2 visits per week for 6 weeks  Plan of Care/Certification Expiration Date: 24        Plan of Care/Certification Expiration Date:  Plan of Care/Certification Expiration Date: 24    Frequency/Duration: Plan Frequency: 2 visits per week for 6 weeks      Time In/Out:   Time In: 1430  Time Out: 1530      PT Visit Info:    Progress Note Counter: 7      Visit Count:  16    OUTPATIENT PHYSICAL THERAPY:   Treatment Note 2024       Episode  (Balance/gait deficits)               Treatment Diagnosis:    Difficulty in walking, not elsewhere classified  Balance problem  Muscle weakness (generalized)  Medical/Referring Diagnosis:    Balance problem [R26.89]    Referring Physician:  Norma Oshea PA-C MD Orders:  PT Eval and Treat   Return MD Appt:  TBD   Date of Onset:  No data recorded   Allergies:   Clonidine and Simvastatin  Restrictions/Precautions:   None      Interventions Planned (Treatment may consist of any combination of the following):     See Assessment Note    Subjective Comments:   Patient reported no pain and getting along good.   Initial Pain Level::     0/10  Post Session Pain Level:       0/10  Medications Last Reviewed:  2024  Updated Objective Findings:  Seated BP, L before exercises 134/91 pulse 70.       THERAPEUTIC EXERCISE: (55 minutes):    Exercises per grid below to improve mobility, strength, and balance.  Required moderate verbal and manual cues to promote proper body posture and promote proper body mechanics.  Progressed repetitions and complexity of movement as indicated.   Date:  24 Date:  24 Date:  24   Activity/Exercise Parameters Parameters Parameters   Bridge 3 x 10 in hook lying 3 x 10 in

## 2024-08-27 ENCOUNTER — NURSE ONLY (OUTPATIENT)
Dept: FAMILY MEDICINE CLINIC | Facility: CLINIC | Age: 84
End: 2024-08-27

## 2024-08-27 VITALS — SYSTOLIC BLOOD PRESSURE: 138 MMHG | DIASTOLIC BLOOD PRESSURE: 86 MMHG

## 2024-08-29 ENCOUNTER — HOSPITAL ENCOUNTER (OUTPATIENT)
Dept: PHYSICAL THERAPY | Age: 84
Setting detail: RECURRING SERIES
End: 2024-08-29
Payer: MEDICARE

## 2024-08-29 PROCEDURE — 97110 THERAPEUTIC EXERCISES: CPT

## 2024-08-29 ASSESSMENT — PAIN SCALES - GENERAL: PAINLEVEL_OUTOF10: 0

## 2024-08-29 NOTE — PROGRESS NOTES
Tobias Jamil Rafael  : 1940  Primary: [employer] Medicare-advantage Ppo (Medicare Managed)  Secondary:  [facility] [place]  [address]  [facility] [address]  Phone: [phone]  Fax: [phone] Plan Frequency: 2 visits per week for 6 weeks  Plan of Care/Certification Expiration Date: 24        Plan of Care/Certification Expiration Date:  Plan of Care/Certification Expiration Date: 24    Frequency/Duration: Plan Frequency: 2 visits per week for 6 weeks      Time In/Out:   Time In: 1325  Time Out: 1430      PT Visit Info:    Progress Note Counter: 8      Visit Count:  17    OUTPATIENT PHYSICAL THERAPY:   Treatment Note 2024       Episode  (Balance/gait deficits)               Treatment Diagnosis:    Difficulty in walking, not elsewhere classified  Balance problem  Muscle weakness (generalized)  Medical/Referring Diagnosis:    Balance problem [R26.89]    Referring Physician:  Norma Oshea PA-C MD Orders:  PT Eval and Treat   Return MD Appt:  TBD   Date of Onset:  No data recorded   Allergies:   Clonidine and Simvastatin  Restrictions/Precautions:   None      Interventions Planned (Treatment may consist of any combination of the following):     See Assessment Note    Subjective Comments:   Patient reported no pain and getting along good.   Initial Pain Level::     0/10  Post Session Pain Level:       0/10  Medications Last Reviewed:  2024  Updated Objective Findings:  Initial /99 Pulse 69 BP after exercises 161/91 pulse 78      THERAPEUTIC EXERCISE: (55 minutes):    Exercises per grid below to improve mobility, strength, and balance.  Required moderate verbal and manual cues to promote proper body posture and promote proper body mechanics.  Progressed repetitions and complexity of movement as indicated.   Date:  24 Date:  24 Date:  24   Activity/Exercise Parameters Parameters Parameters   Bridge 3 x 10 in hook lying

## 2024-09-03 ENCOUNTER — HOSPITAL ENCOUNTER (OUTPATIENT)
Dept: PHYSICAL THERAPY | Age: 84
Setting detail: RECURRING SERIES
Discharge: HOME | End: 2024-09-06
Payer: MEDICARE

## 2024-09-03 PROCEDURE — 97110 THERAPEUTIC EXERCISES: CPT

## 2024-09-03 ASSESSMENT — PAIN SCALES - GENERAL: PAINLEVEL_OUTOF10: 0

## 2024-09-03 NOTE — PROGRESS NOTES
Tobias Jamil Rafael  : 1940  Primary: [employer] Medicare-advantage Ppo (Medicare Managed)  Secondary:  [facility] [place]  [address]  [facility] [address]  Phone: [phone]  Fax: [phone] Plan Frequency: 2 visits per week for 6 weeks  Plan of Care/Certification Expiration Date: 24        Plan of Care/Certification Expiration Date:  Plan of Care/Certification Expiration Date: 24    Frequency/Duration: Plan Frequency: 2 visits per week for 6 weeks      Time In/Out:   Time In: 1330  Time Out: 1420      PT Visit Info:    Progress Note Counter: 9      Visit Count:  18    OUTPATIENT PHYSICAL THERAPY:   Treatment Note 9/3/2024       Episode  (Balance/gait deficits)               Treatment Diagnosis:    Difficulty in walking, not elsewhere classified  Balance problem  Muscle weakness (generalized)  Medical/Referring Diagnosis:    Balance problem [R26.89]    Referring Physician:  Norma Oshea PA-C MD Orders:  PT Eval and Treat   Return MD Appt:  TBD   Date of Onset:  No data recorded   Allergies:   Clonidine and Simvastatin  Restrictions/Precautions:   None      Interventions Planned (Treatment may consist of any combination of the following):     See Assessment Note    Subjective Comments:   Patient walked down to his pond and back yesterday. Has one more appointment and would like for that to be his last appointment.  Initial Pain Level::     0/10  Post Session Pain Level:       0/10  Medications Last Reviewed:  9/3/2024  Updated Objective Findings:  Initial /77 Pulse 70 BP; 131/74 and 81 bpm      THERAPEUTIC EXERCISE: (50 minutes):    Exercises per grid below to improve mobility, strength, and balance.  Required moderate verbal and manual cues to promote proper body posture and promote proper body mechanics.  Progressed repetitions and complexity of movement as indicated.   Date:  24 Date:  24 Date:  9/3/24   Activity/Exercise

## 2024-09-04 DIAGNOSIS — R60.0 BILATERAL LOWER EXTREMITY EDEMA: ICD-10-CM

## 2024-09-04 DIAGNOSIS — I10 ESSENTIAL HYPERTENSION: ICD-10-CM

## 2024-09-04 RX ORDER — CHLORTHALIDONE 25 MG/1
25 TABLET ORAL DAILY
Qty: 90 TABLET | Refills: 1 | Status: SHIPPED | OUTPATIENT
Start: 2024-09-04

## 2024-09-05 ENCOUNTER — HOSPITAL ENCOUNTER (OUTPATIENT)
Dept: PHYSICAL THERAPY | Age: 84
Setting detail: RECURRING SERIES
Discharge: HOME | End: 2024-09-08
Payer: MEDICARE

## 2024-09-05 PROCEDURE — 97110 THERAPEUTIC EXERCISES: CPT

## 2024-09-05 ASSESSMENT — PAIN SCALES - GENERAL: PAINLEVEL_OUTOF10: 0

## 2024-09-11 ENCOUNTER — OFFICE VISIT (OUTPATIENT)
Dept: FAMILY MEDICINE CLINIC | Facility: CLINIC | Age: 84
End: 2024-09-11

## 2024-09-11 VITALS
SYSTOLIC BLOOD PRESSURE: 138 MMHG | TEMPERATURE: 97.3 F | HEIGHT: 66 IN | DIASTOLIC BLOOD PRESSURE: 70 MMHG | WEIGHT: 180 LBS | OXYGEN SATURATION: 97 % | BODY MASS INDEX: 28.93 KG/M2 | HEART RATE: 64 BPM

## 2024-09-11 DIAGNOSIS — I10 ESSENTIAL HYPERTENSION: Primary | ICD-10-CM

## 2024-09-11 DIAGNOSIS — N18.31 TYPE 2 DIABETES MELLITUS WITH STAGE 3A CHRONIC KIDNEY DISEASE, WITHOUT LONG-TERM CURRENT USE OF INSULIN (HCC): ICD-10-CM

## 2024-09-11 DIAGNOSIS — R35.1 BPH ASSOCIATED WITH NOCTURIA: ICD-10-CM

## 2024-09-11 DIAGNOSIS — R41.3 MEMORY DIFFICULTIES: ICD-10-CM

## 2024-09-11 DIAGNOSIS — E11.22 TYPE 2 DIABETES MELLITUS WITH STAGE 3A CHRONIC KIDNEY DISEASE, WITHOUT LONG-TERM CURRENT USE OF INSULIN (HCC): ICD-10-CM

## 2024-09-11 DIAGNOSIS — R26.89 BALANCE PROBLEM: ICD-10-CM

## 2024-09-11 DIAGNOSIS — N40.1 BPH ASSOCIATED WITH NOCTURIA: ICD-10-CM

## 2024-09-11 DIAGNOSIS — R42 DIZZINESS, NONSPECIFIC: ICD-10-CM

## 2024-09-11 DIAGNOSIS — Z91.81 RISK FOR FALLS: ICD-10-CM

## 2024-09-11 LAB — HBA1C MFR BLD: 6.9 %

## 2024-09-11 RX ORDER — TAMSULOSIN HYDROCHLORIDE 0.4 MG/1
0.4 CAPSULE ORAL DAILY
Qty: 90 CAPSULE | Refills: 1 | Status: SHIPPED | OUTPATIENT
Start: 2024-09-11

## 2024-09-11 RX ORDER — BENAZEPRIL HYDROCHLORIDE 10 MG/1
10 TABLET ORAL DAILY
Qty: 90 TABLET | Refills: 1 | Status: SHIPPED | OUTPATIENT
Start: 2024-09-11

## 2024-09-11 RX ORDER — AMLODIPINE BESYLATE 5 MG/1
5 TABLET ORAL DAILY
COMMUNITY

## 2024-09-11 ASSESSMENT — ENCOUNTER SYMPTOMS
RESPIRATORY NEGATIVE: 1
GASTROINTESTINAL NEGATIVE: 1

## 2024-09-11 ASSESSMENT — PATIENT HEALTH QUESTIONNAIRE - PHQ9
SUM OF ALL RESPONSES TO PHQ QUESTIONS 1-9: 0
2. FEELING DOWN, DEPRESSED OR HOPELESS: NOT AT ALL
1. LITTLE INTEREST OR PLEASURE IN DOING THINGS: NOT AT ALL
SUM OF ALL RESPONSES TO PHQ QUESTIONS 1-9: 0
SUM OF ALL RESPONSES TO PHQ QUESTIONS 1-9: 0
SUM OF ALL RESPONSES TO PHQ9 QUESTIONS 1 & 2: 0
SUM OF ALL RESPONSES TO PHQ QUESTIONS 1-9: 0

## 2024-10-17 ENCOUNTER — HOSPITAL ENCOUNTER (OUTPATIENT)
Dept: NON INVASIVE DIAGNOSTICS | Age: 84
Discharge: HOME | End: 2024-10-19
Payer: MEDICARE

## 2024-10-17 DIAGNOSIS — R01.1 HEART MURMUR: ICD-10-CM

## 2024-10-17 LAB
ECHO AO ASC DIAM: 3.4 CM
ECHO AO ROOT DIAM: 3.3 CM
ECHO AV AREA PEAK VELOCITY: 1.7 CM2
ECHO AV AREA VTI: 1.8 CM2
ECHO AV MEAN GRADIENT: 6 MMHG
ECHO AV MEAN VELOCITY: 1.2 M/S
ECHO AV PEAK GRADIENT: 13 MMHG
ECHO AV PEAK VELOCITY: 1.8 M/S
ECHO AV VELOCITY RATIO: 0.56
ECHO AV VTI: 37.8 CM
ECHO EST RA PRESSURE: 3 MMHG
ECHO IVC PROX: 1.8 CM
ECHO LA AREA 2C: 14.5 CM2
ECHO LA AREA 4C: 15.1 CM2
ECHO LA DIAMETER: 3.4 CM
ECHO LA MAJOR AXIS: 4.9 CM
ECHO LA MINOR AXIS: 4.9 CM
ECHO LA TO AORTIC ROOT RATIO: 1.03
ECHO LA VOL BP: 36 ML (ref 18–58)
ECHO LA VOL MOD A2C: 36 ML (ref 18–58)
ECHO LA VOL MOD A4C: 36 ML (ref 18–58)
ECHO LV E' LATERAL VELOCITY: 10.6 CM/S
ECHO LV E' SEPTAL VELOCITY: 6.4 CM/S
ECHO LV EDV A2C: 84 ML
ECHO LV EDV A4C: 96 ML
ECHO LV EJECTION FRACTION A2C: 61 %
ECHO LV EJECTION FRACTION A4C: 59 %
ECHO LV EJECTION FRACTION BIPLANE: 61 % (ref 55–100)
ECHO LV ESV A2C: 33 ML
ECHO LV ESV A4C: 39 ML
ECHO LV FRACTIONAL SHORTENING: 25 % (ref 28–44)
ECHO LV INTERNAL DIMENSION DIASTOLIC: 4.4 CM (ref 4.2–5.9)
ECHO LV INTERNAL DIMENSION SYSTOLIC: 3.3 CM
ECHO LV IVSD: 1 CM (ref 0.6–1)
ECHO LV MASS 2D: 147.8 G (ref 88–224)
ECHO LV POSTERIOR WALL DIASTOLIC: 1 CM (ref 0.6–1)
ECHO LV RELATIVE WALL THICKNESS RATIO: 0.45
ECHO LVOT AREA: 3.1 CM2
ECHO LVOT AV VTI INDEX: 0.57
ECHO LVOT DIAM: 2 CM
ECHO LVOT MEAN GRADIENT: 2 MMHG
ECHO LVOT MEAN GRADIENT: 2 MMHG
ECHO LVOT PEAK GRADIENT: 4 MMHG
ECHO LVOT PEAK VELOCITY: 1 M/S
ECHO LVOT SV: 67.8 ML
ECHO LVOT VTI: 21.6 CM
ECHO MV A VELOCITY: 0.82 M/S
ECHO MV AREA VTI: 2.6 CM2
ECHO MV E DECELERATION TIME (DT): 169 MS
ECHO MV E VELOCITY: 0.57 M/S
ECHO MV E/A RATIO: 0.7
ECHO MV E/E' LATERAL: 5.38
ECHO MV E/E' RATIO (AVERAGED): 7.14
ECHO MV E/E' SEPTAL: 8.91
ECHO MV LVOT VTI INDEX: 1.19
ECHO MV MAX VELOCITY: 1 M/S
ECHO MV MEAN GRADIENT: 2 MMHG
ECHO MV MEAN VELOCITY: 0.6 M/S
ECHO MV PEAK GRADIENT: 4 MMHG
ECHO MV VTI: 25.8 CM
ECHO PV ACCELERATION TIME (AT): 197 MS
ECHO PV MAX VELOCITY: 1.2 M/S
ECHO PV PEAK GRADIENT: 6 MMHG
ECHO RV BASAL DIMENSION: 2.6 CM
ECHO RV FREE WALL PEAK S': 11.7 CM/S
ECHO RV INTERNAL DIMENSION: 3 CM
ECHO RV TAPSE: 1.3 CM (ref 1.7–?)

## 2024-10-17 PROCEDURE — 93306 TTE W/DOPPLER COMPLETE: CPT

## 2024-10-24 ENCOUNTER — TELEPHONE (OUTPATIENT)
Dept: FAMILY MEDICINE CLINIC | Facility: CLINIC | Age: 84
End: 2024-10-24

## 2024-10-24 NOTE — TELEPHONE ENCOUNTER
Patient's wife would like a call back regarding recent heart tests that she said Norma ordered. Please advise on results.

## 2024-10-25 ENCOUNTER — TELEPHONE (OUTPATIENT)
Dept: FAMILY MEDICINE CLINIC | Facility: CLINIC | Age: 84
End: 2024-10-25

## 2024-10-28 DIAGNOSIS — Z91.81 RISK FOR FALLS: ICD-10-CM

## 2024-10-28 DIAGNOSIS — R42 DIZZINESS, NONSPECIFIC: Primary | ICD-10-CM

## 2024-12-19 ENCOUNTER — APPOINTMENT (OUTPATIENT)
Dept: CT IMAGING | Age: 84
DRG: 417 | End: 2024-12-19
Payer: MEDICARE

## 2024-12-19 ENCOUNTER — HOSPITAL ENCOUNTER (INPATIENT)
Age: 84
LOS: 6 days | Discharge: HOME HEALTH | DRG: 417 | End: 2024-12-26
Admitting: INTERNAL MEDICINE
Payer: MEDICARE

## 2024-12-19 DIAGNOSIS — R35.1 BPH ASSOCIATED WITH NOCTURIA: ICD-10-CM

## 2024-12-19 DIAGNOSIS — K83.1 BILIARY OBSTRUCTION: ICD-10-CM

## 2024-12-19 DIAGNOSIS — K85.00 IDIOPATHIC ACUTE PANCREATITIS WITHOUT INFECTION OR NECROSIS: ICD-10-CM

## 2024-12-19 DIAGNOSIS — N40.1 BPH ASSOCIATED WITH NOCTURIA: ICD-10-CM

## 2024-12-19 DIAGNOSIS — R10.13 ABDOMINAL PAIN, EPIGASTRIC: Primary | ICD-10-CM

## 2024-12-19 DIAGNOSIS — I48.0 PAROXYSMAL ATRIAL FIBRILLATION (HCC): ICD-10-CM

## 2024-12-19 DIAGNOSIS — K85.10 ACUTE BILIARY PANCREATITIS, UNSPECIFIED COMPLICATION STATUS: ICD-10-CM

## 2024-12-19 DIAGNOSIS — I10 ESSENTIAL HYPERTENSION: ICD-10-CM

## 2024-12-19 LAB
ALBUMIN SERPL-MCNC: 3.7 G/DL (ref 3.2–4.6)
ALBUMIN/GLOB SERPL: 0.9 (ref 1–1.9)
ALP SERPL-CCNC: 175 U/L (ref 40–129)
ALT SERPL-CCNC: 135 U/L (ref 8–55)
ANION GAP SERPL CALC-SCNC: 18 MMOL/L (ref 7–16)
APPEARANCE UR: CLEAR
AST SERPL-CCNC: 117 U/L (ref 15–37)
BACTERIA URNS QL MICRO: 0 /HPF
BASOPHILS # BLD: 0 K/UL (ref 0–0.2)
BASOPHILS NFR BLD: 0 % (ref 0–2)
BILIRUB SERPL-MCNC: 6.9 MG/DL (ref 0–1.2)
BILIRUB UR QL: NEGATIVE
BUN SERPL-MCNC: 21 MG/DL (ref 8–23)
CALCIUM SERPL-MCNC: 9.7 MG/DL (ref 8.8–10.2)
CHLORIDE SERPL-SCNC: 100 MMOL/L (ref 98–107)
CO2 SERPL-SCNC: 23 MMOL/L (ref 20–29)
COLOR UR: ABNORMAL
CREAT SERPL-MCNC: 1.58 MG/DL (ref 0.8–1.3)
CRYSTALS URNS QL MICRO: ABNORMAL /LPF
DIFFERENTIAL METHOD BLD: ABNORMAL
EOSINOPHIL # BLD: 0 K/UL (ref 0–0.8)
EOSINOPHIL NFR BLD: 0 % (ref 0.5–7.8)
EPI CELLS #/AREA URNS HPF: ABNORMAL /HPF
ERYTHROCYTE [DISTWIDTH] IN BLOOD BY AUTOMATED COUNT: 13.6 % (ref 11.9–14.6)
GLOBULIN SER CALC-MCNC: 4.1 G/DL (ref 2.3–3.5)
GLUCOSE SERPL-MCNC: 262 MG/DL (ref 70–99)
GLUCOSE UR STRIP.AUTO-MCNC: 500 MG/DL
HCT VFR BLD AUTO: 49.8 % (ref 41.1–50.3)
HGB BLD-MCNC: 16.6 G/DL (ref 13.6–17.2)
HGB UR QL STRIP: ABNORMAL
IMM GRANULOCYTES # BLD AUTO: 0.1 K/UL (ref 0–0.5)
IMM GRANULOCYTES NFR BLD AUTO: 0 % (ref 0–5)
KETONES UR QL STRIP.AUTO: 15 MG/DL
LACTATE SERPL-SCNC: 2.5 MMOL/L (ref 0.5–2)
LEUKOCYTE ESTERASE UR QL STRIP.AUTO: NEGATIVE
LIPASE SERPL-CCNC: >3000 U/L (ref 13–60)
LYMPHOCYTES # BLD: 1.1 K/UL (ref 0.5–4.6)
LYMPHOCYTES NFR BLD: 8 % (ref 13–44)
MCH RBC QN AUTO: 33.5 PG (ref 26.1–32.9)
MCHC RBC AUTO-ENTMCNC: 33.3 G/DL (ref 31.4–35)
MCV RBC AUTO: 100.4 FL (ref 82–102)
MONOCYTES # BLD: 0.5 K/UL (ref 0.1–1.3)
MONOCYTES NFR BLD: 4 % (ref 4–12)
NEUTS SEG # BLD: 11.9 K/UL (ref 1.7–8.2)
NEUTS SEG NFR BLD: 87 % (ref 43–78)
NITRITE UR QL STRIP.AUTO: NEGATIVE
NRBC # BLD: 0 K/UL (ref 0–0.2)
OTHER OBSERVATIONS: ABNORMAL
PH UR STRIP: 6 (ref 5–9)
PLATELET # BLD AUTO: 258 K/UL (ref 150–450)
PMV BLD AUTO: 11.8 FL (ref 9.4–12.3)
POTASSIUM SERPL-SCNC: 4.3 MMOL/L (ref 3.5–5.1)
PROT SERPL-MCNC: 7.9 G/DL (ref 6.3–8.2)
PROT UR STRIP-MCNC: 100 MG/DL
RBC # BLD AUTO: 4.96 M/UL (ref 4.23–5.6)
RBC #/AREA URNS HPF: ABNORMAL /HPF
SODIUM SERPL-SCNC: 140 MMOL/L (ref 136–145)
SP GR UR REFRACTOMETRY: 1.03 (ref 1–1.02)
UROBILINOGEN UR QL STRIP.AUTO: 1 EU/DL (ref 0.2–1)
WBC # BLD AUTO: 13.7 K/UL (ref 4.3–11.1)
WBC URNS QL MICRO: ABNORMAL /HPF

## 2024-12-19 PROCEDURE — 96375 TX/PRO/DX INJ NEW DRUG ADDON: CPT

## 2024-12-19 PROCEDURE — 80053 COMPREHEN METABOLIC PANEL: CPT

## 2024-12-19 PROCEDURE — 85025 COMPLETE CBC W/AUTO DIFF WBC: CPT

## 2024-12-19 PROCEDURE — 74177 CT ABD & PELVIS W/CONTRAST: CPT

## 2024-12-19 PROCEDURE — 96374 THER/PROPH/DIAG INJ IV PUSH: CPT

## 2024-12-19 PROCEDURE — 93005 ELECTROCARDIOGRAM TRACING: CPT

## 2024-12-19 PROCEDURE — 6360000004 HC RX CONTRAST MEDICATION

## 2024-12-19 PROCEDURE — 2580000003 HC RX 258

## 2024-12-19 PROCEDURE — 83690 ASSAY OF LIPASE: CPT

## 2024-12-19 PROCEDURE — 83605 ASSAY OF LACTIC ACID: CPT

## 2024-12-19 PROCEDURE — 96361 HYDRATE IV INFUSION ADD-ON: CPT

## 2024-12-19 PROCEDURE — 6360000002 HC RX W HCPCS

## 2024-12-19 PROCEDURE — 81001 URINALYSIS AUTO W/SCOPE: CPT

## 2024-12-19 PROCEDURE — 99285 EMERGENCY DEPT VISIT HI MDM: CPT

## 2024-12-19 RX ORDER — IOPAMIDOL 755 MG/ML
100 INJECTION, SOLUTION INTRAVASCULAR
Status: COMPLETED | OUTPATIENT
Start: 2024-12-19 | End: 2024-12-19

## 2024-12-19 RX ORDER — ONDANSETRON 2 MG/ML
4 INJECTION INTRAMUSCULAR; INTRAVENOUS
Status: COMPLETED | OUTPATIENT
Start: 2024-12-19 | End: 2024-12-19

## 2024-12-19 RX ORDER — LABETALOL HYDROCHLORIDE 5 MG/ML
10 INJECTION, SOLUTION INTRAVENOUS ONCE
Status: COMPLETED | OUTPATIENT
Start: 2024-12-19 | End: 2024-12-19

## 2024-12-19 RX ORDER — 0.9 % SODIUM CHLORIDE 0.9 %
1000 INTRAVENOUS SOLUTION INTRAVENOUS ONCE
Status: COMPLETED | OUTPATIENT
Start: 2024-12-19 | End: 2024-12-19

## 2024-12-19 RX ORDER — LABETALOL HYDROCHLORIDE 5 MG/ML
INJECTION, SOLUTION INTRAVENOUS
Status: COMPLETED
Start: 2024-12-19 | End: 2024-12-19

## 2024-12-19 RX ORDER — HYDRALAZINE HYDROCHLORIDE 20 MG/ML
10 INJECTION INTRAMUSCULAR; INTRAVENOUS ONCE
Status: COMPLETED | OUTPATIENT
Start: 2024-12-19 | End: 2024-12-19

## 2024-12-19 RX ORDER — MORPHINE SULFATE 4 MG/ML
4 INJECTION, SOLUTION INTRAMUSCULAR; INTRAVENOUS ONCE
Status: COMPLETED | OUTPATIENT
Start: 2024-12-19 | End: 2024-12-19

## 2024-12-19 RX ADMIN — IOPAMIDOL 100 ML: 755 INJECTION, SOLUTION INTRAVENOUS at 20:42

## 2024-12-19 RX ADMIN — HYDRALAZINE HYDROCHLORIDE 10 MG: 20 INJECTION, SOLUTION INTRAMUSCULAR; INTRAVENOUS at 21:52

## 2024-12-19 RX ADMIN — LABETALOL HYDROCHLORIDE 10 MG: 5 INJECTION, SOLUTION INTRAVENOUS at 22:57

## 2024-12-19 RX ADMIN — MORPHINE SULFATE 4 MG: 4 INJECTION INTRAVENOUS at 21:25

## 2024-12-19 RX ADMIN — ONDANSETRON 4 MG: 2 INJECTION, SOLUTION INTRAMUSCULAR; INTRAVENOUS at 21:25

## 2024-12-19 RX ADMIN — LABETALOL HYDROCHLORIDE 10 MG: 5 INJECTION INTRAVENOUS at 22:57

## 2024-12-19 RX ADMIN — SODIUM CHLORIDE 1000 ML: 9 INJECTION, SOLUTION INTRAVENOUS at 22:00

## 2024-12-19 ASSESSMENT — PAIN DESCRIPTION - LOCATION: LOCATION: ABDOMEN

## 2024-12-19 ASSESSMENT — PAIN - FUNCTIONAL ASSESSMENT: PAIN_FUNCTIONAL_ASSESSMENT: 0-10

## 2024-12-19 ASSESSMENT — PAIN SCALES - GENERAL: PAINLEVEL_OUTOF10: 8

## 2024-12-19 ASSESSMENT — PAIN DESCRIPTION - DESCRIPTORS: DESCRIPTORS: SHARP

## 2024-12-19 NOTE — ED TRIAGE NOTES
Patient wheelchair to triage with wife with c/c back and abdominal pain for x1 day. Vomiting and decreased appetite. Denies urinary symptoms

## 2024-12-20 ENCOUNTER — APPOINTMENT (OUTPATIENT)
Dept: ULTRASOUND IMAGING | Age: 84
DRG: 417 | End: 2024-12-20
Payer: MEDICARE

## 2024-12-20 ENCOUNTER — APPOINTMENT (OUTPATIENT)
Dept: MRI IMAGING | Age: 84
DRG: 417 | End: 2024-12-20
Payer: MEDICARE

## 2024-12-20 PROBLEM — K85.90 ACUTE PANCREATITIS WITHOUT INFECTION OR NECROSIS: Status: ACTIVE | Noted: 2024-12-20

## 2024-12-20 LAB
EKG ATRIAL RATE: 86 BPM
EKG DIAGNOSIS: NORMAL
EKG P AXIS: 53 DEGREES
EKG P-R INTERVAL: 248 MS
EKG Q-T INTERVAL: 558 MS
EKG QRS DURATION: 132 MS
EKG QTC CALCULATION (BAZETT): 667 MS
EKG R AXIS: -77 DEGREES
EKG T AXIS: 64 DEGREES
EKG VENTRICULAR RATE: 86 BPM
GLUCOSE BLD STRIP.AUTO-MCNC: 169 MG/DL (ref 65–100)
GLUCOSE BLD STRIP.AUTO-MCNC: 206 MG/DL (ref 65–100)
GLUCOSE BLD STRIP.AUTO-MCNC: 214 MG/DL (ref 65–100)
GLUCOSE BLD STRIP.AUTO-MCNC: 240 MG/DL (ref 65–100)
LACTATE SERPL-SCNC: 2.9 MMOL/L (ref 0.5–2)
SERVICE CMNT-IMP: ABNORMAL

## 2024-12-20 PROCEDURE — 6360000002 HC RX W HCPCS: Performed by: INTERNAL MEDICINE

## 2024-12-20 PROCEDURE — 6370000000 HC RX 637 (ALT 250 FOR IP): Performed by: INTERNAL MEDICINE

## 2024-12-20 PROCEDURE — 83605 ASSAY OF LACTIC ACID: CPT

## 2024-12-20 PROCEDURE — 2500000003 HC RX 250 WO HCPCS: Performed by: INTERNAL MEDICINE

## 2024-12-20 PROCEDURE — 93010 ELECTROCARDIOGRAM REPORT: CPT | Performed by: INTERNAL MEDICINE

## 2024-12-20 PROCEDURE — 36415 COLL VENOUS BLD VENIPUNCTURE: CPT

## 2024-12-20 PROCEDURE — 2580000003 HC RX 258: Performed by: INTERNAL MEDICINE

## 2024-12-20 PROCEDURE — 1100000000 HC RM PRIVATE

## 2024-12-20 PROCEDURE — 76700 US EXAM ABDOM COMPLETE: CPT

## 2024-12-20 PROCEDURE — 82962 GLUCOSE BLOOD TEST: CPT

## 2024-12-20 RX ORDER — SODIUM CHLORIDE 0.9 % (FLUSH) 0.9 %
5-40 SYRINGE (ML) INJECTION EVERY 12 HOURS SCHEDULED
Status: DISCONTINUED | OUTPATIENT
Start: 2024-12-20 | End: 2024-12-26 | Stop reason: HOSPADM

## 2024-12-20 RX ORDER — LISINOPRIL 20 MG/1
20 TABLET ORAL DAILY
Status: DISCONTINUED | OUTPATIENT
Start: 2024-12-20 | End: 2024-12-26 | Stop reason: HOSPADM

## 2024-12-20 RX ORDER — MAGNESIUM SULFATE IN WATER 40 MG/ML
2000 INJECTION, SOLUTION INTRAVENOUS PRN
Status: DISCONTINUED | OUTPATIENT
Start: 2024-12-20 | End: 2024-12-26 | Stop reason: HOSPADM

## 2024-12-20 RX ORDER — ACETAMINOPHEN 325 MG/1
650 TABLET ORAL EVERY 4 HOURS PRN
Status: DISCONTINUED | OUTPATIENT
Start: 2024-12-20 | End: 2024-12-20 | Stop reason: SDUPTHER

## 2024-12-20 RX ORDER — TAMSULOSIN HYDROCHLORIDE 0.4 MG/1
0.4 CAPSULE ORAL DAILY
Status: DISCONTINUED | OUTPATIENT
Start: 2024-12-20 | End: 2024-12-26 | Stop reason: HOSPADM

## 2024-12-20 RX ORDER — ENOXAPARIN SODIUM 100 MG/ML
40 INJECTION SUBCUTANEOUS DAILY
Status: DISCONTINUED | OUTPATIENT
Start: 2024-12-20 | End: 2024-12-22

## 2024-12-20 RX ORDER — ONDANSETRON 4 MG/1
4 TABLET, ORALLY DISINTEGRATING ORAL EVERY 8 HOURS PRN
Status: DISCONTINUED | OUTPATIENT
Start: 2024-12-20 | End: 2024-12-26 | Stop reason: HOSPADM

## 2024-12-20 RX ORDER — OXYCODONE HYDROCHLORIDE 5 MG/1
5 TABLET ORAL PRN
Status: DISPENSED | OUTPATIENT
Start: 2024-12-20 | End: 2024-12-20

## 2024-12-20 RX ORDER — ACETAMINOPHEN 500 MG
1000 TABLET ORAL EVERY 6 HOURS PRN
Status: DISCONTINUED | OUTPATIENT
Start: 2024-12-20 | End: 2024-12-26 | Stop reason: HOSPADM

## 2024-12-20 RX ORDER — POTASSIUM CHLORIDE 7.45 MG/ML
10 INJECTION INTRAVENOUS PRN
Status: DISCONTINUED | OUTPATIENT
Start: 2024-12-20 | End: 2024-12-26 | Stop reason: HOSPADM

## 2024-12-20 RX ORDER — SODIUM CHLORIDE, SODIUM LACTATE, POTASSIUM CHLORIDE, CALCIUM CHLORIDE 600; 310; 30; 20 MG/100ML; MG/100ML; MG/100ML; MG/100ML
INJECTION, SOLUTION INTRAVENOUS CONTINUOUS
Status: ACTIVE | OUTPATIENT
Start: 2024-12-20 | End: 2024-12-20

## 2024-12-20 RX ORDER — LABETALOL HYDROCHLORIDE 5 MG/ML
20 INJECTION, SOLUTION INTRAVENOUS EVERY 4 HOURS PRN
Status: DISCONTINUED | OUTPATIENT
Start: 2024-12-20 | End: 2024-12-26 | Stop reason: HOSPADM

## 2024-12-20 RX ORDER — LABETALOL HYDROCHLORIDE 5 MG/ML
10 INJECTION, SOLUTION INTRAVENOUS EVERY 4 HOURS PRN
Status: DISCONTINUED | OUTPATIENT
Start: 2024-12-20 | End: 2024-12-26 | Stop reason: HOSPADM

## 2024-12-20 RX ORDER — DEXTROSE MONOHYDRATE 100 MG/ML
INJECTION, SOLUTION INTRAVENOUS CONTINUOUS PRN
Status: DISCONTINUED | OUTPATIENT
Start: 2024-12-20 | End: 2024-12-22 | Stop reason: SDUPTHER

## 2024-12-20 RX ORDER — SODIUM CHLORIDE, SODIUM LACTATE, POTASSIUM CHLORIDE, CALCIUM CHLORIDE 600; 310; 30; 20 MG/100ML; MG/100ML; MG/100ML; MG/100ML
INJECTION, SOLUTION INTRAVENOUS CONTINUOUS
Status: DISCONTINUED | OUTPATIENT
Start: 2024-12-20 | End: 2024-12-21

## 2024-12-20 RX ORDER — SODIUM CHLORIDE 9 MG/ML
INJECTION, SOLUTION INTRAVENOUS PRN
Status: DISCONTINUED | OUTPATIENT
Start: 2024-12-20 | End: 2024-12-26 | Stop reason: HOSPADM

## 2024-12-20 RX ORDER — POTASSIUM CHLORIDE 29.8 MG/ML
20 INJECTION INTRAVENOUS PRN
Status: DISCONTINUED | OUTPATIENT
Start: 2024-12-20 | End: 2024-12-26 | Stop reason: HOSPADM

## 2024-12-20 RX ORDER — HYDROMORPHONE HYDROCHLORIDE 1 MG/ML
1 INJECTION, SOLUTION INTRAMUSCULAR; INTRAVENOUS; SUBCUTANEOUS
Status: DISCONTINUED | OUTPATIENT
Start: 2024-12-20 | End: 2024-12-26 | Stop reason: HOSPADM

## 2024-12-20 RX ORDER — INSULIN LISPRO 100 [IU]/ML
0-8 INJECTION, SOLUTION INTRAVENOUS; SUBCUTANEOUS
Status: DISCONTINUED | OUTPATIENT
Start: 2024-12-20 | End: 2024-12-26 | Stop reason: HOSPADM

## 2024-12-20 RX ORDER — ONDANSETRON 2 MG/ML
4 INJECTION INTRAMUSCULAR; INTRAVENOUS EVERY 6 HOURS PRN
Status: DISCONTINUED | OUTPATIENT
Start: 2024-12-20 | End: 2024-12-26 | Stop reason: HOSPADM

## 2024-12-20 RX ORDER — HYDROMORPHONE HYDROCHLORIDE 1 MG/ML
0.5 INJECTION, SOLUTION INTRAMUSCULAR; INTRAVENOUS; SUBCUTANEOUS
Status: DISCONTINUED | OUTPATIENT
Start: 2024-12-20 | End: 2024-12-26 | Stop reason: HOSPADM

## 2024-12-20 RX ORDER — AMLODIPINE BESYLATE 5 MG/1
5 TABLET ORAL DAILY
Status: DISCONTINUED | OUTPATIENT
Start: 2024-12-20 | End: 2024-12-23

## 2024-12-20 RX ORDER — SODIUM CHLORIDE 0.9 % (FLUSH) 0.9 %
5-40 SYRINGE (ML) INJECTION PRN
Status: DISCONTINUED | OUTPATIENT
Start: 2024-12-20 | End: 2024-12-26 | Stop reason: HOSPADM

## 2024-12-20 RX ORDER — AMLODIPINE BESYLATE 10 MG/1
10 TABLET ORAL ONCE
Status: COMPLETED | OUTPATIENT
Start: 2024-12-20 | End: 2024-12-20

## 2024-12-20 RX ORDER — OXYCODONE HYDROCHLORIDE 5 MG/1
10 TABLET ORAL PRN
Status: ACTIVE | OUTPATIENT
Start: 2024-12-20 | End: 2024-12-20

## 2024-12-20 RX ORDER — IBUPROFEN 600 MG/1
1 TABLET ORAL PRN
Status: DISCONTINUED | OUTPATIENT
Start: 2024-12-20 | End: 2024-12-24 | Stop reason: SDUPTHER

## 2024-12-20 RX ADMIN — LABETALOL HYDROCHLORIDE 10 MG: 5 INJECTION INTRAVENOUS at 21:49

## 2024-12-20 RX ADMIN — AMLODIPINE BESYLATE 5 MG: 5 TABLET ORAL at 08:16

## 2024-12-20 RX ADMIN — SODIUM CHLORIDE, POTASSIUM CHLORIDE, SODIUM LACTATE AND CALCIUM CHLORIDE: 600; 310; 30; 20 INJECTION, SOLUTION INTRAVENOUS at 04:15

## 2024-12-20 RX ADMIN — INSULIN LISPRO 3 UNITS: 100 INJECTION, SOLUTION INTRAVENOUS; SUBCUTANEOUS at 16:33

## 2024-12-20 RX ADMIN — LISINOPRIL 20 MG: 20 TABLET ORAL at 08:16

## 2024-12-20 RX ADMIN — INSULIN LISPRO 3 UNITS: 100 INJECTION, SOLUTION INTRAVENOUS; SUBCUTANEOUS at 11:55

## 2024-12-20 RX ADMIN — ENOXAPARIN SODIUM 40 MG: 100 INJECTION SUBCUTANEOUS at 08:16

## 2024-12-20 RX ADMIN — TAMSULOSIN HYDROCHLORIDE 0.4 MG: 0.4 CAPSULE ORAL at 08:16

## 2024-12-20 RX ADMIN — INSULIN LISPRO 3 UNITS: 100 INJECTION, SOLUTION INTRAVENOUS; SUBCUTANEOUS at 08:16

## 2024-12-20 RX ADMIN — HYDROMORPHONE HYDROCHLORIDE 1 MG: 1 INJECTION, SOLUTION INTRAMUSCULAR; INTRAVENOUS; SUBCUTANEOUS at 21:50

## 2024-12-20 RX ADMIN — SODIUM CHLORIDE, POTASSIUM CHLORIDE, SODIUM LACTATE AND CALCIUM CHLORIDE: 600; 310; 30; 20 INJECTION, SOLUTION INTRAVENOUS at 08:22

## 2024-12-20 RX ADMIN — SODIUM CHLORIDE, POTASSIUM CHLORIDE, SODIUM LACTATE AND CALCIUM CHLORIDE: 600; 310; 30; 20 INJECTION, SOLUTION INTRAVENOUS at 12:24

## 2024-12-20 RX ADMIN — SODIUM CHLORIDE, PRESERVATIVE FREE 10 ML: 5 INJECTION INTRAVENOUS at 08:16

## 2024-12-20 RX ADMIN — LABETALOL HYDROCHLORIDE 20 MG: 5 INJECTION INTRAVENOUS at 04:36

## 2024-12-20 RX ADMIN — SODIUM CHLORIDE, POTASSIUM CHLORIDE, SODIUM LACTATE AND CALCIUM CHLORIDE: 600; 310; 30; 20 INJECTION, SOLUTION INTRAVENOUS at 16:33

## 2024-12-20 RX ADMIN — AMLODIPINE BESYLATE 10 MG: 10 TABLET ORAL at 06:01

## 2024-12-20 ASSESSMENT — PAIN SCALES - GENERAL: PAINLEVEL_OUTOF10: 0

## 2024-12-20 NOTE — FLOWSHEET NOTE
Admitting doctor contacted about pt bp seen below. New orders given. Pt bp still elevated on recheck, see new orders.      12/20/24 0345 12/20/24 0531   Vital Signs   BP (!) 178/113 (!) 166/97

## 2024-12-20 NOTE — ED PROVIDER NOTES
Emergency Department Provider Note       PCP: Unknown, Provider   Age: 84 y.o.   Sex: male     DISPOSITION Decision To Admit 12/19/2024 10:57:37 PM    ICD-10-CM    1. Abdominal pain, epigastric  R10.13       2. Idiopathic acute pancreatitis without infection or necrosis  K85.00       3. Essential hypertension  I10           Medical Decision Making     Differential diagnosis includes pancreatitis, cholelithiasis, gastritis, perforated viscus, choledocholithiasis, partial list.    Patient presents with lower abdominal pain however on exam has epigastric tenderness.    Lab work shows acute pancreatitis with lipase of greater than 3000, has leukocytosis although this is likely reactive for the pancreatitis.  No obvious sources of infection seen now.    CT imaging does show fluid within the paracolic gutter that may indicate pancreatitis.    Was given adequate pain control and started fluid resuscitation.  Of note became more hypertensive during his visit, last blood pressure of 214/115.  This was after a dose of hydralazine, will dose with labetalol since heart rate is also mildly elevated at 95 although I suspect this is due to his pancreatitis.  ED Course as of 12/19/24 2259   Thu Dec 19, 2024   1957 EKG 12 Lead  Sinus rhythm with first-degree AV block, rate of 86, right bundle branch block, left anterior fascicular block, no acute ischemic changes [MM]      ED Course User Index  [MM] Rajeev, Samuel, DO     1 acute illness with systemic symptoms.  Over the counter drug management performed.  Prescription drug management performed.  Chronic medical problems impacting care include history of alcohol abuse in the past but has not had any over the past 10 years.  I independently ordered and reviewed each unique test.           I interpreted the CT Scan fluid in the paracolic gutter.  ED provider's independent EKG interpretation and ED course  The patient was admitted and I have discussed patient management with the

## 2024-12-20 NOTE — ED NOTES
TRANSFER - OUT REPORT:    Verbal report given to Estephania WADDELL on Tobias Hall  being transferred to [facility] for routine progression of patient care       Report consisted of patient's Situation, Background, Assessment and   Recommendations(SBAR).     Information from the following report(s) Nurse Handoff Report was reviewed with the receiving nurse.    Kel Fall Assessment:    Presents to emergency department  because of falls (Syncope, seizure, or loss of consciousness): No  Age > 70: Yes  Altered Mental Status, Intoxication with alcohol or substance confusion (Disorientation, impaired judgment, poor safety awaremess, or inability to follow instructions): No  Impaired Mobility: Ambulates or transfers with assistive devices or assistance; Unable to ambulate or transer.: Yes             Lines:   Peripheral IV 12/19/24 Right Antecubital (Active)   Site Assessment Clean, dry & intact 12/19/24 1946   Line Status Blood return noted;Flushed 12/19/24 1946   Phlebitis Assessment No symptoms 12/19/24 1946   Infiltration Assessment 0 12/19/24 1946   Alcohol Cap Used No 12/19/24 1946   Dressing Status New dressing applied 12/19/24 1946   Dressing Type Transparent 12/19/24 1946        Opportunity for questions and clarification was provided.      Patient transported with:  Monitor and Registered Nurse           Froylan Zaldivar RN  12/20/24 4905

## 2024-12-20 NOTE — CARE COORDINATION
Chart reviewed by  for potential transition of care (MATT) needs or concerns.  Pt is insured with pharmacy benefits and is established with a PCP.  No MATT needs or concerns identified or reported at present.  Needs to be determined based on clinical course.  CM following to assist as needed. Please notify/consult  if MATT needs arise.       12/20/24 5367   Service Assessment   Patient Orientation Alert and Oriented   Cognition Alert   History Provided By Medical Record   Primary Caregiver Self   Support Systems Family Members;Spouse/Significant Other   Patient's Healthcare Decision Maker is: Legal Next of Kin   PCP Verified by CM Yes   Last Visit to PCP Within last 3 months  (12/18/2024)   Prior Functional Level Independent in ADLs/IADLs   Current Functional Level Independent in ADLs/IADLs   Can patient return to prior living arrangement Unknown at present   Ability to make needs known: Good   Family able to assist with home care needs: Yes   Would you like for me to discuss the discharge plan with any other family members/significant others, and if so, who? No   Financial Resources Medicare   Community Resources None   Social/Functional History   Lives With Spouse   Type of Home House   Prior Level of Assist for ADLs Independent   Prior Level of Assist for Homemaking Independent   Ambulation Assistance Independent   Prior Level of Assist for Transfers Independent   Occupation Retired   Discharge Planning   Type of Residence House   Living Arrangements Spouse/Significant Other   Current Services Prior To Admission None   Potential Assistance Needed Other (Comment)  (TBD)   Patient expects to be discharged to: House   Services At/After Discharge   Transition of Care Consult (CM Consult) N/A   Services At/After Discharge   (TBD)    Resource Information Provided? No   Mode of Transport at Discharge Other (see comment)  (family)   Confirm Follow Up Transport Self   Condition of

## 2024-12-21 ENCOUNTER — APPOINTMENT (OUTPATIENT)
Dept: MRI IMAGING | Age: 84
DRG: 417 | End: 2024-12-21
Payer: MEDICARE

## 2024-12-21 LAB
ALBUMIN SERPL-MCNC: 2.8 G/DL (ref 3.2–4.6)
ALBUMIN/GLOB SERPL: 0.7 (ref 1–1.9)
ALP SERPL-CCNC: 141 U/L (ref 40–129)
ALT SERPL-CCNC: 88 U/L (ref 8–55)
ANION GAP SERPL CALC-SCNC: 16 MMOL/L (ref 7–16)
AST SERPL-CCNC: 94 U/L (ref 15–37)
BASOPHILS # BLD: 0.1 K/UL (ref 0–0.2)
BASOPHILS NFR BLD: 0 % (ref 0–2)
BILIRUB SERPL-MCNC: 5.6 MG/DL (ref 0–1.2)
BUN SERPL-MCNC: 24 MG/DL (ref 8–23)
CALCIUM SERPL-MCNC: 9.3 MG/DL (ref 8.8–10.2)
CHLORIDE SERPL-SCNC: 102 MMOL/L (ref 98–107)
CO2 SERPL-SCNC: 22 MMOL/L (ref 20–29)
CREAT SERPL-MCNC: 1.51 MG/DL (ref 0.8–1.3)
DIFFERENTIAL METHOD BLD: ABNORMAL
EOSINOPHIL # BLD: 0 K/UL (ref 0–0.8)
EOSINOPHIL NFR BLD: 0 % (ref 0.5–7.8)
ERYTHROCYTE [DISTWIDTH] IN BLOOD BY AUTOMATED COUNT: 13.9 % (ref 11.9–14.6)
EST. AVERAGE GLUCOSE BLD GHB EST-MCNC: 155 MG/DL
GLOBULIN SER CALC-MCNC: 3.8 G/DL (ref 2.3–3.5)
GLUCOSE BLD STRIP.AUTO-MCNC: 120 MG/DL (ref 65–100)
GLUCOSE BLD STRIP.AUTO-MCNC: 161 MG/DL (ref 65–100)
GLUCOSE BLD STRIP.AUTO-MCNC: 200 MG/DL (ref 65–100)
GLUCOSE BLD STRIP.AUTO-MCNC: 216 MG/DL (ref 65–100)
GLUCOSE SERPL-MCNC: 193 MG/DL (ref 70–99)
HBA1C MFR BLD: 7 % (ref 0–5.6)
HCT VFR BLD AUTO: 43.7 % (ref 41.1–50.3)
HGB BLD-MCNC: 14.6 G/DL (ref 13.6–17.2)
IMM GRANULOCYTES # BLD AUTO: 0.2 K/UL (ref 0–0.5)
IMM GRANULOCYTES NFR BLD AUTO: 1 % (ref 0–5)
LIPASE SERPL-CCNC: 517 U/L (ref 13–60)
LYMPHOCYTES # BLD: 1.6 K/UL (ref 0.5–4.6)
LYMPHOCYTES NFR BLD: 6 % (ref 13–44)
MAGNESIUM SERPL-MCNC: 1.6 MG/DL (ref 1.8–2.4)
MCH RBC QN AUTO: 33.3 PG (ref 26.1–32.9)
MCHC RBC AUTO-ENTMCNC: 33.4 G/DL (ref 31.4–35)
MCV RBC AUTO: 99.8 FL (ref 82–102)
MONOCYTES # BLD: 1.9 K/UL (ref 0.1–1.3)
MONOCYTES NFR BLD: 7 % (ref 4–12)
NEUTS SEG # BLD: 21.9 K/UL (ref 1.7–8.2)
NEUTS SEG NFR BLD: 85 % (ref 43–78)
NRBC # BLD: 0 K/UL (ref 0–0.2)
PLATELET # BLD AUTO: 234 K/UL (ref 150–450)
PMV BLD AUTO: 11.8 FL (ref 9.4–12.3)
POTASSIUM SERPL-SCNC: 4 MMOL/L (ref 3.5–5.1)
PROT SERPL-MCNC: 6.6 G/DL (ref 6.3–8.2)
RBC # BLD AUTO: 4.38 M/UL (ref 4.23–5.6)
SERVICE CMNT-IMP: ABNORMAL
SODIUM SERPL-SCNC: 140 MMOL/L (ref 136–145)
TRIGL SERPL-MCNC: 202 MG/DL (ref 0–150)
WBC # BLD AUTO: 25.7 K/UL (ref 4.3–11.1)

## 2024-12-21 PROCEDURE — 85025 COMPLETE CBC W/AUTO DIFF WBC: CPT

## 2024-12-21 PROCEDURE — A9579 GAD-BASE MR CONTRAST NOS,1ML: HCPCS | Performed by: INTERNAL MEDICINE

## 2024-12-21 PROCEDURE — 6360000002 HC RX W HCPCS: Performed by: INTERNAL MEDICINE

## 2024-12-21 PROCEDURE — 82962 GLUCOSE BLOOD TEST: CPT

## 2024-12-21 PROCEDURE — 99222 1ST HOSP IP/OBS MODERATE 55: CPT | Performed by: INTERNAL MEDICINE

## 2024-12-21 PROCEDURE — 36415 COLL VENOUS BLD VENIPUNCTURE: CPT

## 2024-12-21 PROCEDURE — 84478 ASSAY OF TRIGLYCERIDES: CPT

## 2024-12-21 PROCEDURE — 83690 ASSAY OF LIPASE: CPT

## 2024-12-21 PROCEDURE — 1100000000 HC RM PRIVATE

## 2024-12-21 PROCEDURE — 97161 PT EVAL LOW COMPLEX 20 MIN: CPT

## 2024-12-21 PROCEDURE — 74183 MRI ABD W/O CNTR FLWD CNTR: CPT

## 2024-12-21 PROCEDURE — 6360000004 HC RX CONTRAST MEDICATION: Performed by: INTERNAL MEDICINE

## 2024-12-21 PROCEDURE — 83036 HEMOGLOBIN GLYCOSYLATED A1C: CPT

## 2024-12-21 PROCEDURE — 6360000002 HC RX W HCPCS: Performed by: HOSPITALIST

## 2024-12-21 PROCEDURE — 83735 ASSAY OF MAGNESIUM: CPT

## 2024-12-21 PROCEDURE — 97530 THERAPEUTIC ACTIVITIES: CPT

## 2024-12-21 PROCEDURE — 80053 COMPREHEN METABOLIC PANEL: CPT

## 2024-12-21 PROCEDURE — 2500000003 HC RX 250 WO HCPCS: Performed by: INTERNAL MEDICINE

## 2024-12-21 PROCEDURE — 6370000000 HC RX 637 (ALT 250 FOR IP): Performed by: INTERNAL MEDICINE

## 2024-12-21 RX ORDER — HALOPERIDOL 5 MG/ML
2 INJECTION INTRAMUSCULAR ONCE
Status: COMPLETED | OUTPATIENT
Start: 2024-12-21 | End: 2024-12-21

## 2024-12-21 RX ADMIN — INSULIN LISPRO 6 UNITS: 100 INJECTION, SOLUTION INTRAVENOUS; SUBCUTANEOUS at 17:04

## 2024-12-21 RX ADMIN — LISINOPRIL 20 MG: 20 TABLET ORAL at 11:24

## 2024-12-21 RX ADMIN — GADOTERIDOL 17 ML: 279.3 INJECTION, SOLUTION INTRAVENOUS at 10:19

## 2024-12-21 RX ADMIN — SODIUM CHLORIDE, PRESERVATIVE FREE 10 ML: 5 INJECTION INTRAVENOUS at 08:32

## 2024-12-21 RX ADMIN — HYDROMORPHONE HYDROCHLORIDE 1 MG: 1 INJECTION, SOLUTION INTRAMUSCULAR; INTRAVENOUS; SUBCUTANEOUS at 21:43

## 2024-12-21 RX ADMIN — ENOXAPARIN SODIUM 40 MG: 100 INJECTION SUBCUTANEOUS at 08:32

## 2024-12-21 RX ADMIN — SODIUM CHLORIDE, PRESERVATIVE FREE 5 ML: 5 INJECTION INTRAVENOUS at 21:49

## 2024-12-21 RX ADMIN — TAMSULOSIN HYDROCHLORIDE 0.4 MG: 0.4 CAPSULE ORAL at 11:24

## 2024-12-21 RX ADMIN — INSULIN LISPRO 3 UNITS: 100 INJECTION, SOLUTION INTRAVENOUS; SUBCUTANEOUS at 08:33

## 2024-12-21 RX ADMIN — AMLODIPINE BESYLATE 5 MG: 5 TABLET ORAL at 11:24

## 2024-12-21 RX ADMIN — HALOPERIDOL LACTATE 2 MG: 5 INJECTION, SOLUTION INTRAMUSCULAR at 04:27

## 2024-12-21 NOTE — CONSULTS
GASTROENTEROLOGY CONSULT NOTE     CC: Acute pancreatitis     HPI:   Tobias Hall is 84 y.o. y/o male presenting with abdominal pain for 1 day associated with N/V. Denies fever or chills. Today he states abdominal pain has improved.     No current alcohol use, history of heavy alcohol use but stopped drinking about 10 years ago. No smoking history.     On admission lipase elevated to > 3000. CT scan showing gallstones and cystic lesion in the pancreatic body. T bili 6.9.       PE:   Vitals:    12/21/24 0745   BP: (!) 158/80   Pulse: 60   Resp: 16   Temp:    SpO2: 92%      General:  The patient is in no acute distress.    Respiratory: Respiratory effort is normal. Expansion maintained bilaterally and symmetrically.    Cardiovascular:  Regular rate and rhythm.     Abdomen:  Soft. Epigastric tenderness. No distention.    Extremities: No edema bilaterally. No erythema  Neurologic:  Alert       Labs:  Lab Results   Component Value Date    HGB 14.6 12/21/2024    WBC 25.7 (H) 12/21/2024     12/21/2024    MCV 99.8 12/21/2024    CREATININE 1.51 (H) 12/21/2024    ALT 88 (H) 12/21/2024    AST 94 (H) 12/21/2024       Assessment/Plan:   Acute pancreatitis and cystic lesion in the pancreatic body. Elevated t bili. MRCP results pending.     - Plan for EUS/ERCP Monday with Dr. Figueredo   - Continue supportive care with IVF and pain control   - Continue to advance diet as tolerated, will need to be npo Sunday night       Lorena Chester MD  [facility] Gastroenterology

## 2024-12-22 PROBLEM — R10.13 ABDOMINAL PAIN, EPIGASTRIC: Status: ACTIVE | Noted: 2024-12-22

## 2024-12-22 PROBLEM — I48.91 ATRIAL FIBRILLATION WITH RAPID VENTRICULAR RESPONSE (HCC): Status: ACTIVE | Noted: 2024-12-22

## 2024-12-22 LAB
ALBUMIN SERPL-MCNC: 2.5 G/DL (ref 3.2–4.6)
ALBUMIN/GLOB SERPL: 0.8 (ref 1–1.9)
ALP SERPL-CCNC: 131 U/L (ref 40–129)
ALT SERPL-CCNC: 81 U/L (ref 8–55)
ANION GAP SERPL CALC-SCNC: 13 MMOL/L (ref 7–16)
ANION GAP SERPL CALC-SCNC: 14 MMOL/L (ref 7–16)
APTT PPP: 98.9 SEC (ref 23.3–37.4)
AST SERPL-CCNC: 98 U/L (ref 15–37)
BASOPHILS # BLD: 0 K/UL (ref 0–0.2)
BASOPHILS NFR BLD: 0 % (ref 0–2)
BILIRUB SERPL-MCNC: 5.8 MG/DL (ref 0–1.2)
BUN SERPL-MCNC: 29 MG/DL (ref 8–23)
BUN SERPL-MCNC: 32 MG/DL (ref 8–23)
CALCIUM SERPL-MCNC: 9 MG/DL (ref 8.8–10.2)
CALCIUM SERPL-MCNC: 9 MG/DL (ref 8.8–10.2)
CHLORIDE SERPL-SCNC: 103 MMOL/L (ref 98–107)
CHLORIDE SERPL-SCNC: 99 MMOL/L (ref 98–107)
CO2 SERPL-SCNC: 24 MMOL/L (ref 20–29)
CO2 SERPL-SCNC: 24 MMOL/L (ref 20–29)
CREAT SERPL-MCNC: 1.35 MG/DL (ref 0.8–1.3)
CREAT SERPL-MCNC: 1.58 MG/DL (ref 0.8–1.3)
DIFFERENTIAL METHOD BLD: ABNORMAL
EKG ATRIAL RATE: 86 BPM
EKG DIAGNOSIS: NORMAL
EKG DIAGNOSIS: NORMAL
EKG P AXIS: 9 DEGREES
EKG P-R INTERVAL: 216 MS
EKG Q-T INTERVAL: 310 MS
EKG Q-T INTERVAL: 430 MS
EKG QRS DURATION: 118 MS
EKG QRS DURATION: 134 MS
EKG QTC CALCULATION (BAZETT): 507 MS
EKG QTC CALCULATION (BAZETT): 514 MS
EKG R AXIS: -72 DEGREES
EKG R AXIS: -76 DEGREES
EKG T AXIS: 53 DEGREES
EKG T AXIS: 84 DEGREES
EKG VENTRICULAR RATE: 161 BPM
EKG VENTRICULAR RATE: 86 BPM
EOSINOPHIL # BLD: 0 K/UL (ref 0–0.8)
EOSINOPHIL NFR BLD: 0 % (ref 0.5–7.8)
ERYTHROCYTE [DISTWIDTH] IN BLOOD BY AUTOMATED COUNT: 14.1 % (ref 11.9–14.6)
GLOBULIN SER CALC-MCNC: 3.3 G/DL (ref 2.3–3.5)
GLUCOSE BLD STRIP.AUTO-MCNC: 129 MG/DL (ref 65–100)
GLUCOSE BLD STRIP.AUTO-MCNC: 146 MG/DL (ref 65–100)
GLUCOSE BLD STRIP.AUTO-MCNC: 191 MG/DL (ref 65–100)
GLUCOSE BLD STRIP.AUTO-MCNC: 207 MG/DL (ref 65–100)
GLUCOSE BLD STRIP.AUTO-MCNC: 229 MG/DL (ref 65–100)
GLUCOSE SERPL-MCNC: 160 MG/DL (ref 70–99)
GLUCOSE SERPL-MCNC: 247 MG/DL (ref 70–99)
HCT VFR BLD AUTO: 42.2 % (ref 41.1–50.3)
HGB BLD-MCNC: 14.3 G/DL (ref 13.6–17.2)
IMM GRANULOCYTES # BLD AUTO: 0.2 K/UL (ref 0–0.5)
IMM GRANULOCYTES NFR BLD AUTO: 1 % (ref 0–5)
INR PPP: 1.2
LYMPHOCYTES # BLD: 1.5 K/UL (ref 0.5–4.6)
LYMPHOCYTES NFR BLD: 9 % (ref 13–44)
MAGNESIUM SERPL-MCNC: 1.7 MG/DL (ref 1.8–2.4)
MCH RBC QN AUTO: 33.3 PG (ref 26.1–32.9)
MCHC RBC AUTO-ENTMCNC: 33.9 G/DL (ref 31.4–35)
MCV RBC AUTO: 98.4 FL (ref 82–102)
MONOCYTES # BLD: 1.2 K/UL (ref 0.1–1.3)
MONOCYTES NFR BLD: 7 % (ref 4–12)
NEUTS SEG # BLD: 14.3 K/UL (ref 1.7–8.2)
NEUTS SEG NFR BLD: 83 % (ref 43–78)
NRBC # BLD: 0 K/UL (ref 0–0.2)
PLATELET # BLD AUTO: 221 K/UL (ref 150–450)
PMV BLD AUTO: 11.8 FL (ref 9.4–12.3)
POTASSIUM SERPL-SCNC: 3.6 MMOL/L (ref 3.5–5.1)
POTASSIUM SERPL-SCNC: 3.6 MMOL/L (ref 3.5–5.1)
PROT SERPL-MCNC: 5.8 G/DL (ref 6.3–8.2)
PROTHROMBIN TIME: 16 SEC (ref 11.3–14.9)
RBC # BLD AUTO: 4.29 M/UL (ref 4.23–5.6)
SERVICE CMNT-IMP: ABNORMAL
SODIUM SERPL-SCNC: 137 MMOL/L (ref 136–145)
SODIUM SERPL-SCNC: 140 MMOL/L (ref 136–145)
TSH W FREE THYROID IF ABNORMAL: 1.9 UIU/ML (ref 0.27–4.2)
UFH PPP CHRO-ACNC: 0.44 IU/ML (ref 0.3–0.7)
UFH PPP CHRO-ACNC: 0.71 IU/ML (ref 0.3–0.7)
UFH PPP CHRO-ACNC: 0.84 IU/ML (ref 0.3–0.7)
WBC # BLD AUTO: 17.2 K/UL (ref 4.3–11.1)

## 2024-12-22 PROCEDURE — 36415 COLL VENOUS BLD VENIPUNCTURE: CPT

## 2024-12-22 PROCEDURE — 82962 GLUCOSE BLOOD TEST: CPT

## 2024-12-22 PROCEDURE — 93005 ELECTROCARDIOGRAM TRACING: CPT | Performed by: INTERNAL MEDICINE

## 2024-12-22 PROCEDURE — 6360000002 HC RX W HCPCS: Performed by: INTERNAL MEDICINE

## 2024-12-22 PROCEDURE — 94760 N-INVAS EAR/PLS OXIMETRY 1: CPT

## 2024-12-22 PROCEDURE — 84443 ASSAY THYROID STIM HORMONE: CPT

## 2024-12-22 PROCEDURE — 99223 1ST HOSP IP/OBS HIGH 75: CPT | Performed by: INTERNAL MEDICINE

## 2024-12-22 PROCEDURE — 97166 OT EVAL MOD COMPLEX 45 MIN: CPT

## 2024-12-22 PROCEDURE — 93010 ELECTROCARDIOGRAM REPORT: CPT | Performed by: INTERNAL MEDICINE

## 2024-12-22 PROCEDURE — 2580000003 HC RX 258: Performed by: INTERNAL MEDICINE

## 2024-12-22 PROCEDURE — 97535 SELF CARE MNGMENT TRAINING: CPT

## 2024-12-22 PROCEDURE — 80053 COMPREHEN METABOLIC PANEL: CPT

## 2024-12-22 PROCEDURE — 85520 HEPARIN ASSAY: CPT

## 2024-12-22 PROCEDURE — 85730 THROMBOPLASTIN TIME PARTIAL: CPT

## 2024-12-22 PROCEDURE — 2500000003 HC RX 250 WO HCPCS: Performed by: INTERNAL MEDICINE

## 2024-12-22 PROCEDURE — 85025 COMPLETE CBC W/AUTO DIFF WBC: CPT

## 2024-12-22 PROCEDURE — 6370000000 HC RX 637 (ALT 250 FOR IP): Performed by: INTERNAL MEDICINE

## 2024-12-22 PROCEDURE — 83735 ASSAY OF MAGNESIUM: CPT

## 2024-12-22 PROCEDURE — 85610 PROTHROMBIN TIME: CPT

## 2024-12-22 PROCEDURE — 6370000000 HC RX 637 (ALT 250 FOR IP): Performed by: PHYSICIAN ASSISTANT

## 2024-12-22 PROCEDURE — 6360000002 HC RX W HCPCS: Performed by: CASE MANAGER/CARE COORDINATOR

## 2024-12-22 PROCEDURE — 97112 NEUROMUSCULAR REEDUCATION: CPT

## 2024-12-22 PROCEDURE — 2060000000 HC ICU INTERMEDIATE R&B

## 2024-12-22 RX ORDER — HEPARIN SODIUM 10000 [USP'U]/100ML
5-30 INJECTION, SOLUTION INTRAVENOUS CONTINUOUS
Status: DISCONTINUED | OUTPATIENT
Start: 2024-12-22 | End: 2024-12-25

## 2024-12-22 RX ORDER — HEPARIN SODIUM 1000 [USP'U]/ML
4000 INJECTION, SOLUTION INTRAVENOUS; SUBCUTANEOUS PRN
Status: DISCONTINUED | OUTPATIENT
Start: 2024-12-22 | End: 2024-12-25

## 2024-12-22 RX ORDER — DEXTROSE MONOHYDRATE 100 MG/ML
INJECTION, SOLUTION INTRAVENOUS CONTINUOUS PRN
Status: DISCONTINUED | OUTPATIENT
Start: 2024-12-22 | End: 2024-12-26 | Stop reason: HOSPADM

## 2024-12-22 RX ORDER — HEPARIN SODIUM 1000 [USP'U]/ML
2000 INJECTION, SOLUTION INTRAVENOUS; SUBCUTANEOUS PRN
Status: DISCONTINUED | OUTPATIENT
Start: 2024-12-22 | End: 2024-12-25

## 2024-12-22 RX ORDER — HEPARIN SODIUM 1000 [USP'U]/ML
4000 INJECTION, SOLUTION INTRAVENOUS; SUBCUTANEOUS ONCE
Status: COMPLETED | OUTPATIENT
Start: 2024-12-22 | End: 2024-12-22

## 2024-12-22 RX ORDER — MAGNESIUM SULFATE IN WATER 40 MG/ML
2000 INJECTION, SOLUTION INTRAVENOUS ONCE
Status: COMPLETED | OUTPATIENT
Start: 2024-12-22 | End: 2024-12-22

## 2024-12-22 RX ORDER — FUROSEMIDE 10 MG/ML
20 INJECTION INTRAMUSCULAR; INTRAVENOUS ONCE
Status: COMPLETED | OUTPATIENT
Start: 2024-12-22 | End: 2024-12-22

## 2024-12-22 RX ORDER — METOPROLOL TARTRATE 1 MG/ML
5 INJECTION, SOLUTION INTRAVENOUS
Status: COMPLETED | OUTPATIENT
Start: 2024-12-22 | End: 2024-12-22

## 2024-12-22 RX ORDER — TRAZODONE HYDROCHLORIDE 50 MG/1
25 TABLET, FILM COATED ORAL NIGHTLY PRN
Status: DISCONTINUED | OUTPATIENT
Start: 2024-12-22 | End: 2024-12-26 | Stop reason: HOSPADM

## 2024-12-22 RX ADMIN — SODIUM CHLORIDE 5 MG/HR: 900 INJECTION, SOLUTION INTRAVENOUS at 09:13

## 2024-12-22 RX ADMIN — METOPROLOL TARTRATE 5 MG: 5 INJECTION INTRAVENOUS at 08:19

## 2024-12-22 RX ADMIN — INSULIN LISPRO 3 UNITS: 100 INJECTION, SOLUTION INTRAVENOUS; SUBCUTANEOUS at 17:12

## 2024-12-22 RX ADMIN — TAMSULOSIN HYDROCHLORIDE 0.4 MG: 0.4 CAPSULE ORAL at 08:25

## 2024-12-22 RX ADMIN — LISINOPRIL 20 MG: 20 TABLET ORAL at 08:25

## 2024-12-22 RX ADMIN — SODIUM CHLORIDE 5 MG/HR: 900 INJECTION, SOLUTION INTRAVENOUS at 17:14

## 2024-12-22 RX ADMIN — MAGNESIUM SULFATE HEPTAHYDRATE 2000 MG: 40 INJECTION, SOLUTION INTRAVENOUS at 21:44

## 2024-12-22 RX ADMIN — HEPARIN SODIUM 4000 UNITS: 1000 INJECTION INTRAVENOUS; SUBCUTANEOUS at 09:03

## 2024-12-22 RX ADMIN — FUROSEMIDE 20 MG: 10 INJECTION, SOLUTION INTRAMUSCULAR; INTRAVENOUS at 08:25

## 2024-12-22 RX ADMIN — SODIUM CHLORIDE, PRESERVATIVE FREE 10 ML: 5 INJECTION INTRAVENOUS at 08:31

## 2024-12-22 RX ADMIN — SODIUM CHLORIDE, PRESERVATIVE FREE 5 ML: 5 INJECTION INTRAVENOUS at 20:58

## 2024-12-22 RX ADMIN — ENOXAPARIN SODIUM 40 MG: 100 INJECTION SUBCUTANEOUS at 08:24

## 2024-12-22 RX ADMIN — HEPARIN SODIUM 12 UNITS/KG/HR: 10000 INJECTION, SOLUTION INTRAVENOUS at 09:03

## 2024-12-22 RX ADMIN — TRAZODONE HYDROCHLORIDE 25 MG: 50 TABLET ORAL at 21:45

## 2024-12-22 ASSESSMENT — PAIN SCALES - PAIN ASSESSMENT IN ADVANCED DEMENTIA (PAINAD)
FACIALEXPRESSION: SMILING OR INEXPRESSIVE
TOTALSCORE: 0
BODYLANGUAGE: RELAXED
CONSOLABILITY: NO NEED TO CONSOLE
BREATHING: NORMAL

## 2024-12-22 ASSESSMENT — PAIN SCALES - GENERAL: PAINLEVEL_OUTOF10: 0

## 2024-12-22 NOTE — THERAPY EVALUATION
ACUTE PHYSICAL THERAPY GOALS:   (Developed with and agreed upon by patient and/or caregiver.)    (1.) Tobias Hall  will move from supine to sit and sit to supine , scoot up and down, and roll side to side with MODIFIED INDEPENDENCE within 7 treatment day(s).    (2.) Tobias Hall will transfer from bed to chair and chair to bed with SUPERVISION using the least restrictive device within 7 treatment day(s).    (3.) Tobias Hall will ambulate with SUPERVISION for 75 feet with the least restrictive device within 7 treatment day(s).   (4.) Tobias Hall will perform standing static and dynamic balance activities x 10 minutes with SUPERVISION to improve safety within 7 treatment day(s).  (5.) Tobias Hall will perform therapeutic exercises x 15 min for HEP with SUPERVISION to improve strength, endurance, and functional mobility within 7 treatment day(s).       PHYSICAL THERAPY Initial Assessment, Daily Note, and AM  (Link to Caseload Tracking: PT Visit Days : 1  Acknowledge Orders  Time In/Out  PT Charge Capture  Rehab Caseload Tracker    Tobias Hall is a 84 y.o. male   PRIMARY DIAGNOSIS: Acute pancreatitis without infection or necrosis  Abdominal pain, epigastric [R10.13]  Essential hypertension [I10]  Idiopathic acute pancreatitis without infection or necrosis [K85.00]  Acute pancreatitis without infection or necrosis [K85.90]       Reason for Referral: Generalized Muscle Weakness (M62.81)  Difficulty in walking, Not elsewhere classified (R26.2)  Inpatient: Payor: [facility] MEDICARE / Plan: [facility] MEDICARE-ADVANTAGE PPO / Product Type: Medicare /     ASSESSMENT:     REHAB RECOMMENDATIONS:   Recommendation to date pending progress:  Setting:  Short-term Rehab    Equipment:    To Be Determined     ASSESSMENT:  Mr. Hall is a 84 year old male who presents to [facility] with c/o abdominal pain and vomiting. He is diagnosed with acute pancreatitis w/o infection or necrosis. At 
  Feeding [] [] [] [] [] [] [] [] [] []    Grooming [] [] [] [] [] [x] [] [] [] [] Oral hygiene edge of bed    Personal Device Care [] [] [] [] [] [x] [] [] [] [] Denture care edge of bed    Functional Mobility [] [] [] [] [] [] [x] [] [] [] Sit to stand, stand pivot transfer to chair using rolling walker    I=Independent, Mod I=Modified Independent, S=Supervision/Setup, SBA=Standby Assistance, CGA=Contact Guard Assistance, Min=Minimal Assistance, Mod=Moderate Assistance, Max=Maximal Assistance, Total=Total Assistance, NT=Not Tested    PLAN:   FREQUENCY/DURATION   OT Plan of Care: 3 times/week for duration of hospital stay or until stated goals are met, whichever comes first.    PROBLEM LIST:   (Skilled intervention is medically necessary to address:)  Decreased ADL/Functional Activities  Decreased Activity Tolerance  Decreased AROM/PROM  Decreased Balance  Decreased Cognition  Decreased Coordination  Decreased Gait Ability  Decreased Safety Awareness  Decreased Strength  Decreased Transfer Abilities   INTERVENTIONS PLANNED:  (Benefits and precautions of occupational therapy have been discussed with the patient.)  Self Care Training  Therapeutic Activity  Therapeutic Exercise/HEP  Neuromuscular Re-education  Manual Therapy  Education         TREATMENT:     EVALUATION: MODERATE COMPLEXITY: (Untimed Charge)  The initial evaluation charge encompasses clinical chart review, objective assessment, interpretation of assessment, and skilled monitoring of the patient's response to treatment in order to develop a plan of care.     TREATMENT:   Neuromuscular Re-education (10 Minutes): Patient participated in neuromuscular re-education including functional reaching, weight shifting, postural training, midline training, fine motor skills training, standing tolerance activity , and sitting balance activity   with moderate and maximal assistance, verbal cues, tactile cues, visual cues, and education to improve sitting balance,

## 2024-12-22 NOTE — ICUWATCH
Rapid Response Team Note      Subjective: Responded to Rapid Response secondary to AF RVR.    Summary: Patient admitted primarily under the hospitalist service for acute pancreatitis. Throughout the night and this morning, HR's have been wavering with auscultation revealing irregular HR. Telemetry was applied and revealed irregular rhythm with HR's 140's-180's. RAPID RESPONSE was called overhead.    On arrival, patient is lethargic, but able to answer questions and follow commands appropriately. Slightly delayed responses. Unlabored, regular breathing on RA. Zoll defibrillator showing irregular rhythm, appears AF with RVR with HR's as high as 180.     Dr. Menezes responded to the RR. Additional IV access was obtained and 5mg of Lopressor was given. HR's trended down to 100-120's, with occassional burst into the 160's.    See Rapid Response/Code Blue Narrator for full documentation    Outcome:  Decision was made to transfer to telemetry floor for further anti-arrhythmic management. Orders for Cardizem gtt and consultation to Cardiology service placed.      Fran Cordova RN  [facility]: [phone]  [facility]: [phone]

## 2024-12-22 NOTE — CONSULTS
[facility] Cardiology Initial Cardiac Evaluation      Date of  Admission: 12/19/2024  7:31 PM     Primary Care Physician: Unknown, Provider  Primary Cardiologist: none  Referring Physician: Dr. Menezes  Supervising Physician: Dr. Pearl    CC/Reason for evaluation: new onset Afib RVR    HPI:  Tobias Hall is a 84 y.o. male with prior medical history of HLD, T2DM, HTN, CKD 3a, spinal stenosis, BPH. The pt presented to [facility] on 12/19 with abd with vomiting. Workup in the ED revealed: WBC 13.7, Lipase >3000. CT abd/pelvis with findings concerning for pancreatitis or colitis and mild small bowel distention suggestive of regional ileus. The pt was admitted, started on IVF, made NPO. MRCP ordered with abnormalities concerning for pancreatitis with or without nonspecific L colitis. GI was consulted 12/21 and tentatively planned for EUS/ERCP on 12/23. Supportive care was continued.     On 12/22 AM the pt was noted to have high HR on nursing vital sign checks. Telemetry was placed and pt was found to be in Afib RVR with -180s. Rapid Response was called. The pt was lethargic, and pt was given 5mg IV Lopressor. HR then decreased to 100-120s with occasional bursts into 160s per Rapid response note. -130/90s. Cardizem gtt and heparin gtt were ordered. Given the above Cardiology consulted for further recommendations. Pt w/out hx of afib, known heart problems. Feels slightly lightheaded, dizzy, no CP, SOB or other acute complaints.       Recent Cardiac Synopsis:  10/17/24 TTE   Interpretation Summary    Left Ventricle: Normal left ventricular systolic function with a visually estimated EF of 60 - 65%. Left ventricle size is normal. Normal wall thickness. Normal wall motion. Normal diastolic function.    Aortic Valve: Trileaflet valve. Mild sclerosis of the aortic valve cusps. No significant stenosis.    -No significant valvular heart disease noted.  The likely source for her murmur is her aortic valve

## 2024-12-22 NOTE — FLOWSHEET NOTE
12/22/24 0900   Dual Clinician Skin Assessment   Dual Skin Assessment (4 Eyes) WDL   Second Clinical  (First and Last Name) BAIRON Alvares   Skin Integumentary    Skin Integumentary (WDL) X   Skin Color Jaundice;Ecchymosis (comment);Manuel;Blanchable erythema   Skin Condition/Temp Warm;Dry;Flaky;Fragile   Skin Integrity Ecchymosis;Scars (comment);Redness   Location reddness on sacrum, scattered scars and bruising   Wound Prevention and Protection Methods   Location of Wound Prevention Coccyx   Orientation of Wound Prevention Mid;Posterior   Dressing Present  No   Wound Offloading (Prevention Methods) Bed, pressure reduction mattress     4 Eyes Skin Assessment     NAME:  Tobias Hall  YOB: 1940  MEDICAL RECORD NUMBER:  [id number]    The patient is being assessed for  Admission    I agree that at least one RN has performed a thorough Head to Toe Skin Assessment on the patient. ALL assessment sites listed below have been assessed.      Areas assessed by both nurses:    Head, Face, Ears, Shoulders, Back, Chest, Arms, Elbows, Hands, Sacrum. Buttock, Coccyx, Ischium, and Legs. Feet and Heels        Does the Patient have a Wound? No noted wound(s)       Khai Prevention initiated by RN: No  Wound Care Orders initiated by RN: No    Pressure Injury (Stage 3,4, Unstageable, DTI, NWPT, and Complex wounds) if present, place Wound referral order by RN under : No    New Ostomies, if present place, Ostomy referral order under : No     Nurse 1 eSignature: Electronically signed by Priyanka Gomez RN on 12/22/24 at 9:42 AM EST    **SHARE this note so that the co-signing nurse can place an eSignature**    Nurse 2 eSignature: Electronically signed by Giorgio Mackenzie RN on 12/22/24 at 9:43 AM EST

## 2024-12-23 ENCOUNTER — ANESTHESIA EVENT (OUTPATIENT)
Dept: ENDOSCOPY | Age: 84
DRG: 417 | End: 2024-12-23
Payer: MEDICARE

## 2024-12-23 ENCOUNTER — ANESTHESIA (OUTPATIENT)
Dept: ENDOSCOPY | Age: 84
DRG: 417 | End: 2024-12-23
Payer: MEDICARE

## 2024-12-23 ENCOUNTER — APPOINTMENT (OUTPATIENT)
Dept: GENERAL RADIOLOGY | Age: 84
DRG: 417 | End: 2024-12-23
Payer: MEDICARE

## 2024-12-23 ENCOUNTER — APPOINTMENT (OUTPATIENT)
Dept: NON INVASIVE DIAGNOSTICS | Age: 84
DRG: 417 | End: 2024-12-23
Payer: MEDICARE

## 2024-12-23 PROBLEM — K83.1 BILIARY OBSTRUCTION: Status: ACTIVE | Noted: 2024-12-19

## 2024-12-23 LAB
ALBUMIN SERPL-MCNC: 2.3 G/DL (ref 3.2–4.6)
ALBUMIN/GLOB SERPL: 0.6 (ref 1–1.9)
ALP SERPL-CCNC: 124 U/L (ref 40–129)
ALT SERPL-CCNC: 84 U/L (ref 8–55)
ANION GAP SERPL CALC-SCNC: 14 MMOL/L (ref 7–16)
AST SERPL-CCNC: 122 U/L (ref 15–37)
BASOPHILS # BLD: 0 K/UL (ref 0–0.2)
BASOPHILS NFR BLD: 0 % (ref 0–2)
BILIRUB SERPL-MCNC: 7.2 MG/DL (ref 0–1.2)
BUN SERPL-MCNC: 37 MG/DL (ref 8–23)
CALCIUM SERPL-MCNC: 8.7 MG/DL (ref 8.8–10.2)
CHLORIDE SERPL-SCNC: 98 MMOL/L (ref 98–107)
CO2 SERPL-SCNC: 25 MMOL/L (ref 20–29)
CREAT SERPL-MCNC: 1.59 MG/DL (ref 0.8–1.3)
DIFFERENTIAL METHOD BLD: ABNORMAL
ECHO BSA: 1.95 M2
ECHO LV EDV A2C: 93 ML
ECHO LV EDV A4C: 102 ML
ECHO LV EDV INDEX A4C: 53 ML/M2
ECHO LV EDV NDEX A2C: 49 ML/M2
ECHO LV EJECTION FRACTION A2C: 57 %
ECHO LV EJECTION FRACTION A4C: 57 %
ECHO LV EJECTION FRACTION BIPLANE: 56 % (ref 55–100)
ECHO LV ESV A2C: 40 ML
ECHO LV ESV A4C: 44 ML
ECHO LV ESV INDEX A2C: 21 ML/M2
ECHO LV ESV INDEX A4C: 23 ML/M2
ECHO LV FRACTIONAL SHORTENING: 32 % (ref 28–44)
ECHO LV INTERNAL DIMENSION DIASTOLE INDEX: 2.15 CM/M2
ECHO LV INTERNAL DIMENSION DIASTOLIC: 4.1 CM (ref 4.2–5.9)
ECHO LV INTERNAL DIMENSION SYSTOLIC INDEX: 1.47 CM/M2
ECHO LV INTERNAL DIMENSION SYSTOLIC: 2.8 CM
ECHO LV IVSD: 0.9 CM (ref 0.6–1)
ECHO LV MASS 2D: 105.6 G (ref 88–224)
ECHO LV MASS INDEX 2D: 55.3 G/M2 (ref 49–115)
ECHO LV POSTERIOR WALL DIASTOLIC: 0.8 CM (ref 0.6–1)
ECHO LV RELATIVE WALL THICKNESS RATIO: 0.39
EOSINOPHIL # BLD: 0 K/UL (ref 0–0.8)
EOSINOPHIL NFR BLD: 0 % (ref 0.5–7.8)
ERYTHROCYTE [DISTWIDTH] IN BLOOD BY AUTOMATED COUNT: 14.1 % (ref 11.9–14.6)
GLOBULIN SER CALC-MCNC: 3.7 G/DL (ref 2.3–3.5)
GLUCOSE BLD STRIP.AUTO-MCNC: 136 MG/DL (ref 65–100)
GLUCOSE BLD STRIP.AUTO-MCNC: 144 MG/DL (ref 65–100)
GLUCOSE BLD STRIP.AUTO-MCNC: 156 MG/DL (ref 65–100)
GLUCOSE BLD STRIP.AUTO-MCNC: 157 MG/DL (ref 65–100)
GLUCOSE BLD STRIP.AUTO-MCNC: 158 MG/DL (ref 65–100)
GLUCOSE BLD STRIP.AUTO-MCNC: 159 MG/DL (ref 65–100)
GLUCOSE SERPL-MCNC: 184 MG/DL (ref 70–99)
HCT VFR BLD AUTO: 38.1 % (ref 41.1–50.3)
HGB BLD-MCNC: 13.3 G/DL (ref 13.6–17.2)
IMM GRANULOCYTES # BLD AUTO: 0.1 K/UL (ref 0–0.5)
IMM GRANULOCYTES NFR BLD AUTO: 1 % (ref 0–5)
LYMPHOCYTES # BLD: 1.5 K/UL (ref 0.5–4.6)
LYMPHOCYTES NFR BLD: 11 % (ref 13–44)
MAGNESIUM SERPL-MCNC: 2.3 MG/DL (ref 1.8–2.4)
MCH RBC QN AUTO: 33.9 PG (ref 26.1–32.9)
MCHC RBC AUTO-ENTMCNC: 34.9 G/DL (ref 31.4–35)
MCV RBC AUTO: 97.2 FL (ref 82–102)
MONOCYTES # BLD: 1.1 K/UL (ref 0.1–1.3)
MONOCYTES NFR BLD: 8 % (ref 4–12)
NEUTS SEG # BLD: 11.5 K/UL (ref 1.7–8.2)
NEUTS SEG NFR BLD: 80 % (ref 43–78)
NRBC # BLD: 0 K/UL (ref 0–0.2)
PLATELET # BLD AUTO: 242 K/UL (ref 150–450)
PMV BLD AUTO: 11.7 FL (ref 9.4–12.3)
POTASSIUM SERPL-SCNC: 3.3 MMOL/L (ref 3.5–5.1)
PROT SERPL-MCNC: 6 G/DL (ref 6.3–8.2)
RBC # BLD AUTO: 3.92 M/UL (ref 4.23–5.6)
SERVICE CMNT-IMP: ABNORMAL
SODIUM SERPL-SCNC: 137 MMOL/L (ref 136–145)
UFH PPP CHRO-ACNC: 0.32 IU/ML (ref 0.3–0.7)
WBC # BLD AUTO: 14.2 K/UL (ref 4.3–11.1)

## 2024-12-23 PROCEDURE — 2500000003 HC RX 250 WO HCPCS: Performed by: PHYSICIAN ASSISTANT

## 2024-12-23 PROCEDURE — 6370000000 HC RX 637 (ALT 250 FOR IP): Performed by: PHYSICIAN ASSISTANT

## 2024-12-23 PROCEDURE — 99232 SBSQ HOSP IP/OBS MODERATE 35: CPT | Performed by: INTERNAL MEDICINE

## 2024-12-23 PROCEDURE — 3700000001 HC ADD 15 MINUTES (ANESTHESIA): Performed by: INTERNAL MEDICINE

## 2024-12-23 PROCEDURE — 85520 HEPARIN ASSAY: CPT

## 2024-12-23 PROCEDURE — 6370000000 HC RX 637 (ALT 250 FOR IP): Performed by: INTERNAL MEDICINE

## 2024-12-23 PROCEDURE — 94760 N-INVAS EAR/PLS OXIMETRY 1: CPT

## 2024-12-23 PROCEDURE — 6360000002 HC RX W HCPCS: Performed by: NURSE ANESTHETIST, CERTIFIED REGISTERED

## 2024-12-23 PROCEDURE — 6360000002 HC RX W HCPCS: Performed by: INTERNAL MEDICINE

## 2024-12-23 PROCEDURE — 43259 EGD US EXAM DUODENUM/JEJUNUM: CPT | Performed by: INTERNAL MEDICINE

## 2024-12-23 PROCEDURE — 2580000003 HC RX 258: Performed by: INTERNAL MEDICINE

## 2024-12-23 PROCEDURE — 3609018500 HC EGD US SCOPE W/ADJACENT STRUCTURES: Performed by: INTERNAL MEDICINE

## 2024-12-23 PROCEDURE — 2060000000 HC ICU INTERMEDIATE R&B

## 2024-12-23 PROCEDURE — 0DJ08ZZ INSPECTION OF UPPER INTESTINAL TRACT, VIA NATURAL OR ARTIFICIAL OPENING ENDOSCOPIC: ICD-10-PCS | Performed by: INTERNAL MEDICINE

## 2024-12-23 PROCEDURE — 83735 ASSAY OF MAGNESIUM: CPT

## 2024-12-23 PROCEDURE — 6360000004 HC RX CONTRAST MEDICATION: Performed by: PHYSICIAN ASSISTANT

## 2024-12-23 PROCEDURE — 2700000000 HC OXYGEN THERAPY PER DAY

## 2024-12-23 PROCEDURE — 7100000001 HC PACU RECOVERY - ADDTL 15 MIN: Performed by: INTERNAL MEDICINE

## 2024-12-23 PROCEDURE — 2500000003 HC RX 250 WO HCPCS: Performed by: INTERNAL MEDICINE

## 2024-12-23 PROCEDURE — 3700000000 HC ANESTHESIA ATTENDED CARE: Performed by: INTERNAL MEDICINE

## 2024-12-23 PROCEDURE — 36415 COLL VENOUS BLD VENIPUNCTURE: CPT

## 2024-12-23 PROCEDURE — 93308 TTE F-UP OR LMTD: CPT | Performed by: INTERNAL MEDICINE

## 2024-12-23 PROCEDURE — C1753 CATH, INTRAVAS ULTRASOUND: HCPCS | Performed by: INTERNAL MEDICINE

## 2024-12-23 PROCEDURE — 2709999900 HC NON-CHARGEABLE SUPPLY: Performed by: INTERNAL MEDICINE

## 2024-12-23 PROCEDURE — 80053 COMPREHEN METABOLIC PANEL: CPT

## 2024-12-23 PROCEDURE — 82962 GLUCOSE BLOOD TEST: CPT

## 2024-12-23 PROCEDURE — 7100000000 HC PACU RECOVERY - FIRST 15 MIN: Performed by: INTERNAL MEDICINE

## 2024-12-23 PROCEDURE — C8924 2D TTE W OR W/O FOL W/CON,FU: HCPCS

## 2024-12-23 PROCEDURE — 2580000003 HC RX 258: Performed by: NURSE ANESTHETIST, CERTIFIED REGISTERED

## 2024-12-23 PROCEDURE — 0FJB8ZZ INSPECTION OF HEPATOBILIARY DUCT, VIA NATURAL OR ARTIFICIAL OPENING ENDOSCOPIC: ICD-10-PCS | Performed by: INTERNAL MEDICINE

## 2024-12-23 PROCEDURE — 85025 COMPLETE CBC W/AUTO DIFF WBC: CPT

## 2024-12-23 RX ORDER — SODIUM CHLORIDE, SODIUM LACTATE, POTASSIUM CHLORIDE, CALCIUM CHLORIDE 600; 310; 30; 20 MG/100ML; MG/100ML; MG/100ML; MG/100ML
INJECTION, SOLUTION INTRAVENOUS
Status: DISCONTINUED | OUTPATIENT
Start: 2024-12-23 | End: 2024-12-23 | Stop reason: SDUPTHER

## 2024-12-23 RX ORDER — PHENYLEPHRINE HYDROCHLORIDE 10 MG/ML
INJECTION INTRAVENOUS
Status: DISCONTINUED | OUTPATIENT
Start: 2024-12-23 | End: 2024-12-23 | Stop reason: SDUPTHER

## 2024-12-23 RX ORDER — LIDOCAINE HYDROCHLORIDE 20 MG/ML
INJECTION, SOLUTION EPIDURAL; INFILTRATION; INTRACAUDAL; PERINEURAL
Status: DISCONTINUED | OUTPATIENT
Start: 2024-12-23 | End: 2024-12-23 | Stop reason: SDUPTHER

## 2024-12-23 RX ORDER — PROPOFOL 10 MG/ML
INJECTION, EMULSION INTRAVENOUS
Status: DISCONTINUED | OUTPATIENT
Start: 2024-12-23 | End: 2024-12-23 | Stop reason: SDUPTHER

## 2024-12-23 RX ORDER — DEXAMETHASONE SODIUM PHOSPHATE 4 MG/ML
INJECTION, SOLUTION INTRA-ARTICULAR; INTRALESIONAL; INTRAMUSCULAR; INTRAVENOUS; SOFT TISSUE
Status: DISCONTINUED | OUTPATIENT
Start: 2024-12-23 | End: 2024-12-23 | Stop reason: SDUPTHER

## 2024-12-23 RX ORDER — DILTIAZEM HYDROCHLORIDE 120 MG/1
120 CAPSULE, COATED, EXTENDED RELEASE ORAL DAILY
Status: DISCONTINUED | OUTPATIENT
Start: 2024-12-23 | End: 2024-12-26 | Stop reason: HOSPADM

## 2024-12-23 RX ORDER — INDOCYANINE GREEN AND WATER 25 MG
1.25 KIT INJECTION ONCE
Status: COMPLETED | OUTPATIENT
Start: 2024-12-24 | End: 2024-12-24

## 2024-12-23 RX ORDER — SUCCINYLCHOLINE CHLORIDE 20 MG/ML
INJECTION INTRAMUSCULAR; INTRAVENOUS
Status: DISCONTINUED | OUTPATIENT
Start: 2024-12-23 | End: 2024-12-23 | Stop reason: SDUPTHER

## 2024-12-23 RX ORDER — ONDANSETRON 2 MG/ML
INJECTION INTRAMUSCULAR; INTRAVENOUS
Status: DISCONTINUED | OUTPATIENT
Start: 2024-12-23 | End: 2024-12-23 | Stop reason: SDUPTHER

## 2024-12-23 RX ADMIN — SODIUM CHLORIDE, SODIUM LACTATE, POTASSIUM CHLORIDE, AND CALCIUM CHLORIDE: 600; 310; 30; 20 INJECTION, SOLUTION INTRAVENOUS at 19:23

## 2024-12-23 RX ADMIN — LIDOCAINE HYDROCHLORIDE 100 MG: 20 INJECTION, SOLUTION EPIDURAL; INFILTRATION; INTRACAUDAL; PERINEURAL at 19:30

## 2024-12-23 RX ADMIN — Medication 180 MG: at 19:30

## 2024-12-23 RX ADMIN — POTASSIUM CHLORIDE 10 MEQ: 7.46 INJECTION, SOLUTION INTRAVENOUS at 06:04

## 2024-12-23 RX ADMIN — PHENYLEPHRINE HYDROCHLORIDE 100 MCG: 10 INJECTION INTRAVENOUS at 19:41

## 2024-12-23 RX ADMIN — HEPARIN SODIUM 11 UNITS/KG/HR: 10000 INJECTION, SOLUTION INTRAVENOUS at 12:41

## 2024-12-23 RX ADMIN — DILTIAZEM HYDROCHLORIDE 120 MG: 120 CAPSULE, COATED, EXTENDED RELEASE ORAL at 08:44

## 2024-12-23 RX ADMIN — SODIUM CHLORIDE, PRESERVATIVE FREE 0.66 ML: 5 INJECTION INTRAVENOUS at 12:01

## 2024-12-23 RX ADMIN — SODIUM CHLORIDE 5 MG/HR: 900 INJECTION, SOLUTION INTRAVENOUS at 07:55

## 2024-12-23 RX ADMIN — ONDANSETRON 4 MG: 2 INJECTION INTRAMUSCULAR; INTRAVENOUS at 19:35

## 2024-12-23 RX ADMIN — PROPOFOL 160 MG: 10 INJECTION, EMULSION INTRAVENOUS at 19:30

## 2024-12-23 RX ADMIN — LISINOPRIL 20 MG: 20 TABLET ORAL at 07:57

## 2024-12-23 RX ADMIN — SODIUM CHLORIDE, PRESERVATIVE FREE 10 ML: 5 INJECTION INTRAVENOUS at 08:03

## 2024-12-23 RX ADMIN — SODIUM CHLORIDE, PRESERVATIVE FREE 10 ML: 5 INJECTION INTRAVENOUS at 21:20

## 2024-12-23 RX ADMIN — TAMSULOSIN HYDROCHLORIDE 0.4 MG: 0.4 CAPSULE ORAL at 07:57

## 2024-12-23 RX ADMIN — POTASSIUM CHLORIDE 10 MEQ: 7.46 INJECTION, SOLUTION INTRAVENOUS at 09:05

## 2024-12-23 RX ADMIN — POTASSIUM CHLORIDE 10 MEQ: 7.46 INJECTION, SOLUTION INTRAVENOUS at 06:55

## 2024-12-23 RX ADMIN — HYDROMORPHONE HYDROCHLORIDE 1 MG: 1 INJECTION, SOLUTION INTRAMUSCULAR; INTRAVENOUS; SUBCUTANEOUS at 02:21

## 2024-12-23 RX ADMIN — POTASSIUM CHLORIDE 10 MEQ: 7.46 INJECTION, SOLUTION INTRAVENOUS at 07:55

## 2024-12-23 RX ADMIN — PHENOL 1 SPRAY: 1.5 LIQUID ORAL at 01:27

## 2024-12-23 RX ADMIN — DEXAMETHASONE SODIUM PHOSPHATE 4 MG: 4 INJECTION, SOLUTION INTRAMUSCULAR; INTRAVENOUS at 19:35

## 2024-12-23 ASSESSMENT — PAIN SCALES - PAIN ASSESSMENT IN ADVANCED DEMENTIA (PAINAD)
FACIALEXPRESSION: SMILING OR INEXPRESSIVE
CONSOLABILITY: NO NEED TO CONSOLE
BREATHING: NORMAL
BODYLANGUAGE: RELAXED
BODYLANGUAGE: RIGID, FISTS CLENCHED, KNEES UP, PUSHING/PULLING AWAY, STRIKES OUT
TOTALSCORE: 0
TOTALSCORE: 0
FACIALEXPRESSION: SMILING OR INEXPRESSIVE
TOTALSCORE: 7
BODYLANGUAGE: RELAXED
FACIALEXPRESSION: SMILING OR INEXPRESSIVE
CONSOLABILITY: NO NEED TO CONSOLE
BREATHING: NORMAL
TOTALSCORE: 0
FACIALEXPRESSION: FACIAL GRIMACING
BREATHING: NORMAL
CONSOLABILITY: NO NEED TO CONSOLE
BODYLANGUAGE: RELAXED
BODYLANGUAGE: RELAXED
CONSOLABILITY: NO NEED TO CONSOLE
TOTALSCORE: 0
CONSOLABILITY: DISTRACTED OR REASSURED BY VOICE/TOUCH
FACIALEXPRESSION: SMILING OR INEXPRESSIVE
FACIALEXPRESSION: SMILING OR INEXPRESSIVE
CONSOLABILITY: NO NEED TO CONSOLE
FACIALEXPRESSION: SMILING OR INEXPRESSIVE
BREATHING: NORMAL
CONSOLABILITY: NO NEED TO CONSOLE
NEGVOCALIZATION: OCCASIONAL MOAN/GROAN, LOW SPEECH, NEGATIVE/DISAPPROVING QUALITY
BODYLANGUAGE: RELAXED
BODYLANGUAGE: RELAXED
TOTALSCORE: 0
CONSOLABILITY: NO NEED TO CONSOLE
TOTALSCORE: 0
TOTALSCORE: 0
TOTALSCORE: 2
BODYLANGUAGE: TENSE, DISTRESSED PACING, FIDGETING
BREATHING: OCCASIONAL LABORED BREATHING, SHORT PERIOD OF HYPERVENTILATION
CONSOLABILITY: NO NEED TO CONSOLE
NEGVOCALIZATION: OCCASIONAL MOAN/GROAN, LOW SPEECH, NEGATIVE/DISAPPROVING QUALITY
BODYLANGUAGE: RELAXED
BREATHING: NORMAL
TOTALSCORE: 0
BREATHING: NORMAL
BREATHING: NORMAL
BODYLANGUAGE: RELAXED
CONSOLABILITY: NO NEED TO CONSOLE
BREATHING: NORMAL
BREATHING: NORMAL

## 2024-12-23 ASSESSMENT — PAIN SCALES - GENERAL
PAINLEVEL_OUTOF10: 0
PAINLEVEL_OUTOF10: 7
PAINLEVEL_OUTOF10: 0
PAINLEVEL_OUTOF10: 2

## 2024-12-23 ASSESSMENT — PAIN DESCRIPTION - DESCRIPTORS: DESCRIPTORS: SORE

## 2024-12-23 ASSESSMENT — PAIN - FUNCTIONAL ASSESSMENT: PAIN_FUNCTIONAL_ASSESSMENT: 0-10

## 2024-12-23 ASSESSMENT — PAIN DESCRIPTION - LOCATION
LOCATION: THROAT
LOCATION: ABDOMEN;BACK

## 2024-12-23 NOTE — ICUWATCH
RRT Clinical Rounding Nurse Progress Report      SUBJECTIVE: Called to assess patient secondary to Recent rapid response.      Vitals:    12/22/24 1134 12/22/24 1638 12/22/24 2024 12/22/24 2103   BP:  (!) 155/86 (!) 160/91 139/78   Pulse: (!) 109 83 94 98   Resp: 18 18 16    Temp: 98.4 °F (36.9 °C) 98.1 °F (36.7 °C) 98.8 °F (37.1 °C)    TempSrc: Oral Oral Axillary    SpO2: 90% 91% 90%    Weight:       Height:              ASSESSMENT:  On arrival to room, I found patient to be resting in bed quietly. Patient is oriented X 2. Denies major pain or SOB. Respirations even and unlabored. Patient remains on cardizem infusion with heart rate showing 90s. No needs or concerns expressed at this time.    PLAN:  Recent labs/notes/vitals reviewed, and patient discussed with primary RN. No concern per primary RN. Will follow per RRT Clinical Rounding Program protocol. Primary RN to call with concerns.    Jose Parry RN  [place]: [phone]

## 2024-12-23 NOTE — ANESTHESIA PRE PROCEDURE
Department of Anesthesiology  Preprocedure Note       Name:  Tobias Hall   Age:  84 y.o.  :  1940                                          MRN:  [id number]         Date:  2024      Surgeon: Surgeon(s):  Karolina Figueredo MD    Procedure: Procedure(s):  ENDOSCOPIC RETROGRADE CHOLANGIOPANCREATOGRAPHY  ENDOSCOPIC ULTRASOUND    Medications prior to admission:   Prior to Admission medications    Medication Sig Start Date End Date Taking? Authorizing Provider   amLODIPine (NORVASC) 5 MG tablet Take 1 tablet by mouth daily   Yes Kamari Zapien MD   benazepril (LOTENSIN) 10 MG tablet Take 1 tablet by mouth daily 24  Yes Norma Oshea PA-C   tamsulosin (FLOMAX) 0.4 MG capsule Take 1 capsule by mouth daily 24  Yes Norma Oshea PA-C   acetaminophen (TYLENOL) 500 MG tablet Take 2 tablets by mouth every 6 hours as needed for Pain (moderate)  Patient not taking: Reported on 2024    Kamari Zapien MD       Current medications:    Current Facility-Administered Medications   Medication Dose Route Frequency Provider Last Rate Last Admin    Benzocaine-Menthol (CEPACOL SORE THROAT) 15-2.6 MG lozenge 1 lozenge  1 lozenge Oral Q2H PRN Linette Duran PA        phenol 1.4 % mouth spray 1 spray  1 spray Mouth/Throat Q2H PRN Linette Duran PA   1 spray at 24 0127    dilTIAZem (CARDIZEM CD) extended release capsule 120 mg  120 mg Oral Daily Max Pearl MD   120 mg at 24 0844    [Held by provider] dilTIAZem 100 mg in sodium chloride 0.9 % 100 mL infusion (ADD-Gaffney)  2.5-15 mg/hr IntraVENous Continuous Ciesco, Ric, DO   Stopped at 24 0842    heparin (porcine) injection 4,000 Units  4,000 Units IntraVENous PRN Ciesco, Ric, DO        heparin (porcine) injection 2,000 Units  2,000 Units IntraVENous PRN Ciesco, Ric, DO        heparin 25,000 units in dextrose 5% 250 mL (premix) infusion  5-30 Units/kg/hr IntraVENous Continuous Ciesco, Ric, DO   Stopped at

## 2024-12-23 NOTE — ICUWATCH
RRT Clinical Rounding Nurse Update    Vitals:    12/22/24 2024 12/22/24 2103 12/23/24 0008 12/23/24 0350   BP: (!) 160/91 139/78 128/76 110/73   Pulse: 94 98 83 80   Resp: 16  17 18   Temp: 98.8 °F (37.1 °C)  98.4 °F (36.9 °C) 99.5 °F (37.5 °C)   TempSrc: Axillary  Axillary Axillary   SpO2: 90%  93% 96%   Weight:       Height:            ASSESSMENT:  Previous outreach assessment was reviewed. There have been no significant clinical changes since the completion of the last dated Outreach assessment. Patient currently in sinus rhythm on exam. BP stable. Patient is on diltiazem at 7.5mg/hr.    PLAN:  Will follow per RRT Clinical Rounding Program protocol.    Jose Parry RN  [place]: [phone]

## 2024-12-24 ENCOUNTER — ANESTHESIA EVENT (OUTPATIENT)
Dept: SURGERY | Age: 84
DRG: 417 | End: 2024-12-24
Payer: MEDICARE

## 2024-12-24 ENCOUNTER — ANESTHESIA (OUTPATIENT)
Dept: SURGERY | Age: 84
DRG: 417 | End: 2024-12-24
Payer: MEDICARE

## 2024-12-24 PROBLEM — K85.10 ACUTE BILIARY PANCREATITIS: Status: ACTIVE | Noted: 2024-12-20

## 2024-12-24 LAB
ALBUMIN SERPL-MCNC: 2.3 G/DL (ref 3.2–4.6)
ALBUMIN/GLOB SERPL: 0.7 (ref 1–1.9)
ALP SERPL-CCNC: 148 U/L (ref 40–129)
ALT SERPL-CCNC: 104 U/L (ref 8–55)
ANION GAP SERPL CALC-SCNC: 16 MMOL/L (ref 7–16)
AST SERPL-CCNC: 134 U/L (ref 15–37)
BASOPHILS # BLD: 0 K/UL (ref 0–0.2)
BASOPHILS NFR BLD: 0 % (ref 0–2)
BILIRUB SERPL-MCNC: 7.7 MG/DL (ref 0–1.2)
BUN SERPL-MCNC: 35 MG/DL (ref 8–23)
CALCIUM SERPL-MCNC: 8.6 MG/DL (ref 8.8–10.2)
CHLORIDE SERPL-SCNC: 101 MMOL/L (ref 98–107)
CO2 SERPL-SCNC: 24 MMOL/L (ref 20–29)
CREAT SERPL-MCNC: 1.51 MG/DL (ref 0.8–1.3)
DIFFERENTIAL METHOD BLD: ABNORMAL
EOSINOPHIL # BLD: 0 K/UL (ref 0–0.8)
EOSINOPHIL NFR BLD: 0 % (ref 0.5–7.8)
ERYTHROCYTE [DISTWIDTH] IN BLOOD BY AUTOMATED COUNT: 14.5 % (ref 11.9–14.6)
GLOBULIN SER CALC-MCNC: 3.3 G/DL (ref 2.3–3.5)
GLUCOSE BLD STRIP.AUTO-MCNC: 177 MG/DL (ref 65–100)
GLUCOSE BLD STRIP.AUTO-MCNC: 197 MG/DL (ref 65–100)
GLUCOSE BLD STRIP.AUTO-MCNC: 218 MG/DL (ref 65–100)
GLUCOSE BLD STRIP.AUTO-MCNC: 237 MG/DL (ref 65–100)
GLUCOSE SERPL-MCNC: 237 MG/DL (ref 70–99)
HCT VFR BLD AUTO: 40.5 % (ref 41.1–50.3)
HGB BLD-MCNC: 13.7 G/DL (ref 13.6–17.2)
IMM GRANULOCYTES # BLD AUTO: 0.2 K/UL (ref 0–0.5)
IMM GRANULOCYTES NFR BLD AUTO: 1 % (ref 0–5)
LYMPHOCYTES # BLD: 0.9 K/UL (ref 0.5–4.6)
LYMPHOCYTES NFR BLD: 7 % (ref 13–44)
MCH RBC QN AUTO: 33.3 PG (ref 26.1–32.9)
MCHC RBC AUTO-ENTMCNC: 33.8 G/DL (ref 31.4–35)
MCV RBC AUTO: 98.5 FL (ref 82–102)
MONOCYTES # BLD: 0.6 K/UL (ref 0.1–1.3)
MONOCYTES NFR BLD: 4 % (ref 4–12)
NEUTS SEG # BLD: 11.3 K/UL (ref 1.7–8.2)
NEUTS SEG NFR BLD: 88 % (ref 43–78)
NRBC # BLD: 0 K/UL (ref 0–0.2)
PLATELET # BLD AUTO: 266 K/UL (ref 150–450)
PMV BLD AUTO: 11.9 FL (ref 9.4–12.3)
POTASSIUM SERPL-SCNC: 3.8 MMOL/L (ref 3.5–5.1)
PROT SERPL-MCNC: 5.6 G/DL (ref 6.3–8.2)
RBC # BLD AUTO: 4.11 M/UL (ref 4.23–5.6)
SERVICE CMNT-IMP: ABNORMAL
SODIUM SERPL-SCNC: 141 MMOL/L (ref 136–145)
UFH PPP CHRO-ACNC: 0.21 IU/ML (ref 0.3–0.7)
UFH PPP CHRO-ACNC: <0.1 IU/ML (ref 0.3–0.7)
WBC # BLD AUTO: 12.9 K/UL (ref 4.3–11.1)

## 2024-12-24 PROCEDURE — 7100000001 HC PACU RECOVERY - ADDTL 15 MIN: Performed by: SURGERY

## 2024-12-24 PROCEDURE — 2500000003 HC RX 250 WO HCPCS: Performed by: NURSE ANESTHETIST, CERTIFIED REGISTERED

## 2024-12-24 PROCEDURE — 6360000002 HC RX W HCPCS: Performed by: SURGERY

## 2024-12-24 PROCEDURE — 92610 EVALUATE SWALLOWING FUNCTION: CPT

## 2024-12-24 PROCEDURE — 1100000003 HC PRIVATE W/ TELEMETRY

## 2024-12-24 PROCEDURE — 2700000000 HC OXYGEN THERAPY PER DAY

## 2024-12-24 PROCEDURE — 6370000000 HC RX 637 (ALT 250 FOR IP): Performed by: PHYSICIAN ASSISTANT

## 2024-12-24 PROCEDURE — 6360000002 HC RX W HCPCS: Performed by: NURSE ANESTHETIST, CERTIFIED REGISTERED

## 2024-12-24 PROCEDURE — 8E0W4CZ ROBOTIC ASSISTED PROCEDURE OF TRUNK REGION, PERCUTANEOUS ENDOSCOPIC APPROACH: ICD-10-PCS | Performed by: SURGERY

## 2024-12-24 PROCEDURE — 3600000019 HC SURGERY ROBOT ADDTL 15MIN: Performed by: SURGERY

## 2024-12-24 PROCEDURE — 36415 COLL VENOUS BLD VENIPUNCTURE: CPT

## 2024-12-24 PROCEDURE — 6370000000 HC RX 637 (ALT 250 FOR IP): Performed by: INTERNAL MEDICINE

## 2024-12-24 PROCEDURE — C1889 IMPLANT/INSERT DEVICE, NOC: HCPCS | Performed by: SURGERY

## 2024-12-24 PROCEDURE — 99223 1ST HOSP IP/OBS HIGH 75: CPT | Performed by: SURGERY

## 2024-12-24 PROCEDURE — 85025 COMPLETE CBC W/AUTO DIFF WBC: CPT

## 2024-12-24 PROCEDURE — 47562 LAPAROSCOPIC CHOLECYSTECTOMY: CPT | Performed by: SURGERY

## 2024-12-24 PROCEDURE — 3700000001 HC ADD 15 MINUTES (ANESTHESIA): Performed by: SURGERY

## 2024-12-24 PROCEDURE — 6360000002 HC RX W HCPCS: Performed by: ANESTHESIOLOGY

## 2024-12-24 PROCEDURE — 85520 HEPARIN ASSAY: CPT

## 2024-12-24 PROCEDURE — 2500000003 HC RX 250 WO HCPCS: Performed by: SURGERY

## 2024-12-24 PROCEDURE — S2900 ROBOTIC SURGICAL SYSTEM: HCPCS | Performed by: SURGERY

## 2024-12-24 PROCEDURE — 2580000003 HC RX 258: Performed by: NURSE ANESTHETIST, CERTIFIED REGISTERED

## 2024-12-24 PROCEDURE — 2709999900 HC NON-CHARGEABLE SUPPLY: Performed by: SURGERY

## 2024-12-24 PROCEDURE — 2500000003 HC RX 250 WO HCPCS: Performed by: INTERNAL MEDICINE

## 2024-12-24 PROCEDURE — 2720000010 HC SURG SUPPLY STERILE: Performed by: SURGERY

## 2024-12-24 PROCEDURE — 7100000000 HC PACU RECOVERY - FIRST 15 MIN: Performed by: SURGERY

## 2024-12-24 PROCEDURE — 92523 SPEECH SOUND LANG COMPREHEN: CPT

## 2024-12-24 PROCEDURE — 0FT44ZZ RESECTION OF GALLBLADDER, PERCUTANEOUS ENDOSCOPIC APPROACH: ICD-10-PCS | Performed by: SURGERY

## 2024-12-24 PROCEDURE — 3700000000 HC ANESTHESIA ATTENDED CARE: Performed by: SURGERY

## 2024-12-24 PROCEDURE — 3600000009 HC SURGERY ROBOT BASE: Performed by: SURGERY

## 2024-12-24 PROCEDURE — 99232 SBSQ HOSP IP/OBS MODERATE 35: CPT | Performed by: INTERNAL MEDICINE

## 2024-12-24 PROCEDURE — 80053 COMPREHEN METABOLIC PANEL: CPT

## 2024-12-24 PROCEDURE — 82962 GLUCOSE BLOOD TEST: CPT

## 2024-12-24 DEVICE — CLIP INT L POLYMER LOK LIG HEM O LOK (6EA/PK): Type: IMPLANTABLE DEVICE | Site: ABDOMEN | Status: FUNCTIONAL

## 2024-12-24 RX ORDER — SODIUM CHLORIDE, SODIUM LACTATE, POTASSIUM CHLORIDE, CALCIUM CHLORIDE 600; 310; 30; 20 MG/100ML; MG/100ML; MG/100ML; MG/100ML
INJECTION, SOLUTION INTRAVENOUS CONTINUOUS
Status: DISCONTINUED | OUTPATIENT
Start: 2024-12-24 | End: 2024-12-24 | Stop reason: HOSPADM

## 2024-12-24 RX ORDER — HALOPERIDOL 5 MG/ML
1 INJECTION INTRAMUSCULAR
Status: DISCONTINUED | OUTPATIENT
Start: 2024-12-24 | End: 2024-12-24 | Stop reason: HOSPADM

## 2024-12-24 RX ORDER — BUPIVACAINE HYDROCHLORIDE 5 MG/ML
INJECTION, SOLUTION EPIDURAL; INTRACAUDAL PRN
Status: DISCONTINUED | OUTPATIENT
Start: 2024-12-24 | End: 2024-12-24 | Stop reason: HOSPADM

## 2024-12-24 RX ORDER — SODIUM CHLORIDE, SODIUM LACTATE, POTASSIUM CHLORIDE, CALCIUM CHLORIDE 600; 310; 30; 20 MG/100ML; MG/100ML; MG/100ML; MG/100ML
INJECTION, SOLUTION INTRAVENOUS
Status: DISCONTINUED | OUTPATIENT
Start: 2024-12-24 | End: 2024-12-24 | Stop reason: SDUPTHER

## 2024-12-24 RX ORDER — MAGNESIUM HYDROXIDE 1200 MG/15ML
LIQUID ORAL CONTINUOUS PRN
Status: DISCONTINUED | OUTPATIENT
Start: 2024-12-24 | End: 2024-12-24 | Stop reason: HOSPADM

## 2024-12-24 RX ORDER — FENTANYL CITRATE 50 UG/ML
INJECTION, SOLUTION INTRAMUSCULAR; INTRAVENOUS
Status: DISCONTINUED | OUTPATIENT
Start: 2024-12-24 | End: 2024-12-24 | Stop reason: SDUPTHER

## 2024-12-24 RX ORDER — LABETALOL HYDROCHLORIDE 5 MG/ML
10 INJECTION, SOLUTION INTRAVENOUS
Status: COMPLETED | OUTPATIENT
Start: 2024-12-24 | End: 2024-12-24

## 2024-12-24 RX ORDER — NALOXONE HYDROCHLORIDE 0.4 MG/ML
INJECTION, SOLUTION INTRAMUSCULAR; INTRAVENOUS; SUBCUTANEOUS PRN
Status: DISCONTINUED | OUTPATIENT
Start: 2024-12-24 | End: 2024-12-24 | Stop reason: HOSPADM

## 2024-12-24 RX ORDER — SODIUM CHLORIDE 9 MG/ML
INJECTION, SOLUTION INTRAVENOUS PRN
Status: DISCONTINUED | OUTPATIENT
Start: 2024-12-24 | End: 2024-12-24 | Stop reason: HOSPADM

## 2024-12-24 RX ORDER — EPHEDRINE SULFATE 5 MG/ML
INJECTION INTRAVENOUS
Status: DISCONTINUED | OUTPATIENT
Start: 2024-12-24 | End: 2024-12-24 | Stop reason: SDUPTHER

## 2024-12-24 RX ORDER — SODIUM CHLORIDE 0.9 % (FLUSH) 0.9 %
5-40 SYRINGE (ML) INJECTION EVERY 12 HOURS SCHEDULED
Status: DISCONTINUED | OUTPATIENT
Start: 2024-12-24 | End: 2024-12-24 | Stop reason: HOSPADM

## 2024-12-24 RX ORDER — LIDOCAINE HYDROCHLORIDE 20 MG/ML
INJECTION, SOLUTION EPIDURAL; INFILTRATION; INTRACAUDAL; PERINEURAL
Status: DISCONTINUED | OUTPATIENT
Start: 2024-12-24 | End: 2024-12-24 | Stop reason: SDUPTHER

## 2024-12-24 RX ORDER — DEXTROSE MONOHYDRATE 100 MG/ML
INJECTION, SOLUTION INTRAVENOUS CONTINUOUS PRN
Status: DISCONTINUED | OUTPATIENT
Start: 2024-12-24 | End: 2024-12-24 | Stop reason: SDUPTHER

## 2024-12-24 RX ORDER — PROPOFOL 10 MG/ML
INJECTION, EMULSION INTRAVENOUS
Status: DISCONTINUED | OUTPATIENT
Start: 2024-12-24 | End: 2024-12-24 | Stop reason: SDUPTHER

## 2024-12-24 RX ORDER — NEOSTIGMINE METHYLSULFATE 1 MG/ML
INJECTION INTRAVENOUS
Status: DISCONTINUED | OUTPATIENT
Start: 2024-12-24 | End: 2024-12-24 | Stop reason: SDUPTHER

## 2024-12-24 RX ORDER — VECURONIUM BROMIDE 1 MG/ML
INJECTION, POWDER, LYOPHILIZED, FOR SOLUTION INTRAVENOUS
Status: DISCONTINUED | OUTPATIENT
Start: 2024-12-24 | End: 2024-12-24 | Stop reason: SDUPTHER

## 2024-12-24 RX ORDER — SODIUM CHLORIDE 0.9 % (FLUSH) 0.9 %
5-40 SYRINGE (ML) INJECTION PRN
Status: DISCONTINUED | OUTPATIENT
Start: 2024-12-24 | End: 2024-12-24 | Stop reason: HOSPADM

## 2024-12-24 RX ORDER — DEXAMETHASONE SODIUM PHOSPHATE 4 MG/ML
INJECTION, SOLUTION INTRA-ARTICULAR; INTRALESIONAL; INTRAMUSCULAR; INTRAVENOUS; SOFT TISSUE
Status: DISCONTINUED | OUTPATIENT
Start: 2024-12-24 | End: 2024-12-24 | Stop reason: SDUPTHER

## 2024-12-24 RX ORDER — ONDANSETRON 2 MG/ML
4 INJECTION INTRAMUSCULAR; INTRAVENOUS
Status: DISCONTINUED | OUTPATIENT
Start: 2024-12-24 | End: 2024-12-24 | Stop reason: HOSPADM

## 2024-12-24 RX ORDER — ONDANSETRON 2 MG/ML
INJECTION INTRAMUSCULAR; INTRAVENOUS
Status: DISCONTINUED | OUTPATIENT
Start: 2024-12-24 | End: 2024-12-24 | Stop reason: SDUPTHER

## 2024-12-24 RX ORDER — GLYCOPYRROLATE 0.2 MG/ML
INJECTION INTRAMUSCULAR; INTRAVENOUS
Status: DISCONTINUED | OUTPATIENT
Start: 2024-12-24 | End: 2024-12-24 | Stop reason: SDUPTHER

## 2024-12-24 RX ORDER — OXYCODONE HYDROCHLORIDE 5 MG/1
5 TABLET ORAL
Status: DISCONTINUED | OUTPATIENT
Start: 2024-12-24 | End: 2024-12-24 | Stop reason: HOSPADM

## 2024-12-24 RX ORDER — METOPROLOL TARTRATE 1 MG/ML
INJECTION, SOLUTION INTRAVENOUS
Status: DISCONTINUED | OUTPATIENT
Start: 2024-12-24 | End: 2024-12-24 | Stop reason: SDUPTHER

## 2024-12-24 RX ORDER — IBUPROFEN 600 MG/1
1 TABLET ORAL PRN
Status: DISCONTINUED | OUTPATIENT
Start: 2024-12-24 | End: 2024-12-24 | Stop reason: HOSPADM

## 2024-12-24 RX ADMIN — LABETALOL HYDROCHLORIDE 10 MG: 5 INJECTION INTRAVENOUS at 10:25

## 2024-12-24 RX ADMIN — LIDOCAINE HYDROCHLORIDE 60 MG: 20 INJECTION, SOLUTION EPIDURAL; INFILTRATION; INTRACAUDAL; PERINEURAL at 08:12

## 2024-12-24 RX ADMIN — SODIUM CHLORIDE, SODIUM LACTATE, POTASSIUM CHLORIDE, AND CALCIUM CHLORIDE: 600; 310; 30; 20 INJECTION, SOLUTION INTRAVENOUS at 08:52

## 2024-12-24 RX ADMIN — LISINOPRIL 20 MG: 20 TABLET ORAL at 13:45

## 2024-12-24 RX ADMIN — DEXAMETHASONE SODIUM PHOSPHATE 4 MG: 4 INJECTION INTRA-ARTICULAR; INTRALESIONAL; INTRAMUSCULAR; INTRAVENOUS; SOFT TISSUE at 09:18

## 2024-12-24 RX ADMIN — CEFAZOLIN 2000 MG: 3 INJECTION, POWDER, FOR SOLUTION INTRAMUSCULAR; INTRAVENOUS at 08:31

## 2024-12-24 RX ADMIN — VECURONIUM BROMIDE 10 MG: 1 INJECTION, POWDER, LYOPHILIZED, FOR SOLUTION INTRAVENOUS at 08:12

## 2024-12-24 RX ADMIN — ONDANSETRON 4 MG: 2 INJECTION INTRAMUSCULAR; INTRAVENOUS at 09:18

## 2024-12-24 RX ADMIN — TRAZODONE HYDROCHLORIDE 25 MG: 50 TABLET ORAL at 20:31

## 2024-12-24 RX ADMIN — SODIUM CHLORIDE, SODIUM LACTATE, POTASSIUM CHLORIDE, AND CALCIUM CHLORIDE: 600; 310; 30; 20 INJECTION, SOLUTION INTRAVENOUS at 08:00

## 2024-12-24 RX ADMIN — METOPROLOL TARTRATE 1 MG: 1 INJECTION, SOLUTION INTRAVENOUS at 09:19

## 2024-12-24 RX ADMIN — PROPOFOL 100 MG: 10 INJECTION, EMULSION INTRAVENOUS at 08:12

## 2024-12-24 RX ADMIN — DILTIAZEM HYDROCHLORIDE 120 MG: 120 CAPSULE, COATED, EXTENDED RELEASE ORAL at 13:45

## 2024-12-24 RX ADMIN — FENTANYL CITRATE 50 MCG: 50 INJECTION, SOLUTION INTRAMUSCULAR; INTRAVENOUS at 09:06

## 2024-12-24 RX ADMIN — INSULIN LISPRO 3 UNITS: 100 INJECTION, SOLUTION INTRAVENOUS; SUBCUTANEOUS at 16:55

## 2024-12-24 RX ADMIN — PHENYLEPHRINE HYDROCHLORIDE 200 MCG: 10 INJECTION INTRAVENOUS at 08:33

## 2024-12-24 RX ADMIN — INSULIN LISPRO 3 UNITS: 100 INJECTION, SOLUTION INTRAVENOUS; SUBCUTANEOUS at 20:31

## 2024-12-24 RX ADMIN — ACETAMINOPHEN 1000 MG: 500 TABLET, FILM COATED ORAL at 20:31

## 2024-12-24 RX ADMIN — SODIUM CHLORIDE, PRESERVATIVE FREE 10 ML: 5 INJECTION INTRAVENOUS at 13:46

## 2024-12-24 RX ADMIN — GLYCOPYRROLATE 0.4 MG: 0.2 INJECTION INTRAMUSCULAR; INTRAVENOUS at 09:40

## 2024-12-24 RX ADMIN — SODIUM CHLORIDE, PRESERVATIVE FREE 5 ML: 5 INJECTION INTRAVENOUS at 20:44

## 2024-12-24 RX ADMIN — NEOSTIGMINE METHYLSULFATE 3 MG: 1 INJECTION INTRAVENOUS at 09:40

## 2024-12-24 RX ADMIN — FENTANYL CITRATE 50 MCG: 50 INJECTION, SOLUTION INTRAMUSCULAR; INTRAVENOUS at 09:11

## 2024-12-24 RX ADMIN — EPHEDRINE SULFATE 10 MG: 5 INJECTION INTRAVENOUS at 08:35

## 2024-12-24 RX ADMIN — INDOCYANINE GREEN AND WATER 1.25 MG: KIT at 07:55

## 2024-12-24 RX ADMIN — EPHEDRINE SULFATE 10 MG: 5 INJECTION INTRAVENOUS at 08:30

## 2024-12-24 RX ADMIN — TAMSULOSIN HYDROCHLORIDE 0.4 MG: 0.4 CAPSULE ORAL at 13:45

## 2024-12-24 RX ADMIN — METOPROLOL TARTRATE 1 MG: 1 INJECTION, SOLUTION INTRAVENOUS at 09:23

## 2024-12-24 RX ADMIN — HYDROMORPHONE HYDROCHLORIDE 0.25 MG: 1 INJECTION, SOLUTION INTRAMUSCULAR; INTRAVENOUS; SUBCUTANEOUS at 10:32

## 2024-12-24 ASSESSMENT — PAIN SCALES - PAIN ASSESSMENT IN ADVANCED DEMENTIA (PAINAD)
CONSOLABILITY: DISTRACTED OR REASSURED BY VOICE/TOUCH
TOTALSCORE: 0
TOTALSCORE: 1
CONSOLABILITY: NO NEED TO CONSOLE
FACIALEXPRESSION: SMILING OR INEXPRESSIVE
CONSOLABILITY: NO NEED TO CONSOLE
BREATHING: NORMAL
BREATHING: NORMAL
BODYLANGUAGE: TENSE, DISTRESSED PACING, FIDGETING
TOTALSCORE: 2
BREATHING: NORMAL
BODYLANGUAGE: TENSE, DISTRESSED PACING, FIDGETING
BODYLANGUAGE: RELAXED

## 2024-12-24 ASSESSMENT — PAIN DESCRIPTION - ORIENTATION: ORIENTATION: ANTERIOR

## 2024-12-24 ASSESSMENT — PAIN - FUNCTIONAL ASSESSMENT
PAIN_FUNCTIONAL_ASSESSMENT: NONE - DENIES PAIN
PAIN_FUNCTIONAL_ASSESSMENT: NONE - DENIES PAIN
PAIN_FUNCTIONAL_ASSESSMENT: 0-10

## 2024-12-24 ASSESSMENT — PAIN SCALES - GENERAL
PAINLEVEL_OUTOF10: 7
PAINLEVEL_OUTOF10: 6

## 2024-12-24 ASSESSMENT — PAIN DESCRIPTION - DESCRIPTORS: DESCRIPTORS: SORE

## 2024-12-24 ASSESSMENT — PAIN DESCRIPTION - LOCATION: LOCATION: ABDOMEN

## 2024-12-24 NOTE — ANESTHESIA PRE PROCEDURE
Dental:    (+) poor dentition and upper dentures      Pulmonary: breath sounds clear to auscultation  (+)     sleep apnea: on noncompliant,   decreased breath sounds                               Cardiovascular:  Exercise tolerance: poor (<4 METS)  (+) hypertension:, dysrhythmias: atrial fibrillation, hyperlipidemia        Rhythm: regular  Rate: normal  Echocardiogram reviewed               ROS comment: Afib w RVR during this hospitalization      ·  Left Ventricle: Normal left ventricular systolic function with a visually estimated EF of 55 - 60%. Left ventricle size is normal. Normal wall thickness. Normal wall motion.  ·  Image quality is suboptimal. Contrast used: Definity.  ·  Limited study for EF and wall motion    Bifascicular block     Neuro/Psych:                ROS comment: Chronic LBP GI/Hepatic/Renal:   (+) liver disease:, renal disease (CKD3): CRI         ROS comment: Mild transaminitis    Hyperbilirubinemia    Acute Pancreatitis.   Endo/Other:    (+) DiabetesType II DM, blood dyscrasia (Heparin gtt stopped at 0602): anticoagulation therapy, arthritis (gout): OA..                 Abdominal:             Vascular:          Other Findings: Lethargic            Anesthesia Plan      general     ASA 3 - emergent     (GETA  Most questions answered by wife, patient lethargic  Heparin gtt stopped at 0602 today. Surgeon aware, plan to only hold for 2 hours prior to incision)  Induction: intravenous and rapid sequence.    MIPS: Postoperative opioids intended.  Anesthetic plan and risks discussed with patient and spouse.      Plan discussed with CRNA.                    Alise Humphrey MD   12/24/2024

## 2024-12-24 NOTE — ANESTHESIA POSTPROCEDURE EVALUATION
Department of Anesthesiology  Postprocedure Note    Patient: Tobias Hall  MRN: [id number]  YOB: 1940  Date of evaluation: 12/23/2024    Procedure Summary       Date: 12/23/24 Room / Location: [facility] ENDO FLOURO 1 / [facility] ENDOSCOPY    Anesthesia Start: 1923 Anesthesia Stop: 2003    Procedure: ENDOSCOPIC ULTRASOUND (Upper GI Region) Diagnosis:       Biliary obstruction      (Biliary obstruction [K83.1])    Surgeons: Karolina Figueredo MD Responsible Provider: Josias Zeng Jr., MD    Anesthesia Type: General ASA Status: 3            Anesthesia Type: General    Chula Phase I: Chula Score: 8    Chula Phase II:      Anesthesia Post Evaluation    Patient location during evaluation: PACU  Patient participation: complete - patient participated  Level of consciousness: awake  Pain score: 0  Airway patency: patent  Nausea & Vomiting: no nausea and no vomiting  Cardiovascular status: blood pressure returned to baseline and hemodynamically stable  Respiratory status: acceptable, spontaneous ventilation and nonlabored ventilation  Hydration status: euvolemic  Multimodal analgesia pain management approach  Pain management: adequate    No notable events documented.

## 2024-12-24 NOTE — ANESTHESIA PROCEDURE NOTES
Airway  Date/Time: 12/24/2024 8:14 AM  Urgency: elective    Airway not difficult    General Information and Staff    Patient location during procedure: OR  Resident/CRNA: Tesfaye Paulson APRN - CRNA  Performed: resident/CRNA/CAA   Performed by: Tesfaye Paulson APRN - CRNA  Authorized by: Alise Humphrey MD      Indications and Patient Condition  Indications for airway management: anesthesia  Spontaneous Ventilation: absent  Sedation level: deep  Preoxygenated: yes  Patient position: sniffing  MILS maintained throughout  Mask difficulty assessment: vent by bag mask + OA or adjuvant +/- NMBA    Final Airway Details  Final airway type: endotracheal airway      Successful airway: ETT  Cuffed: yes   Successful intubation technique: direct laryngoscopy  Facilitating devices/methods: intubating stylet  Endotracheal tube insertion site: oral  Blade: Gino  Blade size: #4  ETT size (mm): 8.0  Cormack-Lehane Classification: grade I - full view of glottis  Placement verified by: chest auscultation   Measured from: teeth  Number of attempts at approach: 1  Ventilation between attempts: bag mask  Number of other approaches attempted: 0    no

## 2024-12-24 NOTE — H&P
Tesfaye Huitron MD   General and Robotic surgery  [address] [place] [address]  Phone [phone]   Fax [phone]          Name: Tobias Hall      MRN: [id number]       : 1940       Age: 84 y.o.    Sex: male        PCP: Unknown, Provider     CC/Reason for admission:    Chief Complaint   Patient presents with    Abdominal Pain         HPI:     Tobias Hall is a 84 y.o. male who presented with upper abdominal pain, N/V.  No previous episodes like this.  CT with gallstones and cyst in pancreas.  T.bili 6.9.  Underwent EUS by Dr. Figueredo which revealed normal CBD.  WBC 12.9 <-- 25.  Lipase 517 <--  >3000.   Abdominal pain resolved on my visit this AM.      Has been on heparin gtt for Afib RVR.            PMH:    Past Medical History:   Diagnosis Date    Arthritis     Calculus of kidney 2013    Chronic kidney disease     Chronic kidney disease, stage III (moderate) (HCC) 2013    Fracture of coccyx (HCC) 2016    Gout 2020    Gout, unspecified 2013    History of left shoulder fracture 2016    Hyperlipidemia 2016    Hypertension     Microscopic hematuria 2013    Sciatica of right side 2016    Sleep apnea     does not wear cpap    Unspecified essential hypertension 2013    Urinary tract infection, site not specified 2013       PSH:    Past Surgical History:   Procedure Laterality Date    LITHOTRIPSY      LUMBAR FUSION  2018    L5-S1 fusion and L5-S1 laminectomy with hardware    SHOULDER ARTHROPLASTY Left 2014    left shoulder    TOTAL HIP ARTHROPLASTY Left        MEDS:    Current Facility-Administered Medications   Medication Dose Route Frequency Provider Last Rate Last Admin    Benzocaine-Menthol (CEPACOL SORE THROAT) 15-2.6 MG lozenge 1 lozenge  1 lozenge Oral Q2H PRN Linette Duran PA        phenol 1.4 % mouth spray 1 spray  1 spray Mouth/Throat Q2H PRN Linette Duran PA   1 spray at 24 0127    
Brief GI Note    Patient scheduled for a ERCP tomorrow. Will likely consider EUS simultaneously in light of cystic lesion seen on imagine .Please call with urgent/acute questions    Karolina Figueredo MD  Gastroenterology and Interventional Endoscopy    
Lotensin 10 mg daily    BPH   patient does use Flomax 0.4 mg daily    DVT prophylaxis: Lovenox    CODE STATUS full code            Non-peripheral Lines and Tubes (if present):             Hospital Problems:  Principal Problem:    Acute pancreatitis without infection or necrosis  Resolved Problems:    * No resolved hospital problems. *        Objective:   Patient Vitals for the past 24 hrs:   Temp Pulse Resp BP SpO2   12/20/24 0050 -- -- -- (!) 157/89 92 %   12/20/24 0015 -- -- -- (!) 166/108 94 %   12/20/24 0000 -- -- -- (!) 169/129 94 %   12/19/24 2318 -- -- -- (!) 176/104 97 %   12/19/24 2300 -- -- -- (!) 164/112 95 %   12/19/24 2257 -- 90 -- -- --   12/19/24 2245 -- -- -- (!) 214/115 97 %   12/19/24 2230 -- -- -- (!) 192/119 98 %   12/19/24 2200 -- -- -- (!) 188/101 95 %   12/19/24 2145 -- -- -- (!) 190/111 94 %   12/19/24 2130 -- -- -- (!) 192/113 94 %   12/19/24 2100 -- -- -- (!) 197/113 97 %   12/19/24 1832 -- -- -- (!) 163/102 --   12/19/24 1830 97.5 °F (36.4 °C) 85 18 -- 96 %       Oxygen Therapy  SpO2: 92 %  Pulse via Oximetry: 103 beats per minute  O2 Device: None (Room air)    Estimated body mass index is 29.05 kg/m² as calculated from the following:    Height as of this encounter: 1.676 m (5' 6\").    Weight as of this encounter: 81.6 kg (180 lb).  No intake or output data in the 24 hours ending 12/20/24 0135      Physical Exam:  Vital signs: 214/663 17 6168 5 and 90%    General:    Well nourished.    Head:  Normocephalic, atraumatic  Eyes:  Sclerae appear normal.  Pupils equally round.  ENT:  Nares appear normal.  Moist oral mucosa  Neck:  No restricted ROM.  Trachea midline   CV:   RRR.  No m/r/g.  No jugular venous distension.  Lungs:   CTAB.  No wheezing, rhonchi, or rales.  Symmetric expansion.  Abdomen:   Soft, nontender, nondistended.  Extremities: No cyanosis or clubbing.  No edema  Skin:     No rashes.  Normal coloration.   Warm and dry.    Neuro:  CN II-XII grossly intact.  Sensation intact.

## 2024-12-24 NOTE — PERIOP NOTE
Dr. Figueredo notified that pt on heparin drip at this time. Per Dr. Figueredo, pause heparin now and bump case to end of day today. BAIRON Edmondson notified.   
Notified MD Milagro of BP 200s/100s. Per provider, orders placed for labetolol.  
TRANSFER - IN REPORT:    Verbal report received from BAIRON Edmondson on Tobias Hall  being received from Room 426 for ordered procedure      Report consisted of patient's Situation, Background, Assessment and   Recommendations(SBAR).     Information from the following report(s) Nurse Handoff Report, Index, ED Encounter Summary, ED SBAR, Adult Overview, Surgery Report, Intake/Output, MAR, Recent Results, Pre Procedure Checklist, Procedure Verification, Quality Measures, Neuro Assessment, and Event Log was reviewed with the receiving nurse.    Opportunity for questions and clarification was provided.      Assessment completed upon patient's arrival to unit and care assumed.    
TRANSFER - OUT REPORT:    Verbal report given to BAIRON RUIZ on Tobias Hall  being transferred to [facility] for routine post-op       Report consisted of patient's Situation, Background, Assessment and   Recommendations(SBAR).     Information from the following report(s) Adult Overview and Surgery Report was reviewed with the receiving nurse.           Lines:   Peripheral IV 12/22/24 Left;Posterior Forearm (Active)   Site Assessment Clean, dry & intact 12/23/24 1759   Line Status Blood return noted;Flushed;Normal saline locked 12/23/24 1759   Line Care Connections checked and tightened 12/23/24 1759   Phlebitis Assessment No symptoms 12/23/24 1759   Infiltration Assessment 0 12/23/24 1759   Alcohol Cap Used Yes 12/23/24 1558   Dressing Status Clean, dry & intact 12/23/24 1759   Dressing Type Transparent 12/23/24 1759   Dressing Intervention New 12/22/24 1620       Peripheral IV 12/22/24 Left Forearm (Active)   Site Assessment Clean, dry & intact 12/23/24 1759   Line Status Blood return noted;Flushed;Normal saline locked 12/23/24 1759   Line Care Connections checked and tightened 12/23/24 1759   Phlebitis Assessment No symptoms 12/23/24 1759   Infiltration Assessment 0 12/23/24 1759   Alcohol Cap Used Yes 12/23/24 1759   Dressing Status Clean, dry & intact 12/23/24 1759   Dressing Type Transparent 12/23/24 1759   Dressing Intervention New 12/22/24 2140        Opportunity for questions and clarification was provided.      Patient transported with:  Registered Nurse        
Updated MD Milagro of BPs 180s/90s. Provider ok with this range at this time.  
nurses name.  Family updated re: pt status after security code verified.

## 2024-12-24 NOTE — ANESTHESIA POSTPROCEDURE EVALUATION
Department of Anesthesiology  Postprocedure Note    Patient: Tobias Hall  MRN: [id number]  YOB: 1940  Date of evaluation: 12/24/2024    Procedure Summary       Date: 12/24/24 Room / Location: [facility] MAIN OR  / [facility] MAIN OR    Anesthesia Start: 0753 Anesthesia Stop: 0954    Procedure: CHOLECYSTECTOMY LAPAROSCOPIC ROBOTIC (Abdomen) Diagnosis:       Cholecystitis      (Cholecystitis [K81.9])    Providers: Tesfaye Huitron MD Responsible Provider: Alise Humphrey MD    Anesthesia Type: General ASA Status: 3 - Emergent            Anesthesia Type: General    Chula Phase I: Chula Score: 10    Chula Phase II: Chula Score: 10    Anesthesia Post Evaluation    Patient location during evaluation: PACU  Patient participation: complete - patient participated  Level of consciousness: awake  Airway patency: patent  Nausea: well controlled.  Cardiovascular status: acceptable.  Respiratory status: acceptable and nasal cannula  Hydration status: stable  Pain management: adequate    No notable events documented.

## 2024-12-24 NOTE — ANESTHESIA PROCEDURE NOTES
Airway  Date/Time: 12/23/2024 7:32 PM  Urgency: elective    Airway not difficult    General Information and Staff    Patient location during procedure: OR  Resident/CRNA: Fidelia Hooker APRN - CRNA  Performed: resident/CRNA   Performed by: Fidelia Hooker APRN - CRNA  Authorized by: Josias Zeng Jr., MD      Indications and Patient Condition  Indications for airway management: anesthesia  Spontaneous Ventilation: absent  Sedation level: deep  Preoxygenated: yes  Patient position: sniffing  Mask difficulty assessment: vent by bag mask    Final Airway Details  Final airway type: endotracheal airway      Successful airway: ETT  Cuffed: yes   Successful intubation technique: direct laryngoscopy  Facilitating devices/methods: intubating stylet  Endotracheal tube insertion site: oral  Blade: Calderon  Blade size: #3  ETT size (mm): 8.0  Cormack-Lehane Classification: grade I - full view of glottis  Placement verified by: chest auscultation and capnometry   Measured from: lips  Number of attempts at approach: 1  Ventilation between attempts: bag mask  Number of other approaches attempted: 0    no

## 2024-12-24 NOTE — OP NOTE
Procedure: EGD, Endoscopic Ultrasound (EUS)    Indication:  jaundice ; recent pancreatitis    Date of Procedure: 12/23/2024    Patient profile: Refer to patient note in chart for documentation of history and physical    Providers: Karolina Figueredo MD    Referring MD: No ref. provider found    PCP: Unknown, Provider    Medicines: General anesthesia    Complication: No immediate complications.     Estimated blood loss: Minimal    Procedure: After the risks including, but not limited to medication reaction, infection, bleeding, perforation, missed lesions, benefits and alternatives of the procedure were discussed with the patient and all questions were answered, informed consent was obtained. The gastroscope and the linear echoendoscope were passed under direct vision throughout the procedure, the patient's blood pressure pulse and oxygen saturation were monitored continuously. The scopes were introduced through the mouth and advanced to the second part of duodenum. The endoscopy was performed without difficulty. The patient tolerated the procedure well.       Findings:     EUS Exam  [employer]ji [employer]etta Precision Ultrasound System was utilized for EUS imaging.     The linear echoendoscope (Olympus 180) was introduced from the mouth and advanced through the esophagus into the stomach to the level of the celiac axis.  The celiac axis was normal. There was no evidence of clinically significant celiac lymphadenopathy. The scope was then torqued counterclockwise to identify the pancreatic parenchyma. The pancreatic body and tail were lobular throughout. Cystic lesion not clearly visualized. Suspect the lesion seen on CT was likely a pseudocyst which has since resolvedThe pancreatic duct was identified. The PD measured 2 mm in the neck, 2 mm in the body and 2 mm in the tail.     The scope was then introduced further toward the gastric antrum. The gallbladder was identified. Gallbladder appears somewhat distended with thickened

## 2024-12-24 NOTE — OP NOTE
Operative Note      Patient: Tobias Hall  YOB: 1940  MRN: [id number]    Date of Procedure: 12/24/2024    Pre-Op Diagnosis: Cholecystitis [K81.9], biliary pancreatitis    Post-Op Diagnosis: same    Procedure: robotic cholecystectomy       Surgeon: Tesfaye Huitron MD    Anesthesia: General    Estimated Blood Loss (mL): min    Complications: none    Specimens:   gallbladder      Drains: none    Findings: acute cholecystitis    Detailed Description of Procedure:     The patient was brought into the operating room and placed in the supine position.  After the induction of general endotracheal anesthesia, the abdomen was prepped and draped in standard sterile fashion.  The abdomen was entered using a 5mm visiport with the optiview technique in the LUQ.  Once entry was confirmed with the camera, the peritoneal cavity was insufflated with C02.  Ultimately 4 8mm robotic ports were placed across the abdomen.  The patient was placed in reverse trendelenburg position and the robot was docked.      The gallbladder was distended and edematous.  It was decompressed by incising the dome and suctioning.  The gallbladder was grasped and reflected cephalad.  Peritoneum was incised along the infundibulum of the gallbladder with hook cautery.  The hook was used to create a window between the cystic duct and artery.  This dissection was carried out until the critical view was obtained, in which the cystic duct and artery were completely defined, and the liver was visible through a wide window of dissection.  Two clips were placed placed proximally, and one distally, on both cystic duct and artery, and the structures were divided with scissors.  The remainder of the gallbladder attachments to the liver were divided with cautery and the gallbladder was removed with an anchor bag.  Hemostasis was checked in the gallbladder fossa, and the RUQ was irrigated.     After the gallbladder had been removed, it was completely

## 2024-12-25 LAB
ALBUMIN SERPL-MCNC: 2.4 G/DL (ref 3.2–4.6)
ALBUMIN/GLOB SERPL: 0.6 (ref 1–1.9)
ALP SERPL-CCNC: 154 U/L (ref 40–129)
ALT SERPL-CCNC: 98 U/L (ref 8–55)
ANION GAP SERPL CALC-SCNC: 14 MMOL/L (ref 7–16)
AST SERPL-CCNC: 137 U/L (ref 15–37)
BASOPHILS # BLD: 0.1 K/UL (ref 0–0.2)
BASOPHILS NFR BLD: 0 % (ref 0–2)
BILIRUB SERPL-MCNC: 5.3 MG/DL (ref 0–1.2)
BUN SERPL-MCNC: 36 MG/DL (ref 8–23)
CALCIUM SERPL-MCNC: 8.8 MG/DL (ref 8.8–10.2)
CHLORIDE SERPL-SCNC: 103 MMOL/L (ref 98–107)
CO2 SERPL-SCNC: 25 MMOL/L (ref 20–29)
CREAT SERPL-MCNC: 1.45 MG/DL (ref 0.8–1.3)
DIFFERENTIAL METHOD BLD: ABNORMAL
EOSINOPHIL # BLD: 0 K/UL (ref 0–0.8)
EOSINOPHIL NFR BLD: 0 % (ref 0.5–7.8)
ERYTHROCYTE [DISTWIDTH] IN BLOOD BY AUTOMATED COUNT: 14.7 % (ref 11.9–14.6)
EST. AVERAGE GLUCOSE BLD GHB EST-MCNC: 154 MG/DL
GLOBULIN SER CALC-MCNC: 4 G/DL (ref 2.3–3.5)
GLUCOSE BLD STRIP.AUTO-MCNC: 181 MG/DL (ref 65–100)
GLUCOSE BLD STRIP.AUTO-MCNC: 198 MG/DL (ref 65–100)
GLUCOSE BLD STRIP.AUTO-MCNC: 212 MG/DL (ref 65–100)
GLUCOSE BLD STRIP.AUTO-MCNC: 239 MG/DL (ref 65–100)
GLUCOSE SERPL-MCNC: 229 MG/DL (ref 70–99)
HBA1C MFR BLD: 7 % (ref 0–5.6)
HCT VFR BLD AUTO: 41 % (ref 41.1–50.3)
HGB BLD-MCNC: 14.2 G/DL (ref 13.6–17.2)
IMM GRANULOCYTES # BLD AUTO: 0.3 K/UL (ref 0–0.5)
IMM GRANULOCYTES NFR BLD AUTO: 2 % (ref 0–5)
LYMPHOCYTES # BLD: 1.6 K/UL (ref 0.5–4.6)
LYMPHOCYTES NFR BLD: 10 % (ref 13–44)
MCH RBC QN AUTO: 33.8 PG (ref 26.1–32.9)
MCHC RBC AUTO-ENTMCNC: 34.6 G/DL (ref 31.4–35)
MCV RBC AUTO: 97.6 FL (ref 82–102)
MONOCYTES # BLD: 1.6 K/UL (ref 0.1–1.3)
MONOCYTES NFR BLD: 9 % (ref 4–12)
NEUTS SEG # BLD: 13.4 K/UL (ref 1.7–8.2)
NEUTS SEG NFR BLD: 79 % (ref 43–78)
NRBC # BLD: 0 K/UL (ref 0–0.2)
PLATELET # BLD AUTO: 300 K/UL (ref 150–450)
PMV BLD AUTO: 11.2 FL (ref 9.4–12.3)
POTASSIUM SERPL-SCNC: 3.6 MMOL/L (ref 3.5–5.1)
PROT SERPL-MCNC: 6.4 G/DL (ref 6.3–8.2)
RBC # BLD AUTO: 4.2 M/UL (ref 4.23–5.6)
SERVICE CMNT-IMP: ABNORMAL
SODIUM SERPL-SCNC: 141 MMOL/L (ref 136–145)
UFH PPP CHRO-ACNC: 0.26 IU/ML (ref 0.3–0.7)
UFH PPP CHRO-ACNC: 0.59 IU/ML (ref 0.3–0.7)
WBC # BLD AUTO: 16.9 K/UL (ref 4.3–11.1)

## 2024-12-25 PROCEDURE — 80053 COMPREHEN METABOLIC PANEL: CPT

## 2024-12-25 PROCEDURE — 6370000000 HC RX 637 (ALT 250 FOR IP): Performed by: PHYSICIAN ASSISTANT

## 2024-12-25 PROCEDURE — 36415 COLL VENOUS BLD VENIPUNCTURE: CPT

## 2024-12-25 PROCEDURE — 6370000000 HC RX 637 (ALT 250 FOR IP)

## 2024-12-25 PROCEDURE — 6370000000 HC RX 637 (ALT 250 FOR IP): Performed by: INTERNAL MEDICINE

## 2024-12-25 PROCEDURE — 83036 HEMOGLOBIN GLYCOSYLATED A1C: CPT

## 2024-12-25 PROCEDURE — 1100000003 HC PRIVATE W/ TELEMETRY

## 2024-12-25 PROCEDURE — 2500000003 HC RX 250 WO HCPCS: Performed by: INTERNAL MEDICINE

## 2024-12-25 PROCEDURE — 99232 SBSQ HOSP IP/OBS MODERATE 35: CPT | Performed by: INTERNAL MEDICINE

## 2024-12-25 PROCEDURE — 82962 GLUCOSE BLOOD TEST: CPT

## 2024-12-25 PROCEDURE — 6360000002 HC RX W HCPCS: Performed by: INTERNAL MEDICINE

## 2024-12-25 PROCEDURE — 85025 COMPLETE CBC W/AUTO DIFF WBC: CPT

## 2024-12-25 PROCEDURE — 85520 HEPARIN ASSAY: CPT

## 2024-12-25 RX ORDER — INSULIN GLARGINE 100 [IU]/ML
5 INJECTION, SOLUTION SUBCUTANEOUS DAILY
Status: DISCONTINUED | OUTPATIENT
Start: 2024-12-25 | End: 2024-12-26 | Stop reason: HOSPADM

## 2024-12-25 RX ADMIN — INSULIN LISPRO 3 UNITS: 100 INJECTION, SOLUTION INTRAVENOUS; SUBCUTANEOUS at 20:47

## 2024-12-25 RX ADMIN — INSULIN GLARGINE 5 UNITS: 100 INJECTION, SOLUTION SUBCUTANEOUS at 12:27

## 2024-12-25 RX ADMIN — HYDROMORPHONE HYDROCHLORIDE 1 MG: 1 INJECTION, SOLUTION INTRAMUSCULAR; INTRAVENOUS; SUBCUTANEOUS at 03:26

## 2024-12-25 RX ADMIN — INSULIN LISPRO 3 UNITS: 100 INJECTION, SOLUTION INTRAVENOUS; SUBCUTANEOUS at 17:30

## 2024-12-25 RX ADMIN — SODIUM CHLORIDE, PRESERVATIVE FREE 10 ML: 5 INJECTION INTRAVENOUS at 20:53

## 2024-12-25 RX ADMIN — TRAZODONE HYDROCHLORIDE 25 MG: 50 TABLET ORAL at 20:40

## 2024-12-25 RX ADMIN — SODIUM CHLORIDE, PRESERVATIVE FREE 10 ML: 5 INJECTION INTRAVENOUS at 08:15

## 2024-12-25 RX ADMIN — DILTIAZEM HYDROCHLORIDE 120 MG: 120 CAPSULE, COATED, EXTENDED RELEASE ORAL at 08:15

## 2024-12-25 RX ADMIN — SODIUM CHLORIDE, PRESERVATIVE FREE 10 ML: 5 INJECTION INTRAVENOUS at 20:51

## 2024-12-25 RX ADMIN — LISINOPRIL 20 MG: 20 TABLET ORAL at 08:15

## 2024-12-25 RX ADMIN — TAMSULOSIN HYDROCHLORIDE 0.4 MG: 0.4 CAPSULE ORAL at 08:15

## 2024-12-25 RX ADMIN — HEPARIN SODIUM 2000 UNITS: 1000 INJECTION INTRAVENOUS; SUBCUTANEOUS at 03:11

## 2024-12-25 ASSESSMENT — PAIN SCALES - GENERAL
PAINLEVEL_OUTOF10: 0

## 2024-12-26 VITALS
HEART RATE: 79 BPM | BODY MASS INDEX: 25.09 KG/M2 | DIASTOLIC BLOOD PRESSURE: 89 MMHG | OXYGEN SATURATION: 92 % | HEIGHT: 66 IN | SYSTOLIC BLOOD PRESSURE: 154 MMHG | RESPIRATION RATE: 17 BRPM | WEIGHT: 156.09 LBS | TEMPERATURE: 98.2 F

## 2024-12-26 LAB
ALBUMIN SERPL-MCNC: 2 G/DL (ref 3.2–4.6)
ALBUMIN/GLOB SERPL: 0.5 (ref 1–1.9)
ALP SERPL-CCNC: 150 U/L (ref 40–129)
ALT SERPL-CCNC: 89 U/L (ref 8–55)
ANION GAP SERPL CALC-SCNC: 11 MMOL/L (ref 7–16)
AST SERPL-CCNC: 114 U/L (ref 15–37)
BASOPHILS # BLD: 0.1 K/UL (ref 0–0.2)
BASOPHILS NFR BLD: 1 % (ref 0–2)
BILIRUB SERPL-MCNC: 3.9 MG/DL (ref 0–1.2)
BUN SERPL-MCNC: 32 MG/DL (ref 8–23)
CALCIUM SERPL-MCNC: 8.5 MG/DL (ref 8.8–10.2)
CHLORIDE SERPL-SCNC: 105 MMOL/L (ref 98–107)
CO2 SERPL-SCNC: 27 MMOL/L (ref 20–29)
CREAT SERPL-MCNC: 1.21 MG/DL (ref 0.8–1.3)
DIFFERENTIAL METHOD BLD: ABNORMAL
EKG ATRIAL RATE: 66 BPM
EKG DIAGNOSIS: NORMAL
EKG P AXIS: 5 DEGREES
EKG P-R INTERVAL: 214 MS
EKG Q-T INTERVAL: 478 MS
EKG QRS DURATION: 140 MS
EKG QTC CALCULATION (BAZETT): 501 MS
EKG R AXIS: -70 DEGREES
EKG T AXIS: -34 DEGREES
EKG VENTRICULAR RATE: 66 BPM
EOSINOPHIL # BLD: 0 K/UL (ref 0–0.8)
EOSINOPHIL NFR BLD: 0 % (ref 0.5–7.8)
ERYTHROCYTE [DISTWIDTH] IN BLOOD BY AUTOMATED COUNT: 14.7 % (ref 11.9–14.6)
GLOBULIN SER CALC-MCNC: 3.8 G/DL (ref 2.3–3.5)
GLUCOSE BLD STRIP.AUTO-MCNC: 171 MG/DL (ref 65–100)
GLUCOSE BLD STRIP.AUTO-MCNC: 240 MG/DL (ref 65–100)
GLUCOSE SERPL-MCNC: 205 MG/DL (ref 70–99)
HCT VFR BLD AUTO: 41.3 % (ref 41.1–50.3)
HGB BLD-MCNC: 14 G/DL (ref 13.6–17.2)
IMM GRANULOCYTES # BLD AUTO: 0.7 K/UL (ref 0–0.5)
IMM GRANULOCYTES NFR BLD AUTO: 5 % (ref 0–5)
LYMPHOCYTES # BLD: 1.9 K/UL (ref 0.5–4.6)
LYMPHOCYTES NFR BLD: 13 % (ref 13–44)
MCH RBC QN AUTO: 33.6 PG (ref 26.1–32.9)
MCHC RBC AUTO-ENTMCNC: 33.9 G/DL (ref 31.4–35)
MCV RBC AUTO: 99 FL (ref 82–102)
MONOCYTES # BLD: 1.3 K/UL (ref 0.1–1.3)
MONOCYTES NFR BLD: 9 % (ref 4–12)
NEUTS SEG # BLD: 10.3 K/UL (ref 1.7–8.2)
NEUTS SEG NFR BLD: 72 % (ref 43–78)
NRBC # BLD: 0.02 K/UL (ref 0–0.2)
PLATELET # BLD AUTO: 240 K/UL (ref 150–450)
PMV BLD AUTO: 11.7 FL (ref 9.4–12.3)
POTASSIUM SERPL-SCNC: 3.6 MMOL/L (ref 3.5–5.1)
PROT SERPL-MCNC: 5.8 G/DL (ref 6.3–8.2)
RBC # BLD AUTO: 4.17 M/UL (ref 4.23–5.6)
SERVICE CMNT-IMP: ABNORMAL
SERVICE CMNT-IMP: ABNORMAL
SODIUM SERPL-SCNC: 142 MMOL/L (ref 136–145)
WBC # BLD AUTO: 14.3 K/UL (ref 4.3–11.1)

## 2024-12-26 PROCEDURE — 92526 ORAL FUNCTION THERAPY: CPT

## 2024-12-26 PROCEDURE — 6370000000 HC RX 637 (ALT 250 FOR IP): Performed by: INTERNAL MEDICINE

## 2024-12-26 PROCEDURE — 92507 TX SP LANG VOICE COMM INDIV: CPT

## 2024-12-26 PROCEDURE — 6370000000 HC RX 637 (ALT 250 FOR IP)

## 2024-12-26 PROCEDURE — 80053 COMPREHEN METABOLIC PANEL: CPT

## 2024-12-26 PROCEDURE — 85025 COMPLETE CBC W/AUTO DIFF WBC: CPT

## 2024-12-26 PROCEDURE — 2500000003 HC RX 250 WO HCPCS: Performed by: INTERNAL MEDICINE

## 2024-12-26 PROCEDURE — 36415 COLL VENOUS BLD VENIPUNCTURE: CPT

## 2024-12-26 PROCEDURE — 82962 GLUCOSE BLOOD TEST: CPT

## 2024-12-26 PROCEDURE — 93005 ELECTROCARDIOGRAM TRACING: CPT | Performed by: NURSE PRACTITIONER

## 2024-12-26 RX ORDER — DILTIAZEM HYDROCHLORIDE 120 MG/1
120 CAPSULE, COATED, EXTENDED RELEASE ORAL DAILY
Qty: 30 CAPSULE | Refills: 3 | Status: SHIPPED | OUTPATIENT
Start: 2024-12-27

## 2024-12-26 RX ADMIN — DILTIAZEM HYDROCHLORIDE 120 MG: 120 CAPSULE, COATED, EXTENDED RELEASE ORAL at 07:45

## 2024-12-26 RX ADMIN — LISINOPRIL 20 MG: 20 TABLET ORAL at 07:45

## 2024-12-26 RX ADMIN — TAMSULOSIN HYDROCHLORIDE 0.4 MG: 0.4 CAPSULE ORAL at 07:45

## 2024-12-26 RX ADMIN — INSULIN LISPRO 3 UNITS: 100 INJECTION, SOLUTION INTRAVENOUS; SUBCUTANEOUS at 12:34

## 2024-12-26 RX ADMIN — INSULIN GLARGINE 5 UNITS: 100 INJECTION, SOLUTION SUBCUTANEOUS at 07:45

## 2024-12-26 RX ADMIN — SODIUM CHLORIDE, PRESERVATIVE FREE 10 ML: 5 INJECTION INTRAVENOUS at 07:45

## 2024-12-26 ASSESSMENT — PAIN SCALES - GENERAL
PAINLEVEL_OUTOF10: 0
PAINLEVEL_OUTOF10: 0

## 2024-12-26 NOTE — CARE COORDINATION
DC orders noted in system. CM to bedside to discuss with family. Nephew at bedside, he got spouse on phone. Reviewed IM letter. Letter signed, scanned to chart, copy left with family. At first, family not agreeable to discharge today stating they thought patient would benefit from rehab. CM left room to discuss with MD. MD stated he was agreeable with pursuing rehab if that is what the family wanted. CM back to room to discuss with nephew. He states they have discussed it further and now desire discharge home with HH services. Patient is current with [facility]. FRANCISCO orders sent in EPIC with confirmation for HH SN PT OT HHA. Discussed DME needs. They have a RW and wheelchair. Nephew is purchasing a bedside commode and shower chair. Discussed hospital bed. Nephew states they will need eventually, but not at this time. Does request wedge pillow. CM discussed wedge with nurse who states she can obtain. Discussed transportation home, nephew states he can get patient in and out of car. Plan for discharge home later today with support of family and HH SN PT OT HHA with [facility].        12/26/24 5078   Services At/After Discharge   Transition of Care Consult (CM Consult) Home Health   Internal Home Health No   Reason Outside Agency Chosen Patient already serviced by other home care/hospice agency   Services At/After Discharge Home Health   [facility] Resource Information Provided? No   Mode of Transport at Discharge Other (see comment)  (personal vehicle, nephew states he will transport)   Confirm Follow Up Transport Other (see comment)  (family)   Condition of Participation: Discharge Planning   The Plan for Transition of Care is related to the following treatment goals: Home with support of family and HH services   The Patient and/or Patient Representative was provided with a Choice of Provider? Patient   The Patient and/Or Patient Representative agree with the Discharge Plan? Yes   Freedom of Choice list was provided with

## 2024-12-26 NOTE — PLAN OF CARE
Problem: Chronic Conditions and Co-morbidities  Goal: Patient's chronic conditions and co-morbidity symptoms are monitored and maintained or improved  12/22/2024 0424 by Ana María Lemus RN  Outcome: Progressing  12/21/2024 1754 by Tomy PEARSON RN  Outcome: Progressing     Problem: Discharge Planning  Goal: Discharge to home or other facility with appropriate resources  12/22/2024 0424 by Ana María Lemus RN  Outcome: Progressing  12/21/2024 1754 by Tomy PEARSON RN  Outcome: Progressing     Problem: Pain  Goal: Verbalizes/displays adequate comfort level or baseline comfort level  12/22/2024 0424 by Ana María Lemus RN  Outcome: Progressing  12/21/2024 1754 by Tomy PEARSON RN  Outcome: Progressing     Problem: Safety - Adult  Goal: Free from fall injury  Outcome: Progressing     Problem: ABCDS Injury Assessment  Goal: Absence of physical injury  Outcome: Progressing     Problem: Neurosensory - Adult  Goal: Achieves stable or improved neurological status  Outcome: Progressing     Problem: Respiratory - Adult  Goal: Achieves optimal ventilation and oxygenation  Outcome: Progressing     Problem: Cardiovascular - Adult  Goal: Maintains optimal cardiac output and hemodynamic stability  Outcome: Progressing     Problem: Skin/Tissue Integrity - Adult  Goal: Skin integrity remains intact  Outcome: Progressing     Problem: Musculoskeletal - Adult  Goal: Return mobility to safest level of function  Outcome: Progressing     Problem: Gastrointestinal - Adult  Goal: Minimal or absence of nausea and vomiting  Outcome: Progressing     Problem: Genitourinary - Adult  Goal: Absence of urinary retention  Outcome: Progressing     Problem: Infection - Adult  Goal: Absence of infection at discharge  Outcome: Progressing     Problem: Metabolic/Fluid and Electrolytes - Adult  Goal: Electrolytes maintained within normal limits  Outcome: Progressing  Goal: Hemodynamic stability and optimal renal function 
  Problem: Chronic Conditions and Co-morbidities  Goal: Patient's chronic conditions and co-morbidity symptoms are monitored and maintained or improved  12/24/2024 2307 by Reagan Monahan RN  Outcome: Progressing  12/24/2024 1535 by Debo Valle RN  Outcome: Progressing  Flowsheets (Taken 12/24/2024 1145)  Care Plan - Patient's Chronic Conditions and Co-Morbidity Symptoms are Monitored and Maintained or Improved:   Monitor and assess patient's chronic conditions and comorbid symptoms for stability, deterioration, or improvement   Collaborate with multidisciplinary team to address chronic and comorbid conditions and prevent exacerbation or deterioration     Problem: Discharge Planning  Goal: Discharge to home or other facility with appropriate resources  12/24/2024 2307 by Reagan Monahan RN  Outcome: Progressing  12/24/2024 1535 by Debo Valle RN  Outcome: Progressing  Flowsheets (Taken 12/24/2024 1145)  Discharge to home or other facility with appropriate resources:   Identify barriers to discharge with patient and caregiver   Arrange for needed discharge resources and transportation as appropriate   Identify discharge learning needs (meds, wound care, etc)     Problem: Pain  Goal: Verbalizes/displays adequate comfort level or baseline comfort level  12/24/2024 2307 by Reagan Monahan RN  Outcome: Progressing  12/24/2024 1535 by Debo Valle RN  Outcome: Progressing     Problem: Safety - Adult  Goal: Free from fall injury  12/24/2024 2307 by Reagan Monahan RN  Outcome: Progressing  12/24/2024 1535 by Debo Valle RN  Outcome: Progressing  Flowsheets (Taken 12/24/2024 1145)  Free From Fall Injury:   Instruct family/caregiver on patient safety   Based on caregiver fall risk screen, instruct family/caregiver to ask for assistance with transferring infant if caregiver noted to have fall risk factors     Problem: ABCDS Injury Assessment  Goal: Absence of physical injury  12/24/2024 
  Problem: Chronic Conditions and Co-morbidities  Goal: Patient's chronic conditions and co-morbidity symptoms are monitored and maintained or improved  12/25/2024 1112 by Trina Espino RN  Outcome: Progressing  Flowsheets (Taken 12/25/2024 0815)  Care Plan - Patient's Chronic Conditions and Co-Morbidity Symptoms are Monitored and Maintained or Improved:   Monitor and assess patient's chronic conditions and comorbid symptoms for stability, deterioration, or improvement   Collaborate with multidisciplinary team to address chronic and comorbid conditions and prevent exacerbation or deterioration   Update acute care plan with appropriate goals if chronic or comorbid symptoms are exacerbated and prevent overall improvement and discharge  12/24/2024 2307 by Reagan Monahan RN  Outcome: Progressing     Problem: Discharge Planning  Goal: Discharge to home or other facility with appropriate resources  12/25/2024 1112 by Trina Espino RN  Outcome: Progressing  Flowsheets (Taken 12/25/2024 0815)  Discharge to home or other facility with appropriate resources:   Identify barriers to discharge with patient and caregiver   Arrange for needed discharge resources and transportation as appropriate   Identify discharge learning needs (meds, wound care, etc)   Arrange for interpreters to assist at discharge as needed  12/24/2024 2307 by Reagan Monahan RN  Outcome: Progressing     Problem: Pain  Goal: Verbalizes/displays adequate comfort level or baseline comfort level  12/25/2024 1112 by Trina Espino RN  Outcome: Progressing  Flowsheets (Taken 12/25/2024 0815)  Verbalizes/displays adequate comfort level or baseline comfort level:   Encourage patient to monitor pain and request assistance   Assess pain using appropriate pain scale   Administer analgesics based on type and severity of pain and evaluate response   Implement non-pharmacological measures as appropriate and evaluate response   Consider cultural and social 
  Problem: Chronic Conditions and Co-morbidities  Goal: Patient's chronic conditions and co-morbidity symptoms are monitored and maintained or improved  12/25/2024 2253 by Blayne Alberts, RN  Outcome: Progressing  Flowsheets (Taken 12/25/2024 2253)  Care Plan - Patient's Chronic Conditions and Co-Morbidity Symptoms are Monitored and Maintained or Improved:   Monitor and assess patient's chronic conditions and comorbid symptoms for stability, deterioration, or improvement   Collaborate with multidisciplinary team to address chronic and comorbid conditions and prevent exacerbation or deterioration   Update acute care plan with appropriate goals if chronic or comorbid symptoms are exacerbated and prevent overall improvement and discharge  12/25/2024 1112 by Trina Espino RN  Outcome: Progressing  Flowsheets (Taken 12/25/2024 0815)  Care Plan - Patient's Chronic Conditions and Co-Morbidity Symptoms are Monitored and Maintained or Improved:   Monitor and assess patient's chronic conditions and comorbid symptoms for stability, deterioration, or improvement   Collaborate with multidisciplinary team to address chronic and comorbid conditions and prevent exacerbation or deterioration   Update acute care plan with appropriate goals if chronic or comorbid symptoms are exacerbated and prevent overall improvement and discharge     Problem: Discharge Planning  Goal: Discharge to home or other facility with appropriate resources  12/25/2024 1112 by Trina Espino, RN  Outcome: Progressing  Flowsheets (Taken 12/25/2024 0815)  Discharge to home or other facility with appropriate resources:   Identify barriers to discharge with patient and caregiver   Arrange for needed discharge resources and transportation as appropriate   Identify discharge learning needs (meds, wound care, etc)   Arrange for interpreters to assist at discharge as needed     Problem: Pain  Goal: Verbalizes/displays adequate comfort level or baseline comfort 
  Problem: Chronic Conditions and Co-morbidities  Goal: Patient's chronic conditions and co-morbidity symptoms are monitored and maintained or improved  Outcome: Progressing     Problem: Discharge Planning  Goal: Discharge to home or other facility with appropriate resources  Outcome: Progressing     Problem: Pain  Goal: Verbalizes/displays adequate comfort level or baseline comfort level  Outcome: Progressing     
  Problem: Chronic Conditions and Co-morbidities  Goal: Patient's chronic conditions and co-morbidity symptoms are monitored and maintained or improved  Outcome: Progressing     Problem: Discharge Planning  Goal: Discharge to home or other facility with appropriate resources  Outcome: Progressing     Problem: Pain  Goal: Verbalizes/displays adequate comfort level or baseline comfort level  Outcome: Progressing     Problem: Safety - Adult  Goal: Free from fall injury  Outcome: Progressing     Problem: ABCDS Injury Assessment  Goal: Absence of physical injury  Outcome: Progressing     Problem: Neurosensory - Adult  Goal: Achieves stable or improved neurological status  Outcome: Progressing     Problem: Respiratory - Adult  Goal: Achieves optimal ventilation and oxygenation  Outcome: Progressing     Problem: Cardiovascular - Adult  Goal: Maintains optimal cardiac output and hemodynamic stability  Outcome: Progressing     Problem: Skin/Tissue Integrity - Adult  Goal: Skin integrity remains intact  Outcome: Progressing     Problem: Musculoskeletal - Adult  Goal: Return mobility to safest level of function  Outcome: Progressing     Problem: Gastrointestinal - Adult  Goal: Minimal or absence of nausea and vomiting  Outcome: Progressing     Problem: Genitourinary - Adult  Goal: Absence of urinary retention  Outcome: Progressing     Problem: Infection - Adult  Goal: Absence of infection at discharge  Outcome: Progressing     Problem: Metabolic/Fluid and Electrolytes - Adult  Goal: Electrolytes maintained within normal limits  Outcome: Progressing  Goal: Hemodynamic stability and optimal renal function maintained  Outcome: Progressing  Goal: Glucose maintained within prescribed range  Outcome: Progressing     Problem: Hematologic - Adult  Goal: Maintains hematologic stability  Outcome: Progressing     
  Problem: Chronic Conditions and Co-morbidities  Goal: Patient's chronic conditions and co-morbidity symptoms are monitored and maintained or improved  Outcome: Progressing     Problem: Discharge Planning  Goal: Discharge to home or other facility with appropriate resources  Outcome: Progressing     Problem: Pain  Goal: Verbalizes/displays adequate comfort level or baseline comfort level  Outcome: Progressing     Problem: Safety - Adult  Goal: Free from fall injury  Outcome: Progressing     Problem: ABCDS Injury Assessment  Goal: Absence of physical injury  Outcome: Progressing     Problem: Neurosensory - Adult  Goal: Achieves stable or improved neurological status  Outcome: Progressing     Problem: Respiratory - Adult  Goal: Achieves optimal ventilation and oxygenation  Outcome: Progressing     Problem: Cardiovascular - Adult  Goal: Maintains optimal cardiac output and hemodynamic stability  Outcome: Progressing     Problem: Skin/Tissue Integrity - Adult  Goal: Skin integrity remains intact  Outcome: Progressing     Problem: Musculoskeletal - Adult  Goal: Return mobility to safest level of function  Outcome: Progressing     Problem: Gastrointestinal - Adult  Goal: Minimal or absence of nausea and vomiting  Outcome: Progressing     Problem: Genitourinary - Adult  Goal: Absence of urinary retention  Outcome: Progressing     Problem: Infection - Adult  Goal: Absence of infection at discharge  Outcome: Progressing     Problem: Metabolic/Fluid and Electrolytes - Adult  Goal: Electrolytes maintained within normal limits  Outcome: Progressing  Goal: Hemodynamic stability and optimal renal function maintained  Outcome: Progressing  Goal: Glucose maintained within prescribed range  Outcome: Progressing     Problem: Hematologic - Adult  Goal: Maintains hematologic stability  Outcome: Progressing     Problem: Skin/Tissue Integrity  Goal: Absence of new skin breakdown  Description: 1.  Monitor for areas of redness and/or skin 
12/22/2024 2040)  Hemodynamic stability and optimal renal function maintained:   Monitor labs and assess for signs and symptoms of volume excess or deficit   Monitor intake, output and patient weight  Goal: Glucose maintained within prescribed range  Outcome: Progressing  Flowsheets (Taken 12/22/2024 2040)  Glucose maintained within prescribed range:   Monitor blood glucose as ordered   Assess for signs and symptoms of hyperglycemia and hypoglycemia     Problem: Hematologic - Adult  Goal: Maintains hematologic stability  Outcome: Progressing  Flowsheets (Taken 12/22/2024 2040)  Maintains hematologic stability: Assess for signs and symptoms of bleeding or hemorrhage     Problem: Neurosensory - Adult  Goal: Achieves stable or improved neurological status  Outcome: Not Progressing  Flowsheets (Taken 12/22/2024 2040)  Achieves stable or improved neurological status: Assess for and report changes in neurological status     Problem: Metabolic/Fluid and Electrolytes - Adult  Goal: Electrolytes maintained within normal limits  Outcome: Not Progressing  Flowsheets (Taken 12/22/2024 2040)  Electrolytes maintained within normal limits: Monitor labs and assess patient for signs and symptoms of electrolyte imbalances     
skin breakdown   Assess vascular access sites hourly  12/24/2024 0634 by Tiera Carey RN  Outcome: Progressing     Problem: Musculoskeletal - Adult  Goal: Return mobility to safest level of function  12/24/2024 1535 by Debo Valle RN  Outcome: Progressing  Flowsheets (Taken 12/24/2024 1145)  Return Mobility to Safest Level of Function:   Assess patient stability and activity tolerance for standing, transferring and ambulating with or without assistive devices   Assist with transfers and ambulation using safe patient handling equipment as needed   Ensure adequate protection for wounds/incisions during mobilization  12/24/2024 0634 by Tiera Carey RN  Outcome: Progressing     Problem: Gastrointestinal - Adult  Goal: Minimal or absence of nausea and vomiting  12/24/2024 1535 by Debo Valle RN  Outcome: Progressing  Flowsheets (Taken 12/24/2024 1145)  Minimal or absence of nausea and vomiting:   Administer IV fluids as ordered to ensure adequate hydration   Maintain NPO status until nausea and vomiting are resolved   Nasogastric tube to low intermittent suction as ordered  12/24/2024 0634 by Tiera Carey RN  Outcome: Progressing     Problem: Genitourinary - Adult  Goal: Absence of urinary retention  12/24/2024 1535 by Debo Valle RN  Outcome: Progressing  Flowsheets (Taken 12/24/2024 1145)  Absence of urinary retention:   Assess patient’s ability to void and empty bladder   Monitor intake/output and perform bladder scan as needed   Place urinary catheter per Licensed Independent Practitioner order if needed  12/24/2024 0634 by Tiera Carey RN  Outcome: Progressing     Problem: Infection - Adult  Goal: Absence of infection at discharge  12/24/2024 1535 by Debo Valle RN  Outcome: Progressing  Flowsheets (Taken 12/24/2024 1145)  Absence of infection at discharge:   Assess and monitor for signs and symptoms of infection   Monitor lab/diagnostic results   Monitor all insertion sites 
nonpharmacologic comfort measures as appropriate   Advance diet as tolerated, if ordered   Nutrition consult to assist patient with adequate nutrition and appropriate food choices     Problem: Genitourinary - Adult  Goal: Absence of urinary retention  12/26/2024 1249 by Omar Peralta RN  Outcome: Completed  12/26/2024 0947 by Trina Espino RN  Outcome: Progressing  Flowsheets (Taken 12/26/2024 0745)  Absence of urinary retention:   Assess patient’s ability to void and empty bladder   Monitor intake/output and perform bladder scan as needed   Place urinary catheter per Licensed Independent Practitioner order if needed   Discuss with Licensed Independent Practitioner  medications to alleviate retention as needed   Discuss catheterization for long term situations as appropriate     Problem: Infection - Adult  Goal: Absence of infection at discharge  12/26/2024 1249 by Omar Peralta RN  Outcome: Completed  12/26/2024 0947 by Trina Espino RN  Outcome: Progressing  Flowsheets (Taken 12/26/2024 0745)  Absence of infection at discharge:   Assess and monitor for signs and symptoms of infection   Monitor lab/diagnostic results   Monitor all insertion sites i.e., indwelling lines, tubes and drains   Monitor endotracheal (as able) and nasal secretions for changes in amount and color   [place] appropriate cooling/warming therapies per order   Administer medications as ordered   Instruct and encourage patient and family to use good hand hygiene technique   Identify and instruct in appropriate isolation precautions for identified infection/condition     Problem: Metabolic/Fluid and Electrolytes - Adult  Goal: Electrolytes maintained within normal limits  12/26/2024 1249 by Omar Peralta RN  Outcome: Completed  12/26/2024 0947 by Trina Espino RN  Outcome: Progressing  Flowsheets (Taken 12/26/2024 0745)  Electrolytes maintained within normal limits:   Monitor labs and assess patient for signs and symptoms of electrolyte 
Instruct patient on self management of diabetes and initiate consult as needed     Problem: Hematologic - Adult  Goal: Maintains hematologic stability  Outcome: Progressing  Flowsheets (Taken 12/26/2024 6192)  Maintains hematologic stability:   Assess for signs and symptoms of bleeding or hemorrhage   Monitor labs for bleeding or clotting disorders   Administer blood products/factors as ordered     Problem: Skin/Tissue Integrity  Goal: Absence of new skin breakdown  Description: 1.  Monitor for areas of redness and/or skin breakdown  2.  Assess vascular access sites hourly  3.  Every 4-6 hours minimum:  Change oxygen saturation probe site  4.  Every 4-6 hours:  If on nasal continuous positive airway pressure, respiratory therapy assess nares and determine need for appliance change or resting period.  Outcome: Progressing     Problem: Safety - Medical Restraint  Goal: Remains free of injury from restraints (Restraint for Interference with Medical Device)  Description: INTERVENTIONS:  1. Determine that other, less restrictive measures have been tried or would not be effective before applying the restraint  2. Evaluate the patient's condition at the time of restraint application  3. Inform patient/family regarding the reason for restraint  4. Q2H: Monitor safety, psychosocial status, comfort, nutrition and hydration  Outcome: Progressing     
hypoglycemia   Administer ordered medications to maintain glucose within target range  12/23/2024 0540 by Kala Monahan, RN  Outcome: Progressing  Flowsheets (Taken 12/22/2024 2040)  Glucose maintained within prescribed range:   Monitor blood glucose as ordered   Assess for signs and symptoms of hyperglycemia and hypoglycemia     Problem: Hematologic - Adult  Goal: Maintains hematologic stability  12/23/2024 1055 by Debo Valle RN  Outcome: Progressing  Flowsheets (Taken 12/23/2024 0757)  Maintains hematologic stability:   Assess for signs and symptoms of bleeding or hemorrhage   Monitor labs for bleeding or clotting disorders  12/23/2024 0540 by Kala Monahan, RN  Outcome: Progressing  Flowsheets (Taken 12/22/2024 2040)  Maintains hematologic stability: Assess for signs and symptoms of bleeding or hemorrhage     Problem: Skin/Tissue Integrity  Goal: Absence of new skin breakdown  Description: 1.  Monitor for areas of redness and/or skin breakdown  2.  Assess vascular access sites hourly  3.  Every 4-6 hours minimum:  Change oxygen saturation probe site  4.  Every 4-6 hours:  If on nasal continuous positive airway pressure, respiratory therapy assess nares and determine need for appliance change or resting period.  Outcome: Progressing     Problem: Neurosensory - Adult  Goal: Achieves stable or improved neurological status  12/23/2024 1055 by Debo Valle RN  Outcome: Progressing  Flowsheets (Taken 12/23/2024 0757)  Achieves stable or improved neurological status:   Assess for and report changes in neurological status   Initiate measures to prevent increased intracranial pressure   Maintain blood pressure and fluid volume within ordered parameters to optimize cerebral perfusion and minimize risk of hemorrhage  12/23/2024 0540 by Kala Monahan, RN  Outcome: Not Progressing  Flowsheets (Taken 12/22/2024 2040)  Achieves stable or improved neurological status: Assess for and report changes in

## 2024-12-26 NOTE — DISCHARGE SUMMARY
No results for input(s): \"COVID19\" in the last 72 hours.    Recent Vital Data:  Patient Vitals for the past 24 hrs:   Temp Pulse Resp BP SpO2   12/26/24 1204 98.2 °F (36.8 °C) 79 17 (!) 154/89 92 %   12/26/24 0930 -- 76 -- 138/84 --   12/26/24 0731 97.9 °F (36.6 °C) 79 17 (!) 177/97 92 %   12/26/24 0412 98.2 °F (36.8 °C) 77 16 (!) 158/85 91 %   12/26/24 0005 98.2 °F (36.8 °C) 66 16 (!) 140/64 91 %   12/25/24 2041 98.2 °F (36.8 °C) 85 18 (!) 155/93 95 %   12/25/24 1815 -- -- -- (!) 138/90 --   12/25/24 1730 -- -- -- (!) 146/88 --   12/25/24 1712 98.2 °F (36.8 °C) 77 17 (!) 176/95 93 %       Oxygen Therapy  SpO2: 92 %  Pulse via Oximetry: 81 beats per minute  Pulse Oximeter Device Mode: Continuous  Pulse Oximeter Device Location: Right, Finger  O2 Device: None (Room air)  Skin Protection for O2 Device: No  O2 Flow Rate (L/min): 2 L/min    Estimated body mass index is 25.21 kg/m² as calculated from the following:    Height as of this encounter: 1.676 m (5' 5.98\").    Weight as of this encounter: 70.8 kg (156 lb 1.4 oz).    Intake/Output Summary (Last 24 hours) at 12/26/2024 1230  Last data filed at 12/26/2024 0745  Gross per 24 hour   Intake 400 ml   Output 825 ml   Net -425 ml         Physical Exam:  General:    Well nourished.  No overt distress  Head:  Normocephalic, atraumatic  Eyes:  Sclerae appear normal.  Pupils equally round.    HENT:  Nares appear normal, no drainage.  Moist mucous membranes  Neck:  No restricted ROM.  Trachea midline  CV:   RRR.  No m/r/g.  No JVD  Lungs:   CTAB.  No wheezing, rhonchi, or rales.  Respirations even, unlabored  Abdomen:   Soft, nontender, nondistended.    Extremities: Warm and dry.   No edema.    Skin:     No rashes.  Normal coloration  Neuro:  CN II-XII grossly intact.  Psych:  Normal mood and affect. Bu demented      Signed:  Favio Cavazos MD    Part of this note may have been written by using a voice dictation software.  The note has been proof read but may still

## 2024-12-26 NOTE — CONSULTS
Nutrition Assessment  Assessment Type: Initial, Consult  Reason for visit:  Poor Intake/Appetite 5 or More Days (Hospitalists)  Malnutrition Screening Tool Score: 1    Nutrition Intervention:   Food and/or Nutrient Delivery:   Meals and Snacks:  Diet: Continue current diet per SLP evaluation  Medical Food Supplements:   Medical food supplement therapy:  Initiate Glucerna Nutrition Shake (diabetic oral supplement) 220 calories, 10 grams protein per 8 ounce serving strawberry     Malnutrition Assessment:  Malnutrition Status: At risk for malnutrition (poor appetite, variable intakes during admit, pancreatitis, recent cynthia)  Nutrition Focused Physical Exam: Only remarkable for mild losses to temple/clavicle    Nutrition Assessment:  Food/Nutrition Related History:   Patient reports at baseline he eats really well. His wife typically cooks a good breakfast, and they typically go out to eat and gets a meat & 3. He normally cleans his plate. Reports an acute decline in appetite/intakes days prior to admit associated with abdominal pain/nausea/vomiting. Denies having issues chewing/swallowing at baseline. Endorses n/v prior to admit.      Do You Have Any Cultural, Sikhism, or Ethnic Food Preferences?: No     Weight History:   EMR weight history review from family office visits: 180# 3/13/24, 179# 5/22/24, 177# 6/12/24, 179# 7/15/24, 182# 8/12/24, 180# 9/11/24. Patient denies any recent weight changes, stating his UBW is around 180 lbs. Nephew states that patient used to weigh 180 lbs when he was working/active and hasn't actually weighed that in a while. States he last saw patient in November for Thanksgiving and states it doesn't look like he's lost any weight.     Nutrition Background:       PMH significant for HLD, DM 2, gout, HTN, spinal stenosis, CKD 3, arthritis, EDER.   Patient presents with abdominal pain. Is admitted with pancreatitis & Afib w/ RVR.   12/23: EUS - gallbladdder filled with sludge/debris   12/24:

## 2024-12-26 NOTE — PROGRESS NOTES
[facility] CARDIOLOGY PROGRESS NOTE           12/25/2024 9:36 AM    Admit Date: 12/19/2024      Subjective:   Patient is postop day #1 laparoscopic cholecystectomy.  Hemodynamics are stable.    ROS:  Cardiovascular:  As noted above    Objective:      Vitals:    12/24/24 2000 12/24/24 2304 12/25/24 0415 12/25/24 0743   BP: (!) 148/95 (!) 156/89 129/78 (!) 147/96   Pulse: 86 90 78 68   Resp: 16 18 18 18   Temp: 97.9 °F (36.6 °C) 98.1 °F (36.7 °C) 98.1 °F (36.7 °C) 97.9 °F (36.6 °C)   TempSrc: Oral Oral Axillary    SpO2: 93% 91% (!) 89% 94%   Weight:       Height:           Physical Exam:  General-No Acute Distress  Neck- supple, no JVD  CV-irregular with rate controlled  Lung- clear bilaterally  Abd- soft, nontender, nondistended  Ext- no edema bilaterally.  Skin- warm and dry    Data Review:   Recent Labs     12/22/24  1003 12/22/24  1501 12/23/24  0424 12/24/24  0352 12/25/24  0215   NA  --    < > 137 141 141   K  --    < > 3.3* 3.8 3.6   MG 1.7*  --  2.3  --   --    BUN  --    < > 37* 35* 36*   WBC  --    < > 14.2* 12.9* 16.9*   HGB  --    < > 13.3* 13.7 14.2   HCT  --    < > 38.1* 40.5* 41.0*   PLT  --    < > 242 266 300   INR 1.2  --   --   --   --     < > = values in this interval not displayed.       Assessment/Plan:     Principal Problem:    Acute pancreatitis without infection or necrosis  Plan: Patient postop day #1 laparoscopic cholecystectomy.  He is stable.  Active Problems:    Atrial fibrillation with rapid ventricular response (HCC)  Plan: Patient has converted to sinus rhythm.  Discontinue IV diltiazem and heparin.  Continue oral diltiazem.  Can avoid oral anticoagulation as this occurred in the face of acute sepsis.  No further cardiovascular recommendations at this time.    Abdominal pain, epigastric  Plan: Scheduled for robotic laparoscopic cholecystectomy    Stage 3a chronic kidney disease (HCC)  Plan: Renal function is stable.    Essential hypertension  Plan: Hemodynamics are 
                         [facility] CARDIOLOGY PROGRESS NOTE           12/24/2024 8:46 AM    Admit Date: 12/19/2024      Subjective:   Patient is on way to the OR this morning.  Hemodynamics are stable.    ROS:  Cardiovascular:  As noted above    Objective:      Vitals:    12/24/24 0035 12/24/24 0437 12/24/24 0520 12/24/24 0644   BP:  (!) 143/88  (!) 184/97   Pulse: 96 88 91 82   Resp:  18  16   Temp:  98.4 °F (36.9 °C)  98.1 °F (36.7 °C)   TempSrc:  Axillary  Skin   SpO2:  97%  99%   Weight:       Height:           Physical Exam:  General-No Acute Distress  Neck- supple, no JVD  CV-irregular with rate controlled  Lung- clear bilaterally  Abd- soft, nontender, nondistended  Ext- no edema bilaterally.  Skin- warm and dry    Data Review:   Recent Labs     12/22/24  1003 12/22/24  1501 12/23/24  0424 12/24/24  0352   NA  --    < > 137 141   K  --    < > 3.3* 3.8   MG 1.7*  --  2.3  --    BUN  --    < > 37* 35*   WBC  --   --  14.2* 12.9*   HGB  --   --  13.3* 13.7   HCT  --   --  38.1* 40.5*   PLT  --   --  242 266   INR 1.2  --   --   --     < > = values in this interval not displayed.       Assessment/Plan:     Principal Problem:    Acute pancreatitis without infection or necrosis  Plan: Patient scheduled for robotic laparoscopic cholecystectomy this morning.  Active Problems:    Atrial fibrillation with rapid ventricular response (HCC)  Plan: Rates have been better controlled and suspect this will dramatically improve his patient recovers from acute interim abdominal pathology.  Currently holding heparin for surgery.  Will address anticoagulation as patient recovers postop.    Abdominal pain, epigastric  Plan: Scheduled for robotic laparoscopic cholecystectomy    Stage 3a chronic kidney disease (HCC)  Plan: Renal function is stable.    Essential hypertension  Plan: Hemodynamics are stable and will determine patient's medical therapy post surgery.    Type 2 diabetes mellitus with chronic kidney disease  Plan: Managed 
       Hospitalist Progress Note   Admit Date:  2024  7:31 PM   Name:  Tobias Hall   Age:  84 y.o.  Sex:  male  :  1940   MRN:  [id number]   Room:  [facility] [place]/    Presenting/Chief Complaint: Abdominal Pain     Reason(s) for Admission: Abdominal pain, epigastric [R10.13]  Essential hypertension [I10]  Idiopathic acute pancreatitis without infection or necrosis [K85.00]  Acute pancreatitis without infection or necrosis [K85.90]     Hospital Course:   Tobias Hall is a 84 y.o. male with medical history of HLP, Dm2, gout, HTN, Lspine stenosis, BPH, CKD, AA, HTN admitted with abdominal pain, nausea/emesis, anorexia and elevated LFTS/ lipase.  Admitted for acute pancreatitis and possible biliary obstruction and A-fib with RVR.  Gastroenterology consulted.  Performed EUS with gallbladder sludge and normal common bile duct.  General surgery consulted for cholecystectomy on .  Cardiology consulted. Echocardiography EF 55-60%. Off diltiazem drip. Converted to p.o. diltiazem.     PT and OT consulted. Discharge plans needs STR    Subjective & 24hr Events:   No overnight events.  Patient in the OR for cholecystectomy.    Assessment & Plan:     Acute pancreatitis without infection or necrosis  Biliary obstruction  -CT abdomen/pelvis revealed mild small bowel distention suggesting ileus.  -Abdominal ultrasound revealed fatty liver, cystic focus of pancreatic body/tail measuring 1.39 x 1.25 x 1 cm.  Possible cholelithiasis.  -MRCP suggesting pancreatitis and reidentified gallstones.  Previously seen 5 mm calculus at distalmost CBD has been excreted.  -Gastroenterology consulted on .  Signed off on   -EUS on  with gallbladder sludge and normal CBD  -General Surgery consulted on .  -Cholecystectomy on   -Advance diet as tolerated    Mixed cholestatic/hepatocellular pattern LFTs  -Imaging as above  -Trend LFTs daily    Atrial fibrillation with rapid ventricular response   -In 
       Hospitalist Progress Note   Admit Date:  2024  7:31 PM   Name:  Tobias Hall   Age:  84 y.o.  Sex:  male  :  1940   MRN:  [id number]   Room:  [facility]/    Presenting/Chief Complaint: Abdominal Pain     Reason(s) for Admission: Abdominal pain, epigastric [R10.13]  Essential hypertension [I10]  Idiopathic acute pancreatitis without infection or necrosis [K85.00]  Acute pancreatitis without infection or necrosis [K85.90]     Hospital Course:   Tobias Hall is a 84 y.o. male with medical history of HLP, Dm2, gout, HTN, Lspine stenosis, BPH, CKD, AA, HTN admitted with abdominal pain, nausea/emesis, anorexia and elevated LFTS/ lipase.  Admitted for acute pancreatitis and possible biliary obstruction and A-fib with RVR.  Gastroenterology consulted.  Performed EUS with gallbladder sludge and normal common bile duct.  General surgery consulted for cholecystectomy on .  Cardiology consulted. Echocardiography EF 55-60%. Off diltiazem drip. Converted to p.o. diltiazem. Now in sinus rhythm.     PT and OT consulted. Discharge plans needs STR    Subjective & 24hr Events:   No overnight events.  POD # 1 cholecystectomy on   Normal sinus rhythm on . Off diltiazem and heparin drip. No AC at this time.  Passing gas but not bowel movement.  Advance diet as tolerated    Assessment & Plan:     Acute pancreatitis without infection or necrosis  Mixed cholestatic/hepatocellular pattern LFTs  -CT abdomen/pelvis revealed mild small bowel distention suggesting ileus.  -Abdominal ultrasound revealed fatty liver, cystic focus of pancreatic body/tail measuring 1.39 x 1.25 x 1 cm.  Possible cholelithiasis.  -MRCP suggesting pancreatitis and reidentified gallstones.  Previously seen 5 mm calculus at distalmost CBD has been excreted.  -Gastroenterology consulted on . Signed off on .  -EUS on  with gallbladder sludge and normal CBD  -General Surgery consulted on . Signed off 
  Hospitalist Rapid Response or Critical Care Event   Admit Date:  2024  7:31 PM   Name:  Tobias Hall   Age:  84 y.o.  Sex:  male  :  1940   MRN:  [id number]   Room:  [facility]    Presenting Complaint: Abdominal Pain    Reason(s) for Admission: Abdominal pain, epigastric [R10.13]  Essential hypertension [I10]  Idiopathic acute pancreatitis without infection or necrosis [K85.00]  Acute pancreatitis without infection or necrosis [K85.90]     Rapid Response or Critical Care Event:   Rapid response called for new SVT. At bedside, patient on cardiac monitor with HR's in the 160s-170s. BP of 130/90. Patient denies chest pain and shortness of breath, he does not have any new symptoms and answers all questions appropriately. Gave Lopressor 5 mg IV once and orders placed to start Cardizem gtt and heparin gtt. EKG shows Afib with RVR, this is new onset. Discussed with cardiology, will transfer to telemetry. Spouse at bedside. All questions answered.       There is a high probability of acute organ impairment or life-threatening deterioration in the patient's condition from:   Afib with RVR, cardiopulmonary collpase    Critical care and other interventions:   Rapid Response, EKG, Lopressor 5 mg IV, Cardizem gtt    Total critical care time spent: 40 minutes.      Advance Care Planning note done: No      Objective:   Patient Vitals for the past 24 hrs:   Temp Pulse Resp BP SpO2   24 0821 -- (!) 140 -- (!) 130/94 --   24 0741 -- (!) 161 18 -- 91 %   24 0719 98.4 °F (36.9 °C) (!) 136 20 (!) 117/96 92 %   24 0526 98.4 °F (36.9 °C) 88 18 (!) 147/80 92 %   24 1956 98.4 °F (36.9 °C) (!) 107 18 (!) 139/96 91 %   24 1130 -- -- -- -- 93 %       Oxygen Therapy  SpO2: 91 %  Pulse via Oximetry: 103 beats per minute  Pulse Oximeter Device Mode: Intermittent  Pulse Oximeter Device Location: Finger  O2 Device: (S) None (Room air) (found on room air)  Skin Protection for O2 Device: No  O2 
4 Eyes Skin Assessment     NAME:  Tobias Hall  YOB: 1940  MEDICAL RECORD NUMBER:  [id number]    The patient is being assessed for  Admission    I agree that at least one RN has performed a thorough Head to Toe Skin Assessment on the patient. ALL assessment sites listed below have been assessed.      Areas assessed by both nurses:    Head, Face, Ears, Shoulders, Back, Chest, Arms, Elbows, Hands, Sacrum. Buttock, Coccyx, Ischium, and Legs. Feet and Heels        Does the Patient have a Wound? No noted wound(s)-redness to sacrum       Khai Prevention initiated by RN: Yes  Wound Care Orders initiated by RN: No    Pressure Injury (Stage 3,4, Unstageable, DTI, NWPT, and Complex wounds) if present, place Wound referral order by RN under : No    New Ostomies, if present place, Ostomy referral order under : No     Nurse 1 eSignature: Electronically signed by CAPRICE HANNA RN on 12/20/24 at 6:22 AM EST    **SHARE this note so that the co-signing nurse can place an eSignature**    Nurse 2 eSignature: Electronically signed by Halie Lassiter RN on 12/20/24 at 6:54 AM EST   
4 Eyes Skin Assessment     NAME:  Tobias Hall  YOB: 1940  MEDICAL RECORD NUMBER:  [id number]    The patient is being assessed for  Post-Op Surgical ERCP    I agree that at least one RN has performed a thorough Head to Toe Skin Assessment on the patient. ALL assessment sites listed below have been assessed.      Areas assessed by both nurses:    Head, Face, Ears, Shoulders, Back, Chest, Arms, Elbows, Hands, Sacrum. Buttock, Coccyx, Ischium, Legs. Feet and Heels, and Under Medical Devices         Does the Patient have a Wound? No noted wound(s) scattered bruising. Reddened groin, blanchable sacrum. Dry/flaky skin noted ble/feet       Khai Prevention initiated by RN: Yes  Wound Care Orders initiated by RN: No    Pressure Injury (Stage 3,4, Unstageable, DTI, NWPT, and Complex wounds) if present, place Wound referral order by RN under : No    New Ostomies, if present place, Ostomy referral order under : No     Nurse 1 eSignature: Electronically signed by BONITA DAY RN on 12/23/24 at 9:43 PM EST    **SHARE this note so that the co-signing nurse can place an eSignature**    Nurse 2 eSignature: Electronically signed by GARCIA COULTER RN on 12/23/24 at 10:28 PM EST    
Discharge instructions reviewed with Bradley Shelleynephhardik).  Family voiced understanding of all discharge instructions. IV's removed and heart monitor returned to central monitoring.     Med info sheets provided for all medications in AVS. Opportunity for questions provided.   All patient belongings were returned. Patient is ready for discharge. Family to transport patient home.   
Echo noted.   Case reviewed, will discuss with patient in AM and plan for robotic cholecystectomy tomorrow.    
GOALS:  LTG: Patient will tolerate least restrictive diet without overt signs or symptoms of airway compromise.   STG: Patient will tolerate thin liquids and minced and moist diet without overt signs or symptoms of airway compromise. Updated 2024  STG: Patient will participate in modified barium swallow study as clinically indicated.  Ongoing assessment 2024    LTG: Patient will improve cognitive- communicative function to participate in daily activities at highest possible level. Ongoing 2024  STG: Patient will sustain attention to therapy tasks for 5 minute interval with min A provided.   STG: Patient will follow verbally issued one step commands with 75% accuracy with min A provided.     SPEECH LANGUAGE PATHOLOGY: COMBINED Dysphagia and Cognitive- Communciative Daily Note #1    Acknowledge Order  I  Therapy Time  I   Charges     I  Rehab Caseload Tracker    NAME: Tobias Hall  : 1940  MRN: [id number]    ADMISSION DATE: 2024  PRIMARY DIAGNOSIS: Acute biliary pancreatitis    ICD-10: Treatment Diagnosis: R13.12 Dysphagia, Oropharyngeal Phase  R41.841 Cognitive-Communication Deficit    RECOMMENDATIONS   Diet:    Minced and Moist Foods  Thin Liquids     Medication:  crushed and provided in purees, as is medically appropriate.     Cognitive- Communicative: Patient exhibits improved alertness though deficits with attention, orientation, memory remains present. Family also reports speech not at baseline and sounds \"slurred\".     Will follow for dysphagia, cognitive- communicative function during acute phase of care.    Compensatory Swallowing Strategies:   Assist feed  Upright as possible for all oral intake  Only provide PO intake when awake/alert.    Therapeutic Intervention:   Patient/family education  Dysphagia treatment  Cognitive-linguistic treatment   Patient continues to require skilled intervention:  Yes. Recommend ongoing speech therapy services during this 
GOALS:  LTG: Patient will tolerate least restrictive diet without overt signs or symptoms of airway compromise.   STG: Patient will tolerate thin liquids and puree food textures without overt signs or symptoms of airway compromise.   STG: Patient will participate in modified barium swallow study as clinically indicated.      LTG: Patient will improve cognitive- communicative function to participate in daily activities at highest possible level.   STG: Patient will sustain attention to therapy tasks for 5 minute interval with min A provided.   STG: Patient will follow verbally issued one step commands with 75% accuracy with min A provided.     SPEECH LANGUAGE PATHOLOGY: COMBINED Dysphagia and Cognitive- Communciative Initial Assessment    Acknowledge Order  I  Therapy Time  I   Charges     I  Rehab Caseload Tracker    NAME: Tobias Hall  : 1940  MRN: [id number]    ADMISSION DATE: 2024  PRIMARY DIAGNOSIS: Acute biliary pancreatitis    ICD-10: Treatment Diagnosis: R13.12 Dysphagia, Oropharyngeal Phase  R41.841 Cognitive-Communication Deficit    RECOMMENDATIONS   Diet:    Patient appropriate for thin liquids and puree textured foods. Currently on clear liquid diet following cholecystectomy. Patient appropriate to continue clear liquid diet with thin liquids at this time.     Medication:  crushed and provided in purees, as is medically appropriate.     Cognitive- Communicative: Patient exhibits deficits in attention, orientation, memory, and problem solving. Brother in law present at bedside and reports patient does have baseline dementia though patient today is below baseline compared to prior level of function.     Will follow for dysphagia, cognitive- communicative function during acute phase of care.    Compensatory Swallowing Strategies:   Assist feed  Upright as possible for all oral intake  Only provide PO intake when awake/alert.    Therapeutic Intervention:   Patient/family 
Occupational Therapy Note:    Attempted to see patient this AM for occupational therapy treatment  session. Patient is currently off the floor for a procedure. Will follow and re-attempt as schedule permits/patient available. Thank you,    GABY Barker, OT    Rehab Caseload Tracker    
Outpatient Pharmacy Progress Note for Meds-to-Beds    Total number of Prescriptions Filled: 2    List of Medications (name,strength):  dilTIAZem 120 mg   metFORMIN 500mg      Delivered to: son       Co-pay: 3.01      Payment Type: cash      Additional Documentation:  Medication(s) were delivered to the patient's room prior to discharge      Thank you for letting us serve your patients.  [facility] [id number]- [place] [address]  Phone: [phone]  Fax: [phone]       
Patient to pre-op via stretcher. Wife at bedside  
Please complete MRI screening form with signatures, thank you.   
RN notified by pre-op to stop the heparin in preparation of ERCP later this afternoon. Heparin stopped at 1350.    
TRANSFER - IN REPORT:    Verbal report received from BAIRON Estevez on Tobias Hall being received from PACU (post op) for routine progression of care.     Report consisted of patient’s Situation, Background, Assessment and Recommendations(SBAR).     Information from the following report(s) SBAR and Procedure Summary was reviewed. Opportunity for questions and clarification was provided.      Assessment completed upon patient’s arrival to unit and care assumed.     Patient received to room 426. Patient connected to monitor and assessment completed. Plan of care reviewed. Patient oriented to room and call light. Patient aware to use call light to communicate any chest pain or needs.     Admission skin assessment completed with second RN and reveals the following: **    
TRANSFER - IN REPORT:    Verbal report received from BAIRON Park on Tobias Hall being received from 718 for change in patient condition (afib RVR)      Report consisted of patient’s Situation, Background, Assessment and Recommendations(SBAR).     Information from the following report(s) SBAR, Kardex, ED Summary, Intake/Output, MAR, and Recent Results was reviewed with the receiving nurse.    Opportunity for questions and clarification was provided.      Assessment completed upon patient’s arrival to unit and care assumed.     
TRANSFER - IN REPORT:    Verbal report received from Froylan WADDELL on Tobias Hall  being received from [facility] ED for routine progression of patient care      Report consisted of patient's Situation, Background, Assessment and   Recommendations(SBAR).     Information from the following report(s) Nurse Handoff Report, ED Encounter Summary, ED SBAR, Intake/Output, MAR, and Recent Results was reviewed with the receiving nurse.    Opportunity for questions and clarification was provided.      Assessment completed upon patient's arrival to unit and care assumed.     
TRANSFER - OUT REPORT:    Verbal report given to BAIRON Hicks on Tobias Hall  being transferred to pre-op for pre-op       Report consisted of patient's Situation, Background, Assessment and   Recommendations(SBAR).     Information from the following report(s) Nurse Handoff Report, MAR, and Cardiac Rhythm SR  was reviewed with the receiving nurse.           Lines:   Peripheral IV 12/22/24 Left;Posterior Forearm (Active)   Site Assessment Clean, dry & intact 12/24/24 0520   Line Status Capped;Normal saline locked 12/24/24 0520   Line Care Cap changed 12/24/24 0042   Phlebitis Assessment No symptoms 12/24/24 0520   Infiltration Assessment 0 12/24/24 0520   Alcohol Cap Used Yes 12/24/24 0520   Dressing Status Clean, dry & intact 12/24/24 0520   Dressing Type Transparent 12/24/24 0520   Dressing Intervention New 12/22/24 1620       Peripheral IV 12/22/24 Left Forearm (Active)   Site Assessment Clean, dry & intact 12/24/24 0520   Line Status Infusing 12/24/24 0520   Line Care Connections checked and tightened 12/24/24 0520   Phlebitis Assessment No symptoms 12/24/24 0520   Infiltration Assessment 0 12/24/24 0520   Alcohol Cap Used Yes 12/24/24 0520   Dressing Status Clean, dry & intact 12/24/24 0520   Dressing Type Transparent 12/24/24 0520   Dressing Intervention New 12/22/24 2140        Opportunity for questions and clarification was provided.      Patient transported with:  Monitor, O2 @ 2lpm, and Registered Nurse        
TRANSFER - OUT REPORT:    Verbal report given to Priyanka WADDELL on Tobias Hall  being transferred to 4th floor for routine progression of patient care       Report consisted of patient's Situation, Background, Assessment and   Recommendations(SBAR).     Information from the following report(s) Nurse Handoff Report was reviewed with the receiving nurse.           Lines:   Peripheral IV 12/22/24 Left;Posterior Forearm (Active)   Site Assessment Clean, dry & intact 12/22/24 0830   Line Status Flushed;Brisk blood return;Normal saline locked 12/22/24 0830   Line Care Connections checked and tightened;Cap changed;Ports disinfected 12/22/24 0830   Phlebitis Assessment No symptoms 12/22/24 0830   Infiltration Assessment 0 12/22/24 0830   Alcohol Cap Used Yes 12/22/24 0830   Dressing Status New dressing applied 12/22/24 0830   Dressing Type Transparent 12/22/24 0830   Dressing Intervention New 12/22/24 0830        Opportunity for questions and clarification was provided.           
Ref Range    POC Glucose 237 (H) 65 - 100 mg/dL    Performed by: Xenia    POCT Glucose    Collection Time: 12/24/24  7:57 PM   Result Value Ref Range    POC Glucose 218 (H) 65 - 100 mg/dL    Performed by: Stephanie    Anti-XA, Heparin    Collection Time: 12/25/24  2:15 AM   Result Value Ref Range    Anti-XA Unfrac Heparin 0.26 (L) 0.3 - 0.7 IU/mL   CBC with Auto Differential    Collection Time: 12/25/24  2:15 AM   Result Value Ref Range    WBC 16.9 (H) 4.3 - 11.1 K/uL    RBC 4.20 (L) 4.23 - 5.6 M/uL    Hemoglobin 14.2 13.6 - 17.2 g/dL    Hematocrit 41.0 (L) 41.1 - 50.3 %    MCV 97.6 82 - 102 FL    MCH 33.8 (H) 26.1 - 32.9 PG    MCHC 34.6 31.4 - 35.0 g/dL    RDW 14.7 (H) 11.9 - 14.6 %    Platelets 300 150 - 450 K/uL    MPV 11.2 9.4 - 12.3 FL    nRBC 0.00 0.0 - 0.2 K/uL    Differential Type AUTOMATED      Neutrophils % 79 (H) 43 - 78 %    Lymphocytes % 10 (L) 13 - 44 %    Monocytes % 9 4.0 - 12.0 %    Eosinophils % 0 (L) 0.5 - 7.8 %    Basophils % 0 0.0 - 2.0 %    Immature Granulocytes % 2 0.0 - 5.0 %    Neutrophils Absolute 13.4 (H) 1.7 - 8.2 K/UL    Lymphocytes Absolute 1.6 0.5 - 4.6 K/UL    Monocytes Absolute 1.6 (H) 0.1 - 1.3 K/UL    Eosinophils Absolute 0.0 0.0 - 0.8 K/UL    Basophils Absolute 0.1 0.0 - 0.2 K/UL    Immature Granulocytes Absolute 0.3 0.0 - 0.5 K/UL   Comprehensive Metabolic Panel    Collection Time: 12/25/24  2:15 AM   Result Value Ref Range    Sodium 141 136 - 145 mmol/L    Potassium 3.6 3.5 - 5.1 mmol/L    Chloride 103 98 - 107 mmol/L    CO2 25 20 - 29 mmol/L    Anion Gap 14 7 - 16 mmol/L    Glucose 229 (H) 70 - 99 mg/dL    BUN 36 (H) 8 - 23 MG/DL    Creatinine 1.45 (H) 0.80 - 1.30 MG/DL    Est, Glom Filt Rate 47 (L) >60 ml/min/1.73m2    Calcium 8.8 8.8 - 10.2 MG/DL    Total Bilirubin 5.3 (H) 0.0 - 1.2 MG/DL    ALT 98 (H) 8 - 55 U/L     (H) 15 - 37 U/L    Alk Phosphatase 154 (H) 40 - 129 U/L    Total Protein 6.4 6.3 - 8.2 g/dL    Albumin 2.4 (L) 3.2 - 4.6 g/dL    Globulin 
pancreatitis without infection or necrosis  - pancreatic lesion seen on both US and CT imaging  - IVF resuscitation with LR  - possible cholelithiasis but no biliary ductal dilation  - MRCP completed, differential is IPMN vs serous cystadenoma vs pseudocyst  - plan for EUS/ERCP with Dr. Figueredo on Monday  - trend LFTs  - supportive measures    # HTN  - benazepril   - hold amlodipine as above    # CKD Stage III  - at baseline  - avoid nephrotoxic meds  - serial BMPs    Patient is critically ill.  Without the interventions noted, there is a high probability of imminent acute organ impairment or life-threatening deterioration.  Total critical care time spent: 40 minutes.    Critical care time includes time spent at bedside performing history and exam, performing chart review, discussing findings and treatment plan with patient and/or family, discussing patient with consultants and colleagues, ordering and reviewing pertinent laboratory and radiographic evaluations, and discussing patient with nursing staff. Time excludes procedures.     Anticipated Discharge Arrangements:   Likely needs SNF at discharge. Hopefully discharge in next 2-3 days. Discussed with patient and spouse at bedside. All questions answered.     PT/OT evals ordered?  Therapy evals ordered  Diet:  ADULT DIET; Easy to Chew  VTE prophylaxis: heparin gtt  Code status: Full Code      Non-peripheral Lines and Tubes (if present):      External Urinary Catheter (Active)        Telemetry (if present):           Hospital Problems:  Principal Problem:    Acute pancreatitis without infection or necrosis  Active Problems:    Stage 3a chronic kidney disease (HCC)    Essential hypertension    Type 2 diabetes mellitus with chronic kidney disease  Resolved Problems:    * No resolved hospital problems. *      Objective:   Patient Vitals for the past 24 hrs:   Temp Pulse Resp BP SpO2   12/22/24 0821 -- (!) 140 -- (!) 130/94 --   12/22/24 0741 -- (!) 161 18 -- 91 % 
(FLOMAX) capsule 0.4 mg  0.4 mg Oral Daily    sodium chloride flush 0.9 % injection 5-40 mL  5-40 mL IntraVENous 2 times per day    sodium chloride flush 0.9 % injection 5-40 mL  5-40 mL IntraVENous PRN    0.9 % sodium chloride infusion   IntraVENous PRN    potassium chloride 20 mEq/50 mL IVPB (Central Line)  20 mEq IntraVENous PRN    Or    potassium chloride 10 mEq/100 mL IVPB (Peripheral Line)  10 mEq IntraVENous PRN    magnesium sulfate 2000 mg in 50 mL IVPB premix  2,000 mg IntraVENous PRN    sodium phosphate 15 mmol in sodium chloride 0.9 % 250 mL IVPB  15 mmol IntraVENous PRN    HYDROmorphone HCl PF (DILAUDID) injection 0.5 mg  0.5 mg IntraVENous Q3H PRN    Or    HYDROmorphone HCl PF (DILAUDID) injection 1 mg  1 mg IntraVENous Q3H PRN    ondansetron (ZOFRAN-ODT) disintegrating tablet 4 mg  4 mg Oral Q8H PRN    Or    ondansetron (ZOFRAN) injection 4 mg  4 mg IntraVENous Q6H PRN    labetalol (NORMODYNE;TRANDATE) injection 10 mg  10 mg IntraVENous Q4H PRN    Or    labetalol (NORMODYNE;TRANDATE) injection 20 mg  20 mg IntraVENous Q4H PRN    insulin lispro (HUMALOG,ADMELOG) injection vial 0-8 Units  0-8 Units SubCUTAneous 4x Daily AC & HS    Glucagon Emergency KIT 1 mg  1 mg SubCUTAneous PRN         
rales.  Symmetric expansion.  Abdomen:   Soft, nondistended. RUQ/epigastrium TTP without rebound tenderness that is improved since yesterday.   Extremities: No cyanosis or clubbing.  No edema  Skin:     Jaundice.   Neuro:  CN II-XII grossly intact.    Psych:  Normal mood and affect.      I have personally reviewed labs and tests:  Recent Labs:  Recent Results (from the past 48 hour(s))   Lipase    Collection Time: 12/19/24  7:45 PM   Result Value Ref Range    Lipase >3,000 (H) 13 - 60 U/L   CBC with Auto Differential    Collection Time: 12/19/24  7:45 PM   Result Value Ref Range    WBC 13.7 (H) 4.3 - 11.1 K/uL    RBC 4.96 4.23 - 5.6 M/uL    Hemoglobin 16.6 13.6 - 17.2 g/dL    Hematocrit 49.8 41.1 - 50.3 %    .4 82 - 102 FL    MCH 33.5 (H) 26.1 - 32.9 PG    MCHC 33.3 31.4 - 35.0 g/dL    RDW 13.6 11.9 - 14.6 %    Platelets 258 150 - 450 K/uL    MPV 11.8 9.4 - 12.3 FL    nRBC 0.00 0.0 - 0.2 K/uL    Differential Type AUTOMATED      Neutrophils % 87 (H) 43 - 78 %    Lymphocytes % 8 (L) 13 - 44 %    Monocytes % 4 4.0 - 12.0 %    Eosinophils % 0 (L) 0.5 - 7.8 %    Basophils % 0 0.0 - 2.0 %    Immature Granulocytes % 0 0.0 - 5.0 %    Neutrophils Absolute 11.9 (H) 1.7 - 8.2 K/UL    Lymphocytes Absolute 1.1 0.5 - 4.6 K/UL    Monocytes Absolute 0.5 0.1 - 1.3 K/UL    Eosinophils Absolute 0.0 0.0 - 0.8 K/UL    Basophils Absolute 0.0 0.0 - 0.2 K/UL    Immature Granulocytes Absolute 0.1 0.0 - 0.5 K/UL   Comprehensive Metabolic Panel    Collection Time: 12/19/24  7:45 PM   Result Value Ref Range    Sodium 140 136 - 145 mmol/L    Potassium 4.3 3.5 - 5.1 mmol/L    Chloride 100 98 - 107 mmol/L    CO2 23 20 - 29 mmol/L    Anion Gap 18 (H) 7 - 16 mmol/L    Glucose 262 (H) 70 - 99 mg/dL    BUN 21 8 - 23 MG/DL    Creatinine 1.58 (H) 0.80 - 1.30 MG/DL    Est, Glom Filt Rate 43 (L) >60 ml/min/1.73m2    Calcium 9.7 8.8 - 10.2 MG/DL    Total Bilirubin 6.9 (H) 0.0 - 1.2 MG/DL     (H) 8 - 55 U/L     (H) 15 - 37 U/L    Alk 
K/UL    Immature Granulocytes Absolute 0.2 0.0 - 0.5 K/UL   POCT Glucose    Collection Time: 12/22/24  6:22 AM   Result Value Ref Range    POC Glucose 146 (H) 65 - 100 mg/dL    Performed by: Bailey    POCT Glucose    Collection Time: 12/22/24  8:12 AM   Result Value Ref Range    POC Glucose 229 (H) 65 - 100 mg/dL    Performed by: [place]    EKG 12 Lead    Collection Time: 12/22/24  8:19 AM   Result Value Ref Range    Ventricular Rate 161 BPM    QRS Duration 118 ms    Q-T Interval 310 ms    QTc Calculation (Bazett) 507 ms    R Axis -76 degrees    T Axis 84 degrees    Diagnosis       Atrial fibrillation with rapid ventricular response with premature   ventricular or aberrantly conducted complexes  Incomplete right bundle branch block  Left anterior fascicular block  Minimal voltage criteria for LVH, may be normal variant ( Rusty product )  Possible Lateral infarct , age undetermined  Cannot rule out Inferior infarct (masked by fascicular block?) , age   undetermined  Abnormal ECG    Confirmed by POLINA MIXON MD ([id number]) on 12/22/2024 8:59:50 AM     APTT    Collection Time: 12/22/24 10:03 AM   Result Value Ref Range    APTT 98.9 (H) 23.3 - 37.4 SEC   Protime-INR    Collection Time: 12/22/24 10:03 AM   Result Value Ref Range    Protime 16.0 (H) 11.3 - 14.9 sec    INR 1.2     Anti-XA, Heparin    Collection Time: 12/22/24 10:03 AM   Result Value Ref Range    Anti-XA Unfrac Heparin 0.84 (H) 0.3 - 0.7 IU/mL   Magnesium    Collection Time: 12/22/24 10:03 AM   Result Value Ref Range    Magnesium 1.7 (L) 1.8 - 2.4 mg/dL   TSH reflex to FT4    Collection Time: 12/22/24 10:03 AM   Result Value Ref Range    TSH w Free Thyroid if Abnormal 1.90 0.27 - 4.20 UIU/ML   POCT Glucose    Collection Time: 12/22/24 11:32 AM   Result Value Ref Range    POC Glucose 191 (H) 65 - 100 mg/dL    Performed by: Hernesto    EKG 12 Lead    Collection Time: 12/22/24  2:24 PM   Result Value Ref Range    Ventricular Rate 
tablet 5 mg  5 mg Oral PRN    Or    oxyCODONE (ROXICODONE) immediate release tablet 10 mg  10 mg Oral PRN    HYDROmorphone HCl PF (DILAUDID) injection 0.5 mg  0.5 mg IntraVENous Q3H PRN    Or    HYDROmorphone HCl PF (DILAUDID) injection 1 mg  1 mg IntraVENous Q3H PRN    ondansetron (ZOFRAN-ODT) disintegrating tablet 4 mg  4 mg Oral Q8H PRN    Or    ondansetron (ZOFRAN) injection 4 mg  4 mg IntraVENous Q6H PRN    enoxaparin (LOVENOX) injection 40 mg  40 mg SubCUTAneous Daily    labetalol (NORMODYNE;TRANDATE) injection 10 mg  10 mg IntraVENous Q4H PRN    Or    labetalol (NORMODYNE;TRANDATE) injection 20 mg  20 mg IntraVENous Q4H PRN    insulin lispro (HUMALOG,ADMELOG) injection vial 0-8 Units  0-8 Units SubCUTAneous 4x Daily AC & HS    glucose chewable tablet 16 g  4 tablet Oral PRN    dextrose bolus 10% 125 mL  125 mL IntraVENous PRN    Or    dextrose bolus 10% 250 mL  250 mL IntraVENous PRN    Glucagon Emergency KIT 1 mg  1 mg SubCUTAneous PRN    dextrose 10 % infusion   IntraVENous Continuous PRN       Signed:  ARABELLA DOSS DO    Part of this note may have been written by using a voice dictation software.  The note has been proof read but may still contain some grammatical/other typographical errors.

## 2024-12-27 ENCOUNTER — TELEPHONE (OUTPATIENT)
Dept: FAMILY MEDICINE CLINIC | Facility: CLINIC | Age: 84
End: 2024-12-27

## 2024-12-27 NOTE — TELEPHONE ENCOUNTER
called patient to do [facility] hospital follow up but no answer & VM box is full

## 2024-12-27 NOTE — TELEPHONE ENCOUNTER
Care Transitions Initial Follow Up Call    Outreach made within 2 business days of discharge: Yes    Patient: Tobias Hall Patient : 1940   MRN: [id number]  Reason for Admission: pancreatitis   Discharge Date: 24       Spoke with: yuniel Hall (patients wife)    Discharge department/facility: [facility]     TCM Interactive Patient Contact:  Was patient able to fill all prescriptions: Yes  Was patient instructed to bring all medications to the follow-up visit: Yes  Is patient taking all medications as directed in the discharge summary? Yes  Does patient understand their discharge instructions: Yes  Does patient have questions or concerns that need addressed prior to 7-14 day follow up office visit: yes they would like a referral for home health    Additional needs identified to be addressed with provider  No needs identified             Scheduled appointment with PCP within 7-14 days    Follow Up  Future Appointments   Date Time Provider Department Center   2024  3:00 PM Brianne Dodd APRN - NP [facility] DEP   2025  3:30 PM Taylor Pope APRN - CNP [facility] AMB   3/6/2025 10:30 AM PFP LAB [facility] DEP   3/11/2025 10:40 AM Brianne Dodd APRN - NP [facility] DEP   3/12/2025 11:00 AM Armen Gonzalez MD [facility]       ECHO FifeGENET PEARSON

## 2024-12-27 NOTE — TELEPHONE ENCOUNTER
Pt was release from hospital on 12/26  They want a referral for homehealth. Any way we can put it in for today. Pt is coming in on Monday

## 2024-12-28 NOTE — ED NOTES
Case management contact made with pt post discharge to inquire about follow up care. Pt has FU with PCP 12/30.       Daniella Birch, RN  12/28/24 [id number]

## 2024-12-30 ENCOUNTER — OFFICE VISIT (OUTPATIENT)
Dept: FAMILY MEDICINE CLINIC | Facility: CLINIC | Age: 84
End: 2024-12-30

## 2024-12-30 VITALS
HEART RATE: 68 BPM | TEMPERATURE: 97.4 F | BODY MASS INDEX: 24.2 KG/M2 | WEIGHT: 150.6 LBS | SYSTOLIC BLOOD PRESSURE: 151 MMHG | HEIGHT: 66 IN | OXYGEN SATURATION: 96 % | DIASTOLIC BLOOD PRESSURE: 82 MMHG

## 2024-12-30 DIAGNOSIS — Z76.89 ESTABLISHING CARE WITH NEW DOCTOR, ENCOUNTER FOR: ICD-10-CM

## 2024-12-30 DIAGNOSIS — I48.91 ATRIAL FIBRILLATION, UNSPECIFIED TYPE (HCC): ICD-10-CM

## 2024-12-30 DIAGNOSIS — Z09 HOSPITAL DISCHARGE FOLLOW-UP: Primary | ICD-10-CM

## 2024-12-30 DIAGNOSIS — N18.31 TYPE 2 DIABETES MELLITUS WITH STAGE 3A CHRONIC KIDNEY DISEASE, WITHOUT LONG-TERM CURRENT USE OF INSULIN (HCC): Chronic | ICD-10-CM

## 2024-12-30 DIAGNOSIS — E11.22 TYPE 2 DIABETES MELLITUS WITH STAGE 3A CHRONIC KIDNEY DISEASE, WITHOUT LONG-TERM CURRENT USE OF INSULIN (HCC): Chronic | ICD-10-CM

## 2024-12-30 DIAGNOSIS — K86.2 PANCREATIC CYST: ICD-10-CM

## 2024-12-30 RX ORDER — METFORMIN HYDROCHLORIDE 500 MG/1
500 TABLET, EXTENDED RELEASE ORAL
Qty: 90 TABLET | Refills: 0 | Status: SHIPPED | OUTPATIENT
Start: 2024-12-30

## 2024-12-30 RX ORDER — BLOOD-GLUCOSE METER
1 KIT MISCELLANEOUS DAILY PRN
Qty: 1 KIT | Refills: 0 | Status: SHIPPED | OUTPATIENT
Start: 2024-12-30

## 2024-12-30 NOTE — PROGRESS NOTES
well as documenting on the day of the visit.       Subjective:   HPI:  Follow up of Hospital problems/diagnosis(es): Tobias Hall is a 84 y.o. male with medical explained history history was admitted for acute pancreatitis and possible biliary obstruction and A-fib with RVR . He was evaluated by Gastroenterology, Cardiology, general surgery.   Cholecystectomy performed on  12/24.  Echocardiography EF 55-60%. AFIb  with RVR resolved with diltiazem drip, the pt was switched to  p.o. diltiazem. The pt was discharged home with HH and family care    Discharge summary reviewed- see chart.    Interval history/Current status: Alert, oriented place and person, unable to evaluate the pain level due to the patient cognition  , but per family (wife and nephew) the pt has a pain in his legs and back at night . Family members also concerned about lack of appetite and diarrhea with metformin .  Per nephew home health nurse came on 12/28/2024 but he is not sure about the schedule for her visits PT and OT.  Discussed with family diet options post op  cholecystectomy, written instructions provided.      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    Hyperuricemia    History of renal stone    Spinal stenosis of lumbosacral region    Family history of malignant neoplasm of prostate    Spinal stenosis    Post-traumatic arthrosis of left shoulder    Essential hypertension    Painful orthopaedic hardware (HCC)    Spondylolisthesis at L5-S1 level    Mixed hyperlipidemia    Gout    History of left shoulder fracture    BPH associated with nocturia    Type 2 diabetes mellitus with chronic kidney disease    Dyslipidemia    Acute biliary pancreatitis    Atrial fibrillation with rapid ventricular response (HCC)    Abdominal pain, epigastric    Biliary obstruction       Medications listed

## 2024-12-31 DIAGNOSIS — E11.22 TYPE 2 DIABETES MELLITUS WITH STAGE 3A CHRONIC KIDNEY DISEASE, WITHOUT LONG-TERM CURRENT USE OF INSULIN (HCC): Primary | ICD-10-CM

## 2024-12-31 DIAGNOSIS — N18.31 TYPE 2 DIABETES MELLITUS WITH STAGE 3A CHRONIC KIDNEY DISEASE, WITHOUT LONG-TERM CURRENT USE OF INSULIN (HCC): Primary | ICD-10-CM

## 2024-12-31 RX ORDER — BLOOD SUGAR DIAGNOSTIC
STRIP MISCELLANEOUS
Qty: 100 EACH | Refills: 3 | Status: SHIPPED | OUTPATIENT
Start: 2024-12-31

## 2024-12-31 RX ORDER — LANCETS 30 GAUGE
1 EACH MISCELLANEOUS DAILY
Qty: 100 EACH | Refills: 3 | Status: SHIPPED | OUTPATIENT
Start: 2024-12-31

## 2025-01-03 ENCOUNTER — TELEPHONE (OUTPATIENT)
Dept: FAMILY MEDICINE CLINIC | Facility: CLINIC | Age: 85
End: 2025-01-03

## 2025-01-03 NOTE — TELEPHONE ENCOUNTER
Please ask family if patient sleeps during the day.  If he does not tell them he needs to be awake during the day and he will sleep during the night.  They also can try melatonin 3 mg to help him sleep.  Please ask if his cognition improved and how is his appetite.  thank you

## 2025-01-03 NOTE — TELEPHONE ENCOUNTER
Pt is not sleeping at all. Is there anything ,that may help. Zan sheffield said ok to speak with orquidea

## 2025-01-03 NOTE — TELEPHONE ENCOUNTER
Wife Charmaine says they have given him Melatonin 10 mg & doesn't help him with the sleep at all, he is napping during the day but wife is trying to keep him up some  She can not get him to drink the protein drinks or much of anything really, & maybe eats 2 bites of food..   Still Having problems with metformin causing bad diarrhea, wife wants to know when does he not have to take it. Blood sugar today was 176

## 2025-01-06 ENCOUNTER — TELEPHONE (OUTPATIENT)
Dept: FAMILY MEDICINE CLINIC | Facility: CLINIC | Age: 85
End: 2025-01-06

## 2025-01-06 DIAGNOSIS — G47.00 INSOMNIA, UNSPECIFIED TYPE: Primary | ICD-10-CM

## 2025-01-06 RX ORDER — TRAZODONE HYDROCHLORIDE 50 MG/1
25 TABLET, FILM COATED ORAL NIGHTLY
Qty: 15 TABLET | Refills: 0 | Status: SHIPPED | OUTPATIENT
Start: 2025-01-06

## 2025-01-06 NOTE — TELEPHONE ENCOUNTER
Pt wife calling wanting to know what he can have for pain. He was told he could not take Tylenol. He is having headaches and sciatica pain.  Advise

## 2025-01-07 NOTE — TELEPHONE ENCOUNTER
Called the patient's nephew Mr. Carlos and notified him that trazodone 25 mg sent to [facility], discussed metformin side effects and agreed to continue managing diabetes with diet as before with daily blood sugar checks.  discussed possible sundowning due to  patient complains on the pain and get restless and confused during the night while he has no these  issues during the day.  Advised to reach out if any questions or concerns

## 2025-01-08 ENCOUNTER — OFFICE VISIT (OUTPATIENT)
Dept: SURGERY | Age: 85
End: 2025-01-08

## 2025-01-08 VITALS — WEIGHT: 150 LBS | BODY MASS INDEX: 24.99 KG/M2 | HEIGHT: 65 IN

## 2025-01-08 DIAGNOSIS — K83.1 BILIARY OBSTRUCTION: Primary | ICD-10-CM

## 2025-01-08 NOTE — PROGRESS NOTES
[facility]  [address], [address]  [place] [address]  [phone]      Tobias Hall   presents for follow-up after robotic cholecystectomy by Dr Huitron 12/24/24.  He reports no problems with eating, voiding, or their wound and no ongoing postoperative problems.  He did have some diarrhea after restarting metformin and now has not gone in several days. Appetite is good.  Chronic sciatica is problematic.     EXAM:  He  is in no distress  There is mild residual tenderness in the abdomen   Incision: healing well, no seroma, no cellulitis    Pathology:  Gallbladder, cholecystectomy:   Benign adenomyoma.   Mild chronic cholecystitis.   Cholelithiasis.     A copy was provided to the patient.       ASSESSMENT/PLAN:    1. Biliary obstruction         Diet as tolerated from General Surgery Standpoint  No heavy lifting for 6 weeks  Follow op with PCP or Nephrology for chronic issues presented today  Follow up here prn    No follow-up provider specified.    EDWINA ESPARZA, APRN - CNP

## 2025-01-16 ENCOUNTER — TELEPHONE (OUTPATIENT)
Dept: FAMILY MEDICINE CLINIC | Facility: CLINIC | Age: 85
End: 2025-01-16

## 2025-01-16 DIAGNOSIS — Z91.81 RISK FOR FALLS: ICD-10-CM

## 2025-01-16 DIAGNOSIS — R41.3 MEMORY DIFFICULTIES: Primary | ICD-10-CM

## 2025-01-16 NOTE — TELEPHONE ENCOUNTER
Find out more about dementia because it is getting really bad & night time is really bad. His wife would like to talk to someone about how to handle it better.   He doesn't know her or think he is home & if he could walk he would leave.     He wont hardly eat & still nauseated. Has headache all the time, they said not to take tylenol when he left hospital but he can't take advil for kidneys. What can she give him?    Wants to know if he needs to stay on the metformin? Is this giving him a headache or the stomach issues.     Wants to know if he is still Afib like he was in the hospital.

## 2025-01-17 NOTE — TELEPHONE ENCOUNTER
Referral placed for  to assist the patient with resources about her new role of the  be caregiver for the patient.  Will stop metformin due to GI side effects(excessive diarrhea),  continue to monitor fasting glucose and  report if it stays above 140.  The patient encouraged to call if any questions or concerns

## 2025-01-20 ENCOUNTER — TELEPHONE (OUTPATIENT)
Dept: FAMILY MEDICINE CLINIC | Facility: CLINIC | Age: 85
End: 2025-01-20

## 2025-01-20 ENCOUNTER — CARE COORDINATION (OUTPATIENT)
Dept: CARE COORDINATION | Facility: CLINIC | Age: 85
End: 2025-01-20

## 2025-01-20 NOTE — CARE COORDINATION
The patient's wife states that the patient is having more difficulty sleeping at night. She's looking for additional help with the patient as she states he is more agitated and confused during the day a well.    Patient's wife states she will inquire about patient's medication with PCP.    The patient is currently receiving PT and [facility] with an aid for personal care.     CARROLL provided info on [facility] for voucher to help with possible additional home care for the wife/ caregiver. Patient's wife states she will call them.    The patient's wife states she understands that Medicare does not cover personal care aids or aids to stay with the patient at home to help.     CARROLL explained out of pocket-cost for personal care aid.     She states that the is also [facility] the [facility]. Her nephew is helping, she states.

## 2025-01-21 ENCOUNTER — OFFICE VISIT (OUTPATIENT)
Dept: FAMILY MEDICINE CLINIC | Facility: CLINIC | Age: 85
End: 2025-01-21

## 2025-01-21 DIAGNOSIS — G30.1 SEVERE LATE ONSET ALZHEIMER'S DEMENTIA WITH AGITATION (HCC): ICD-10-CM

## 2025-01-21 DIAGNOSIS — F02.C11 SEVERE LATE ONSET ALZHEIMER'S DEMENTIA WITH AGITATION (HCC): ICD-10-CM

## 2025-01-21 DIAGNOSIS — G47.00 INSOMNIA, UNSPECIFIED TYPE: ICD-10-CM

## 2025-01-21 DIAGNOSIS — Z00.00 MEDICARE ANNUAL WELLNESS VISIT, SUBSEQUENT: Primary | ICD-10-CM

## 2025-01-21 RX ORDER — QUETIAPINE FUMARATE 25 MG/1
12.5 TABLET, FILM COATED ORAL NIGHTLY PRN
Qty: 30 TABLET | Refills: 0 | Status: SHIPPED | OUTPATIENT
Start: 2025-01-21 | End: 2025-03-22

## 2025-01-21 ASSESSMENT — PATIENT HEALTH QUESTIONNAIRE - PHQ9
SUM OF ALL RESPONSES TO PHQ QUESTIONS 1-9: 0
2. FEELING DOWN, DEPRESSED OR HOPELESS: NOT AT ALL
SUM OF ALL RESPONSES TO PHQ9 QUESTIONS 1 & 2: 0
1. LITTLE INTEREST OR PLEASURE IN DOING THINGS: NOT AT ALL
SUM OF ALL RESPONSES TO PHQ QUESTIONS 1-9: 0
SUM OF ALL RESPONSES TO PHQ9 QUESTIONS 1 & 2: 0
2. FEELING DOWN, DEPRESSED OR HOPELESS: NOT AT ALL
1. LITTLE INTEREST OR PLEASURE IN DOING THINGS: NOT AT ALL
SUM OF ALL RESPONSES TO PHQ9 QUESTIONS 1 & 2: 0
SUM OF ALL RESPONSES TO PHQ QUESTIONS 1-9: 0
2. FEELING DOWN, DEPRESSED OR HOPELESS: NOT AT ALL
1. LITTLE INTEREST OR PLEASURE IN DOING THINGS: NOT AT ALL
SUM OF ALL RESPONSES TO PHQ QUESTIONS 1-9: 0

## 2025-01-21 NOTE — PATIENT INSTRUCTIONS
[employer] disclaims any warranty or liability for your use of this information.    Personalized Preventive Plan for Tobias Hall - 1/21/2025  Medicare offers a range of preventive health benefits. Some of the tests and screenings are paid in full while other may be subject to a deductible, co-insurance, and/or copay.  Some of these benefits include a comprehensive review of your medical history including lifestyle, illnesses that may run in your family, and various assessments and screenings as appropriate.  After reviewing your medical record and screening and assessments performed today your provider may have ordered immunizations, labs, imaging, and/or referrals for you.  A list of these orders (if applicable) as well as your Preventive Care list are included within your After Visit Summary for your review.

## 2025-01-21 NOTE — PROGRESS NOTES
[facility]  [address] [place] [address]  Phone [phone]  Fax:  [phone]    Patient: Tobias Hall  YOB: 1940  Patient Age 84 y.o.  Patient sex: male  Medical Record:  [id number]  Visit Date: 01/22/25    [facility] Clinic Note     Chief Complaint   Patient presents with    Other     [facility] papers filled out  Need something for sleep     Medicare AWV       History of Present Illness:  The patient presented to the office with his wife with concerns on insomnia with restlessness and agitation at night, they also need to feel form for [facility]. Ms. Collado  reports that the patient labs during the day and cannot sleep at night, he gets up every few minutes from bed dresses up and wondering over the house with risk to fall, he also gets angry and agitated if she tries to assist him to walk .  Ms. Collado tries to keep the patient awake during the day but he cannot ambulate by himself and always need standby assistance while he is walking with his walker , he also cannot be entertained with reading due to inability to read , playing cards , watch TV or carry conversation due to cognitive impairment that became worse after recent hospitalization.         Allergies:  Allergies   Allergen Reactions    Clonidine Other (See Comments)     Lethargy, Patient denies allergy    Simvastatin Other (See Comments)     Noted in office visit note, 5/26/2015,  Patient denies allergy       Current Medications:   Medications marked \"taking\" at this time:    Current Outpatient Medications:     QUEtiapine (SEROQUEL) 25 MG tablet, Take 0.5 tablets by mouth nightly as needed for Agitation, Disp: 30 tablet, Rfl: 0    traZODone (DESYREL) 50 MG tablet, Take 0.5 tablets by mouth nightly, Disp: 15 tablet, Rfl: 0    blood glucose test strips (ONETOUCH VERIO) strip, Use to monitor glucose daily for DM, Disp: 100 each, Rfl: 3    OneTouch Delica Lancets 30G MISC, 1 each by Does not apply route

## 2025-01-22 ENCOUNTER — TELEPHONE (OUTPATIENT)
Dept: FAMILY MEDICINE CLINIC | Facility: CLINIC | Age: 85
End: 2025-01-22

## 2025-01-22 VITALS
RESPIRATION RATE: 18 BRPM | BODY MASS INDEX: 25.33 KG/M2 | SYSTOLIC BLOOD PRESSURE: 138 MMHG | TEMPERATURE: 97.4 F | HEIGHT: 65 IN | WEIGHT: 152 LBS | DIASTOLIC BLOOD PRESSURE: 74 MMHG | HEART RATE: 63 BPM | OXYGEN SATURATION: 94 %

## 2025-01-22 PROBLEM — F01.B11 MODERATE VASCULAR DEMENTIA WITH AGITATION (HCC): Status: RESOLVED | Noted: 2025-01-22 | Resolved: 2025-01-22

## 2025-01-22 PROBLEM — F01.B11 MODERATE VASCULAR DEMENTIA WITH AGITATION (HCC): Status: ACTIVE | Noted: 2025-01-22

## 2025-01-22 PROBLEM — F02.C11 SEVERE LATE ONSET ALZHEIMER'S DEMENTIA WITH AGITATION (HCC): Status: ACTIVE | Noted: 2025-01-22

## 2025-01-22 PROBLEM — G30.1 SEVERE LATE ONSET ALZHEIMER'S DEMENTIA WITH AGITATION (HCC): Status: ACTIVE | Noted: 2025-01-22

## 2025-01-22 NOTE — TELEPHONE ENCOUNTER
Called to make sure the papers for the [facility] will be ready by 9:00AM. They are meeting the [facility] at 10AM tomorrow & [facility]ico pick them up on the way

## 2025-01-24 ENCOUNTER — INITIAL CONSULT (OUTPATIENT)
Age: 85
End: 2025-01-24
Payer: MEDICARE

## 2025-01-24 VITALS
BODY MASS INDEX: 27.16 KG/M2 | HEIGHT: 65 IN | SYSTOLIC BLOOD PRESSURE: 122 MMHG | DIASTOLIC BLOOD PRESSURE: 82 MMHG | HEART RATE: 78 BPM | WEIGHT: 163 LBS

## 2025-01-24 DIAGNOSIS — N18.31 TYPE 2 DIABETES MELLITUS WITH STAGE 3A CHRONIC KIDNEY DISEASE, WITHOUT LONG-TERM CURRENT USE OF INSULIN (HCC): Chronic | ICD-10-CM

## 2025-01-24 DIAGNOSIS — I10 PRIMARY HYPERTENSION: Chronic | ICD-10-CM

## 2025-01-24 DIAGNOSIS — I48.91 ATRIAL FIBRILLATION, TRANSIENT (HCC): Primary | ICD-10-CM

## 2025-01-24 DIAGNOSIS — E11.22 TYPE 2 DIABETES MELLITUS WITH STAGE 3A CHRONIC KIDNEY DISEASE, WITHOUT LONG-TERM CURRENT USE OF INSULIN (HCC): Chronic | ICD-10-CM

## 2025-01-24 PROCEDURE — 1123F ACP DISCUSS/DSCN MKR DOCD: CPT | Performed by: INTERNAL MEDICINE

## 2025-01-24 PROCEDURE — 3079F DIAST BP 80-89 MM HG: CPT | Performed by: INTERNAL MEDICINE

## 2025-01-24 PROCEDURE — 1160F RVW MEDS BY RX/DR IN RCRD: CPT | Performed by: INTERNAL MEDICINE

## 2025-01-24 PROCEDURE — 1159F MED LIST DOCD IN RCRD: CPT | Performed by: INTERNAL MEDICINE

## 2025-01-24 PROCEDURE — 1126F AMNT PAIN NOTED NONE PRSNT: CPT | Performed by: INTERNAL MEDICINE

## 2025-01-24 PROCEDURE — 3074F SYST BP LT 130 MM HG: CPT | Performed by: INTERNAL MEDICINE

## 2025-01-24 PROCEDURE — 99204 OFFICE O/P NEW MOD 45 MIN: CPT | Performed by: INTERNAL MEDICINE

## 2025-01-24 RX ORDER — DILTIAZEM HYDROCHLORIDE 120 MG/1
120 CAPSULE, COATED, EXTENDED RELEASE ORAL DAILY
Qty: 90 CAPSULE | Refills: 3 | Status: SHIPPED | OUTPATIENT
Start: 2025-01-24

## 2025-01-24 ASSESSMENT — ENCOUNTER SYMPTOMS: SHORTNESS OF BREATH: 0

## 2025-01-24 NOTE — PROGRESS NOTES
laminectomy with hardware    SHOULDER ARTHROPLASTY Left 12/2014    left shoulder    TOTAL HIP ARTHROPLASTY Left     UPPER GASTROINTESTINAL ENDOSCOPY N/A 12/23/2024    ENDOSCOPIC ULTRASOUND performed by Karolina Figueredo MD at [facility] ENDOSCOPY     Family History   Problem Relation Age of Onset    Cancer Mother         breast    High Blood Pressure Mother     No Known Problems Paternal Grandfather     No Known Problems Maternal Grandmother     No Known Problems Paternal Grandmother     No Known Problems Maternal Grandfather     Heart Disease Father     Cancer Father         unsure    High Blood Pressure Father      Social History     Tobacco Use    Smoking status: Never    Smokeless tobacco: Never   Substance Use Topics    Alcohol use: Not Currently       ROS:    Review of Systems   Cardiovascular:  Negative for chest pain, dyspnea on exertion, palpitations and syncope.   Respiratory:  Negative for shortness of breath.    Neurological:  Negative for light-headedness.          PHYSICAL EXAM:   Ht 1.651 m (5' 5\")   Wt 73.9 kg (163 lb)   BMI 27.12 kg/m²      Physical Exam  Constitutional:       General: He is not in acute distress.     Appearance: Normal appearance.   HENT:      Head: Normocephalic and atraumatic.      Mouth/Throat:      Mouth: Mucous membranes are moist.   Eyes:      Extraocular Movements: Extraocular movements intact.   Cardiovascular:      Rate and Rhythm: Normal rate and regular rhythm.      Pulses: Normal pulses.      Heart sounds: No murmur heard.     No friction rub. No gallop.   Pulmonary:      Effort: No respiratory distress.      Breath sounds: No wheezing, rhonchi or rales.   Abdominal:      General: There is no distension.   Musculoskeletal:         General: No deformity.      Cervical back: Neck supple.   Skin:     General: Skin is warm.      Coloration: Skin is not jaundiced.   Neurological:      Mental Status: He is alert and oriented to person, place, and time.   Psychiatric:         Mood and

## 2025-01-27 ENCOUNTER — TELEPHONE (OUTPATIENT)
Dept: FAMILY MEDICINE CLINIC | Facility: CLINIC | Age: 85
End: 2025-01-27

## 2025-01-27 NOTE — TELEPHONE ENCOUNTER
Mrs. Hall said that the Seroquel is working but it is very hard to get the pill broke in two. She said Blood sugar 120-140 something & afternoon a little higher then that but nowhere near 200. Waiting to hear back from the [facility]. They said it could take up to 6 months before hearing anything. She has been in contact with [facility] care giver [facility] & they are going to try to work with her.

## 2025-03-06 DIAGNOSIS — E11.22 TYPE 2 DIABETES MELLITUS WITH STAGE 3A CHRONIC KIDNEY DISEASE, WITHOUT LONG-TERM CURRENT USE OF INSULIN (HCC): Primary | ICD-10-CM

## 2025-03-06 DIAGNOSIS — F02.C11 SEVERE LATE ONSET ALZHEIMER'S DEMENTIA WITH AGITATION (HCC): ICD-10-CM

## 2025-03-06 DIAGNOSIS — R35.1 BPH ASSOCIATED WITH NOCTURIA: ICD-10-CM

## 2025-03-06 DIAGNOSIS — Z12.5 SCREENING FOR PROSTATE CANCER: ICD-10-CM

## 2025-03-06 DIAGNOSIS — M10.9 GOUT, UNSPECIFIED CAUSE, UNSPECIFIED CHRONICITY, UNSPECIFIED SITE: ICD-10-CM

## 2025-03-06 DIAGNOSIS — E78.5 DYSLIPIDEMIA: ICD-10-CM

## 2025-03-06 DIAGNOSIS — N18.31 TYPE 2 DIABETES MELLITUS WITH STAGE 3A CHRONIC KIDNEY DISEASE, WITHOUT LONG-TERM CURRENT USE OF INSULIN (HCC): Primary | ICD-10-CM

## 2025-03-06 DIAGNOSIS — N40.1 BPH ASSOCIATED WITH NOCTURIA: ICD-10-CM

## 2025-03-06 DIAGNOSIS — I10 ESSENTIAL HYPERTENSION: ICD-10-CM

## 2025-03-06 DIAGNOSIS — G47.00 INSOMNIA, UNSPECIFIED TYPE: ICD-10-CM

## 2025-03-06 DIAGNOSIS — G30.1 SEVERE LATE ONSET ALZHEIMER'S DEMENTIA WITH AGITATION (HCC): ICD-10-CM

## 2025-03-06 LAB
ALBUMIN SERPL-MCNC: 3.2 G/DL (ref 3.2–4.6)
ALBUMIN/GLOB SERPL: 0.9 (ref 1–1.9)
ALP SERPL-CCNC: 77 U/L (ref 40–129)
ALT SERPL-CCNC: 19 U/L (ref 8–55)
ANION GAP SERPL CALC-SCNC: 11 MMOL/L (ref 7–16)
AST SERPL-CCNC: 30 U/L (ref 15–37)
BASOPHILS # BLD: 0.07 K/UL (ref 0–0.2)
BASOPHILS NFR BLD: 1.1 % (ref 0–2)
BILIRUB SERPL-MCNC: 0.8 MG/DL (ref 0–1.2)
BUN SERPL-MCNC: 12 MG/DL (ref 8–23)
CALCIUM SERPL-MCNC: 9.2 MG/DL (ref 8.8–10.2)
CHLORIDE SERPL-SCNC: 102 MMOL/L (ref 98–107)
CHOLEST SERPL-MCNC: 218 MG/DL (ref 0–200)
CO2 SERPL-SCNC: 27 MMOL/L (ref 20–29)
CREAT SERPL-MCNC: 1.39 MG/DL (ref 0.8–1.3)
DIFFERENTIAL METHOD BLD: ABNORMAL
EOSINOPHIL # BLD: 0.12 K/UL (ref 0–0.8)
EOSINOPHIL NFR BLD: 1.9 % (ref 0.5–7.8)
ERYTHROCYTE [DISTWIDTH] IN BLOOD BY AUTOMATED COUNT: 13.1 % (ref 11.9–14.6)
GLOBULIN SER CALC-MCNC: 3.6 G/DL (ref 2.3–3.5)
GLUCOSE SERPL-MCNC: 187 MG/DL (ref 70–99)
HCT VFR BLD AUTO: 43.3 % (ref 41.1–50.3)
HDLC SERPL-MCNC: 70 MG/DL (ref 40–60)
HDLC SERPL: 3.1 (ref 0–5)
HGB BLD-MCNC: 14.6 G/DL (ref 13.6–17.2)
IMM GRANULOCYTES # BLD AUTO: 0.01 K/UL (ref 0–0.5)
IMM GRANULOCYTES NFR BLD AUTO: 0.2 % (ref 0–5)
LDLC SERPL CALC-MCNC: 127 MG/DL (ref 0–100)
LYMPHOCYTES # BLD: 2.73 K/UL (ref 0.5–4.6)
LYMPHOCYTES NFR BLD: 42.8 % (ref 13–44)
MCH RBC QN AUTO: 34.3 PG (ref 26.1–32.9)
MCHC RBC AUTO-ENTMCNC: 33.7 G/DL (ref 31.4–35)
MCV RBC AUTO: 101.6 FL (ref 82–102)
MONOCYTES # BLD: 0.39 K/UL (ref 0.1–1.3)
MONOCYTES NFR BLD: 6.1 % (ref 4–12)
NEUTS SEG # BLD: 3.06 K/UL (ref 1.7–8.2)
NEUTS SEG NFR BLD: 47.9 % (ref 43–78)
NRBC # BLD: 0 K/UL (ref 0–0.2)
PLATELET # BLD AUTO: 221 K/UL (ref 150–450)
PMV BLD AUTO: 11.3 FL (ref 9.4–12.3)
POTASSIUM SERPL-SCNC: 3.8 MMOL/L (ref 3.5–5.1)
PROT SERPL-MCNC: 6.8 G/DL (ref 6.3–8.2)
PSA SERPL-MCNC: 0.6 NG/ML (ref 0–4)
RBC # BLD AUTO: 4.26 M/UL (ref 4.23–5.6)
SODIUM SERPL-SCNC: 140 MMOL/L (ref 136–145)
TRIGL SERPL-MCNC: 103 MG/DL (ref 0–150)
TSH W FREE THYROID IF ABNORMAL: 3.78 UIU/ML (ref 0.27–4.2)
URATE SERPL-MCNC: 6.1 MG/DL (ref 3.9–8.2)
VLDLC SERPL CALC-MCNC: 21 MG/DL (ref 6–23)
WBC # BLD AUTO: 6.4 K/UL (ref 4.3–11.1)

## 2025-03-06 RX ORDER — BENAZEPRIL HYDROCHLORIDE 10 MG/1
10 TABLET ORAL DAILY
Qty: 90 TABLET | Refills: 1 | Status: SHIPPED | OUTPATIENT
Start: 2025-03-06

## 2025-03-06 RX ORDER — TAMSULOSIN HYDROCHLORIDE 0.4 MG/1
CAPSULE ORAL DAILY
Qty: 90 CAPSULE | Refills: 1 | Status: SHIPPED | OUTPATIENT
Start: 2025-03-06

## 2025-03-06 RX ORDER — QUETIAPINE FUMARATE 25 MG/1
25 TABLET, FILM COATED ORAL NIGHTLY PRN
Qty: 90 TABLET | Refills: 1 | Status: SHIPPED | OUTPATIENT
Start: 2025-03-06 | End: 2025-09-02

## 2025-03-10 ENCOUNTER — OFFICE VISIT (OUTPATIENT)
Dept: FAMILY MEDICINE CLINIC | Facility: CLINIC | Age: 85
End: 2025-03-10
Payer: MEDICARE

## 2025-03-10 VITALS
DIASTOLIC BLOOD PRESSURE: 70 MMHG | BODY MASS INDEX: 27.89 KG/M2 | OXYGEN SATURATION: 97 % | HEIGHT: 65 IN | HEART RATE: 62 BPM | SYSTOLIC BLOOD PRESSURE: 130 MMHG | TEMPERATURE: 97.9 F | WEIGHT: 167.38 LBS

## 2025-03-10 DIAGNOSIS — N18.31 TYPE 2 DIABETES MELLITUS WITH STAGE 3A CHRONIC KIDNEY DISEASE, WITHOUT LONG-TERM CURRENT USE OF INSULIN (HCC): ICD-10-CM

## 2025-03-10 DIAGNOSIS — G30.1 SEVERE LATE ONSET ALZHEIMER'S DEMENTIA WITH AGITATION (HCC): Primary | ICD-10-CM

## 2025-03-10 DIAGNOSIS — E11.22 TYPE 2 DIABETES MELLITUS WITH STAGE 3A CHRONIC KIDNEY DISEASE, WITHOUT LONG-TERM CURRENT USE OF INSULIN (HCC): ICD-10-CM

## 2025-03-10 DIAGNOSIS — F02.C11 SEVERE LATE ONSET ALZHEIMER'S DEMENTIA WITH AGITATION (HCC): Primary | ICD-10-CM

## 2025-03-10 DIAGNOSIS — G47.00 INSOMNIA, UNSPECIFIED TYPE: ICD-10-CM

## 2025-03-10 PROCEDURE — 1159F MED LIST DOCD IN RCRD: CPT | Performed by: FAMILY MEDICINE

## 2025-03-10 PROCEDURE — 3075F SYST BP GE 130 - 139MM HG: CPT | Performed by: FAMILY MEDICINE

## 2025-03-10 PROCEDURE — 1126F AMNT PAIN NOTED NONE PRSNT: CPT | Performed by: FAMILY MEDICINE

## 2025-03-10 PROCEDURE — 99214 OFFICE O/P EST MOD 30 MIN: CPT | Performed by: FAMILY MEDICINE

## 2025-03-10 PROCEDURE — 1123F ACP DISCUSS/DSCN MKR DOCD: CPT | Performed by: FAMILY MEDICINE

## 2025-03-10 PROCEDURE — 3078F DIAST BP <80 MM HG: CPT | Performed by: FAMILY MEDICINE

## 2025-03-10 RX ORDER — TRAZODONE HYDROCHLORIDE 50 MG/1
50 TABLET ORAL NIGHTLY PRN
Qty: 90 TABLET | Refills: 1 | Status: SHIPPED | OUTPATIENT
Start: 2025-03-10

## 2025-03-10 ASSESSMENT — ENCOUNTER SYMPTOMS
ABDOMINAL PAIN: 0
CONSTIPATION: 0
DIARRHEA: 1
COUGH: 0
WHEEZING: 0

## 2025-03-10 NOTE — PATIENT INSTRUCTIONS
Trazodone for sleep. Stop the seroquel.    Give 50 for sleep. IF after 4 hours he awakens, you can give him another 50 mg. Max dose is 100 mg.     Try zoloft (sertraline) 50 mg for agitation - start 1/2 tablet initially for a week. Take at night. Then after a week increase to the whole tablet.    This helps calm them down.  Will take 2 weeks to get into his system.

## 2025-03-10 NOTE — PROGRESS NOTES
[facility]  [address] [place] [address]  Phone [phone]  Fax:  [phone]    Patient: Tobias Hall  YOB: 1940  Patient Age 84 y.o.  Patient sex: male  Medical Record:  [id number]  Visit Date: 03/11/25    [facility] Clinic Note     Chief Complaint   Patient presents with    Follow-up     3 month, discuss test results, feeling fine       History of Present Illness:  84-year-old male patient seen today for a provider who left ill.    Patient was due for follow-up regarding dementia.  Wife is here with him today.    On last visit there were concerns on insomnia with restlessness and agitation at night,    Ms. Collado  reports that the patient naps during the day and cannot sleep at night, he gets up every few minutes from bed dresses up and wondering over the house with risk to fall, he also gets angry and agitated if she tries to assist him to walk .  Ms. Collado tries to keep the patient awake during the day but he cannot ambulate by himself and always need standby assistance while he is walking with his walker , he also cannot be entertained with reading due to inability to read , playing cards , watch TV or carry conversation due to cognitive impairment that became worse after recent hospitalization.     Wife is concerned about the Seroquel.  The pharmacist told her the Seroquel has bad side effects as it is an antipsychotic.  She did cut the Seroquel in half but finds it very difficult to cut in half.  It did help him sleep.  Patient is not able to converse during this encounter.  He does not interact at all with the conversation.  He does get up and walk around the house.  The biggest problem is his sleeping and discussed he needs to go to bed a little later but the wife says she cannot keep him awake.      No improvement since surg. Better walking now - in PT.   Eating well.; Sleeping ok at night.  Wife is not comfortable with the seroquel. Does not

## 2025-03-12 ENCOUNTER — OFFICE VISIT (OUTPATIENT)
Dept: NEUROLOGY | Age: 85
End: 2025-03-12
Payer: MEDICARE

## 2025-03-12 ENCOUNTER — RESULTS FOLLOW-UP (OUTPATIENT)
Dept: FAMILY MEDICINE CLINIC | Facility: CLINIC | Age: 85
End: 2025-03-12

## 2025-03-12 VITALS
WEIGHT: 165 LBS | HEIGHT: 67 IN | SYSTOLIC BLOOD PRESSURE: 146 MMHG | BODY MASS INDEX: 25.9 KG/M2 | OXYGEN SATURATION: 95 % | HEART RATE: 58 BPM | DIASTOLIC BLOOD PRESSURE: 79 MMHG

## 2025-03-12 DIAGNOSIS — F01.C3 SEVERE VASCULAR DEMENTIA WITH MOOD DISTURBANCE (HCC): Primary | ICD-10-CM

## 2025-03-12 PROCEDURE — 3078F DIAST BP <80 MM HG: CPT | Performed by: PSYCHIATRY & NEUROLOGY

## 2025-03-12 PROCEDURE — 99205 OFFICE O/P NEW HI 60 MIN: CPT | Performed by: PSYCHIATRY & NEUROLOGY

## 2025-03-12 PROCEDURE — 3077F SYST BP >= 140 MM HG: CPT | Performed by: PSYCHIATRY & NEUROLOGY

## 2025-03-12 PROCEDURE — 1123F ACP DISCUSS/DSCN MKR DOCD: CPT | Performed by: PSYCHIATRY & NEUROLOGY

## 2025-03-12 PROCEDURE — 1159F MED LIST DOCD IN RCRD: CPT | Performed by: PSYCHIATRY & NEUROLOGY

## 2025-03-12 RX ORDER — DONEPEZIL HYDROCHLORIDE 5 MG/1
5 TABLET, FILM COATED ORAL NIGHTLY
Qty: 90 TABLET | Refills: 1 | Status: SHIPPED | OUTPATIENT
Start: 2025-03-12

## 2025-03-12 NOTE — PROGRESS NOTES
Year: No       Medications/Allergies:   MEDICATIONS:   Outpatient Encounter Medications as of 3/12/2025   Medication Sig Dispense Refill    donepezil (ARICEPT) 5 MG tablet Take 1 tablet by mouth nightly 90 tablet 1    traZODone (DESYREL) 50 MG tablet Take 1 tablet by mouth nightly as needed for Sleep 90 tablet 1    sertraline (ZOLOFT) 50 MG tablet Take 1 tablet by mouth daily 90 tablet 1    benazepril (LOTENSIN) 10 MG tablet TAKE 1 TABLET BY MOUTH EVERY DAY 90 tablet 1    tamsulosin (FLOMAX) 0.4 MG capsule TAKE 1 CAPSULE BY MOUTH EVERY DAY 90 capsule 1    QUEtiapine (SEROQUEL) 25 MG tablet Take 1 tablet by mouth nightly as needed for Agitation 90 tablet 1    dilTIAZem (CARDIZEM CD) 120 MG extended release capsule Take 1 capsule by mouth daily 90 capsule 3    traZODone (DESYREL) 50 MG tablet Take 0.5 tablets by mouth nightly (Patient not taking: Reported on 1/24/2025) 15 tablet 0    blood glucose test strips (ONETOUCH VERIO) strip Use to monitor glucose daily for  each 3    OneTouch Delica Lancets 30G MISC 1 each by Does not apply route daily 100 each 3    glucose monitoring kit 1 kit by Does not apply route daily as needed (Daily) OneTouch  meter 1 kit 0    metFORMIN (GLUCOPHAGE-XR) 500 MG extended release tablet Take 1 tablet by mouth daily (with breakfast) (Patient not taking: Reported on 3/12/2025) 90 tablet 0     No facility-administered encounter medications on file as of 3/12/2025.       ALLERGIES:  Allergies   Allergen Reactions    Clonidine Other (See Comments)     Lethargy, Patient denies allergy    Simvastatin Other (See Comments)     Noted in office visit note, 5/26/2015,  Patient denies allergy       Physical Exam:   BP (!) 146/79   Pulse 58   Ht 1.702 m (5' 7\")   Wt 74.8 kg (165 lb)   SpO2 95%   BMI 25.84 kg/m²     Physical Exam   General: cooperative, NAD   HEENT: MMM, anicteric sclerae  Cardiovascular: RRR   Respiratory: good air entry b/l, unlabored breathing  Gastrointestinal:

## 2025-03-13 ENCOUNTER — TELEMEDICINE (OUTPATIENT)
Dept: GASTROENTEROLOGY | Age: 85
End: 2025-03-13

## 2025-03-13 DIAGNOSIS — K86.2 PANCREAS CYST: Primary | ICD-10-CM

## 2025-03-13 NOTE — PROGRESS NOTES
Brief GI Note    Attempted to call patients' spouse. No answer. No voicemail set up. Pancreatic cystic lesion noted on MRI. No CBD stone (Seen previously). EUS done in 2024 was also negative for a CBD Stone. He has had gallbladder removed.    Given patients' dementia, would defer any aggressive management for the pancreatic cyst at this time. Follow up with PA in clinic in 6 months. If doing relatively well at that time, would discontinue additional follow up in this patient for the pancreatic cyst    Karolina Figueredo MD  Gastroenterology and Interventional Endoscopy

## 2025-03-27 DIAGNOSIS — E11.22 TYPE 2 DIABETES MELLITUS WITH STAGE 3A CHRONIC KIDNEY DISEASE, WITHOUT LONG-TERM CURRENT USE OF INSULIN (HCC): Chronic | ICD-10-CM

## 2025-03-27 DIAGNOSIS — N18.31 TYPE 2 DIABETES MELLITUS WITH STAGE 3A CHRONIC KIDNEY DISEASE, WITHOUT LONG-TERM CURRENT USE OF INSULIN (HCC): Chronic | ICD-10-CM

## 2025-03-30 RX ORDER — METFORMIN HYDROCHLORIDE 500 MG/1
500 TABLET, EXTENDED RELEASE ORAL
Qty: 90 TABLET | Refills: 0 | Status: SHIPPED | OUTPATIENT
Start: 2025-03-30

## 2025-04-01 ENCOUNTER — TELEPHONE (OUTPATIENT)
Dept: FAMILY MEDICINE CLINIC | Facility: CLINIC | Age: 85
End: 2025-04-01

## 2025-04-01 NOTE — TELEPHONE ENCOUNTER
Stomach upset all day. Can not make it to the bathroom. Not holding it. What can be done?  Will be home in 45 minutes to call her back

## 2025-08-12 ENCOUNTER — OFFICE VISIT (OUTPATIENT)
Dept: FAMILY MEDICINE CLINIC | Facility: CLINIC | Age: 85
End: 2025-08-12

## 2025-08-12 VITALS
WEIGHT: 162 LBS | SYSTOLIC BLOOD PRESSURE: 128 MMHG | HEART RATE: 63 BPM | HEIGHT: 67 IN | BODY MASS INDEX: 25.43 KG/M2 | OXYGEN SATURATION: 93 % | TEMPERATURE: 97.5 F | DIASTOLIC BLOOD PRESSURE: 75 MMHG

## 2025-08-12 DIAGNOSIS — F02.C11 SEVERE LATE ONSET ALZHEIMER'S DEMENTIA WITH AGITATION (HCC): ICD-10-CM

## 2025-08-12 DIAGNOSIS — E11.22 TYPE 2 DIABETES MELLITUS WITH STAGE 3A CHRONIC KIDNEY DISEASE, WITHOUT LONG-TERM CURRENT USE OF INSULIN (HCC): Chronic | ICD-10-CM

## 2025-08-12 DIAGNOSIS — G30.1 SEVERE LATE ONSET ALZHEIMER'S DEMENTIA WITH AGITATION (HCC): ICD-10-CM

## 2025-08-12 DIAGNOSIS — Z76.0 MEDICATION REFILL: ICD-10-CM

## 2025-08-12 DIAGNOSIS — N18.31 TYPE 2 DIABETES MELLITUS WITH STAGE 3A CHRONIC KIDNEY DISEASE, WITHOUT LONG-TERM CURRENT USE OF INSULIN (HCC): Chronic | ICD-10-CM

## 2025-08-12 DIAGNOSIS — G47.00 INSOMNIA, UNSPECIFIED TYPE: Chronic | ICD-10-CM

## 2025-08-12 DIAGNOSIS — J40 BRONCHITIS: ICD-10-CM

## 2025-08-12 DIAGNOSIS — R09.82 POSTNASAL DISCHARGE: ICD-10-CM

## 2025-08-12 DIAGNOSIS — N40.1 BPH ASSOCIATED WITH NOCTURIA: Chronic | ICD-10-CM

## 2025-08-12 DIAGNOSIS — B96.89 ACUTE BACTERIAL SINUSITIS: ICD-10-CM

## 2025-08-12 DIAGNOSIS — R26.89 BALANCE PROBLEM: Primary | Chronic | ICD-10-CM

## 2025-08-12 DIAGNOSIS — R09.81 NASAL CONGESTION: ICD-10-CM

## 2025-08-12 DIAGNOSIS — R29.6 FREQUENT FALLS: Chronic | ICD-10-CM

## 2025-08-12 DIAGNOSIS — I10 ESSENTIAL HYPERTENSION: Chronic | ICD-10-CM

## 2025-08-12 DIAGNOSIS — R35.1 BPH ASSOCIATED WITH NOCTURIA: Chronic | ICD-10-CM

## 2025-08-12 DIAGNOSIS — J01.90 ACUTE BACTERIAL SINUSITIS: ICD-10-CM

## 2025-08-12 DIAGNOSIS — R05.2 SUBACUTE COUGH: ICD-10-CM

## 2025-08-12 LAB — HBA1C MFR BLD: 6.1 %

## 2025-08-12 RX ORDER — QUETIAPINE FUMARATE 25 MG/1
25 TABLET, FILM COATED ORAL NIGHTLY PRN
Qty: 90 TABLET | Refills: 1 | Status: SHIPPED | OUTPATIENT
Start: 2025-08-12 | End: 2026-02-08

## 2025-08-12 RX ORDER — FLUTICASONE PROPIONATE 50 MCG
2 SPRAY, SUSPENSION (ML) NASAL DAILY
Qty: 16 G | Refills: 0 | Status: SHIPPED | OUTPATIENT
Start: 2025-08-12

## 2025-08-12 RX ORDER — BENAZEPRIL HYDROCHLORIDE 10 MG/1
10 TABLET ORAL DAILY
Qty: 90 TABLET | Refills: 1 | Status: SHIPPED | OUTPATIENT
Start: 2025-08-12

## 2025-08-12 RX ORDER — TAMSULOSIN HYDROCHLORIDE 0.4 MG/1
0.4 CAPSULE ORAL DAILY
Qty: 90 CAPSULE | Refills: 3 | Status: SHIPPED | OUTPATIENT
Start: 2025-08-12 | End: 2026-08-07

## 2025-08-12 RX ORDER — GUAIFENESIN 600 MG/1
600 TABLET, EXTENDED RELEASE ORAL 2 TIMES DAILY
Qty: 14 TABLET | Refills: 0 | Status: SHIPPED | OUTPATIENT
Start: 2025-08-12 | End: 2025-08-19

## 2025-08-12 ASSESSMENT — ENCOUNTER SYMPTOMS
SHORTNESS OF BREATH: 0
VOMITING: 0
COUGH: 0
DIARRHEA: 0
CHEST TIGHTNESS: 0
NAUSEA: 0

## 2025-08-12 ASSESSMENT — PATIENT HEALTH QUESTIONNAIRE - PHQ9
1. LITTLE INTEREST OR PLEASURE IN DOING THINGS: NOT AT ALL
2. FEELING DOWN, DEPRESSED OR HOPELESS: NOT AT ALL
SUM OF ALL RESPONSES TO PHQ QUESTIONS 1-9: 0

## 2025-08-18 ENCOUNTER — TELEPHONE (OUTPATIENT)
Dept: FAMILY MEDICINE CLINIC | Facility: CLINIC | Age: 85
End: 2025-08-18

## 2025-08-25 ENCOUNTER — TELEPHONE (OUTPATIENT)
Dept: FAMILY MEDICINE CLINIC | Facility: CLINIC | Age: 85
End: 2025-08-25

## 2025-09-03 DIAGNOSIS — B96.89 ACUTE BACTERIAL SINUSITIS: ICD-10-CM

## 2025-09-03 DIAGNOSIS — J01.90 ACUTE BACTERIAL SINUSITIS: ICD-10-CM

## 2025-09-04 RX ORDER — FLUTICASONE PROPIONATE 50 MCG
2 SPRAY, SUSPENSION (ML) NASAL DAILY
Qty: 48 ML | Refills: 1 | Status: SHIPPED | OUTPATIENT
Start: 2025-09-04

## (undated) DEVICE — MOUTHPIECE ENDOSCP L CTRL OPN AND SIDE PORTS DISP

## (undated) DEVICE — 1010 S-DRAPE TOWEL DRAPE 10/BX: Brand: STERI-DRAPE™

## (undated) DEVICE — PACKING 8004003 NEURAY 200PK 25X25MM: Brand: NEURAY ®

## (undated) DEVICE — TIP COVER ACCESSORY

## (undated) DEVICE — BANDAGE COMPR SELF ADH 5 YDX4 IN TAN STRL PREMIERPRO LF

## (undated) DEVICE — LUBE JELLY FOIL PACK 1.4 OZ: Brand: MEDLINE INDUSTRIES, INC.

## (undated) DEVICE — AGENT HEMOSTATIC SURGIFLOW MATRIX KIT W/THROMBIN

## (undated) DEVICE — KENDALL RADIOLUCENT FOAM MONITORING ELECTRODE RECTANGULAR SHAPE: Brand: KENDALL

## (undated) DEVICE — LIQUIBAND RAPID ADHESIVE 36/CS 0.8ML: Brand: MEDLINE

## (undated) DEVICE — UNDERGLOVE SURG SZ 7 FNGR THK0.21MIL GRN LTX BEAD CUF

## (undated) DEVICE — TROCAR: Brand: KII FIOS FIRST ENTRY

## (undated) DEVICE — SHEET, DRAPE, SPLIT, STERILE: Brand: MEDLINE

## (undated) DEVICE — ARM DRAPE

## (undated) DEVICE — SUTURE ETHBND EXCEL SZ 5 L30IN NONABSORBABLE GRN L40MM V-37 MB66G

## (undated) DEVICE — SUTURE MONOCRYL SZ 4-0 L27IN ABSRB UD L19MM PS-2 1/2 CIR PRIM Y426H

## (undated) DEVICE — CONNECTOR TBNG OD5-7MM O2 END DISP

## (undated) DEVICE — STOCKINETTE TUBE 6X48 -- MEDICHOICE

## (undated) DEVICE — BLADELESS OBTURATOR: Brand: WECK VISTA

## (undated) DEVICE — MEDI-VAC YANKAUER SUCTION HANDLE W/BULBOUS TIP: Brand: CARDINAL HEALTH

## (undated) DEVICE — SYRINGE MED 10ML LUERLOCK TIP W/O SFTY DISP

## (undated) DEVICE — SOLUTION IRRIG 3000ML 0.9% SOD CHL FLX CONT 0797208] ICU MEDICAL INC]

## (undated) DEVICE — SYRINGE FLSH IV STD 10 CC W/O CANN PREFILLED NACL POSIFLUSH 306546

## (undated) DEVICE — DISPOSABLE GRASPER: Brand: EPIX LAPAROSCOPIC GRASPER

## (undated) DEVICE — 1200 GUARD II KIT W/5MM TUBE W/O VAC TUBE: Brand: GUARDIAN

## (undated) DEVICE — BANDAGE,GAUZE,BULKEE II,4.5"X4.1YD,STRL: Brand: MEDLINE

## (undated) DEVICE — SUTURE VCRL SZ 0 L27IN ABSRB UD L36MM CP-1 1/2 CIR REV CUT J267H

## (undated) DEVICE — Device

## (undated) DEVICE — COLUMN DRAPE

## (undated) DEVICE — GAUZE,SPONGE,4"X4",16PLY,STRL,LF,10/TRAY: Brand: MEDLINE

## (undated) DEVICE — ANCHOR TISSUE RETRIEVAL SYSTEM, BAG SIZE 125 ML, PORT SIZE 8 MM: Brand: ANCHOR TISSUE RETRIEVAL SYSTEM

## (undated) DEVICE — SYRINGE 20ML LL S/C 50

## (undated) DEVICE — SOLUTION IRRIG 1000ML H2O STRL BLT

## (undated) DEVICE — SOLUTION IV 1000ML 0.9% SOD CHL

## (undated) DEVICE — AMD ANTIMICROBIAL GAUZE SPONGES,12 PLY USP TYPE VII, 0.2% POLYHEXAMETHYLENE BIGUANIDE HCI (PHMB): Brand: CURITY

## (undated) DEVICE — AGENT HEMSTAT 3GM OXIDIZED REGENERATED CELOS ABSRB FOR CONT (ORDER MULTIPLES OF 5EA)

## (undated) DEVICE — (D)PREP SKN CHLRAPRP APPL 26ML -- CONVERT TO ITEM 371833

## (undated) DEVICE — AIRLIFE™ OXYGEN TUBING 7 FEET (2.1 M) CRUSH RESISTANT OXYGEN TUBING, VINYL TIPPED: Brand: AIRLIFE™

## (undated) DEVICE — AIRSEAL 8 MM CANNULA CAP AND OBTURATOR WITH BLADELESS OPTICAL TIP COMPATIBLE WITH INTUITIVE DA VINCI XI AND DA VINCI X 8 MM INSTRUMENT CANNULA, STANDARD LENGTH: Brand: AIRSEAL

## (undated) DEVICE — DRAPE SHT 3 QTR PROXIMA 53X77 --

## (undated) DEVICE — SUTURE VCRL SZ 1 L27IN ABSRB UD L36MM CP-1 1/2 CIR REV CUT J268H

## (undated) DEVICE — TRAY CATH 16F URIN MTR LTX -- CONVERT TO ITEM 363111

## (undated) DEVICE — PAD PT POS 36 IN SURGYPAD DISP

## (undated) DEVICE — DRAPE,TOP,102X53,STERILE: Brand: MEDLINE

## (undated) DEVICE — SUTURE PROL SZ 2-0 L18IN NONABSORBABLE BLU FS L26MM 3/8 CIR 8685H

## (undated) DEVICE — SEAL

## (undated) DEVICE — SPONGE: SPECIALTY PEANUT XR 100/CS: Brand: MEDICAL ACTION INDUSTRIES

## (undated) DEVICE — GARMENT,MEDLINE,DVT,INT,CALF,MED, GEN2: Brand: MEDLINE

## (undated) DEVICE — COVER,MAYO STAND,STERILE: Brand: MEDLINE

## (undated) DEVICE — FLEXIBLE YANKAUER,MEDIUM TIP, NO VACUUM CONTROL: Brand: ARGYLE

## (undated) DEVICE — SUTURE PROL SZ 0 L30IN NONABSORBABLE BLU FSLX L36MM 3/8 CIR 8690H

## (undated) DEVICE — GOWN,REINFORCED,POLY,AURORA,XXLARGE,STR: Brand: MEDLINE

## (undated) DEVICE — SURGICAL PROCEDURE PACK BASIC ST FRANCIS

## (undated) DEVICE — YANKAUER,BULB TIP,W/O VENT,RIGID,STERILE: Brand: MEDLINE

## (undated) DEVICE — SUTURE VCRL SZ 2-0 L27IN ABSRB UD L36MM CP-1 1/2 CIR REV J266H

## (undated) DEVICE — STRIP,CLOSURE,WOUND,MEDI-STRIP,1/2X4: Brand: MEDLINE

## (undated) DEVICE — GLOVE SURG SZ 7 L11.33IN FNGR THK9.8MIL STRW LTX POLYMER

## (undated) DEVICE — DRESSING,GAUZE,XEROFORM,CURAD,5"X9",ST: Brand: CURAD

## (undated) DEVICE — GENERAL ROBOTIC: Brand: MEDLINE INDUSTRIES, INC.

## (undated) DEVICE — SUTURE MCRYL SZ 3-0 L27IN ABSRB UD L19MM PS-2 3/8 CIR PRIM Y427H

## (undated) DEVICE — BUTTON SWITCH PENCIL BLADE ELECTRODE, HOLSTER: Brand: EDGE

## (undated) DEVICE — SINGLE PORT MANIFOLD: Brand: NEPTUNE 2

## (undated) DEVICE — GUIDEPIN ORTH L190MM DIA2.5MM METAGLENE CTRL FOR DELT XTEND

## (undated) DEVICE — BLOCK BITE AD 60FR W/ VELC STRP ADDRESSES MOST PT AND

## (undated) DEVICE — BLADE SCALP SURG BARD-PARK 10 --

## (undated) DEVICE — ELECTRODE PT RET AD L9FT HI MOIST COND ADH HYDRGEL CORDED

## (undated) DEVICE — SURGIFOAM SPNG SZ 100

## (undated) DEVICE — GOWN,PREVENTION PLUS,XL,ST,24/CS: Brand: MEDLINE

## (undated) DEVICE — ABDOMINAL PAD: Brand: DERMACEA

## (undated) DEVICE — KENDALL SCD EXPRESS SLEEVES, KNEE LENGTH, MEDIUM: Brand: KENDALL SCD

## (undated) DEVICE — SYRINGE CATH TIP 50ML

## (undated) DEVICE — SOLUTION ANTIFOG VIS SYS CLEARIFY LAPSCP

## (undated) DEVICE — SUTURE PERMAHAND SZ 2-0 L30IN NONABSORBABLE BLK SILK W/O A305H

## (undated) DEVICE — Device: Brand: BALLOON3

## (undated) DEVICE — BLADE ELECTRODE: Brand: EDGE

## (undated) DEVICE — APPLICATOR ENDOSCP L34CM W/ S STL CANN PLAS OBT STYL FOR

## (undated) DEVICE — FLOSEAL HEMOSTATIC MATRIX, 5 ML: Brand: FLOSEAL

## (undated) DEVICE — SLIM BODY SKIN STAPLER: Brand: APPOSE ULC

## (undated) DEVICE — PADDING CAST W6INXL4YD ST COT COHESIVE HND TEARABLE SPEC

## (undated) DEVICE — GAUZE,SPONGE,4"X4",12PLY,WOVEN,NS,LF: Brand: MEDLINE

## (undated) DEVICE — 3000CC GUARDIAN II: Brand: GUARDIAN

## (undated) DEVICE — ARGYLE SIGMOID SURGICAL SUCTION INSTRUMENT 23 FR/CH (7.7 MM): Brand: ARGYLE

## (undated) DEVICE — NEEDLE SYRINGE 18GA L1.5IN RED PLAS HUB S STL BLNT FILL W/O

## (undated) DEVICE — SPONGE LAP 18X18IN STRL -- 5/PK

## (undated) DEVICE — DRAPE XR C ARM 41X74IN LF --

## (undated) DEVICE — DISPOSABLE DRAPE, STERILE, FOR A CDS-3060 5 FOOT TABLE: Brand: PEDIGO PRODUCTS, INC.

## (undated) DEVICE — NEEDLE SPNL 22GA L3.5IN BLK HUB S STL REG WALL FIT STYL W/

## (undated) DEVICE — SET TBNG SUCT LN ASPIR ANTICOAG FOR CATS + ATS

## (undated) DEVICE — REM POLYHESIVE ADULT PATIENT RETURN ELECTRODE: Brand: VALLEYLAB

## (undated) DEVICE — DRAPE TOWEL: Brand: CONVERTORS

## (undated) DEVICE — NEUROMONITORING KIT

## (undated) DEVICE — GUIDEPIN ORTH L300MM DIA1.5MM GLENOSPHERE FOR DELT XTEND

## (undated) DEVICE — DRAPE TWL SURG 16X26IN BLU ORB04] ALLCARE INC]

## (undated) DEVICE — X-RAY SPONGES,12 PLY: Brand: DERMACEA

## (undated) DEVICE — PRECISION THIN (9.0 X 0.38 X 18.5MM)

## (undated) DEVICE — 3M™ TEGADERM™ TRANSPARENT FILM DRESSING FRAME STYLE, 1629, 8 IN X 12 IN (20 CM X 30 CM), 10/CT 8CT/CASE: Brand: 3M™ TEGADERM™

## (undated) DEVICE — KIT POS W/ FOAM ARM CRADL SHEARGUARD CHST PD CVR FOR SPNL

## (undated) DEVICE — 2000CC GUARDIAN II: Brand: GUARDIAN

## (undated) DEVICE — OSCILLATING TIP SAW CARTRIDGE: Brand: STRYKER PRECISION

## (undated) DEVICE — SUTURE 5 MERS GRN 30 TO 40 IN D9211

## (undated) DEVICE — STERILE PACKAGE WITH CANNULA: Brand: LITE BIO DELIVERY SYSTEM

## (undated) DEVICE — TUBING INSUFFLATION SMK EVAC HI FLO SET PNEUMOCLEAR

## (undated) DEVICE — MASTISOL ADHESIVE LIQ 2/3ML

## (undated) DEVICE — PACK PROCEDURE SURG POST LAMINECTOMY CDS

## (undated) DEVICE — SUTURE PERMAHAND SZ 2-0 L12X18IN NONABSORBABLE BLK SILK A185H

## (undated) DEVICE — SUCTION IRRIGATOR: Brand: ENDOWRIST

## (undated) DEVICE — UNIVERSAL DRAPES: Brand: MEDLINE INDUSTRIES, INC.

## (undated) DEVICE — HANDPIECE SET WITH COAXIAL HIGH FLOW TIP AND SUCTION TUBE: Brand: INTERPULSE

## (undated) DEVICE — BLADE ASSEMB CLP HAIR FINE --

## (undated) DEVICE — WAX SURG 2.5GM HEMSTAT BNE BEESWAX PARAFFIN ISO PALMITATE

## (undated) DEVICE — SUTURE PDS II SZ 0 L27IN ABSRB VLT L26MM CT-2 1/2 CIR Z334H

## (undated) DEVICE — SUTURE ETHBND EXCEL SZ 2 L27IN NONABSORBABLE GRN WHT MO-7 D7485

## (undated) DEVICE — SUTURE PDS II SZ 1 L96IN ABSRB VLT TP-1 L65MM 1/2 CIR Z880G

## (undated) DEVICE — COVER,TABLE,HEAVY DUTY,79"X110",STRL: Brand: MEDLINE

## (undated) DEVICE — RESERVOIR AUTOTRNS ATR 40 COLLCTN DISP

## (undated) DEVICE — SYRINGE MEDICAL 3ML CLEAR PLASTIC STANDARD NON CONTROL LUERLOCK TIP DISPOSABLE

## (undated) DEVICE — KIT HEMSTAT MTRX 8ML PORCINE GEL HUM THROM ABSRB FLOWABLE [2993] [JNJ HEALTHCARE]

## (undated) DEVICE — KIT EVAC 400CC DIA3/16IN H PAT 12.5IN 3 SPR RND SHP PVC DRN

## (undated) DEVICE — PADDING CAST W4INXL4YD ST COT COHESIVE HND TEARABLE SPEC

## (undated) DEVICE — ELECTRODE NDL 2.8IN COAT VALLEYLAB

## (undated) DEVICE — INTENDED FOR TISSUE SEPARATION, AND OTHER PROCEDURES THAT REQUIRE A SHARP SURGICAL BLADE TO PUNCTURE OR CUT.: Brand: BARD-PARKER ® STAINLESS STEEL BLADES

## (undated) DEVICE — ENDOSCOPIC KIT 1.1+ OP4 CA DE 2 GWN AAMI LEVEL 3

## (undated) DEVICE — PRECISION THIN (9.0 X 0.38 X 31.0MM)

## (undated) DEVICE — NDL SPNE QNCKE 18GX3.5IN LF --

## (undated) DEVICE — (D)STRIP SKN CLSR 0.5X4IN WHT --

## (undated) DEVICE — SURGICEL ENDOSCP APPL